# Patient Record
Sex: FEMALE | Race: WHITE | Employment: PART TIME | URBAN - METROPOLITAN AREA
[De-identification: names, ages, dates, MRNs, and addresses within clinical notes are randomized per-mention and may not be internally consistent; named-entity substitution may affect disease eponyms.]

---

## 2017-01-01 ENCOUNTER — MED REC SCAN ONLY (OUTPATIENT)
Dept: FAMILY MEDICINE CLINIC | Facility: CLINIC | Age: 27
End: 2017-01-01

## 2017-01-04 ENCOUNTER — TELEPHONE (OUTPATIENT)
Dept: FAMILY MEDICINE CLINIC | Facility: CLINIC | Age: 27
End: 2017-01-04

## 2017-01-04 ENCOUNTER — LAB ENCOUNTER (OUTPATIENT)
Dept: LAB | Age: 27
End: 2017-01-04
Attending: INTERNAL MEDICINE
Payer: MEDICAID

## 2017-01-04 DIAGNOSIS — E89.0 HYPOTHYROIDISM, POSTSURGICAL: Primary | ICD-10-CM

## 2017-01-04 LAB
FREE T4: 0.3 NG/DL (ref 0.9–1.8)
TSI SER-ACNC: >100 MIU/ML (ref 0.35–5.5)

## 2017-01-04 PROCEDURE — 36415 COLL VENOUS BLD VENIPUNCTURE: CPT

## 2017-01-04 PROCEDURE — 84443 ASSAY THYROID STIM HORMONE: CPT

## 2017-01-04 PROCEDURE — 84439 ASSAY OF FREE THYROXINE: CPT

## 2017-01-05 ENCOUNTER — TELEPHONE (OUTPATIENT)
Dept: FAMILY MEDICINE CLINIC | Facility: CLINIC | Age: 27
End: 2017-01-05

## 2017-01-05 NOTE — TELEPHONE ENCOUNTER
Patient called back and stated that she spoke with Conner Dodge RN at the time of her office visit on 12/14/2016 and was told that she would wait for the lab results to be faxed over and she would call her back to advise her if she would need to go to the ER.   Pl

## 2017-01-05 NOTE — TELEPHONE ENCOUNTER
Patient states that she has been very fatigued and has a lab order for repeat thyroid labs from a doctor at an urgent care that she was seen at recently. She will be having the lab work done at THE [facility] so that the results can be available to our doctors.

## 2017-01-05 NOTE — TELEPHONE ENCOUNTER
ABDIRIZAK GEE. I did speak with patient on the phone yesterday, not at an [address] I never told patient I would advise if she needed to go to the ED in regards to thyroid results.

## 2017-01-05 NOTE — TELEPHONE ENCOUNTER
Labs not ordered by our office. Authorizing         1/4/2017 MD Dr. Dimple Leal is a endocrinologist. Patient would need to review with office of ordering provider.  We also do not manage patients levo

## 2017-01-06 ENCOUNTER — HOSPITAL ENCOUNTER (OUTPATIENT)
Age: 27
Discharge: ANOTHER HOSPITAL | DRG: 644 | End: 2017-01-06
Attending: FAMILY MEDICINE
Payer: MEDICAID

## 2017-01-06 ENCOUNTER — HOSPITAL ENCOUNTER (INPATIENT)
Facility: HOSPITAL | Age: 27
LOS: 2 days | Discharge: HOME | DRG: 644 | End: 2017-01-11
Attending: EMERGENCY MEDICINE | Admitting: HOSPITALIST
Payer: MEDICAID

## 2017-01-06 VITALS
DIASTOLIC BLOOD PRESSURE: 92 MMHG | OXYGEN SATURATION: 100 % | HEART RATE: 105 BPM | TEMPERATURE: 98 F | RESPIRATION RATE: 20 BRPM | SYSTOLIC BLOOD PRESSURE: 144 MMHG

## 2017-01-06 DIAGNOSIS — R53.1 WEAKNESS GENERALIZED: ICD-10-CM

## 2017-01-06 DIAGNOSIS — R00.0 TACHYCARDIA: ICD-10-CM

## 2017-01-06 DIAGNOSIS — R06.02 SOB (SHORTNESS OF BREATH): ICD-10-CM

## 2017-01-06 DIAGNOSIS — E03.9 HYPOTHYROIDISM, UNSPECIFIED TYPE: Primary | ICD-10-CM

## 2017-01-06 DIAGNOSIS — R53.1 WEAKNESS GENERALIZED: Primary | ICD-10-CM

## 2017-01-06 PROBLEM — E87.2 METABOLIC ACIDOSIS: Status: ACTIVE | Noted: 2017-01-06

## 2017-01-06 PROBLEM — E87.20 METABOLIC ACIDOSIS: Status: ACTIVE | Noted: 2017-01-06

## 2017-01-06 LAB
ALBUMIN SERPL-MCNC: 3.5 G/DL (ref 3.5–4.8)
ALP LIVER SERPL-CCNC: 104 U/L (ref 37–98)
ALT SERPL-CCNC: 32 U/L (ref 14–54)
AST SERPL-CCNC: 46 U/L (ref 15–41)
ATRIAL RATE: 103 BPM
BASOPHILS # BLD AUTO: 0.06 X10(3) UL (ref 0–0.1)
BASOPHILS NFR BLD AUTO: 1 %
BILIRUB SERPL-MCNC: 0.6 MG/DL (ref 0.1–2)
BILIRUB UR QL STRIP.AUTO: NEGATIVE
BUN BLD-MCNC: 7 MG/DL (ref 8–20)
CALCIUM BLD-MCNC: 8.4 MG/DL (ref 8.3–10.3)
CHLORIDE: 110 MMOL/L (ref 101–111)
CO2: 21 MMOL/L (ref 22–32)
COLOR UR AUTO: YELLOW
CREAT BLD-MCNC: 0.82 MG/DL (ref 0.55–1.02)
EOSINOPHIL # BLD AUTO: 0.15 X10(3) UL (ref 0–0.3)
EOSINOPHIL NFR BLD AUTO: 2.6 %
ERYTHROCYTE [DISTWIDTH] IN BLOOD BY AUTOMATED COUNT: 14.2 % (ref 11.5–16)
FREE T4: 0.3 NG/DL (ref 0.9–1.8)
GLUCOSE BLD-MCNC: 108 MG/DL (ref 65–99)
GLUCOSE BLD-MCNC: 98 MG/DL (ref 70–99)
GLUCOSE UR STRIP.AUTO-MCNC: NEGATIVE MG/DL
HCT VFR BLD AUTO: 41.6 % (ref 34–50)
HGB BLD-MCNC: 14.7 G/DL (ref 12–16)
IMMATURE GRANULOCYTE COUNT: 0.01 X10(3) UL (ref 0–1)
IMMATURE GRANULOCYTE RATIO %: 0.2 %
KETONES UR STRIP.AUTO-MCNC: NEGATIVE MG/DL
LEUKOCYTE ESTERASE UR QL STRIP.AUTO: NEGATIVE
LYMPHOCYTES # BLD AUTO: 2.28 X10(3) UL (ref 0.9–4)
LYMPHOCYTES NFR BLD AUTO: 39 %
M PROTEIN MFR SERPL ELPH: 6.8 G/DL (ref 6.1–8.3)
MCH RBC QN AUTO: 30.6 PG (ref 27–33.2)
MCHC RBC AUTO-ENTMCNC: 35.3 G/DL (ref 31–37)
MCV RBC AUTO: 86.7 FL (ref 81–100)
MONOCYTES # BLD AUTO: 0.87 X10(3) UL (ref 0.1–0.6)
MONOCYTES NFR BLD AUTO: 14.9 %
NEUTROPHIL ABS PRELIM: 2.47 X10 (3) UL (ref 1.3–6.7)
NEUTROPHILS # BLD AUTO: 2.47 X10(3) UL (ref 1.3–6.7)
NEUTROPHILS NFR BLD AUTO: 42.3 %
NITRITE UR QL STRIP.AUTO: NEGATIVE
P AXIS: 30 DEGREES
P-R INTERVAL: 172 MS
PH UR STRIP.AUTO: 7.5 [PH] (ref 4.5–8)
PLATELET # BLD AUTO: 189 10(3)UL (ref 150–450)
POTASSIUM SERPL-SCNC: 3.8 MMOL/L (ref 3.6–5.1)
PROT UR STRIP.AUTO-MCNC: NEGATIVE MG/DL
Q-T INTERVAL: 374 MS
QRS DURATION: 90 MS
QTC CALCULATION (BEZET): 489 MS
R AXIS: 68 DEGREES
RBC # BLD AUTO: 4.8 X10(6)UL (ref 3.8–5.1)
RBC UR QL AUTO: NEGATIVE
RED CELL DISTRIBUTION WIDTH-SD: 43.9 FL (ref 35.1–46.3)
SODIUM SERPL-SCNC: 142 MMOL/L (ref 136–144)
SP GR UR STRIP.AUTO: 1.02 (ref 1–1.03)
T AXIS: 11 DEGREES
TSI SER-ACNC: >100 MIU/ML (ref 0.35–5.5)
UROBILINOGEN UR STRIP.AUTO-MCNC: 0.2 MG/DL
VENTRICULAR RATE: 103 BPM
WBC # BLD AUTO: 5.8 X10(3) UL (ref 4–13)

## 2017-01-06 PROCEDURE — 93005 ELECTROCARDIOGRAM TRACING: CPT

## 2017-01-06 PROCEDURE — 99215 OFFICE O/P EST HI 40 MIN: CPT

## 2017-01-06 PROCEDURE — 82962 GLUCOSE BLOOD TEST: CPT

## 2017-01-06 PROCEDURE — 93010 ELECTROCARDIOGRAM REPORT: CPT | Performed by: INTERNAL MEDICINE

## 2017-01-06 PROCEDURE — 99205 OFFICE O/P NEW HI 60 MIN: CPT

## 2017-01-06 PROCEDURE — 93010 ELECTROCARDIOGRAM REPORT: CPT

## 2017-01-06 PROCEDURE — 99220 INITIAL OBSERVATION CARE,LEVL III: CPT | Performed by: HOSPITALIST

## 2017-01-06 RX ORDER — POTASSIUM CHLORIDE 20 MEQ/1
40 TABLET, EXTENDED RELEASE ORAL ONCE
Status: COMPLETED | OUTPATIENT
Start: 2017-01-06 | End: 2017-01-06

## 2017-01-06 RX ORDER — ONDANSETRON 2 MG/ML
8 INJECTION INTRAMUSCULAR; INTRAVENOUS EVERY 6 HOURS PRN
Status: DISCONTINUED | OUTPATIENT
Start: 2017-01-06 | End: 2017-01-11

## 2017-01-06 RX ORDER — LEVOTHYROXINE SODIUM 175 UG/1
175 TABLET ORAL
COMMUNITY
End: 2017-01-27

## 2017-01-06 RX ORDER — ONDANSETRON 2 MG/ML
4 INJECTION INTRAMUSCULAR; INTRAVENOUS EVERY 4 HOURS PRN
Status: DISCONTINUED | OUTPATIENT
Start: 2017-01-06 | End: 2017-01-06

## 2017-01-06 RX ORDER — TOPIRAMATE 25 MG/1
50 TABLET ORAL 2 TIMES DAILY PRN
Status: DISCONTINUED | OUTPATIENT
Start: 2017-01-06 | End: 2017-01-07

## 2017-01-06 RX ORDER — MECLIZINE HCL 12.5 MG/1
12.5 TABLET ORAL 3 TIMES DAILY PRN
Status: DISCONTINUED | OUTPATIENT
Start: 2017-01-06 | End: 2017-01-07

## 2017-01-06 RX ORDER — TRAZODONE HYDROCHLORIDE 50 MG/1
50 TABLET ORAL NIGHTLY PRN
Status: DISCONTINUED | OUTPATIENT
Start: 2017-01-06 | End: 2017-01-11

## 2017-01-06 RX ORDER — ACETAMINOPHEN 325 MG/1
650 TABLET ORAL EVERY 6 HOURS PRN
Status: DISCONTINUED | OUTPATIENT
Start: 2017-01-06 | End: 2017-01-11

## 2017-01-06 RX ORDER — SODIUM CHLORIDE 9 MG/ML
INJECTION, SOLUTION INTRAVENOUS CONTINUOUS
Status: ACTIVE | OUTPATIENT
Start: 2017-01-06 | End: 2017-01-06

## 2017-01-06 RX ORDER — TOPIRAMATE 25 MG/1
50 TABLET ORAL 2 TIMES DAILY
Status: DISCONTINUED | OUTPATIENT
Start: 2017-01-06 | End: 2017-01-06

## 2017-01-06 RX ORDER — SODIUM CHLORIDE 9 MG/ML
INJECTION, SOLUTION INTRAVENOUS CONTINUOUS
Status: DISCONTINUED | OUTPATIENT
Start: 2017-01-06 | End: 2017-01-11

## 2017-01-06 NOTE — ED NOTES
Brought patient to bathroom via wheelchair, was able to get up to chair but holding onto cart and wheelchair to stand

## 2017-01-06 NOTE — ED PROVIDER NOTES
Patient Seen in: [facility]    History   Patient presents with:  Dizziness (neurologic)    Stated Complaint: high pulse, hypothyroidism    HPI    Patient is a 25-year-old female.   Coming in today with complaint of feeling w mcg by mouth before breakfast.   B Complex Vitamins (B-COMPLEX/B-12 OR),  Take by mouth.   ergocalciferol 38486 UNITS Oral Cap,  Take 1 capsule (50,000 Units total) by mouth once a week.    topiramate 50 MG Oral Tab,  Take 1 tablet (50 mg total) by mouth 2 (Temporal)  Resp 20  SpO2 100%  LMP 11/15/2016 (Approximate)        Physical Exam   Constitutional: She is oriented to person, place, and time. She appears well-developed and well-nourished. She appears distressed.    HENT:   Head: Normocephalic and atrauma evaluation    Follow-up:  On File, E D Attending  Patrick 61  [phone]    Today        Medications Prescribed:  Current Discharge Medication List

## 2017-01-06 NOTE — ED INITIAL ASSESSMENT (HPI)
C/o dizziness, sob, and lightheadedness with confusion x 3 days. Stated she spoke with her physician yesterday. She stated she has hypothyroidism and her pulse was 125 bpm this morning.

## 2017-01-06 NOTE — ED PROVIDER NOTES
Patient Seen in: [facility] Emergency Department    History   Patient presents with:  Dizziness (neurologic)    Stated Complaint:     HPI    39-year-old female presents reporting that her \"thyroid is low\".   Patient reports 2 years of thyroid issue Cholesterol Father    • Hypertension Father    • Heart Disease Neg    • Stroke Neg          Smoking Status: Never Smoker                      Smokeless Status: Never Used                        Alcohol Use: No                Review of Systems    Positive f other components within normal limits   FREE T4 (FREE THYROXINE) - Abnormal; Notable for the following:     Free T4 0.3 (*)     All other components within normal limits   CBC W/ DIFFERENTIAL - Abnormal; Notable for the following:     Monocyte Absolute 0.8

## 2017-01-06 NOTE — PROGRESS NOTES
[facility] [address]  [place], [address]  ?  01/06/2017  ? Re: Manuela Rodriguez  ? To Whom It May Concern:    Manuela Rodriguez was admitted to [facility] on 1/6/2017.     Please excuse Manuela Rodriguez from attending work for these

## 2017-01-06 NOTE — PLAN OF CARE
NURSING ADMISSION NOTE      Patient admitted via Cart  Oriented to room. Safety precautions initiated. Bed in low position. Call light in reach.   admissiion complete  Report given to RN

## 2017-01-06 NOTE — H&P
[facility]  History and Physical     Manuela Rodriguez Patient Status:  Emergency    10/27/1990 MRN [id number]   Location [facility] Attending Mary Villa MD   Hosp Day # 0 PCP Remy Mesa MD     Chief Complaint: Erven [place] before breakfast. Disp:  Rfl:    B Complex Vitamins (B-COMPLEX/B-12 OR) Take by mouth. Disp:  Rfl:    ergocalciferol 70311 UNITS Oral Cap Take 1 capsule (50,000 Units total) by mouth once a week.  Disp: 12 capsule Rfl: 0   topiramate 50 MG Oral Tab Take 1 t etiology; check orthostatics; possibly from hypothyroidism; neuro consult; possible MS? (says she was ruled out for MS at [facility] but I still have my own suspicions)  2. Possible pyelonephritis-empiric rocephin-send urine for culture   3.  Hypothyroidism-endo co

## 2017-01-06 NOTE — ED INITIAL ASSESSMENT (HPI)
Dizzy, lightheaded for about 2 days, feeling shes having palpitations, Endocrinolgist has been adjusting thyroid medication

## 2017-01-07 LAB
CORTISOL: 10.4 UG/DL
POTASSIUM SERPL-SCNC: 3.3 MMOL/L (ref 3.6–5.1)
POTASSIUM SERPL-SCNC: 3.7 MMOL/L (ref 3.6–5.1)

## 2017-01-07 PROCEDURE — 99223 1ST HOSP IP/OBS HIGH 75: CPT | Performed by: OTHER

## 2017-01-07 PROCEDURE — 99225 SUBSEQUENT OBSERVATION CARE: CPT | Performed by: HOSPITALIST

## 2017-01-07 RX ORDER — POTASSIUM CHLORIDE 20 MEQ/1
40 TABLET, EXTENDED RELEASE ORAL EVERY 4 HOURS
Status: COMPLETED | OUTPATIENT
Start: 2017-01-07 | End: 2017-01-07

## 2017-01-07 RX ORDER — IBUPROFEN 400 MG/1
400 TABLET ORAL EVERY 6 HOURS PRN
Status: DISCONTINUED | OUTPATIENT
Start: 2017-01-07 | End: 2017-01-11

## 2017-01-07 RX ORDER — TOPIRAMATE 25 MG/1
50 TABLET ORAL 2 TIMES DAILY
Status: DISCONTINUED | OUTPATIENT
Start: 2017-01-07 | End: 2017-01-11

## 2017-01-07 RX ORDER — POTASSIUM CHLORIDE 20 MEQ/1
40 TABLET, EXTENDED RELEASE ORAL ONCE
Status: COMPLETED | OUTPATIENT
Start: 2017-01-07 | End: 2017-01-07

## 2017-01-07 RX ORDER — MECLIZINE HYDROCHLORIDE 25 MG/1
25 TABLET ORAL 3 TIMES DAILY PRN
Status: DISCONTINUED | OUTPATIENT
Start: 2017-01-07 | End: 2017-01-08

## 2017-01-07 NOTE — PLAN OF CARE
METABOLIC/FLUID AND ELECTROLYTES - ADULT    • Electrolytes maintained within normal limits Progressing        NEUROLOGICAL - ADULT    • Achieves maximal functionality and self care Progressing        Patient/Family Goals    • Patient/Family Short Term Goal

## 2017-01-07 NOTE — PROGRESS NOTES
[facility]  Progress note     Sophy Fly Patient Status:  Emergency    10/27/1990 MRN [id number]   Location [facility] Attending Shahbaz Arellano MD   Hosp Day # 1 PCP Donaldo Campbell MD     Chief Complaint: dizziness possibly from hypothyroidism; neuro consult; possible MS? (says she was ruled out for MS at [facility] but I still have my own suspicions); cortisol normal  2.  Probable acute pyelonephritis-empiric rocephin-monitor urine for culture (U/A looks fine but grossly her

## 2017-01-07 NOTE — CONSULTS
[facility]    PATIENT'S NAME: Hugh Khan   ATTENDING PHYSICIAN: Tyrell Skinner M.D.   Pj Better: Jordon Barnes M.D.    PATIENT ACCOUNT#:   [de-identified]    LOCATION:  [address] [place] [address]  MEDICAL RECORD #:   [id number]       DATE OF BIRTH:  [birth year] HISTORY:  Hysterectomy and thyroidectomy in the past.    SOCIAL HISTORY:  No tobacco or alcohol use. FAMILY HISTORY:  Breast cancer in the mother and diabetes in the father. High cholesterol in the father.     MEDICATIONS:  Current medications included regular basis, 50 mg twice a day, not p.r.n., and Antivert as needed. She said Antivert is helping her. We will give 25 mg 3 times a day. PT and OT to see. Will follow up as needed. Please call with any questions.   The patient can be seen as outpatien

## 2017-01-07 NOTE — PLAN OF CARE
CARDIOVASCULAR - ADULT    • Absence of cardiac arrhythmias or at baseline Progressing        METABOLIC/FLUID AND ELECTROLYTES - ADULT    • Glucose maintained within prescribed range Progressing    • Electrolytes maintained within normal limits Progressing

## 2017-01-07 NOTE — PAYOR COMM NOTE
Attending Physician: Evette Cowan MD    Review Type: ADMISSION   Reviewer: Yogi Base       Date: January 7, 2017 - 2:21 PM  Payor: Moshe Mendez  Authorization Number: [id number]/A  Admit date: 1/6/2017  9:32 AM   Admitted from Emergency Dept.: yes    REVIEWER CO Dose Route User    1/7/2017 0945 Given 37.6 mcg Intravenous Georgina Bauman, PILLO      Meclizine HCl (ANTIVERT) tab 12.5 mg     Date Action Dose Route User    1/7/2017 0945 Given 12.5 mg Oral Mireille Goncalves RN    1/6/2017 2202 Given 12.5 mg Oral Salud Salas Monocyte Absolute 0.87 (*)     All other components within normal limits   CBC WITH DIFFERENTIAL WITH PLATELET    Narrative: The following orders were created for panel order CBC WITH DIFFERENTIAL WITH PLATELET.   Procedure facilitate increase in free T4 levels. 2.      Dizziness.  Evaluation per primary service.  Cortisol was 10.4 and normal, making adrenal insufficiency unlikely. Thank you for this consult.  Please call with further questions.

## 2017-01-08 ENCOUNTER — APPOINTMENT (OUTPATIENT)
Dept: ULTRASOUND IMAGING | Facility: HOSPITAL | Age: 27
DRG: 644 | End: 2017-01-08
Attending: HOSPITALIST
Payer: MEDICAID

## 2017-01-08 LAB
POTASSIUM SERPL-SCNC: 3.3 MMOL/L (ref 3.6–5.1)
POTASSIUM SERPL-SCNC: 3.7 MMOL/L (ref 3.6–5.1)

## 2017-01-08 PROCEDURE — 99225 SUBSEQUENT OBSERVATION CARE: CPT | Performed by: HOSPITALIST

## 2017-01-08 PROCEDURE — 76770 US EXAM ABDO BACK WALL COMP: CPT

## 2017-01-08 RX ORDER — MECLIZINE HYDROCHLORIDE 25 MG/1
25 TABLET ORAL EVERY 6 HOURS SCHEDULED
Status: DISCONTINUED | OUTPATIENT
Start: 2017-01-08 | End: 2017-01-11

## 2017-01-08 RX ORDER — POTASSIUM CHLORIDE 20 MEQ/1
40 TABLET, EXTENDED RELEASE ORAL EVERY 4 HOURS
Status: COMPLETED | OUTPATIENT
Start: 2017-01-08 | End: 2017-01-08

## 2017-01-08 RX ORDER — POTASSIUM CHLORIDE 20 MEQ/1
40 TABLET, EXTENDED RELEASE ORAL ONCE
Status: COMPLETED | OUTPATIENT
Start: 2017-01-08 | End: 2017-01-08

## 2017-01-08 RX ORDER — HEPARIN SODIUM 5000 [USP'U]/ML
5000 INJECTION, SOLUTION INTRAVENOUS; SUBCUTANEOUS EVERY 8 HOURS SCHEDULED
Status: DISCONTINUED | OUTPATIENT
Start: 2017-01-08 | End: 2017-01-11

## 2017-01-08 NOTE — PROGRESS NOTES
Pharmacy Note:   Antimicrobial Weight Dose Adjustment for: Ceftriaxone    Taylor Rico has been prescribed Ceftriaxone 1 gram daily. Estimated Creatinine Clearance: 99.3 mL/min (based on Cr of 0.82).           Weight = 104.3 kg          BMI

## 2017-01-08 NOTE — PLAN OF CARE
CARDIOVASCULAR - ADULT    • Absence of cardiac arrhythmias or at baseline Progressing        METABOLIC/FLUID AND ELECTROLYTES - ADULT    • Electrolytes maintained within normal limits Progressing        MUSCULOSKELETAL - ADULT    • Return mobility to safes

## 2017-01-08 NOTE — PROGRESS NOTES
[place] HOSPITALIST  Progress note     Aminah Bryce Patient Status:  Emergency    10/27/1990 MRN [id number]   Location [facility] Attending Phillip Eastman MD   Hosp Day # 2 PCP Loc Pinto MD     Chief Complaint: dizziness Epic.      ASSESSMENT / PLAN:     1. Dizziness-vertigo? possibly from hypothyroidism  2.  Probable acute pyelonephritis-empiric rocephin-monitor urine for culture (U/A looks fine but grossly her urine had white sediment in ER; also has flank pain and temp 9

## 2017-01-08 NOTE — PLAN OF CARE
MUSCULOSKELETAL - ADULT    • Return mobility to safest level of function Progressing        NEUROLOGICAL - ADULT    • Achieves stable or improved neurological status Progressing        Patient/Family Goals    • Patient/Family Long Term Goal Progressing

## 2017-01-09 ENCOUNTER — APPOINTMENT (OUTPATIENT)
Dept: CV DIAGNOSTICS | Facility: HOSPITAL | Age: 27
DRG: 644 | End: 2017-01-09
Attending: HOSPITALIST
Payer: MEDICAID

## 2017-01-09 LAB
BUN BLD-MCNC: 7 MG/DL (ref 8–20)
CALCIUM BLD-MCNC: 7.6 MG/DL (ref 8.3–10.3)
CHLORIDE: 117 MMOL/L (ref 101–111)
CO2: 21 MMOL/L (ref 22–32)
CREAT BLD-MCNC: 0.7 MG/DL (ref 0.55–1.02)
GLUCOSE BLD-MCNC: 72 MG/DL (ref 70–99)
POTASSIUM SERPL-SCNC: 3.2 MMOL/L (ref 3.6–5.1)
POTASSIUM SERPL-SCNC: 3.3 MMOL/L (ref 3.6–5.1)
SODIUM SERPL-SCNC: 146 MMOL/L (ref 136–144)

## 2017-01-09 PROCEDURE — 93306 TTE W/DOPPLER COMPLETE: CPT | Performed by: INTERNAL MEDICINE

## 2017-01-09 PROCEDURE — 99232 SBSQ HOSP IP/OBS MODERATE 35: CPT | Performed by: HOSPITALIST

## 2017-01-09 PROCEDURE — 93306 TTE W/DOPPLER COMPLETE: CPT

## 2017-01-09 RX ORDER — POTASSIUM CHLORIDE 20 MEQ/1
40 TABLET, EXTENDED RELEASE ORAL EVERY 4 HOURS
Status: COMPLETED | OUTPATIENT
Start: 2017-01-09 | End: 2017-01-09

## 2017-01-09 RX ORDER — POTASSIUM CHLORIDE 14.9 MG/ML
20 INJECTION INTRAVENOUS ONCE
Status: DISCONTINUED | OUTPATIENT
Start: 2017-01-10 | End: 2017-01-11

## 2017-01-09 NOTE — CM/SW NOTE
01/09/17 1600   CM/SW Screening   Referral [phone] staff; Chart review;Nursing rounds   Patient's Mental Status Alert;Oriented   Patient lives with (family)   Patient Status Prior to Admission   Independent with ADLs

## 2017-01-09 NOTE — PAYOR COMM NOTE
Attending Physician: Georgette Vallejo MD    Review Type: CONTINUED STAY  Reviewer: Adelita Marley     Date: January 9, 2017 - 2:46 PM  Payor: Moshe Mendez  Authorization Number: N/A  Admit date: 1/6/2017  9:32 AM        REVIEWER COMMENTS    Patient feels dizzy mEq     Date Action Dose Route User    1/8/2017 2134 Given 40 mEq Oral Cottie Nissen, RN      Potassium Chloride ER (K-DUR M20) CR tab 40 mEq     Date Action Dose Route User    1/9/2017 1232 Given 40 mEq Oral Manjit Gould RN    1/9/2017 0830 Given 36

## 2017-01-09 NOTE — PHYSICAL THERAPY NOTE
PT evaluation order received. Attempted to visit patient who is currently busy due to echo. Will re-attempt later today or tomorrow as schedule allows.

## 2017-01-09 NOTE — PROGRESS NOTES
[facility]  Progress Note    Baljinder Conley Patient Status:  Observation    10/27/1990 MRN [id number]   [facility] 5NW-A Attending Danielle Varela MD   Hosp Day # 3 PCP Radha Eckert MD       Assessment/Plan:    1.  Severe postprocedural h Neck swelling     Pain in joints     Dizziness     Headache     Fatigue     Allergic rhinitis     White matter lesion of central nervous system     New persistent daily headache     Multiple sclerosis (HCC)     PCOS (polycystic ovarian syndrome)     Hypoth

## 2017-01-09 NOTE — PROGRESS NOTES
[facility]  Progress Note     Cody Vicente Patient Status:  Inpatient    10/27/1990 MRN [id number]   [facility] 5NW-A Attending Rodrigo Mesa MD   Hosp Day # 3 PCP Zunilda Dove MD     Chief Complaint: dizziness    S: Patient feels ago, worse with standing up-check orthostasis and r/o vertigo, states she was dx in the past with BPPV, Meclizine did help in the past?  3. No nystagmus  4. D/c abx- ucx neg  5.  PT eval    Plan of care: as above    Quality:  · DVT Prophylaxis: heparin  · C

## 2017-01-09 NOTE — PLAN OF CARE
CARDIOVASCULAR - ADULT    • Absence of cardiac arrhythmias or at baseline Not Progressing        METABOLIC/FLUID AND ELECTROLYTES - ADULT    • Electrolytes maintained within normal limits Not Progressing        NEUROLOGICAL - ADULT    • Achieves stable or Nonintractable headache     FH: breast cancer in first degree relative when <48years old     Metabolic acidosis     Pyelonephritis     Hypothyroidism, unspecified type     Weakness generalized     Tachycardia     Dizziness and giddiness       Active issue

## 2017-01-09 NOTE — PLAN OF CARE
CARDIOVASCULAR - ADULT    • Absence of cardiac arrhythmias or at baseline Progressing        MUSCULOSKELETAL - ADULT    • Return mobility to safest level of function Progressing        Patient/Family Goals    • Patient/Family Long Term Goal Progressing

## 2017-01-10 LAB
HAV IGM SER QL: 2 MG/DL (ref 1.7–3)
POTASSIUM SERPL-SCNC: 3.3 MMOL/L (ref 3.6–5.1)
POTASSIUM SERPL-SCNC: 3.7 MMOL/L (ref 3.6–5.1)

## 2017-01-10 PROCEDURE — 99232 SBSQ HOSP IP/OBS MODERATE 35: CPT | Performed by: HOSPITALIST

## 2017-01-10 PROCEDURE — 99233 SBSQ HOSP IP/OBS HIGH 50: CPT | Performed by: OTHER

## 2017-01-10 RX ORDER — POTASSIUM CHLORIDE 20 MEQ/1
40 TABLET, EXTENDED RELEASE ORAL ONCE
Status: COMPLETED | OUTPATIENT
Start: 2017-01-10 | End: 2017-01-10

## 2017-01-10 RX ORDER — MECLIZINE HYDROCHLORIDE 25 MG/1
25 TABLET ORAL 3 TIMES DAILY PRN
Qty: 30 TABLET | Refills: 0 | Status: SHIPPED | OUTPATIENT
Start: 2017-01-10 | End: 2017-06-12

## 2017-01-10 RX ORDER — ERGOCALCIFEROL 1.25 MG/1
50000 CAPSULE ORAL WEEKLY
Qty: 12 CAPSULE | Refills: 0 | Status: SHIPPED | OUTPATIENT
Start: 2017-01-10 | End: 2017-01-27

## 2017-01-10 RX ORDER — POTASSIUM CHLORIDE 20 MEQ/1
40 TABLET, EXTENDED RELEASE ORAL EVERY 4 HOURS
Status: COMPLETED | OUTPATIENT
Start: 2017-01-10 | End: 2017-01-10

## 2017-01-10 NOTE — CONSULTS
[facility]  Report of Consultation    Columba Peralta Patient Status:  Inpatient    10/27/1990 MRN [id number]   [facility] 5NW-A Attending Mack Tripp MD   Hosp Day # 4 PCP Arturo Flores MD     Reason for been longstanding but seems it has worsened over past 2 weeks. Denies any syncope. States when she gets up her HR will increase to 115-120 bpm.  Had holter in past which just showed atrial runs but nothing to correspond with her symptoms.   Echo shows a n topiramate (Topamax) tab 50 mg, 50 mg, Oral, BID  •  ibuprofen (MOTRIN) tab 400 mg, 400 mg, Oral, Q6H PRN  •  TraZODone HCl (DESYREL) tab 50 mg, 50 mg, Oral, Nightly PRN  •  ondansetron HCl (ZOFRAN) injection 8 mg, 8 mg, Intravenous, Q6H PRN  •  0.9%  NaCl

## 2017-01-10 NOTE — PROGRESS NOTES
01/10/17 1718   Provider Notification   Reason for Communication Change in status  (Positive orthostatics)   Provider Name Dr. Michele Russell   Method of Communication Page   Response Waiting for response   Notification Time 26       Paged Dr. Michele Russell to notify

## 2017-01-10 NOTE — PHYSICAL THERAPY NOTE
PHYSICAL THERAPY EVALUATION - INPATIENT     Room Number: 842/968-U  Evaluation Date: 1/10/2017  Type of Evaluation: Initial  Physician Order: PT Eval and Treat    Presenting Problem: severe hypothryroidism  Reason for Therapy: Mobility Dysfunction and while patient was trying to get dressed; patient denies injury, was able to get up and go to work. Pt states that her father is Sandy Goes" and works full-time. He will be incapable of physically assisting or even driving after hospital D/C.     SUBJECTIVE  \ (including adjusting bedclothes, sheets and blankets)?: None   -   Sitting down on and standing up from a chair with arms (e.g., wheelchair, bedside commode, etc.): None   -   Moving from lying on back to sitting on the side of the bed?: None   How much he marching  Ankle pumps  LAQ  Transfer training    Patient End of Session: Up in chair;Needs met;Call light within reach;RN aware of session/findings; All patient questions and concerns addressed    ASSESSMENT     Patient is a 27year old female admitted 1/6/

## 2017-01-10 NOTE — HISTORICAL OFFICE NOTE
Blanco Jc  : 10/27/1990  ACCOUNT:  [id number]  [phone]  PCP: Dr. Alicia Simeon     TODAY'S DATE: 2016  DICTATED BY:  Aislinn Shipman M.D.]    CHIEF COMPLAINT: [Followup of Chest discomfort/tightness and Followup of Palpitations.]    HPI:    [On  used tobacco. denies smoking. CAFFEINE: no caffeine. ALCOHOL: denies drinking. EXERCISE: active.      ALLERGIES: No Known Allergies    MEDICATIONS: Selected prescriptions see below    VITAL SIGNS: [B/P - 104/60 , Pulse - 115, Weight -  204, Height -   65 , evaluation.]    PRESCRIPTIONS:   02/08/16 *Metoprolol Succinate 25MG      1/2 tablet twice daily                   02/08/16 Synthroid             50MCG     one tablet daily                         02/01/16 Calcitriol            0.5MCG    one capsule daily

## 2017-01-10 NOTE — PLAN OF CARE
MUSCULOSKELETAL - ADULT    • Return mobility to safest level of function Progressing        NEUROLOGICAL - ADULT    • Achieves maximal functionality and self care Progressing        Patient/Family Goals    • Patient/Family Long Term Goal Progressing    • P

## 2017-01-10 NOTE — PROGRESS NOTES
01/10/17 1001   Provider Notification   Reason for Communication New consult   Provider Name Dr. Fermin Castellon of Communication Call   Response Waiting for response   Notification Time [phone]     Re-consulted neuro per Dr. Lorrie Sims for pt's persistent di

## 2017-01-11 ENCOUNTER — TELEPHONE (OUTPATIENT)
Dept: FAMILY MEDICINE CLINIC | Facility: CLINIC | Age: 27
End: 2017-01-11

## 2017-01-11 VITALS
RESPIRATION RATE: 18 BRPM | TEMPERATURE: 98 F | WEIGHT: 231.5 LBS | HEART RATE: 92 BPM | SYSTOLIC BLOOD PRESSURE: 120 MMHG | OXYGEN SATURATION: 100 % | HEIGHT: 67 IN | BODY MASS INDEX: 36.34 KG/M2 | DIASTOLIC BLOOD PRESSURE: 75 MMHG

## 2017-01-11 LAB
FREE T4: 0.7 NG/DL (ref 0.9–1.8)
PHOSPHATE SERPL-MCNC: 2.2 MG/DL (ref 2.5–4.9)
POTASSIUM SERPL-SCNC: 3.4 MMOL/L (ref 3.6–5.1)
TSI SER-ACNC: >100 MIU/ML (ref 0.35–5.5)

## 2017-01-11 PROCEDURE — 99239 HOSP IP/OBS DSCHRG MGMT >30: CPT | Performed by: HOSPITALIST

## 2017-01-11 RX ORDER — POTASSIUM CHLORIDE 20 MEQ/1
40 TABLET, EXTENDED RELEASE ORAL EVERY 4 HOURS
Status: DISPENSED | OUTPATIENT
Start: 2017-01-11 | End: 2017-01-11

## 2017-01-11 NOTE — PLAN OF CARE
MUSCULOSKELETAL - ADULT    • Return mobility to safest level of function Progressing        Patient/Family Goals    • Patient/Family Long Term Goal Progressing    • Patient/Family Short Term Goal Progressing        Received patient A/O x4, VSS with with co

## 2017-01-11 NOTE — DISCHARGE SUMMARY
[facility] [address]  [place], [address]  ?  01/11/2017  ? Re: Dali Joel  ? To Whom It May Concern:    Dali Joel was admitted to [facility] from 1/6/2017 to 01/11/2017.     Please excuse Dali Joel from attendin

## 2017-01-11 NOTE — PLAN OF CARE
NEUROLOGICAL - ADULT    • Achieves stable or improved neurological status Progressing    • Achieves maximal functionality and self care Progressing          CARDIOVASCULAR - ADULT    • Absence of cardiac arrhythmias or at baseline Progressing          META Allergic rhinitis     White matter lesion of central nervous system     New persistent daily headache     Multiple sclerosis (HCC)     PCOS (polycystic ovarian syndrome)     Hypothyroidism     Cough     Post procedure discomfort     Palpitations     SOB (s

## 2017-01-11 NOTE — PROGRESS NOTES
[facility] Neurology Progress Note    [place] Patient Status:  Inpatient    10/27/1990 MRN [id number]   [facility] 5NW-A Attending Carlos Barba MD   Hosp Day # 5 PCP Harley Feliciano MD     CC: Dizziness and Lightheadedness 107/76; Sitting 121/81; Standing 131/78  · Antivert 25mg QID  · Palpitations  · Severe hypothyroidism- TSH on 1/6= >100.000  · Synthroid 100mcg IV daily  · Migraines- continue Topamax  · Periventricular and subcortical white abnormalities- MS ruled out

## 2017-01-11 NOTE — PROGRESS NOTES
[facility]  Progress Note    Kaela Bailey Patient Status:  Observation    10/27/1990 MRN [id number]   [facility] 5NW-A Attending Sanam Nair MD   Hosp Day # 5 PCP Hemanth Donohue MD       Assessment/Plan:    1.  Severe postprocedural h syndrome)     Hypothyroidism     Cough     Post procedure discomfort     Palpitations     SOB (shortness of breath)     Heterozygous MTHFR mutation C677T (HCC)     Migraine headache     Insomnia     Nonintractable headache     FH: breast cancer in first de

## 2017-01-11 NOTE — TELEPHONE ENCOUNTER
LMOM at number provided above as CB number for patient to [facility] and discuss.

## 2017-01-11 NOTE — PHYSICAL THERAPY NOTE
PHYSICAL THERAPY TREATMENT NOTE - INPATIENT    Room Number: 534/118-Z     Session: 1   Number of Visits to Meet Established Goals: 5    Presenting Problem: severe hypothryroidism    Problem List  Principal Problem:    Hypothyroidism, unspecified type  Act None   -   Sitting down on and standing up from a chair with arms (e.g., wheelchair, bedside commode, etc.): None   -   Moving from lying on back to sitting on the side of the bed?: None   How much help from another person does the patient currently need. Ahmet Parks session/findings; All patient questions and concerns addressed    ASSESSMENT   Patient is a 27year old female admitted 1/6/2017 for severe hypothyroidism. The patient is demonstrating improved activity tolerance today but appears limited with further progr Goal #5      Goal #6      Goal Comments: Goals established on 1/10/2017;ongoing 1/11/17

## 2017-01-11 NOTE — PLAN OF CARE
CARDIOVASCULAR - ADULT    • Absence of cardiac arrhythmias or at baseline Progressing          NEUROLOGICAL - ADULT    • Achieves stable or improved neurological status Progressing    • Achieves maximal functionality and self care Progressing          PROB Nonintractable headache     FH: breast cancer in first degree relative when <48years old     Metabolic acidosis     Pyelonephritis     Hypothyroidism, unspecified type     Weakness generalized     Tachycardia     Dizziness and giddiness       Active issue

## 2017-01-11 NOTE — DISCHARGE SUMMARY
[facility] HOSPITALIST  DISCHARGE SUMMARY     Debo Cole Patient Status:  Inpatient    10/27/1990 MRN [id number]   [facility] 5NW-A Attending Kiko Gomez MD   [facility] Day # 5 PCP Salima Fortune MD     Date of Admission: 2017  Date of Disch tablet by mouth daily. Refills:  0       ergocalciferol 49037 UNITS Caps   Commonly known as:  DRISDOL/VITAMIN D2        Take 1 capsule (50,000 Units total) by mouth once a week.     Stop taking on:  2/9/2017   Quantity:  12 capsule   Refills:  0       t auscultation bilaterally. No wheezes. No rhonchi. Cardiovascular: S1, S2. Regular rate and rhythm. No murmurs, rubs or gallops. Abdomen: Soft, nontender, nondistended. Positive bowel sounds. No rebound or guarding.   Neurologic: No focal neurological de

## 2017-01-11 NOTE — PROGRESS NOTES
[facility]  Progress Note     Debo Cole Patient Status:  Inpatient    10/27/1990 MRN [id number]   [facility] 5NW-A Attending Kiko Gomez MD   Hosp Day # 5 PCP Salima Fortune MD     Chief Complaint: dizziness    S: Patient state levothyroxine sodium  100 mcg Intravenous Daily   • topiramate  50 mg Oral BID       ASSESSMENT / PLAN:     1. Hypothyroidism- severe, postprocedural on iv T4, better clinically  2.  Dizziness-started 1 week ago, worse with standing up-check orthostasis and

## 2017-01-11 NOTE — PROGRESS NOTES
NURSING DISCHARGE NOTE    Discharged Home via Wheelchair. Accompanied by Family member  Belongings Taken by patient/family. Discharge instructions and prescribed medications discussed with pt, who verbalized understanding of plan.  Pertinent handout

## 2017-01-13 ENCOUNTER — OFFICE VISIT (OUTPATIENT)
Dept: FAMILY MEDICINE CLINIC | Facility: CLINIC | Age: 27
End: 2017-01-13

## 2017-01-13 ENCOUNTER — APPOINTMENT (OUTPATIENT)
Dept: LAB | Age: 27
End: 2017-01-13
Attending: INTERNAL MEDICINE
Payer: MEDICAID

## 2017-01-13 VITALS
HEIGHT: 65 IN | HEART RATE: 102 BPM | TEMPERATURE: 98 F | RESPIRATION RATE: 16 BRPM | DIASTOLIC BLOOD PRESSURE: 74 MMHG | SYSTOLIC BLOOD PRESSURE: 120 MMHG

## 2017-01-13 DIAGNOSIS — E87.6 HYPOKALEMIA: ICD-10-CM

## 2017-01-13 DIAGNOSIS — R53.1 GENERALIZED WEAKNESS: ICD-10-CM

## 2017-01-13 DIAGNOSIS — E03.9 HYPOTHYROIDISM, UNSPECIFIED TYPE: Primary | ICD-10-CM

## 2017-01-13 LAB
BUN BLD-MCNC: 12 MG/DL (ref 8–20)
CALCIUM BLD-MCNC: 8.3 MG/DL (ref 8.3–10.3)
CHLORIDE: 111 MMOL/L (ref 101–111)
CO2: 26 MMOL/L (ref 22–32)
CREAT BLD-MCNC: 0.82 MG/DL (ref 0.55–1.02)
GLUCOSE BLD-MCNC: 82 MG/DL (ref 70–99)
POTASSIUM SERPL-SCNC: 3.9 MMOL/L (ref 3.6–5.1)
SODIUM SERPL-SCNC: 143 MMOL/L (ref 136–144)

## 2017-01-13 PROCEDURE — 36415 COLL VENOUS BLD VENIPUNCTURE: CPT

## 2017-01-13 PROCEDURE — 80048 BASIC METABOLIC PNL TOTAL CA: CPT

## 2017-01-13 PROCEDURE — 99213 OFFICE O/P EST LOW 20 MIN: CPT | Performed by: INTERNAL MEDICINE

## 2017-01-13 NOTE — PROGRESS NOTES
[facility] Internal Medicine Office Note  Chief Complaint:   Patient presents with:  Hospital F/U: admit Epifanio Arch 1/6/17-1/11/17 for hypothyroid      HPI:   This is a 27year old female coming in for hospital follow up  TSH >100   She felt diz Thyroidectomy     • Other surgical history  01/13/15     Subtotal Thyroidectomy    • Other surgical history Left      foot sx x3   • D & c  6/22/2011   • D & c  2009   • Laparoscopy,pelvic,biopsy  2008     drill ovary     Family History   Problem Relation 38.52 kg/(m^2) as calculated from the following:    Height as of this encounter: 65\". Weight as of 1/6/17: 231 lb 7.7 oz. Vital signs reviewed. Appears stated age, well groomed. Physical Exam   Vitals reviewed.    Constitutional: She appears well-dev understanding. Patient is notified to call with any questions, complications, allergies, or worsening or changing symptoms. Patient is to call with any side effects or complications from the treatments as a result of today.      Cristian Hope MD

## 2017-01-13 NOTE — PATIENT INSTRUCTIONS
Thank you for choosing Valentina Pacheco MD at [place] [address]  To Do: Vimal Licea  1. Blood work today  2.  Call your insurance to find endocrinologist and make follow up appointment    • Please signup for [facility] CHART, which is electronic access to your please call Central Scheduling at [phone]  Please allow our office 5 business days to contact you regarding any testing results.     Refill policies:   Allow 3 business days for refills; controlled substances may take longer and must be picked up from

## 2017-01-18 ENCOUNTER — OFFICE VISIT (OUTPATIENT)
Dept: FAMILY MEDICINE CLINIC | Facility: CLINIC | Age: 27
End: 2017-01-18

## 2017-01-18 VITALS
WEIGHT: 219 LBS | TEMPERATURE: 98 F | BODY MASS INDEX: 34.37 KG/M2 | DIASTOLIC BLOOD PRESSURE: 72 MMHG | HEART RATE: 103 BPM | SYSTOLIC BLOOD PRESSURE: 112 MMHG | OXYGEN SATURATION: 99 % | HEIGHT: 67 IN | RESPIRATION RATE: 25 BRPM

## 2017-01-18 DIAGNOSIS — J00 ACUTE NASOPHARYNGITIS: Primary | ICD-10-CM

## 2017-01-18 DIAGNOSIS — J34.89 SINUS PRESSURE: ICD-10-CM

## 2017-01-18 PROCEDURE — 99213 OFFICE O/P EST LOW 20 MIN: CPT | Performed by: NURSE PRACTITIONER

## 2017-01-18 NOTE — PATIENT INSTRUCTIONS
Self-Care for Sore Throats  Sore throats happen for many reasons, such as colds, allergies, and infections caused by viruses or bacteria. In any case, your throat becomes red and sore.  Your goal for self-care is to reduce your discomfort while giving you Contact your healthcare provider if you have:  · A temperature over 101°F (38.3°C)  · White spots on the throat  · Great difficulty swallowing  · Trouble breathing  · A skin rash  · Recent exposure to someone else with strep bacteria  · Severe hoarseness a · Over-the-counter decongestants may be used unless a similar medicine was prescribed. Nasal sprays work the fastest. Use one that contains phenylephrine or oxymetazoline. First blow the nose gently. Then use the spray.  Do not use these medicines more ofte © 2735-6559 [address], [address]. All rights reserved. This information is not intended as a substitute for professional medical care. Always follow your healthcare professional's instructions.

## 2017-01-18 NOTE — PROGRESS NOTES
HPI:   Goldy Finn is a 27year old female who presents with ill symptoms for  4  days.  Patient reports sore throat, congestion, dry cough, cough is not keeping pt up at night, ear pain, sinus pain, OTC cold meds have not been helping, sniffling and sn • Heart Disease Neg    • Stroke Neg         Smoking Status: Never Smoker                      Smokeless Status: Never Used                        Alcohol Use: No                  REVIEW OF SYSTEMS:   GENERAL: feels well otherwise, fatigued with illness  HE · Suck on throat lozenges, cough drops, hard candy, ice chips, or frozen fruit-juice bars. Use the sugar-free versions if your diet or medical condition requires them. Gargle to ease irritation  Gargling every hour or 2 can ease irritation.  Try gargling w Sinusitis (No Antibiotics)    The sinuses are air-filled spaces within the bones of the face. They connect to the inside of the nose. Sinusitis is an inflammation of the tissue lining the sinus cavity.  Sinus inflammation can occur during a cold. It can als · Use acetaminophen or ibuprofen to control pain, unless another pain medicine was prescribed. (If you have chronic liver or kidney disease or ever had a stomach ulcer, talk with your doctor before using these medicines.  Aspirin should never be used in any

## 2017-01-21 ENCOUNTER — OFFICE VISIT (OUTPATIENT)
Dept: FAMILY MEDICINE CLINIC | Facility: CLINIC | Age: 27
End: 2017-01-21

## 2017-01-21 ENCOUNTER — LAB ENCOUNTER (OUTPATIENT)
Dept: LAB | Age: 27
End: 2017-01-21
Attending: INTERNAL MEDICINE
Payer: MEDICAID

## 2017-01-21 VITALS
SYSTOLIC BLOOD PRESSURE: 122 MMHG | DIASTOLIC BLOOD PRESSURE: 78 MMHG | WEIGHT: 227 LBS | BODY MASS INDEX: 37.82 KG/M2 | HEIGHT: 65 IN | HEART RATE: 100 BPM | RESPIRATION RATE: 16 BRPM

## 2017-01-21 DIAGNOSIS — E03.9 ACQUIRED HYPOTHYROIDISM: Primary | ICD-10-CM

## 2017-01-21 DIAGNOSIS — E03.9 ACQUIRED HYPOTHYROIDISM: ICD-10-CM

## 2017-01-21 LAB
25-HYDROXYVITAMIN D (TOTAL): 15.7 NG/ML (ref 30–100)
ALBUMIN SERPL-MCNC: 3.7 G/DL (ref 3.5–4.8)
ALP LIVER SERPL-CCNC: 73 U/L (ref 37–98)
ALT SERPL-CCNC: 49 U/L (ref 14–54)
AST SERPL-CCNC: 50 U/L (ref 15–41)
BASOPHILS # BLD AUTO: 0.03 X10(3) UL (ref 0–0.1)
BASOPHILS NFR BLD AUTO: 0.6 %
BILIRUB SERPL-MCNC: 0.8 MG/DL (ref 0.1–2)
BUN BLD-MCNC: 7 MG/DL (ref 8–20)
CALCIUM BLD-MCNC: 8.4 MG/DL (ref 8.3–10.3)
CHLORIDE: 108 MMOL/L (ref 101–111)
CO2: 26 MMOL/L (ref 22–32)
CREAT BLD-MCNC: 0.87 MG/DL (ref 0.55–1.02)
EOSINOPHIL # BLD AUTO: 0.13 X10(3) UL (ref 0–0.3)
EOSINOPHIL NFR BLD AUTO: 2.6 %
ERYTHROCYTE [DISTWIDTH] IN BLOOD BY AUTOMATED COUNT: 14.8 % (ref 11.5–16)
EST. AVERAGE GLUCOSE BLD GHB EST-MCNC: 94 MG/DL (ref 68–126)
FREE T4: 0.6 NG/DL (ref 0.9–1.8)
GLUCOSE BLD-MCNC: 89 MG/DL (ref 70–99)
HAV AB SERPL IA-ACNC: 1465 PG/ML (ref 193–986)
HAV IGM SER QL: 2 MG/DL (ref 1.7–3)
HBA1C MFR BLD HPLC: 4.9 % (ref ?–5.7)
HCT VFR BLD AUTO: 42.5 % (ref 34–50)
HGB BLD-MCNC: 14.3 G/DL (ref 12–16)
IMMATURE GRANULOCYTE COUNT: 0.01 X10(3) UL (ref 0–1)
IMMATURE GRANULOCYTE RATIO %: 0.2 %
LYMPHOCYTES # BLD AUTO: 1.87 X10(3) UL (ref 0.9–4)
LYMPHOCYTES NFR BLD AUTO: 37.7 %
M PROTEIN MFR SERPL ELPH: 6.9 G/DL (ref 6.1–8.3)
MCH RBC QN AUTO: 31 PG (ref 27–33.2)
MCHC RBC AUTO-ENTMCNC: 33.6 G/DL (ref 31–37)
MCV RBC AUTO: 92 FL (ref 81–100)
MONOCYTES # BLD AUTO: 0.5 X10(3) UL (ref 0.1–0.6)
MONOCYTES NFR BLD AUTO: 10.1 %
NEUTROPHIL ABS PRELIM: 2.42 X10 (3) UL (ref 1.3–6.7)
NEUTROPHILS # BLD AUTO: 2.42 X10(3) UL (ref 1.3–6.7)
NEUTROPHILS NFR BLD AUTO: 48.8 %
PLATELET # BLD AUTO: 190 10(3)UL (ref 150–450)
POTASSIUM SERPL-SCNC: 3.5 MMOL/L (ref 3.6–5.1)
RBC # BLD AUTO: 4.62 X10(6)UL (ref 3.8–5.1)
RED CELL DISTRIBUTION WIDTH-SD: 48.7 FL (ref 35.1–46.3)
SODIUM SERPL-SCNC: 142 MMOL/L (ref 136–144)
T3 SERPL-MCNC: 54 NG/DL (ref 60–181)
TSI SER-ACNC: 57.3 MIU/ML (ref 0.35–5.5)
WBC # BLD AUTO: 5 X10(3) UL (ref 4–13)

## 2017-01-21 PROCEDURE — 84482 T3 REVERSE: CPT

## 2017-01-21 PROCEDURE — 83735 ASSAY OF MAGNESIUM: CPT

## 2017-01-21 PROCEDURE — 83036 HEMOGLOBIN GLYCOSYLATED A1C: CPT

## 2017-01-21 PROCEDURE — 99214 OFFICE O/P EST MOD 30 MIN: CPT | Performed by: INTERNAL MEDICINE

## 2017-01-21 PROCEDURE — 85025 COMPLETE CBC W/AUTO DIFF WBC: CPT

## 2017-01-21 PROCEDURE — 82397 CHEMILUMINESCENT ASSAY: CPT

## 2017-01-21 PROCEDURE — 84439 ASSAY OF FREE THYROXINE: CPT

## 2017-01-21 PROCEDURE — 84480 ASSAY TRIIODOTHYRONINE (T3): CPT

## 2017-01-21 PROCEDURE — 36415 COLL VENOUS BLD VENIPUNCTURE: CPT

## 2017-01-21 PROCEDURE — 84443 ASSAY THYROID STIM HORMONE: CPT

## 2017-01-21 PROCEDURE — 82306 VITAMIN D 25 HYDROXY: CPT

## 2017-01-21 PROCEDURE — 82607 VITAMIN B-12: CPT

## 2017-01-21 PROCEDURE — 80053 COMPREHEN METABOLIC PANEL: CPT

## 2017-01-21 NOTE — PROGRESS NOTES
CC: Patient presents with:  Hospital F/U: thyroid issues       HPI:   Here to f/u after recent hospitalization. She says she takes synthroid daily. Current Outpatient Prescriptions:  Potassium 99 MG Oral Tab Take 2 tablets by mouth.  Disp:  Rfl Migraine headache     Insomnia     Nonintractable headache     FH: breast cancer in first degree relative when <48years old     Metabolic acidosis     Pyelonephritis     Hypothyroidism, unspecified type     Weakness generalized     Tachycardia     Dizzin tumor as pt without thyroid. Will run labs. Close f/u in few weeks. She lost endo doc as no longer takes insurance. Father in the room, all questions answered.      Greater than 50% of the encounter time was spent on counseling and/or coordination of care a

## 2017-01-23 ENCOUNTER — TELEPHONE (OUTPATIENT)
Dept: FAMILY MEDICINE CLINIC | Facility: CLINIC | Age: 27
End: 2017-01-23

## 2017-01-23 NOTE — TELEPHONE ENCOUNTER
Tried calling patient, phone is not accepting calls at this time. Patient needs to fill out our questionnaire to fill out [facility] paperwork. Will place paperwork in red triage folder and attempt to call patient later today.      [facility] paperwork given to MD at

## 2017-01-23 NOTE — TELEPHONE ENCOUNTER
Called number given and it says \"at the subscribers request.. Solange Santiago \"  Cannot leave message.

## 2017-01-24 NOTE — TELEPHONE ENCOUNTER
Attemped to call back, at the subscribers request unable to receive incoming calls. "[employer] message sent.

## 2017-01-24 NOTE — TELEPHONE ENCOUNTER
Patient reports she still has HR of 130 bpm with associated chest pain and SOB. Patient states she discussed with Dr Simona Tapia not returning to the ER \"because they dont do anything anyways. \" Patient advised that our recommendation with HR of 130 and chest

## 2017-01-24 NOTE — TELEPHONE ENCOUNTER
Pt came in on 1/24/2017 to bring in questionnaire for disability and FMLA Forms. Attached paperwork with her FMLA paperwork.  Thank you

## 2017-01-24 NOTE — TELEPHONE ENCOUNTER
Patient does not want to come to office to fill out questionnaire. Notified patient we need this information to complete the paperwork, offered to fax form to [facility] if that is closer for patient.  Patient asking if we can fax to [facility] inside [facility] where she wor

## 2017-01-25 ENCOUNTER — TELEPHONE (OUTPATIENT)
Dept: FAMILY MEDICINE CLINIC | Facility: CLINIC | Age: 27
End: 2017-01-25

## 2017-01-25 LAB — TRIIODOTHYRONINE, REVERSE: 9.4 NG/DL

## 2017-01-25 NOTE — TELEPHONE ENCOUNTER
Message        Good Morning,               Per [employer] this test does not meet medical criteria and a peer to peer has been requested. Physician can complete by calling [phone] follow prompts and use tracking#[id number].  Please advise if physician wi

## 2017-01-26 LAB — LEPTIN: 28.1 NG/ML

## 2017-01-27 RX ORDER — LEVOTHYROXINE SODIUM 0.2 MG/1
200 TABLET ORAL
Qty: 90 TABLET | Refills: 0 | Status: SHIPPED | OUTPATIENT
Start: 2017-01-27 | End: 2017-08-09

## 2017-01-27 RX ORDER — ERGOCALCIFEROL 1.25 MG/1
50000 CAPSULE ORAL
Qty: 48 CAPSULE | Refills: 0 | Status: SHIPPED | OUTPATIENT
Start: 2017-01-30 | End: 2017-07-14

## 2017-01-27 NOTE — TELEPHONE ENCOUNTER
Paperwork signed by MD. Blanco Stanley sent to scan and copy placed in envelope at  for patient to . Patient aware.

## 2017-01-27 NOTE — PROGRESS NOTES
Quick Note:    Low thyroid, increase synthroid to 200mcg, high leptin.  Low vit D, start vit D 50,000 twice weekly for 6 months.  ______

## 2017-02-02 ENCOUNTER — TELEPHONE (OUTPATIENT)
Dept: FAMILY MEDICINE CLINIC | Facility: CLINIC | Age: 27
End: 2017-02-02

## 2017-02-02 NOTE — TELEPHONE ENCOUNTER
Pt stated she would call back, phone was not working. Advised to ask what ER she was at, and when and where were her most recent labs.   Pt did not call back as of 5:15 2/2/2017

## 2017-02-03 ENCOUNTER — HOSPITAL ENCOUNTER (OUTPATIENT)
Dept: MRI IMAGING | Age: 27
Discharge: HOME | End: 2017-02-03
Attending: INTERNAL MEDICINE
Payer: MEDICAID

## 2017-02-03 DIAGNOSIS — E03.9 ACQUIRED HYPOTHYROIDISM: ICD-10-CM

## 2017-02-03 PROCEDURE — 70553 MRI BRAIN STEM W/O & W/DYE: CPT

## 2017-02-03 PROCEDURE — A9575 INJ GADOTERATE MEGLUMI 0.1ML: HCPCS | Performed by: INTERNAL MEDICINE

## 2017-02-03 NOTE — TELEPHONE ENCOUNTER
We need to know if she was at [facility]? Attempted again to reach patient at last known phone number and it won't accept incoming calls. Patient has upcoming appt with Dr. Yinka Wolfe 2/13. Will attempt to sent mychart to patient to call us.

## 2017-02-03 NOTE — TELEPHONE ENCOUNTER
Patient phone in response to MyChart and said [address] is the best way to communicate with her. She was at [facility] 1/30 thur 2/2. I am requesting medical records.

## 2017-02-06 ENCOUNTER — TELEPHONE (OUTPATIENT)
Dept: FAMILY MEDICINE CLINIC | Facility: CLINIC | Age: 27
End: 2017-02-06

## 2017-02-06 ENCOUNTER — PATIENT MESSAGE (OUTPATIENT)
Dept: FAMILY MEDICINE CLINIC | Facility: CLINIC | Age: 27
End: 2017-02-06

## 2017-02-06 NOTE — TELEPHONE ENCOUNTER
Advised patient anytime her pulse is rapid and she feels symptomatic, she should be seen in ER. There is no way to be sure her heart is okay without a doctor checking her. Patient verbalized understanding.   Her pulse currently is 112 and she is short of

## 2017-02-06 NOTE — TELEPHONE ENCOUNTER
[facility] staff said patient called back and said she was coming in to office and will see a nurse when she comes.

## 2017-02-06 NOTE — TELEPHONE ENCOUNTER
Pulse is elevated per patient and she is here to have it checked. Patient states she is sleeping all day and her pulse at home at rest is in the 130's and with activity is 150s. Patient is also short of breath.   She is currently on thyroid medication 200m

## 2017-02-09 NOTE — TELEPHONE ENCOUNTER
Called the [facility] of information department at [facility] for medical records of in patient stay from 1/3/17-2/2/17.   Phone: [phone]  Fax: [phone]    Ester Sosa in the dept states that there is no record of them receiving a release of records request.

## 2017-02-10 NOTE — PROGRESS NOTES
Quick Note:    MRI shows possible pituitary microadenoma needs to see neurosurgery dr. Trisha Ramirez and needs to see Endo doc.  Since she has medicaid send her to Dr Ayleen Mckinney.  ______

## 2017-02-10 NOTE — TELEPHONE ENCOUNTER
Message        Perfecto,              Per Yina with [employer] this test has been denied and time frame for additional info/peer to peer has passed. Physician does have the option to file an appeal, by calling [phone], [employer]#[id number].  Patient completed exam on

## 2017-02-14 ENCOUNTER — PATIENT MESSAGE (OUTPATIENT)
Dept: FAMILY MEDICINE CLINIC | Facility: CLINIC | Age: 27
End: 2017-02-14

## 2017-02-14 ENCOUNTER — OFFICE VISIT (OUTPATIENT)
Dept: NEUROLOGY | Facility: CLINIC | Age: 27
End: 2017-02-14

## 2017-02-14 ENCOUNTER — APPOINTMENT (OUTPATIENT)
Dept: LAB | Facility: HOSPITAL | Age: 27
End: 2017-02-14
Attending: Other
Payer: MEDICAID

## 2017-02-14 VITALS
WEIGHT: 210 LBS | SYSTOLIC BLOOD PRESSURE: 130 MMHG | RESPIRATION RATE: 18 BRPM | HEART RATE: 90 BPM | BODY MASS INDEX: 33.75 KG/M2 | DIASTOLIC BLOOD PRESSURE: 80 MMHG | HEIGHT: 66 IN

## 2017-02-14 DIAGNOSIS — D35.2 PITUITARY MICROADENOMA (HCC): ICD-10-CM

## 2017-02-14 DIAGNOSIS — G43.709 CHRONIC MIGRAINE W/O AURA W/O STATUS MIGRAINOSUS, NOT INTRACTABLE: ICD-10-CM

## 2017-02-14 DIAGNOSIS — R42 DIZZINESS: ICD-10-CM

## 2017-02-14 DIAGNOSIS — G93.9 WHITE MATTER LESION OF CENTRAL NERVOUS SYSTEM: ICD-10-CM

## 2017-02-14 DIAGNOSIS — D35.2 PITUITARY MICROADENOMA (HCC): Primary | ICD-10-CM

## 2017-02-14 LAB
FSH: 5.9 MIU/ML
INSULIN-LIKE GROWTH FACTOR 1: 90 NG/ML (ref 117–321)
LH: 14.7 MIU/ML
PROLACTIN: 13.6 NG/ML
T3 SERPL-MCNC: 49 NG/DL (ref 60–181)
THYROXINE (T4): 4.5 UG/DL (ref 4.5–10.9)
TSI SER-ACNC: 89.7 MIU/ML (ref 0.35–5.5)

## 2017-02-14 PROCEDURE — 82024 ASSAY OF ACTH: CPT

## 2017-02-14 PROCEDURE — 84443 ASSAY THYROID STIM HORMONE: CPT

## 2017-02-14 PROCEDURE — 83001 ASSAY OF GONADOTROPIN (FSH): CPT

## 2017-02-14 PROCEDURE — 84146 ASSAY OF PROLACTIN: CPT

## 2017-02-14 PROCEDURE — 36415 COLL VENOUS BLD VENIPUNCTURE: CPT

## 2017-02-14 PROCEDURE — 83002 ASSAY OF GONADOTROPIN (LH): CPT

## 2017-02-14 PROCEDURE — 99213 OFFICE O/P EST LOW 20 MIN: CPT | Performed by: OTHER

## 2017-02-14 PROCEDURE — 84436 ASSAY OF TOTAL THYROXINE: CPT

## 2017-02-14 PROCEDURE — 84480 ASSAY TRIIODOTHYRONINE (T3): CPT

## 2017-02-14 PROCEDURE — 84305 ASSAY OF SOMATOMEDIN: CPT

## 2017-02-14 RX ORDER — METFORMIN HYDROCHLORIDE 750 MG/1
750 TABLET, EXTENDED RELEASE ORAL
Refills: 5 | COMMUNITY
Start: 2017-02-02 | End: 2017-06-12

## 2017-02-14 NOTE — TELEPHONE ENCOUNTER
Attempted to contact appeals dept and was on hold for 1 hr and 36 minutes.  Will attempt again in AM

## 2017-02-14 NOTE — PROGRESS NOTES
HPI:    Patient ID: Baljinder Conley is a 27year old female. HPI      Baljinder Conley is a(n) 27year old female who presented for hospital follow up and with MRI findings of possible microadenoma.  She has a history of migraine headache, chronic dizzine History   Problem Relation Age of Onset   • Breast Cancer Mother 45   • Cancer Mother    • Cancer Maternal Grandmother      Cervical   • Diabetes Father    • High Cholesterol Father    • Hypertension Father    • Heart Disease Neg    • Stroke Neg         Sm normal.   Abdominal: Soft. Bowel sounds are normal.   Skin: Skin is warm and dry. Psychiatric:normal mood and affect. NEUROLOGICAL: This patient is alert and orientated x 3. Speech is fluent with intact comprehension.  Pupils equally round and reactive complete opthalmology evaluation to rule out any visual field defects and given chronic headaches. Follow up in 1-2 months.       Orders Placed This Encounter  ACTH, Plasma  Prolactin  Assay, Thyroid Stim Hormone  Triiodothyronine (T3) Total  T4 (Thyroxi

## 2017-02-14 NOTE — PATIENT INSTRUCTIONS
Refill policies:    • Allow 2 business days for refills; controlled substances may take longer.   • Contact your pharmacy at least 5 days prior to running out of medication and have them send an electronic request or submit request through the “request re your physician has recommended that you have a procedure or additional testing performed. [facility]) will contact your insurance carrier to obtain pre-certification or prior authorization.     Unfortunately, [facility] has seen an increas

## 2017-02-15 ENCOUNTER — OFFICE VISIT (OUTPATIENT)
Dept: FAMILY MEDICINE CLINIC | Facility: CLINIC | Age: 27
End: 2017-02-15

## 2017-02-15 VITALS
HEIGHT: 65 IN | BODY MASS INDEX: 38.65 KG/M2 | HEART RATE: 78 BPM | RESPIRATION RATE: 16 BRPM | DIASTOLIC BLOOD PRESSURE: 84 MMHG | WEIGHT: 232 LBS | SYSTOLIC BLOOD PRESSURE: 124 MMHG

## 2017-02-15 DIAGNOSIS — E03.9 HYPOTHYROIDISM, UNSPECIFIED TYPE: Primary | ICD-10-CM

## 2017-02-15 DIAGNOSIS — D35.2 PITUITARY MICROADENOMA (HCC): ICD-10-CM

## 2017-02-15 LAB — ADRENOCORTICOTROPIC HORMONE: 9 PG/ML

## 2017-02-15 PROCEDURE — 99213 OFFICE O/P EST LOW 20 MIN: CPT | Performed by: INTERNAL MEDICINE

## 2017-02-15 NOTE — TELEPHONE ENCOUNTER
Spoke with patient at [facility] on 2/15/17 in regards to her [facility] paperwork. Release of records has been faxed to [facility] rec dept twice on 2/13/17 and 2/14/17.    As of 2/15/17 we have still not vd inpatient records.     Spoke with patient at her OV on 2/

## 2017-02-15 NOTE — PROGRESS NOTES
CC: Patient presents with:  Test Results       HPI:   Low thyroid, on synthroid, still feels fatigue.        Current Outpatient Prescriptions:  MetFORMIN HCl  MG Oral Tablet 24 Hr  Disp:  Rfl: 5   Levothyroxine Sodium 200 MCG Oral Tab Take 1 table Heterozygous MTHFR mutation [id number] (HCC)     Migraine headache     Insomnia     Nonintractable headache     FH: breast cancer in first degree relative when <48years old     Metabolic acidosis     Pyelonephritis     Hypothyroidism, unspecified type     Weak

## 2017-02-15 NOTE — TELEPHONE ENCOUNTER
Release of records has been faxed to [facility] rec dept twice on 2/13/17 and 2/14/17. As of 2/15/17 we have still not rcvd inpatient records. Spoke with patient at her OV on 2/15/17 and informed her of above.  Also informed her that her [facility] paperwork

## 2017-02-15 NOTE — TELEPHONE ENCOUNTER
Spoke with Lorena Layne RN with [employer] appeals dept in regards to getting approval for the MRI pituitary that patient had done on 2/3/17. We need to fax over all pertinent information that supports why patient needed this MRI.  OV notes, prior tests, labs, fin

## 2017-02-16 NOTE — TELEPHONE ENCOUNTER
24 pages of pertinent information faxed to [employer]. Confirmation rcvd. All paperwork in green triage folder.

## 2017-02-21 ENCOUNTER — OFFICE VISIT (OUTPATIENT)
Dept: SURGERY | Facility: CLINIC | Age: 27
End: 2017-02-21

## 2017-02-21 ENCOUNTER — TELEPHONE (OUTPATIENT)
Dept: NEUROLOGY | Facility: CLINIC | Age: 27
End: 2017-02-21

## 2017-02-21 VITALS — HEIGHT: 67 IN | RESPIRATION RATE: 18 BRPM | BODY MASS INDEX: 32.96 KG/M2 | HEART RATE: 80 BPM | WEIGHT: 210 LBS

## 2017-02-21 DIAGNOSIS — D35.2 PITUITARY MICROADENOMA (HCC): Primary | ICD-10-CM

## 2017-02-21 PROCEDURE — 99203 OFFICE O/P NEW LOW 30 MIN: CPT | Performed by: PHYSICIAN ASSISTANT

## 2017-02-21 NOTE — PATIENT INSTRUCTIONS
Refill policies:    • Allow 2 business days for refills; controlled substances may take longer.   • Contact your pharmacy at least 5 days prior to running out of medication and have them send an electronic request or submit request through the “request re your physician has recommended that you have a procedure or additional testing performed. [facility]) will contact your insurance carrier to obtain pre-certification or prior authorization.     Unfortunately, [facility] has seen an increas

## 2017-02-21 NOTE — TELEPHONE ENCOUNTER
----- Message from Bushra Perez MD sent at 2/21/2017  3:16 PM CST -----  Mildly low IGF-1 ( Growth hormone factor) likely non significant. Rest of the pituitary hormones fine  Still has high TSH and low T3 levels. Follow up with endocrinology.

## 2017-02-21 NOTE — H&P
Neurosurgery Consultation      REASON FOR CONSULT: Pituitary Microadenoma    HISTORY OF PRESENT Brandt Mcdonald is a 27year old female here for consultation for a newly diagnosed pituitary adenoma.     She gives a history of having a goiter with di history includes Breast Cancer (age of onset: 45) in her mother; Cancer in her maternal grandmother and mother; Diabetes in her father; High Cholesterol in her father; Hypertension in her father. There is no history of Heart Disease or Stroke.     SOCIAL HI drift. Sensation to light touch is intact bilateral in both arms and legs. Gait intact. No clonus. Patient can heel and toe walk. Negative Romberg. Motor power is grossly 5/5 in the upper and lower extremities.   DTRs are 1/4 in the upper and lower extrem is in agreement with the report. ASSESSMENT:  1. Pituitary microadenoma nonsecreting  2. Thyroid dysfunction  3. Status post thyroid surgery    PLAN:  1. Follow up 3 month after MRI of the pituitary  2. Medication: None  3.  Imaging: MRI of the pituit

## 2017-02-21 NOTE — PROGRESS NOTES
Patient has been hospitalized x 5 for thyroid disorder. Presents with palpitations, SOB, fatigue, dizziness and light headed. Patient has not been responding to medication and felt it might be pituitary related.   Had scan done which demonstrated pituitary

## 2017-02-23 ENCOUNTER — TELEPHONE (OUTPATIENT)
Dept: FAMILY MEDICINE CLINIC | Facility: CLINIC | Age: 27
End: 2017-02-23

## 2017-02-23 NOTE — TELEPHONE ENCOUNTER
Patient comes to [facility] requesting a note to be excused from work today. She states she was recently discharged from the hospital for thyroid disorder and has a f/u appt tomorrow with her PCP but need a work note today.  Given the limitation of evaluating her

## 2017-02-24 ENCOUNTER — TELEPHONE (OUTPATIENT)
Dept: FAMILY MEDICINE CLINIC | Facility: CLINIC | Age: 27
End: 2017-02-24

## 2017-02-24 NOTE — TELEPHONE ENCOUNTER
An appeal was filed for coverage of MRI denied 1/25/17 by insurance. They need a copy of the denial letter with patient's signature on page 4 saying okay to proceed with appeal faxed to them at [phone] along with any office visit notes.   We do not ha

## 2017-02-27 NOTE — TELEPHONE ENCOUNTER
Spoke with [employer] and informed them that neither our office or patient has the denial letter and we will need a copy of it for us and patient to fill out the appeals page they are requesting. They will fax us a copy within 24 hours to fax [phone].   It

## 2017-02-28 NOTE — TELEPHONE ENCOUNTER
vd records from [facility] [place] for patient's inpatient stay from 1/30/17-2/1/17.

## 2017-03-01 NOTE — TELEPHONE ENCOUNTER
[facility] [address] Appeal form (2 pages). Filled out, need to be signed by patient and faxed back to [phone]. Patient states that she is out of town until 3/3/17. When she is back in town she will stop in to sign the 2 forms.   They are in red tr

## 2017-03-08 NOTE — TELEPHONE ENCOUNTER
Patient has still not come to office to sign. [employer] message sent asking patient when she is coming in.

## 2017-03-14 NOTE — TELEPHONE ENCOUNTER
Spoke with patient who is still out of town. I advised patient that I will sign appeals paperwork on her behalf and include on paperwork that [place] is out of the country. Paperwork faxed to [address] [place] [address] at [phone]. Confirmation rcvd.

## 2017-03-21 ENCOUNTER — LAB ENCOUNTER (OUTPATIENT)
Dept: LAB | Facility: HOSPITAL | Age: 27
End: 2017-03-21
Attending: FAMILY MEDICINE
Payer: MEDICAID

## 2017-03-21 ENCOUNTER — OFFICE VISIT (OUTPATIENT)
Dept: OBGYN CLINIC | Facility: CLINIC | Age: 27
End: 2017-03-21

## 2017-03-21 VITALS
WEIGHT: 236 LBS | DIASTOLIC BLOOD PRESSURE: 80 MMHG | HEIGHT: 65 IN | SYSTOLIC BLOOD PRESSURE: 126 MMHG | BODY MASS INDEX: 39.32 KG/M2

## 2017-03-21 DIAGNOSIS — E28.2 PCOS (POLYCYSTIC OVARIAN SYNDROME): Primary | ICD-10-CM

## 2017-03-21 DIAGNOSIS — E03.9 HYPOTHYROIDISM: Primary | ICD-10-CM

## 2017-03-21 DIAGNOSIS — N91.2 AMENORRHEA: ICD-10-CM

## 2017-03-21 LAB
FREE T4: 0.5 NG/DL (ref 0.9–1.8)
T3FREE SERPL-MCNC: 1.15 PG/ML (ref 2.3–4.2)
TSI SER-ACNC: >100 MIU/ML (ref 0.35–5.5)

## 2017-03-21 PROCEDURE — 84443 ASSAY THYROID STIM HORMONE: CPT

## 2017-03-21 PROCEDURE — 84481 FREE ASSAY (FT-3): CPT

## 2017-03-21 PROCEDURE — 99213 OFFICE O/P EST LOW 20 MIN: CPT | Performed by: OBSTETRICS & GYNECOLOGY

## 2017-03-21 PROCEDURE — 36415 COLL VENOUS BLD VENIPUNCTURE: CPT

## 2017-03-21 PROCEDURE — 84439 ASSAY OF FREE THYROXINE: CPT

## 2017-03-21 RX ORDER — MEDROXYPROGESTERONE ACETATE 10 MG/1
10 TABLET ORAL DAILY
Qty: 10 TABLET | Refills: 0 | Status: SHIPPED | OUTPATIENT
Start: 2017-03-21 | End: 2017-03-21 | Stop reason: CLARIF

## 2017-03-21 NOTE — PROGRESS NOTES
GYN H&P     3/21/2017  12:00 PM    CC: Patient presents with: Other: pt has has polycystic ovaries, has not had a period since 2016, would like to discuss options      HPI: patient is a 27year old  here for Patient presents with:   Other: pt has Disp: 90 tablet Rfl: 0   ergocalciferol 84147 units Oral Cap Take 1 capsule (50,000 Units total) by mouth twice a week. Disp: 48 capsule Rfl: 0   Potassium 99 MG Oral Tab Take 2 tablets by mouth.  Disp:  Rfl:    Meclizine HCl 25 MG Oral Tab Take 1 tablet (2 GENERAL: well developed, well nourished, in no apparent distress  Pelvic declined        A/P: Patient is 27year old female with (E28.2) PCOS (polycystic ovarian syndrome)  (primary encounter diagnosis)    1.  PCOS (polycystic ovarian syndrome)  -pt decli

## 2017-04-26 ENCOUNTER — TELEPHONE (OUTPATIENT)
Dept: SURGERY | Facility: CLINIC | Age: 27
End: 2017-04-26

## 2017-04-27 ENCOUNTER — OFFICE VISIT (OUTPATIENT)
Dept: FAMILY MEDICINE CLINIC | Facility: CLINIC | Age: 27
End: 2017-04-27

## 2017-04-27 ENCOUNTER — HOSPITAL ENCOUNTER (EMERGENCY)
Facility: HOSPITAL | Age: 27
Discharge: STILL ADMITTED OR ADMINISTRATIVE | End: 2017-04-27
Payer: MEDICAID

## 2017-04-27 VITALS — DIASTOLIC BLOOD PRESSURE: 70 MMHG | SYSTOLIC BLOOD PRESSURE: 112 MMHG | HEART RATE: 107 BPM

## 2017-04-27 DIAGNOSIS — Z02.9 ENCOUNTER FOR ADMINISTRATIVE EXAMINATIONS: Primary | ICD-10-CM

## 2017-04-27 PROCEDURE — 99213 OFFICE O/P EST LOW 20 MIN: CPT | Performed by: NURSE PRACTITIONER

## 2017-04-27 NOTE — PROGRESS NOTES
Patient presents to walk in clinic with 24 hour history of shortness of breath and palpitations. She has been struggling with a high TSH and has appointments already scheduled for follow up with neuro/endocrine and primary care.  She is a [employer] employee and

## 2017-05-11 NOTE — PROGRESS NOTES
Patient did not proceed to emergency room for follow up as advised. As evaluation was performed will charge patient for visit as assessment was completed.

## 2017-05-18 ENCOUNTER — OFFICE VISIT (OUTPATIENT)
Dept: FAMILY MEDICINE CLINIC | Facility: CLINIC | Age: 27
End: 2017-05-18

## 2017-05-18 ENCOUNTER — TELEPHONE (OUTPATIENT)
Dept: FAMILY MEDICINE CLINIC | Facility: CLINIC | Age: 27
End: 2017-05-18

## 2017-05-18 ENCOUNTER — HOSPITAL ENCOUNTER (EMERGENCY)
Facility: HOSPITAL | Age: 27
Discharge: HOME | End: 2017-05-18
Attending: EMERGENCY MEDICINE
Payer: MEDICAID

## 2017-05-18 ENCOUNTER — APPOINTMENT (OUTPATIENT)
Dept: GENERAL RADIOLOGY | Facility: HOSPITAL | Age: 27
End: 2017-05-18
Attending: EMERGENCY MEDICINE
Payer: MEDICAID

## 2017-05-18 VITALS
TEMPERATURE: 98 F | DIASTOLIC BLOOD PRESSURE: 90 MMHG | OXYGEN SATURATION: 98 % | HEART RATE: 115 BPM | RESPIRATION RATE: 18 BRPM | SYSTOLIC BLOOD PRESSURE: 138 MMHG

## 2017-05-18 VITALS
HEART RATE: 101 BPM | SYSTOLIC BLOOD PRESSURE: 104 MMHG | DIASTOLIC BLOOD PRESSURE: 49 MMHG | OXYGEN SATURATION: 97 % | RESPIRATION RATE: 17 BRPM

## 2017-05-18 DIAGNOSIS — Z02.9 ADMINISTRATIVE ENCOUNTER: Primary | ICD-10-CM

## 2017-05-18 DIAGNOSIS — R00.2 PALPITATIONS: Primary | ICD-10-CM

## 2017-05-18 DIAGNOSIS — E89.0 POSTOPERATIVE HYPOTHYROIDISM: ICD-10-CM

## 2017-05-18 PROCEDURE — 99285 EMERGENCY DEPT VISIT HI MDM: CPT

## 2017-05-18 PROCEDURE — 96374 THER/PROPH/DIAG INJ IV PUSH: CPT

## 2017-05-18 PROCEDURE — 93005 ELECTROCARDIOGRAM TRACING: CPT

## 2017-05-18 PROCEDURE — 71020 XR CHEST PA + LAT CHEST (CPT=71020): CPT | Performed by: EMERGENCY MEDICINE

## 2017-05-18 PROCEDURE — 85025 COMPLETE CBC W/AUTO DIFF WBC: CPT | Performed by: EMERGENCY MEDICINE

## 2017-05-18 PROCEDURE — 84439 ASSAY OF FREE THYROXINE: CPT | Performed by: EMERGENCY MEDICINE

## 2017-05-18 PROCEDURE — 84443 ASSAY THYROID STIM HORMONE: CPT | Performed by: EMERGENCY MEDICINE

## 2017-05-18 PROCEDURE — 96361 HYDRATE IV INFUSION ADD-ON: CPT

## 2017-05-18 PROCEDURE — 93010 ELECTROCARDIOGRAM REPORT: CPT

## 2017-05-18 PROCEDURE — 80053 COMPREHEN METABOLIC PANEL: CPT | Performed by: EMERGENCY MEDICINE

## 2017-05-18 RX ORDER — LORAZEPAM 2 MG/ML
1 INJECTION INTRAMUSCULAR ONCE
Status: COMPLETED | OUTPATIENT
Start: 2017-05-18 | End: 2017-05-18

## 2017-05-18 RX ORDER — SODIUM CHLORIDE 9 MG/ML
INJECTION, SOLUTION INTRAVENOUS ONCE
Status: COMPLETED | OUTPATIENT
Start: 2017-05-18 | End: 2017-05-18

## 2017-05-18 NOTE — ED INITIAL ASSESSMENT (HPI)
SOB began around 5:30 am today while driving to work right leg felt numb and patient began to have mid sternal chest pain non radiaitng but worse with deep inspiration. Not on BCP, no long drives or travel.  Denies N/V

## 2017-05-18 NOTE — TELEPHONE ENCOUNTER
This is the message sent to pt via [employer] as well as a phone message-  Can you please give our office a call [phone] to discuss your insurance coverage.  We are verifying coverage for your appointment on 5/22/17 with Dr Nohelia Cooney and the insurance we alvarado

## 2017-05-18 NOTE — ED PROVIDER NOTES
Patient Seen in: [facility] Emergency Department    History   Patient presents with:  Chest Pain Angina (cardiovascular)    Stated Complaint: chest pain    HPI    47-year-old female presents to the emergency department with complaints of palpitation foot sx x3    D & C  6/22/2011    D & C  2009    LAPAROSCOPY,PELVIC,BIOPSY  2008    Comment drill ovary       Medications :   Levothyroxine Sodium 200 MCG Oral Tab,  Take 1 tablet (200 mcg total) by mouth before breakfast.  Patient taking differently:  Take and well-nourished. HENT:   Head: Normocephalic and atraumatic. Mouth/Throat: Oropharynx is clear and moist.   Eyes: EOM are normal. Pupils are equal, round, and reactive to light. Neck: Normal range of motion. Neck supple.    Cardiovascular: Normal r for these tests on the individual orders. RAINBOW DRAW BLUE   RAINBOW DRAW GOLD   RAINBOW DRAW LAVENDER   RAINBOW DRAW LIGHT GREEN      EKG    Rate, intervals and axes as noted on EKG Report.   Rate: 102  Rhythm: Sinus Rhythm  Reading: No acute ST segment

## 2017-05-18 NOTE — PROGRESS NOTES
Pt. To [facility] c/o getting dizzy while driving to work. Had to pull over because she thought she was going to pass out. Pt. Also reports left sided \"numbness\", chest pain to center, and palpitations. Reports all symptoms still present currently.   Reports

## 2017-05-22 ENCOUNTER — TELEPHONE (OUTPATIENT)
Dept: NEUROLOGY | Facility: CLINIC | Age: 27
End: 2017-05-22

## 2017-05-22 NOTE — TELEPHONE ENCOUNTER
Patient informed Dr Cachorro Abel is not able to release patient from [facility]. Advised she will have to wait for who her care is under at [facility] to release her.  Patient given follow up with Dr Cachorro Abel 5/26/17 at 1:40 pm. Patient advised to bring CD's of any testing d

## 2017-06-15 ENCOUNTER — HOSPITAL ENCOUNTER (OUTPATIENT)
Dept: CV DIAGNOSTICS | Facility: HOSPITAL | Age: 27
Discharge: HOME | End: 2017-06-15
Attending: INTERNAL MEDICINE
Payer: MEDICAID

## 2017-06-15 ENCOUNTER — HOSPITAL ENCOUNTER (EMERGENCY)
Facility: HOSPITAL | Age: 27
Discharge: HOME | End: 2017-06-15
Attending: EMERGENCY MEDICINE
Payer: MEDICAID

## 2017-06-15 VITALS
SYSTOLIC BLOOD PRESSURE: 118 MMHG | BODY MASS INDEX: 33.75 KG/M2 | WEIGHT: 210 LBS | HEIGHT: 66 IN | DIASTOLIC BLOOD PRESSURE: 85 MMHG | OXYGEN SATURATION: 100 % | HEART RATE: 109 BPM | TEMPERATURE: 98 F | RESPIRATION RATE: 19 BRPM

## 2017-06-15 DIAGNOSIS — R42 DIZZINESS: Primary | ICD-10-CM

## 2017-06-15 DIAGNOSIS — R00.2 PALPITATIONS: ICD-10-CM

## 2017-06-15 DIAGNOSIS — R00.0 INAPPROPRIATE SINUS TACHYCARDIA: ICD-10-CM

## 2017-06-15 DIAGNOSIS — I49.8 POTS (POSTURAL ORTHOSTATIC TACHYCARDIA SYNDROME): ICD-10-CM

## 2017-06-15 PROCEDURE — 81003 URINALYSIS AUTO W/O SCOPE: CPT | Performed by: EMERGENCY MEDICINE

## 2017-06-15 PROCEDURE — 96361 HYDRATE IV INFUSION ADD-ON: CPT

## 2017-06-15 PROCEDURE — 99285 EMERGENCY DEPT VISIT HI MDM: CPT

## 2017-06-15 PROCEDURE — 99284 EMERGENCY DEPT VISIT MOD MDM: CPT

## 2017-06-15 PROCEDURE — 93018 CV STRESS TEST I&R ONLY: CPT | Performed by: INTERNAL MEDICINE

## 2017-06-15 PROCEDURE — 93010 ELECTROCARDIOGRAM REPORT: CPT

## 2017-06-15 PROCEDURE — 84484 ASSAY OF TROPONIN QUANT: CPT | Performed by: EMERGENCY MEDICINE

## 2017-06-15 PROCEDURE — 78452 HT MUSCLE IMAGE SPECT MULT: CPT | Performed by: INTERNAL MEDICINE

## 2017-06-15 PROCEDURE — 85025 COMPLETE CBC W/AUTO DIFF WBC: CPT | Performed by: EMERGENCY MEDICINE

## 2017-06-15 PROCEDURE — 93017 CV STRESS TEST TRACING ONLY: CPT | Performed by: INTERNAL MEDICINE

## 2017-06-15 PROCEDURE — 96360 HYDRATION IV INFUSION INIT: CPT

## 2017-06-15 PROCEDURE — 80048 BASIC METABOLIC PNL TOTAL CA: CPT | Performed by: EMERGENCY MEDICINE

## 2017-06-15 PROCEDURE — 93005 ELECTROCARDIOGRAM TRACING: CPT

## 2017-06-15 NOTE — ED NOTES
Pt able to ambulate to washroom without difficulty. On the way back pt stated she was very dizzy, the room was spinning. Pt assisted back by RN and PCT. Pt placed on monitor,  NSR, 98% room air, resp 21, bp 113/72.   Pt assisted back in bed, states

## 2017-06-15 NOTE — ED INITIAL ASSESSMENT (HPI)
26 y/o female to ED with c/o dizziness, patient was having stress test today, felt extremely lightheaded and dizzy, near syncope, brought to ED by staff.

## 2017-06-15 NOTE — ED NOTES
Report received from [facility] in cardiographics. States pt felt lightheaded at the beginning of second stage of stress test, bp at that time was 158/80 hr was 153, states her vitals tk normally with exercise.   Pt felt ok after a few minutes and then after

## 2017-06-15 NOTE — ED PROVIDER NOTES
Patient Seen in: [facility] Emergency Department    History   Patient presents with:  Dizziness (neurologic)    Stated Complaint: dizziness for 3 weeks, worse after stress test     HPI    Patient reports sudden onset of dizziness.   Patient reports b by mouth before breakfast.  Patient taking differently: Take 175 mcg by mouth before breakfast.     ergocalciferol 86925 units Oral Cap,  Take 1 capsule (50,000 Units total) by mouth twice a week.    B Complex Vitamins (B-COMPLEX/B-12 OR),  Take 1 tablet by Unremarkable. Calves nonswollen, symmetric, nontender. No pedal edema. Neurologic:  Mental status as above.   Patient moves all extremities with good strength    ED Course     Labs Reviewed   BASIC METABOLIC PANEL (8) - Abnormal; Notable for the followi from the other hospital.  I note patient had venous Doppler that showed no DVT on May 18. Patient had CT scan chest that showed no definite acute pulmonary artery embolism also on 5/18. Patient had EKG showing sinus tachycardia. Troponins negative.   TSH

## 2017-06-15 NOTE — ED NOTES
Pt resting on bed. Using phone. Talkative with nurse.   No apparent distress, but states her headache is severe

## 2017-06-15 NOTE — PROGRESS NOTES
After completing nuclear stress images, patient felt lightheaded and dizzy. Patient was given crackers and diet pepsi and monitored in department for 30 minutes after. RN and patient attempted to call Dr. Kike Connolly, but could not get through.   Pa

## 2017-06-19 NOTE — PROGRESS NOTES
Quick Note:    It appears we are no longer PCP, pt needs to continue to follow-up per specialist  ______

## 2017-07-13 RX ORDER — METOPROLOL SUCCINATE 25 MG/1
TABLET, EXTENDED RELEASE ORAL
Qty: 90 TABLET | Refills: 0 | OUTPATIENT
Start: 2017-07-13

## 2017-07-13 NOTE — TELEPHONE ENCOUNTER
Patient no longer under the care of Dr. Neo North as she has [facility] insurance that has appointed Dr. Anu Adorno as the patients PCP. Med denied.

## 2017-08-07 ENCOUNTER — HOSPITAL ENCOUNTER (OUTPATIENT)
Dept: GENERAL RADIOLOGY | Facility: HOSPITAL | Age: 27
Discharge: HOME | End: 2017-08-07
Attending: INTERNAL MEDICINE
Payer: MEDICAID

## 2017-08-07 DIAGNOSIS — M25.571 ACUTE RIGHT ANKLE PAIN: ICD-10-CM

## 2017-08-07 PROCEDURE — 73610 X-RAY EXAM OF ANKLE: CPT | Performed by: INTERNAL MEDICINE

## 2017-08-09 ENCOUNTER — APPOINTMENT (OUTPATIENT)
Dept: CT IMAGING | Facility: HOSPITAL | Age: 27
End: 2017-08-09
Attending: EMERGENCY MEDICINE
Payer: MEDICAID

## 2017-08-09 ENCOUNTER — APPOINTMENT (OUTPATIENT)
Dept: GENERAL RADIOLOGY | Facility: HOSPITAL | Age: 27
End: 2017-08-09
Attending: EMERGENCY MEDICINE
Payer: MEDICAID

## 2017-08-09 ENCOUNTER — APPOINTMENT (OUTPATIENT)
Dept: CT IMAGING | Facility: HOSPITAL | Age: 27
End: 2017-08-09
Attending: HOSPITALIST
Payer: MEDICAID

## 2017-08-09 PROBLEM — F07.81 POST CONCUSSIVE SYNDROME: Status: ACTIVE | Noted: 2017-08-09

## 2017-08-09 PROBLEM — R55 SYNCOPE AND COLLAPSE: Status: ACTIVE | Noted: 2017-08-09

## 2017-08-09 PROBLEM — R42 ORTHOSTATIC DIZZINESS: Status: ACTIVE | Noted: 2017-08-09

## 2017-08-09 PROCEDURE — 70450 CT HEAD/BRAIN W/O DYE: CPT | Performed by: EMERGENCY MEDICINE

## 2017-08-09 PROCEDURE — 71010 XR CHEST AP PORTABLE  (CPT=71010): CPT | Performed by: EMERGENCY MEDICINE

## 2017-08-09 PROCEDURE — 71275 CT ANGIOGRAPHY CHEST: CPT | Performed by: HOSPITALIST

## 2017-08-09 PROCEDURE — 72125 CT NECK SPINE W/O DYE: CPT | Performed by: EMERGENCY MEDICINE

## 2017-08-09 NOTE — ED NOTES
Patient states she is unable to urinate yet for culture and preg test. CT form filled out and signed.  CT aware

## 2017-08-09 NOTE — ED INITIAL ASSESSMENT (HPI)
Patient states she felt dizzy this morning, tripped and hit back of her head on carpeted stairs. Saw black spots for a few minutes afterwards. Went to [employer] and checked her BP on their machine and it was low. Her neurologist told her to come in.

## 2017-08-09 NOTE — ED PROVIDER NOTES
Patient Seen in: [facility] Emergency Department    History   Patient presents with:  Head Neck Injury (neurologic, musculoskeletal)  Hypotension (cardiovascular)    Stated Complaint: HEAD INJURY    HPI    Patient presents after head injury.   The pa date: OTHER SURGICAL HISTORY Left      Comment: foot sx x3  No date: THYROIDECTOMY    Medications :   topiramate 50 MG Oral Tab,  Take 50 mg by mouth 2 (two) times daily.    Meclizine HCl 25 MG Oral Tab,  Take 1 tablet (25 mg total) by mouth 3 (three) times Exam    General: Alert and oriented x3 in no acute distress. HEENT: Normocephalic, atraumatic, pupils equal round and reactive to light, oropharynx clear, uvula midline. Neck: Mild tenderness in the midline C-spine diffusely.   Cardiovascular: Tachycardic CBC W/ DIFFERENTIAL[[id number]]          Abnormal            Final result                 Please view results for these tests on the individual orders.    RAINBOW DRAW BLUE   RAINBOW DRAW GOLD   RAINBOW DRAW LAVENDER   RAINBOW DRAW LIGHT GREEN   URIN Noncontrast CT scanning of the cervical spine is performed from the skull base through C7. Multiplanar reconstructions are generated. Dose reduction techniques were used.  Dose information is transmitted to the ACR [employer] of Radiology) NRDR (Na 1430)   Meclizine HCl (ANTIVERT) tab 25 mg (25 mg Oral Given 8/9/17 1122)   ondansetron HCl (ZOFRAN) injection 4 mg (4 mg Intravenous Given 8/9/17 1124)   acetaminophen (TYLENOL EXTRA STRENGTH) tab 1,000 mg (1,000 mg Oral Given 8/9/17 1122)   DiphenhydrAMI 0

## 2017-08-10 ENCOUNTER — APPOINTMENT (OUTPATIENT)
Dept: MRI IMAGING | Facility: HOSPITAL | Age: 27
End: 2017-08-10
Attending: Other
Payer: MEDICAID

## 2017-08-10 PROCEDURE — 70553 MRI BRAIN STEM W/O & W/DYE: CPT | Performed by: OTHER

## 2017-08-10 NOTE — PLAN OF CARE
Received patient from ED.  AOx4, ST per tele. Denies pain. Reports dizziness that worsened with atenolol and blurry vision. Meclizine given x2 w/o improvement. Morning dose of Atenolol held per Dr. Tyrone Sorto.  (+) orthostatics, IVF.   Due medications given,

## 2017-08-10 NOTE — CONSULTS
[facility]    Report of Consultation    Bertha Flores Patient Status:  Observation    10/27/1990 MRN [id number]   [facility] 7NE-A Attending Anisha Morgan MD   Hosp Day # 0 PCP Katie Sung MD     Date of Admission:  2017 HISTORY Left      Comment: foot sx x3  No date: THYROIDECTOMY  Family History   Problem Relation Age of Onset   • Breast Cancer Mother 45   • Cancer Mother    • Cancer Maternal Grandmother      Cervical   • Diabetes Father    • High Cholesterol Father    • midline. Uvula and palate elevate symmetrically. Shrug shoulders normally bilaterally. The rest of the cranial nerves are grossly intact. Sensation to light touch is intact bilaterally. Motor:  No arm or leg weakness noted.   Finger-to-nose coordinatio refractory to conservative measures, however only one medication tried so far. Underlying etiology of the autonomic disorder is not known- idiopathic versus EDS as proposed by her other neurologist given her joint hypermobility.   Would check MRI of the pi

## 2017-08-10 NOTE — PAYOR COMM NOTE
--------------    8/9    ED        History   Patient presents with:  Head Neck Injury (neurologic, musculoskeletal)  Hypotension (cardiovascular)     Stated Complaint: HEAD INJURY     HPI     Patient presents after head injury.   The patient states that she Bacteria Urine 2+ (*)       Squamous Epi.  Cells Large (*)       Mucous Urine 1+ (*)       All other components within normal limits   PROTHROMBIN TIME (PT) - Abnormal; Notable for the following:      PT 16.4 (*)       INR 1.31 (*)       All other component

## 2017-08-10 NOTE — PROGRESS NOTES
[facility]  Progress Note     Luz Maria Westfall Patient Status:  Observation    10/27/1990 MRN [id number]   [address] Attending Bel Ramírez MD   Hosp Day # 0 PCP Vanita Bustos MD     Chief Complaint: dizziness    S: Patie 50 mg Oral BID   • enoxaparin  40 mg Subcutaneous Daily       ASSESSMENT / PLAN:     1. Dizziness  2. Orthostatic hypotension - resolved   3. Elevated d Dimer, PE r/o on cTA   4.  Hypothyroid    Plan of care:   Replace meclizine with Benadryl, vestibular PT

## 2017-08-10 NOTE — PLAN OF CARE
Assumed care around 0730. Pt alert and oriented x4. VSS. SR/ST low 100's per tele. On room air. Complaining of dizziness with position changes and blurry vision. On IV fluids.  Received benadryl and increased dose of meclizine with no improvement in symptom

## 2017-08-11 ENCOUNTER — APPOINTMENT (OUTPATIENT)
Dept: MRI IMAGING | Facility: HOSPITAL | Age: 27
End: 2017-08-11
Attending: Other
Payer: MEDICAID

## 2017-08-11 PROCEDURE — 70544 MR ANGIOGRAPHY HEAD W/O DYE: CPT | Performed by: OTHER

## 2017-08-11 PROCEDURE — 70549 MR ANGIOGRAPH NECK W/O&W/DYE: CPT | Performed by: OTHER

## 2017-08-11 NOTE — PROGRESS NOTES
[facility]  Progress Note     Christa Fothergill Patient Status:  Observation    10/27/1990 MRN [id number]   [address] Attending Migue Rico MD   Hosp Day # 0 PCP Eileen Littlejohn MD     Chief Complaint: dizziness    S: Patie ASSESSMENT / PLAN:     1. Dizziness  2. Orthostatic hypotension - resolved   3. Elevated d Dimer, PE r/o on cTA   4.  Hypothyroid    Plan of care:   Pt still dizzy  MRI brain with no new findings  Low dose BB and monitor HR      Quality:  · DVT Prophy

## 2017-08-11 NOTE — PROGRESS NOTES
[id number] Neurology Progress Note    Jarett Jorgensen Patient Status:  Observation    10/27/1990 MRN [id number]   [facility] 7NE-A Attending Henrry Martel MD   Hosp Day # 0 PCP Bonny Rocha MD         Subjective:  Alethea Garcia CN VII: Symmetric facial movement   CN VIII: Normal hearing   CN IX, XI: uvula and palate elevate symmetrically    CN XI: shoulder shrug equal    CN XII: tongue midline  Motor: No drift, no focal arm or leg weakness  Sensory: Decreased sensation in RLE today's visit, the following items were reviewed: medications.   The relevant information are:    Current MEDS:  • Pyridostigmine Bromide  30 mg Oral Daily   • Levothyroxine Sodium  150 mcg Oral Before breakfast   • topiramate  50 mg Oral BID   • enoxaparin

## 2017-08-11 NOTE — PHYSICAL THERAPY NOTE
PHYSICAL THERAPY EVALUATION - INPATIENT     Room Number: 6056/5578-Z  Evaluation Date: 8/11/2017  Type of Evaluation: Initial  Physician Order: PT Eval and Treat    Presenting Problem: head neck injury & hypotension  Reason for Therapy: Mobility Dysfun Prior Level of [place]: Pt was independent with ADL's prior to May 2017. Pt lives with her father and has been working but for the past 3 months she has not resumed work due to dizziness. SUBJECTIVE  \" feeling dizzy\".  C/o of blurry have...  -   Turning over in bed (including adjusting bedclothes, sheets and blankets)?: None   -   Sitting down on and standing up from a chair with arms (e.g., wheelchair, bedside commode, etc.): A Little   -   Moving from lying on back to sitting on the head injury. Pt is presenting with symptoms of decreased cardiovascular endurance, impaired neuromuscular control, balance deficits in static and dynamic positions, decreased strength and gait impairments.  Current impairments affects the patient ADL's such

## 2017-08-11 NOTE — PLAN OF CARE
Patient alert and oriented times four. Meds given per MAR. PRN tylenol for headaches. Went for MRI in evening. Up with 1 person assist to the bathroom. Patient continues to complain of blurred vision and dizziness. Call light in reach.   Resting comforta

## 2017-08-11 NOTE — PROGRESS NOTES
08/11/17 1231 08/11/17 1232 08/11/17 1235   Vital Signs   Pulse 102 95 94   Heart Rate Source Monitor Monitor Monitor   Resp 20 18 16   Respiratory Quality Normal Normal Normal   /80 109/78 98/65   BP Location Right arm Right arm Right arm   BP Me

## 2017-08-12 NOTE — PLAN OF CARE
Had another long conversation with pt regarding discharge. She states she understands but is worried about how she has been feeling for the last few months.  States she will call her neurologist on Monday to be seen on mon or tues per Dr Deleon Dock

## 2017-08-12 NOTE — PLAN OF CARE
Assumed care of patient upon return from MRI department this evening. Patient stated that she felt dizzy and light headed when she was transferring from MRI table to wheelchair. Patient currently in bed and asymptomatic. SR on tele.  Room air with clear eliza

## 2017-08-12 NOTE — PLAN OF CARE
Pt called to let me know while she was watching TV she lost her vision for 4 minutes. Went to see pt. Asked her if this has ever happened before and she said yes 2 days ago. Pupils equal and reactive. Dr Kacy Cohn notified. Will cont to monitor.

## 2017-08-12 NOTE — PLAN OF CARE
Pt calling and stating she was awakened by her heart feeling like it was racing and sob. Vss. No ST seen on tele. o2 sat at 100. Sat and talked with pt. Will cont to monitor.

## 2017-08-12 NOTE — PROGRESS NOTES
[facility]  Progress Note     Goldy Finn Patient Status:  Observation    10/27/1990 MRN [id number]   [address] Attending Mone Keane MD   Hosp Day # 0 PCP Lester Snow MD     Chief Complaint: dizziness    S: Patie 3. Elevated d Dimer, PE r/o on cTA   4. Hypothyroid    Plan of care:   Pan work up with MRI , MRA negative  HR controlled with BB yesterday, will d/c on toprol XL 12.5  Spoke to her neurologist yesterday- resumeCendy , okay to start BB.  He notes pt to

## 2017-08-12 NOTE — PROGRESS NOTES
[facility] Neurology Progress Note    Tere Dill Patient Status:  Observation    10/27/1990 MRN [id number]   [facility] [place] 7NE-A Attending Ulisses Sanabria MD   Hosp Day # 0 PCP Arthur Ramirez MD     Subjective:  Tere Dill Date   K 3.7 2017 TSH 0.008/Free T4 1.5   Cortisol 16   D-dimer 0.5   UA-trace LE, 2+bacteria, large squamous cell, cx pending    Imagin/10 MRI Pituitary w/wo -  Nothing convincing for pituitary mass lesion.  Certainly no macroad pressure appears to be stable. Reviewed medications and so far she has not received beta-blocker    She is only on once a day dosing of pyridostigmine. On examination funduscopic examination shows no abnormality. Optic disks were normal bilaterally.

## 2017-08-12 NOTE — PHYSICAL THERAPY NOTE
PHYSICAL THERAPY  RE-EVALUATION - INPATIENT     Room Number: 1414/5588-L  Evaluation Date: 8/12/2017  Type of Evaluation: Re-evaluation  Physician Order: PT Eval and Treat    Presenting Problem: dizziness  Reason for Therapy: Mobility Dysfunction and Disch Hypothyroidism    • Migraines    • Haq's neuroma of left foot    • Obesity, unspecified    • Palpitations    • PCOS (polycystic ovarian syndrome)    • Tachycardia        Past Surgical History  Past Surgical History:  6/22/2011: D & C  2009: D & C  No da AM-PAC '6-Clicks' INPATIENT SHORT FORM - BASIC MOBILITY  How much difficulty does the patient currently have. ..  -   Turning over in bed (including adjusting bedclothes, sheets and blankets)?: None   -   Sitting down on and standing up from a ch to progress mobility. Pt stating several times throughout session, \"but do you think I can go home? \"    Exercise/Education Provided:  Bed mobility  Functional activity tolerated  Gait training  Transfer training    Patient End of Session: Up in chair; Mark Jasmine Stand at assistance level: supervison    Goal #3    Patient is able to ambulate 100 feet with assistive device at assistance level: supervision    Goal #4    Patient will negotiate 4 stairs/one curb w/ assistive device and supervision    Goal #5        [facility]

## 2017-08-12 NOTE — PLAN OF CARE
Shortly after telling pt that she is discharged she got up to the bathroom by herself and while sitting on the toilet she said she felt dizzy and didn't want to fall so she sat on ground. Helped up and to bed. No c/o at this time. Vss.  Will cont to monitor

## 2017-08-12 NOTE — PLAN OF CARE
Pt asking to get a \"note\" that she can go by ambulance to [facility] ER from here. Spoke with SW and Dr Ziggy Parker. All caring for pt feel she can be discharged. Pt informed. She is agreeable to to go. Pt's dad here. He appears to understand.

## 2017-08-12 NOTE — PROGRESS NOTES
Orthostatic BPS-   0611- Lyin/77, HR 69  [id number]/78, HR 74  0616-123/85-Standing, HR 85    Complains of lightheadedness and blurred/doubled vision with sitting and standing.

## 2017-08-14 NOTE — DISCHARGE SUMMARY
[facility]PITAL[facility]  DISCHARGE SUMMARY     Jaja Solis Patient Status:  Observation    10/27/1990 MRN [id number]   [facility] 7NE-A Attending No att. providers found   Hosp Day # 0 PCP Ashley Kevin MD     Date of Admission: 2017 relief and remains tachycardic. Her thyroid medications have been adjusted as her TSH has either been very high and most recently very low. Her last adjustment was 2 weeks ago.     Brief Synopsis:   Pt admitted to the hospital given IVF as her tachycardia 20 tablet  Refills:  0     Metoprolol Succinate ER 25 MG Tb24  Commonly known as: Toprol XL  Notes to patient: This is a beta blocker. Take 0.5 tablets (12.5 mg total) by mouth Daily Beta Blocker.    Quantity:  30 tablet  Refills:  0     ondansetron [id number]    Phone:  [phone]   · Metoprolol Succinate ER 25 MG Tb24  · Pyridostigmine Bromide 60 MG Tabs     Please  your prescriptions at the location directed by your doctor or nurse    Bring a paper prescription for each of these medication

## 2017-10-16 ENCOUNTER — HOSPITAL ENCOUNTER (EMERGENCY)
Facility: HOSPITAL | Age: 27
Discharge: STILL ADMITTED OR ADMINISTRATIVE | End: 2017-10-18
Payer: MEDICAID

## 2017-10-16 ENCOUNTER — HOSPITAL ENCOUNTER (OUTPATIENT)
Age: 27
Discharge: ANOTHER HOSPITAL | End: 2017-10-16
Attending: FAMILY MEDICINE
Payer: MEDICAID

## 2017-10-16 VITALS
SYSTOLIC BLOOD PRESSURE: 122 MMHG | WEIGHT: 220 LBS | RESPIRATION RATE: 16 BRPM | HEART RATE: 80 BPM | OXYGEN SATURATION: 98 % | BODY MASS INDEX: 34.53 KG/M2 | DIASTOLIC BLOOD PRESSURE: 88 MMHG | HEIGHT: 67 IN | TEMPERATURE: 99 F

## 2017-10-16 DIAGNOSIS — R42 LIGHTHEADEDNESS: Primary | ICD-10-CM

## 2017-10-16 DIAGNOSIS — R00.2 PALPITATIONS: ICD-10-CM

## 2017-10-16 PROCEDURE — 93010 ELECTROCARDIOGRAM REPORT: CPT | Performed by: INTERNAL MEDICINE

## 2017-10-16 PROCEDURE — 93005 ELECTROCARDIOGRAM TRACING: CPT

## 2017-10-16 PROCEDURE — 93010 ELECTROCARDIOGRAM REPORT: CPT

## 2017-10-16 PROCEDURE — 99215 OFFICE O/P EST HI 40 MIN: CPT

## 2017-10-16 NOTE — ED INITIAL ASSESSMENT (HPI)
Hx of migraines & PMH, HA started Saturday noc, no relief 400 mg motrin q 6 hours, states feels different than migraine so did not take her meds, also feels lightheaded. Denies blurred vision, describes HA as band around head. Denies CP.

## 2017-10-16 NOTE — ED PROVIDER NOTES
Patient Seen in: [address] [facility]    History   Patient presents with:  Dizziness (neurologic)    Stated Complaint: light headed, elevated bp x2 days    HPI    Thea Venegas  Is a 27year old female with previous h/o POT syndrom No date: OTHER SURGICAL HISTORY Left      Comment: foot sx x3  No date: THYROIDECTOMY    Family History   Problem Relation Age of Onset   • Breast Cancer Mother 45   • Cancer Mother    • Cancer Maternal Grandmother      Cervical   • Diabetes Father    • Course  ------------------------------------------------------------   Patient was found to have elevated heart rate on standing up to 160 bpm.  MDM     25-year-old female with the acute onset of lightheadedness, palpitations associated with some intermitt

## 2017-11-01 LAB
ANALYZER ANC (IANC): ABNORMAL
ANION GAP SERPL CALC-SCNC: 13 MMOL/L (ref 10–20)
BASOPHILS # BLD: 0 THOUSAND/MCL (ref 0–0.3)
BASOPHILS NFR BLD: 0 %
BUN SERPL-MCNC: 6 MG/DL (ref 6–20)
BUN/CREAT SERPL: 9 (ref 7–25)
CALCIUM SERPL-MCNC: 8.8 MG/DL (ref 8.4–10.2)
CHLORIDE: 107 MMOL/L (ref 98–107)
CO2 SERPL-SCNC: 26 MMOL/L (ref 21–32)
CREAT SERPL-MCNC: 0.67 MG/DL (ref 0.51–0.95)
DIFFERENTIAL METHOD BLD: ABNORMAL
EOSINOPHIL # BLD: 0.1 THOUSAND/MCL (ref 0.1–0.5)
EOSINOPHIL NFR BLD: 2 %
ERYTHROCYTE [DISTWIDTH] IN BLOOD: 14.6 % (ref 11–15)
FREE T3: 2.9 PG/ML (ref 2.2–4)
FREE T4: 0.9 NG/DL (ref 0.8–1.5)
GLUCOSE SERPL-MCNC: 99 MG/DL (ref 65–99)
HEMATOCRIT: 44.4 % (ref 36–46.5)
HGB BLD-MCNC: 15.2 GM/DL (ref 12–15.5)
LYMPHOCYTES # BLD: 1.7 THOUSAND/MCL (ref 1–4.8)
LYMPHOCYTES NFR BLD: 31 %
MCH RBC QN AUTO: 29.2 PG (ref 26–34)
MCHC RBC AUTO-ENTMCNC: 34.2 GM/DL (ref 32–36.5)
MCV RBC AUTO: 85.2 FL (ref 78–100)
MONOCYTES # BLD: 1 THOUSAND/MCL (ref 0.3–0.9)
MONOCYTES NFR BLD: 17 %
NEUTROPHILS # BLD: 2.9 THOUSAND/MCL (ref 1.8–7.7)
NEUTROPHILS NFR BLD: 50 %
NEUTS SEG NFR BLD: ABNORMAL %
PERCENT NRBC: ABNORMAL
PLATELET # BLD: 187 THOUSAND/MCL (ref 140–450)
POTASSIUM SERPL-SCNC: 3.6 MMOL/L (ref 3.4–5.1)
RBC # BLD: 5.21 MILLION/MCL (ref 4–5.2)
SODIUM SERPL-SCNC: 142 MMOL/L (ref 135–145)
TSH SERPL-ACNC: 0.14 MCUNIT/ML (ref 0.35–5)
WBC # BLD: 5.7 THOUSAND/MCL (ref 4.2–11)

## 2017-11-02 ENCOUNTER — HOSPITAL (OUTPATIENT)
Dept: OTHER | Age: 27
End: 2017-11-02
Attending: INTERNAL MEDICINE

## 2017-11-02 ENCOUNTER — DIAGNOSTIC TRANS (OUTPATIENT)
Dept: OTHER | Age: 27
End: 2017-11-02

## 2017-11-02 LAB
ALBUMIN SERPL-MCNC: 2.9 GM/DL (ref 3.6–5.1)
ALBUMIN/GLOB SERPL: 1 {RATIO} (ref 1–2.4)
ALP SERPL-CCNC: 68 UNIT/L (ref 45–117)
ALT SERPL-CCNC: 23 UNIT/L
ANALYZER ANC (IANC): ABNORMAL
ANION GAP SERPL CALC-SCNC: 10 MMOL/L (ref 10–20)
AST SERPL-CCNC: 26 UNIT/L
BASOPHILS # BLD: 0 THOUSAND/MCL (ref 0–0.3)
BASOPHILS NFR BLD: 1 %
BILIRUB SERPL-MCNC: 0.6 MG/DL (ref 0.2–1)
BUN SERPL-MCNC: 5 MG/DL (ref 6–20)
BUN/CREAT SERPL: 8 (ref 7–25)
CALCIUM SERPL-MCNC: 8 MG/DL (ref 8.4–10.2)
CHLORIDE: 110 MMOL/L (ref 98–107)
CO2 SERPL-SCNC: 28 MMOL/L (ref 21–32)
CREAT SERPL-MCNC: 0.66 MG/DL (ref 0.51–0.95)
DIFFERENTIAL METHOD BLD: ABNORMAL
EOSINOPHIL # BLD: 0.1 THOUSAND/MCL (ref 0.1–0.5)
EOSINOPHIL NFR BLD: 3 %
ERYTHROCYTE [DISTWIDTH] IN BLOOD: 14.6 % (ref 11–15)
GLOBULIN SER-MCNC: 3 GM/DL (ref 2–4)
GLUCOSE SERPL-MCNC: 93 MG/DL (ref 65–99)
HEMATOCRIT: 40.9 % (ref 36–46.5)
HGB BLD-MCNC: 13.5 GM/DL (ref 12–15.5)
LYMPHOCYTES # BLD: 1.6 THOUSAND/MCL (ref 1–4.8)
LYMPHOCYTES NFR BLD: 40 %
MAGNESIUM SERPL-MCNC: 1.8 MG/DL (ref 1.7–2.4)
MCH RBC QN AUTO: 28.2 PG (ref 26–34)
MCHC RBC AUTO-ENTMCNC: 33 GM/DL (ref 32–36.5)
MCV RBC AUTO: 85.4 FL (ref 78–100)
MONOCYTES # BLD: 0.8 THOUSAND/MCL (ref 0.3–0.9)
MONOCYTES NFR BLD: 19 %
NEUTROPHILS # BLD: 1.5 THOUSAND/MCL (ref 1.8–7.7)
NEUTROPHILS NFR BLD: 37 %
NEUTS SEG NFR BLD: ABNORMAL %
PERCENT NRBC: ABNORMAL
PLATELET # BLD: 164 THOUSAND/MCL (ref 140–450)
POTASSIUM SERPL-SCNC: 3.5 MMOL/L (ref 3.4–5.1)
PROT SERPL-MCNC: 5.9 GM/DL (ref 6.4–8.2)
RBC # BLD: 4.79 MILLION/MCL (ref 4–5.2)
SODIUM SERPL-SCNC: 144 MMOL/L (ref 135–145)
WBC # BLD: 4.1 THOUSAND/MCL (ref 4.2–11)

## 2017-11-03 LAB
CREAT SERPL-MCNC: 0.53 MG/DL (ref 0.51–0.95)
POTASSIUM SERPL-SCNC: 3.5 MMOL/L (ref 3.4–5.1)

## 2017-11-04 LAB
CREAT SERPL-MCNC: 0.51 MG/DL (ref 0.51–0.95)
POTASSIUM SERPL-SCNC: 3.4 MMOL/L (ref 3.4–5.1)

## 2018-01-03 ENCOUNTER — TELEPHONE (OUTPATIENT)
Dept: FAMILY MEDICINE CLINIC | Facility: CLINIC | Age: 28
End: 2018-01-03

## 2018-01-03 NOTE — TELEPHONE ENCOUNTER
Spoke to pt and informed her that as a new pt we must validate her insurance prior to her visit. Pt didn't have a card and couldn't find the information that had her [address] care number.   Pt insisted we contact [facility] because she was informed that if th

## 2018-01-03 NOTE — TELEPHONE ENCOUNTER
Spoke to pt who was unable to locate her insurance card and indicated she will call back to schedule an appointment in the future ok to cancel her apptment DOS 1/5/18.

## 2018-01-09 ENCOUNTER — TELEPHONE (OUTPATIENT)
Dept: FAMILY MEDICINE CLINIC | Facility: CLINIC | Age: 28
End: 2018-01-09

## 2018-01-09 ENCOUNTER — OFFICE VISIT (OUTPATIENT)
Dept: FAMILY MEDICINE CLINIC | Facility: CLINIC | Age: 28
End: 2018-01-09

## 2018-01-09 VITALS
DIASTOLIC BLOOD PRESSURE: 100 MMHG | SYSTOLIC BLOOD PRESSURE: 150 MMHG | WEIGHT: 264 LBS | HEART RATE: 122 BPM | TEMPERATURE: 99 F | HEIGHT: 65.5 IN | BODY MASS INDEX: 43.46 KG/M2 | RESPIRATION RATE: 16 BRPM

## 2018-01-09 DIAGNOSIS — R42 ORTHOSTATIC DIZZINESS: ICD-10-CM

## 2018-01-09 DIAGNOSIS — I49.8 POTS (POSTURAL ORTHOSTATIC TACHYCARDIA SYNDROME): Primary | ICD-10-CM

## 2018-01-09 PROCEDURE — 99213 OFFICE O/P EST LOW 20 MIN: CPT | Performed by: FAMILY MEDICINE

## 2018-01-09 RX ORDER — TOPIRAMATE 50 MG/1
50 TABLET, FILM COATED ORAL
COMMUNITY
End: 2018-01-09

## 2018-01-09 RX ORDER — LEVOTHYROXINE SODIUM 0.12 MG/1
TABLET ORAL
COMMUNITY
Start: 2017-11-03 | End: 2018-01-09

## 2018-01-09 RX ORDER — LEVOTHYROXINE SODIUM 0.12 MG/1
TABLET ORAL
Refills: 0 | COMMUNITY
Start: 2017-12-30 | End: 2018-02-17

## 2018-01-09 NOTE — PROGRESS NOTES
Alis Nelson is a 32year old female and is new to our office. Patient presents today to establish care. The patient's history includes:  · Hx of POTS dx in July 2017. Treated with Corlanor that has been prescribed by Dr. Osiel Zamudio.   She last saw him pain, left     Haq's neuroma of left foot     Foot pain     Thyroid nodule     Pre-op evaluation     Difficulty swallowing     Voice hoarseness     Encounter for therapeutic drug monitoring     Neck swelling     Pain in joints     Dizziness     Headache Smokeless tobacco: Never Used    Alcohol use No    Drug use: No    Sexual activity: No     Other Topics Concern    Caffeine Concern Yes    Comment: Daily; 1 cup     Exercise Yes    Comment: 2-3 x a week    Seat Belt Yes     Social History Narrative   None

## 2018-01-09 NOTE — TELEPHONE ENCOUNTER
Pt was seen today as a new patient. She was to go directly to the ER for dizziness and tachycardia. I do not see that she has checked in. Please call.

## 2018-01-09 NOTE — TELEPHONE ENCOUNTER
Called and talked to patient she is at home waiting for a ride to go to the hospital but she is going to [facility]

## 2018-01-12 ENCOUNTER — HOSPITAL ENCOUNTER (EMERGENCY)
Facility: HOSPITAL | Age: 28
Discharge: HOME | End: 2018-01-12
Payer: MEDICAID

## 2018-01-12 VITALS
RESPIRATION RATE: 16 BRPM | WEIGHT: 230 LBS | OXYGEN SATURATION: 100 % | BODY MASS INDEX: 36.1 KG/M2 | HEART RATE: 112 BPM | HEIGHT: 67 IN | TEMPERATURE: 98 F | DIASTOLIC BLOOD PRESSURE: 94 MMHG | SYSTOLIC BLOOD PRESSURE: 136 MMHG

## 2018-01-17 PROBLEM — R42 ORTHOSTATIC DIZZINESS: Status: RESOLVED | Noted: 2017-08-09 | Resolved: 2018-01-17

## 2018-01-17 PROBLEM — F07.81 POST CONCUSSIVE SYNDROME: Status: RESOLVED | Noted: 2017-08-09 | Resolved: 2018-01-17

## 2018-01-17 PROBLEM — R53.1 WEAKNESS GENERALIZED: Status: RESOLVED | Noted: 2017-01-06 | Resolved: 2018-01-17

## 2018-01-17 PROBLEM — R55 SYNCOPE AND COLLAPSE: Status: RESOLVED | Noted: 2017-08-09 | Resolved: 2018-01-17

## 2018-01-17 PROBLEM — E87.2 METABOLIC ACIDOSIS: Status: RESOLVED | Noted: 2017-01-06 | Resolved: 2018-01-17

## 2018-01-17 PROBLEM — R00.0 TACHYCARDIA: Status: RESOLVED | Noted: 2017-01-06 | Resolved: 2018-01-17

## 2018-01-17 PROBLEM — E87.20 METABOLIC ACIDOSIS: Status: RESOLVED | Noted: 2017-01-06 | Resolved: 2018-01-17

## 2018-01-19 ENCOUNTER — OFFICE VISIT (OUTPATIENT)
Dept: FAMILY MEDICINE CLINIC | Facility: CLINIC | Age: 28
End: 2018-01-19

## 2018-01-19 VITALS
HEART RATE: 101 BPM | OXYGEN SATURATION: 98 % | BODY MASS INDEX: 41 KG/M2 | SYSTOLIC BLOOD PRESSURE: 110 MMHG | TEMPERATURE: 98 F | WEIGHT: 259 LBS | DIASTOLIC BLOOD PRESSURE: 80 MMHG

## 2018-01-19 DIAGNOSIS — R42 DIZZINESS OF UNKNOWN CAUSE: Primary | ICD-10-CM

## 2018-01-19 PROCEDURE — 99213 OFFICE O/P EST LOW 20 MIN: CPT | Performed by: NURSE PRACTITIONER

## 2018-01-19 RX ORDER — MECLIZINE HYDROCHLORIDE 25 MG/1
25 TABLET ORAL 3 TIMES DAILY PRN
Qty: 30 TABLET | Refills: 0 | Status: SHIPPED | OUTPATIENT
Start: 2018-01-19 | End: 2018-01-25

## 2018-01-19 NOTE — PATIENT INSTRUCTIONS
Managing Dizziness (Vertigo) with Medicines    Although medicines can't cure your problem, they can help control symptoms. Your doctor may prescribe medicines for a few weeks and then taper them off. Always take your medicine as prescribed.  Never share y The cause of your vertigo is not yet known. Possible causes of vertigo include:  · Inflammation of the inner ear  · Disease of the nerves to the inner ear  · Movement of calcium particles in the inner ear  · Poor blood flow to the balance centers of the br © 2943-8128 [employer] [address]. [employer] [employer], [facility] [employer] [place]. All rights reserved. This information is not intended as a substitute for professional medical care. Always follow your healthcare professional's instructions.

## 2018-01-19 NOTE — PROGRESS NOTES
Patient presents with:  Dizziness: lightheadedness  : The patient has complaints of dizziness. Pt reports motion without rotation, faintness/lightheadedness. Sx's are precipitated by: none identified. Pt has had sx's for last 7 days.   She reports histor weakness, syncope, no falling  HEENT: no dry mouth, no vision issues,no URI symptoms, no hearing issues. No ear pain, fullness, tinnitus, no dysarthria, no dysphagia, no facial weakness.   NECK: no pain  CHEST: no chest pains  ABD: no nausea or vomiting, no for this Visit:  Signed Prescriptions Disp Refills    Meclizine HCl 25 MG Oral Tab 30 tablet 0      Sig: Take 1 tablet (25 mg total) by mouth 3 (three) times daily as needed for Dizziness.            Imaging & Consults:  None

## 2018-01-23 ENCOUNTER — TELEPHONE (OUTPATIENT)
Dept: FAMILY MEDICINE CLINIC | Facility: CLINIC | Age: 28
End: 2018-01-23

## 2018-01-23 ENCOUNTER — HOSPITAL ENCOUNTER (EMERGENCY)
Facility: HOSPITAL | Age: 28
Discharge: HOME | End: 2018-01-23
Attending: EMERGENCY MEDICINE
Payer: MEDICAID

## 2018-01-23 VITALS
HEIGHT: 67 IN | BODY MASS INDEX: 38.45 KG/M2 | OXYGEN SATURATION: 100 % | TEMPERATURE: 99 F | RESPIRATION RATE: 16 BRPM | HEART RATE: 102 BPM | SYSTOLIC BLOOD PRESSURE: 139 MMHG | DIASTOLIC BLOOD PRESSURE: 98 MMHG | WEIGHT: 245 LBS

## 2018-01-23 DIAGNOSIS — E03.9 HYPOTHYROIDISM, UNSPECIFIED TYPE: ICD-10-CM

## 2018-01-23 DIAGNOSIS — I49.8 POTS (POSTURAL ORTHOSTATIC TACHYCARDIA SYNDROME): Primary | ICD-10-CM

## 2018-01-23 LAB
ATRIAL RATE: 106 BPM
BASOPHILS # BLD AUTO: 0.03 X10(3) UL (ref 0–0.1)
BASOPHILS NFR BLD AUTO: 0.5 %
BUN BLD-MCNC: 5 MG/DL (ref 8–20)
CALCIUM BLD-MCNC: 8.5 MG/DL (ref 8.3–10.3)
CHLORIDE: 109 MMOL/L (ref 101–111)
CO2: 23 MMOL/L (ref 22–32)
CREAT BLD-MCNC: 0.87 MG/DL (ref 0.55–1.02)
EOSINOPHIL # BLD AUTO: 0.09 X10(3) UL (ref 0–0.3)
EOSINOPHIL NFR BLD AUTO: 1.6 %
ERYTHROCYTE [DISTWIDTH] IN BLOOD BY AUTOMATED COUNT: 14.1 % (ref 11.5–16)
FREE T4: 0.6 NG/DL (ref 0.9–1.8)
GLUCOSE BLD-MCNC: 121 MG/DL (ref 70–99)
HCT VFR BLD AUTO: 43.1 % (ref 34–50)
HGB BLD-MCNC: 14.8 G/DL (ref 12–16)
IMMATURE GRANULOCYTE COUNT: 0.02 X10(3) UL (ref 0–1)
IMMATURE GRANULOCYTE RATIO %: 0.4 %
LYMPHOCYTES # BLD AUTO: 1.86 X10(3) UL (ref 0.9–4)
LYMPHOCYTES NFR BLD AUTO: 33.6 %
MCH RBC QN AUTO: 30 PG (ref 27–33.2)
MCHC RBC AUTO-ENTMCNC: 34.3 G/DL (ref 31–37)
MCV RBC AUTO: 87.4 FL (ref 81–100)
MONOCYTES # BLD AUTO: 0.78 X10(3) UL (ref 0.1–0.6)
MONOCYTES NFR BLD AUTO: 14.1 %
NEUTROPHIL ABS PRELIM: 2.76 X10 (3) UL (ref 1.3–6.7)
NEUTROPHILS # BLD AUTO: 2.76 X10(3) UL (ref 1.3–6.7)
NEUTROPHILS NFR BLD AUTO: 49.8 %
P AXIS: 30 DEGREES
P-R INTERVAL: 176 MS
PLATELET # BLD AUTO: 189 10(3)UL (ref 150–450)
POTASSIUM SERPL-SCNC: 3.4 MMOL/L (ref 3.6–5.1)
Q-T INTERVAL: 338 MS
QRS DURATION: 88 MS
QTC CALCULATION (BEZET): 448 MS
R AXIS: 69 DEGREES
RBC # BLD AUTO: 4.93 X10(6)UL (ref 3.8–5.1)
RED CELL DISTRIBUTION WIDTH-SD: 44.7 FL (ref 35.1–46.3)
SODIUM SERPL-SCNC: 140 MMOL/L (ref 136–144)
T AXIS: 10 DEGREES
TROPONIN: <0.046 NG/ML (ref ?–0.05)
TSI SER-ACNC: 27.3 MIU/ML (ref 0.35–5.5)
VENTRICULAR RATE: 106 BPM
WBC # BLD AUTO: 5.5 X10(3) UL (ref 4–13)

## 2018-01-23 PROCEDURE — 99284 EMERGENCY DEPT VISIT MOD MDM: CPT

## 2018-01-23 PROCEDURE — 84443 ASSAY THYROID STIM HORMONE: CPT | Performed by: EMERGENCY MEDICINE

## 2018-01-23 PROCEDURE — 85025 COMPLETE CBC W/AUTO DIFF WBC: CPT | Performed by: EMERGENCY MEDICINE

## 2018-01-23 PROCEDURE — 99285 EMERGENCY DEPT VISIT HI MDM: CPT

## 2018-01-23 PROCEDURE — 80048 BASIC METABOLIC PNL TOTAL CA: CPT | Performed by: EMERGENCY MEDICINE

## 2018-01-23 PROCEDURE — 84484 ASSAY OF TROPONIN QUANT: CPT | Performed by: EMERGENCY MEDICINE

## 2018-01-23 PROCEDURE — 93005 ELECTROCARDIOGRAM TRACING: CPT

## 2018-01-23 PROCEDURE — 93010 ELECTROCARDIOGRAM REPORT: CPT

## 2018-01-23 PROCEDURE — 96360 HYDRATION IV INFUSION INIT: CPT

## 2018-01-23 PROCEDURE — 84439 ASSAY OF FREE THYROXINE: CPT | Performed by: EMERGENCY MEDICINE

## 2018-01-23 NOTE — ED INITIAL ASSESSMENT (HPI)
Pt c/o dizziness, palpitations, blood pressure problems and visual changes feeling like she was going to pass out. Pt sent to ED to evaluate thyroid vs POTS.

## 2018-01-23 NOTE — TELEPHONE ENCOUNTER
Started last night denies fever feels lightheaded and has fatigue given an appointment with Dr Jos Tyler for 16:30 today but might go to Urgent care if feeling worse

## 2018-01-23 NOTE — ED PROVIDER NOTES
Patient Seen in: [facility] Emergency Department    History   Patient presents with:  Arrythmia/Palpitations (cardiovascular)    Stated Complaint: palpations     HPI    69-year-old female who comes emergency department with palpitations.   She was al Used                      Alcohol use: No                Review of Systems    Positive for stated complaint: palpations   Other systems are as noted in HPI. Constitutional and vital signs reviewed.       All other systems reviewed and negative except as no Abnormality         Status                     ---------                               -----------         ------                     CBC W/ DIFFERENTIAL[[id number]]          Abnormal            Final result                 Please view

## 2018-01-25 ENCOUNTER — OFFICE VISIT (OUTPATIENT)
Dept: FAMILY MEDICINE CLINIC | Facility: CLINIC | Age: 28
End: 2018-01-25

## 2018-01-25 VITALS
HEART RATE: 84 BPM | SYSTOLIC BLOOD PRESSURE: 112 MMHG | DIASTOLIC BLOOD PRESSURE: 78 MMHG | BODY MASS INDEX: 40.81 KG/M2 | TEMPERATURE: 99 F | HEIGHT: 67 IN | RESPIRATION RATE: 16 BRPM | WEIGHT: 260 LBS

## 2018-01-25 DIAGNOSIS — I49.8 POTS (POSTURAL ORTHOSTATIC TACHYCARDIA SYNDROME): Primary | ICD-10-CM

## 2018-01-25 DIAGNOSIS — R11.0 NAUSEA: ICD-10-CM

## 2018-01-25 DIAGNOSIS — R42 DIZZINESS: ICD-10-CM

## 2018-01-25 PROCEDURE — 99213 OFFICE O/P EST LOW 20 MIN: CPT | Performed by: PHYSICIAN ASSISTANT

## 2018-01-25 RX ORDER — ONDANSETRON HYDROCHLORIDE 8 MG/1
8 TABLET, FILM COATED ORAL EVERY 8 HOURS PRN
Qty: 10 TABLET | Refills: 2 | Status: SHIPPED | OUTPATIENT
Start: 2018-01-25 | End: 2018-02-05

## 2018-01-25 NOTE — PROGRESS NOTES
CC:  Patient presents with:  ER F/U: blood pressure       HPI: Edi Lopes presents for an ER F/U for dizziness, POTS syndrome, and nausea. She feels better sometimes, and worse others. No syncope or CP. No SOB or fever.  She has started with a mild ST last nigh Normocephalic, atraumatic  Ears: Normal external ears  Nose: No edema, bleeding, or rhinorrhea  Throat: No erythema, edema, or exudates  Neck: Thyromegaly without nodules; no lymphadenopathy  Eyes: Pupils equal  Cardiovascular: RRR without murmurs  Pulmona for Nausea.

## 2018-01-29 ENCOUNTER — OFFICE VISIT (OUTPATIENT)
Dept: FAMILY MEDICINE CLINIC | Facility: CLINIC | Age: 28
End: 2018-01-29

## 2018-01-29 VITALS
WEIGHT: 268 LBS | DIASTOLIC BLOOD PRESSURE: 70 MMHG | HEART RATE: 72 BPM | SYSTOLIC BLOOD PRESSURE: 110 MMHG | RESPIRATION RATE: 16 BRPM | HEIGHT: 67 IN | BODY MASS INDEX: 42.06 KG/M2 | TEMPERATURE: 98 F

## 2018-01-29 DIAGNOSIS — R05.9 COUGH: Primary | ICD-10-CM

## 2018-01-29 PROCEDURE — 99213 OFFICE O/P EST LOW 20 MIN: CPT | Performed by: PHYSICIAN ASSISTANT

## 2018-01-29 RX ORDER — CODEINE PHOSPHATE AND GUAIFENESIN 10; 100 MG/5ML; MG/5ML
5 SOLUTION ORAL 4 TIMES DAILY PRN
Qty: 180 ML | Refills: 0 | Status: SHIPPED | OUTPATIENT
Start: 2018-01-29 | End: 2018-02-05

## 2018-01-29 NOTE — PROGRESS NOTES
CC:  Patient presents with:  Sinus Problem: sinus congestion, sinus drainage, sinus headache x 4 days  Body ache and/or chills: x 4 days      HPI: Joann Hansen presents with complaints of sinus symptoms and a cough. Symptoms have been present for 4 days.   She ha or FBs   Respiratory: See HPI  Cardiovascular: No CP or palpitations; no peripheral edema  Gastrointestinal: Normal bowels; no abdominal pain or N/V/D/C  Genitourinary:  Normal urination; no hematuria, urgency, or frequency  Musculoskeletal: No pain/weakne cool/warm-mist vaporizer at night to help with the coughing. I advised her to try 1-2 tablespoons of honey every 2-4 hours to help reduce cough during the day.   I asked her to use nasal saline rinses and/or a Neti Pot with distilled water for nasal congest

## 2018-02-05 ENCOUNTER — OFFICE VISIT (OUTPATIENT)
Dept: INTERNAL MEDICINE CLINIC | Facility: CLINIC | Age: 28
End: 2018-02-05

## 2018-02-05 VITALS
TEMPERATURE: 98 F | BODY MASS INDEX: 41.12 KG/M2 | RESPIRATION RATE: 16 BRPM | HEIGHT: 67 IN | DIASTOLIC BLOOD PRESSURE: 96 MMHG | WEIGHT: 262 LBS | HEART RATE: 90 BPM | SYSTOLIC BLOOD PRESSURE: 134 MMHG

## 2018-02-05 DIAGNOSIS — Z13.220 SCREENING, LIPID: ICD-10-CM

## 2018-02-05 DIAGNOSIS — E66.9 OBESITY (BMI 30-39.9): ICD-10-CM

## 2018-02-05 DIAGNOSIS — I49.8 POTS (POSTURAL ORTHOSTATIC TACHYCARDIA SYNDROME): Primary | ICD-10-CM

## 2018-02-05 DIAGNOSIS — R73.01 IMPAIRED FASTING GLUCOSE: ICD-10-CM

## 2018-02-05 PROCEDURE — 99214 OFFICE O/P EST MOD 30 MIN: CPT | Performed by: FAMILY MEDICINE

## 2018-02-05 NOTE — PATIENT INSTRUCTIONS
4 Steps for Eating Healthier  Changing the way you eat can improve your health. It can lower your cholesterol and blood pressure, and help you stay at a healthy weight. Your diet doesn’t have to be bland and boring to be healthy.  Just watch your calories · Add beans and lentils to your meals. · Drink more water to match your fiber increase to help prevent constipation. Date Last Reviewed: 6/1/2017  © 0890-1283 [employer] [address]. [address] [facility], [address] [place] [address]. All rights reserved.  This

## 2018-02-05 NOTE — PROGRESS NOTES
HPI:    Patient ID: Mary Kay Solis is a 32year old female. HPI Here to establish care. Patient has diagnosis of POTS and was seeing a Neurologist who no longer takes her insurance. Would like referral to see a new one.  Has intermittent dizziness/light History   Marital status: Single  Spouse name: N/A    Years of education: N/A  Number of children: N/A     Occupational History  None on file     Social History Main Topics   Smoking status: Never Smoker    Smokeless tobacco: Never Used    Alcohol use No Encounter      Lipid Panel      Hemoglobin A1C    Meds This Visit:  No prescriptions requested or ordered in this encounter    Imaging & Referrals:  NEURO - INTERNAL       ET#[id number]

## 2018-02-06 ENCOUNTER — TELEPHONE (OUTPATIENT)
Dept: INTERNAL MEDICINE CLINIC | Facility: CLINIC | Age: 28
End: 2018-02-06

## 2018-02-06 NOTE — TELEPHONE ENCOUNTER
Pt was told to call with how she was doing with her BP was in to see AMS on 2-5-18. Still feeling lightheaded, blacked out today for a few seconds today.  Call lost for second time, pls return call to Pt via her mobile

## 2018-02-06 NOTE — TELEPHONE ENCOUNTER
Left detailed message at [phone] (H) vm, if s/s worsen to go to the ER, if not continue with appt on 2/23/18 with Neuro. ASked pt ot call back to confirm receipt of message.

## 2018-02-06 NOTE — TELEPHONE ENCOUNTER
Patient states she woke up this am, got out of bed, vision went black for about 1 1/2 minutes and came back, dx with POTS Syndrome before, no new s/s, still a little lightheaded but not any more than she has already been, drinking 12-14 8 ox glasses of bel

## 2018-02-12 NOTE — TELEPHONE ENCOUNTER
[phone]    Called pt to F/U regarding status since we did not hear back. Pt stating is not feeling better. Was able to get Neuro appt today, however, is having ride issues.  Neuro office is currently working with pt's insurance to cover ride today due

## 2018-02-13 ENCOUNTER — TELEPHONE (OUTPATIENT)
Dept: NEUROLOGY | Facility: CLINIC | Age: 28
End: 2018-02-13

## 2018-02-13 ENCOUNTER — OFFICE VISIT (OUTPATIENT)
Dept: NEUROLOGY | Facility: CLINIC | Age: 28
End: 2018-02-13

## 2018-02-13 ENCOUNTER — HOSPITAL ENCOUNTER (INPATIENT)
Facility: HOSPITAL | Age: 28
LOS: 3 days | Discharge: HOME | DRG: 309 | End: 2018-02-17
Attending: INTERNAL MEDICINE | Admitting: INTERNAL MEDICINE
Payer: MEDICAID

## 2018-02-13 VITALS
BODY MASS INDEX: 42.69 KG/M2 | HEART RATE: 134 BPM | RESPIRATION RATE: 18 BRPM | WEIGHT: 272 LBS | HEIGHT: 67 IN | SYSTOLIC BLOOD PRESSURE: 162 MMHG | DIASTOLIC BLOOD PRESSURE: 90 MMHG

## 2018-02-13 DIAGNOSIS — R42 DIZZINESS: ICD-10-CM

## 2018-02-13 DIAGNOSIS — I49.8 POTS (POSTURAL ORTHOSTATIC TACHYCARDIA SYNDROME): Primary | ICD-10-CM

## 2018-02-13 PROCEDURE — 99214 OFFICE O/P EST MOD 30 MIN: CPT | Performed by: OTHER

## 2018-02-13 PROCEDURE — 99223 1ST HOSP IP/OBS HIGH 75: CPT | Performed by: INTERNAL MEDICINE

## 2018-02-13 RX ORDER — SODIUM CHLORIDE 9 MG/ML
INJECTION, SOLUTION INTRAVENOUS CONTINUOUS
Status: DISCONTINUED | OUTPATIENT
Start: 2018-02-13 | End: 2018-02-15

## 2018-02-13 RX ORDER — LEVOTHYROXINE SODIUM 0.12 MG/1
125 TABLET ORAL
Status: DISCONTINUED | OUTPATIENT
Start: 2018-02-14 | End: 2018-02-14

## 2018-02-13 RX ORDER — TOPIRAMATE 25 MG/1
50 TABLET ORAL 2 TIMES DAILY
Status: DISCONTINUED | OUTPATIENT
Start: 2018-02-13 | End: 2018-02-16

## 2018-02-13 RX ORDER — ONDANSETRON 2 MG/ML
4 INJECTION INTRAMUSCULAR; INTRAVENOUS EVERY 6 HOURS PRN
Status: DISCONTINUED | OUTPATIENT
Start: 2018-02-13 | End: 2018-02-17

## 2018-02-13 RX ORDER — FLUDROCORTISONE ACETATE 0.1 MG/1
0.1 TABLET ORAL DAILY
Qty: 30 TABLET | Refills: 2 | Status: SHIPPED | OUTPATIENT
Start: 2018-02-13 | End: 2018-02-22 | Stop reason: ALTCHOICE

## 2018-02-13 RX ORDER — FLUDROCORTISONE ACETATE 0.1 MG/1
0.1 TABLET ORAL
Status: DISCONTINUED | OUTPATIENT
Start: 2018-02-14 | End: 2018-02-17

## 2018-02-13 RX ORDER — ACETAMINOPHEN 325 MG/1
650 TABLET ORAL EVERY 6 HOURS PRN
Status: DISCONTINUED | OUTPATIENT
Start: 2018-02-13 | End: 2018-02-17

## 2018-02-13 RX ORDER — ENOXAPARIN SODIUM 100 MG/ML
0.5 INJECTION SUBCUTANEOUS NIGHTLY
Status: DISCONTINUED | OUTPATIENT
Start: 2018-02-13 | End: 2018-02-15

## 2018-02-13 NOTE — PROGRESS NOTES
Patient stated that its not going well. Her insurance stop covering anitivert 5mg because it was to expensive.

## 2018-02-13 NOTE — PROGRESS NOTES
HPI:    Patient ID: Treva Doss is a 32year old female. HPI        Treva Doss is a 32year old female who presented with complaints of persistent dizziness and lightheadedness. I last saw her about a year ago.  She has been following with Dr Aida Alvarez x3  03/2012: OTHER SURGICAL HISTORY      Comment: operative laparoscopy with electrocautery of                multiple ovarian cysts bilaterally.  it was                lysis of a few pelvic adhesions  No date: OTHER SURGICAL HISTORY      Comment: Brigido Teixeira Normocephalic and atraumatic. Neck: Normal range of motion. Neck supple. Cardiovascular: Normal rate, regular rhythm and normal heart sounds. Pulmonary/Chest: Effort normal and breath sounds normal.   Abdominal: Soft.  Bowel sounds are normal.   Skin:

## 2018-02-13 NOTE — TELEPHONE ENCOUNTER
Called  Registration at [phone] to confirm patient's insurance coverage. For Non [facility] patient, [facility], Dr. Jazmine huggins ([phone]) & informed of direct admission for [facility]. Orders given.     Nursing Supervisor (ext [phone]),

## 2018-02-13 NOTE — PATIENT INSTRUCTIONS
Refill policies:    • Allow 2-3 business days for refills; controlled substances may take longer.   • Contact your pharmacy at least 5 days prior to running out of medication and have them send an electronic request or submit request through the [facility] recommended that you have a procedure or additional testing performed. [facility]) will contact your insurance carrier to obtain pre-certification or prior authorization.     Unfortunately, [facility] has seen an increase in denial of paym

## 2018-02-14 ENCOUNTER — APPOINTMENT (OUTPATIENT)
Dept: CV DIAGNOSTICS | Facility: HOSPITAL | Age: 28
DRG: 309 | End: 2018-02-14
Attending: INTERNAL MEDICINE
Payer: MEDICAID

## 2018-02-14 ENCOUNTER — APPOINTMENT (OUTPATIENT)
Dept: MRI IMAGING | Facility: HOSPITAL | Age: 28
DRG: 309 | End: 2018-02-14
Attending: Other
Payer: MEDICAID

## 2018-02-14 PROBLEM — E86.0 DEHYDRATION: Status: ACTIVE | Noted: 2018-02-14

## 2018-02-14 LAB
ALBUMIN SERPL-MCNC: 3.4 G/DL (ref 3.5–4.8)
ALP LIVER SERPL-CCNC: 83 U/L (ref 37–98)
ALT SERPL-CCNC: 32 U/L (ref 14–54)
AST SERPL-CCNC: 35 U/L (ref 15–41)
BILIRUB DIRECT SERPL-MCNC: 0.3 MG/DL (ref 0.1–0.5)
BILIRUB SERPL-MCNC: 1 MG/DL (ref 0.1–2)
BILIRUB UR QL STRIP.AUTO: NEGATIVE
BUN BLD-MCNC: 6 MG/DL (ref 8–20)
CALCIUM BLD-MCNC: 8.2 MG/DL (ref 8.3–10.3)
CHLORIDE: 113 MMOL/L (ref 101–111)
CLARITY UR REFRACT.AUTO: CLEAR
CO2: 23 MMOL/L (ref 22–32)
COLOR UR AUTO: YELLOW
CREAT BLD-MCNC: 0.71 MG/DL (ref 0.55–1.02)
ERYTHROCYTE [DISTWIDTH] IN BLOOD BY AUTOMATED COUNT: 14 % (ref 11.5–16)
FREE T4: 0.8 NG/DL (ref 0.9–1.8)
GLUCOSE BLD-MCNC: 78 MG/DL (ref 70–99)
GLUCOSE UR STRIP.AUTO-MCNC: NEGATIVE MG/DL
HCT VFR BLD AUTO: 40.9 % (ref 34–50)
HGB BLD-MCNC: 13.8 G/DL (ref 12–16)
KETONES UR STRIP.AUTO-MCNC: NEGATIVE MG/DL
LEUKOCYTE ESTERASE UR QL STRIP.AUTO: NEGATIVE
M PROTEIN MFR SERPL ELPH: 6.9 G/DL (ref 6.1–8.3)
MCH RBC QN AUTO: 29.7 PG (ref 27–33.2)
MCHC RBC AUTO-ENTMCNC: 33.7 G/DL (ref 31–37)
MCV RBC AUTO: 88 FL (ref 81–100)
NITRITE UR QL STRIP.AUTO: NEGATIVE
PH UR STRIP.AUTO: 9 [PH] (ref 4.5–8)
PLATELET # BLD AUTO: 174 10(3)UL (ref 150–450)
POTASSIUM SERPL-SCNC: 3.3 MMOL/L (ref 3.6–5.1)
POTASSIUM SERPL-SCNC: 3.5 MMOL/L (ref 3.6–5.1)
PROT UR STRIP.AUTO-MCNC: NEGATIVE MG/DL
RBC # BLD AUTO: 4.65 X10(6)UL (ref 3.8–5.1)
RBC UR QL AUTO: NEGATIVE
RED CELL DISTRIBUTION WIDTH-SD: 44.5 FL (ref 35.1–46.3)
SODIUM SERPL-SCNC: 143 MMOL/L (ref 136–144)
SP GR UR STRIP.AUTO: 1.01 (ref 1–1.03)
TSI SER-ACNC: 18.3 MIU/ML (ref 0.35–5.5)
UROBILINOGEN UR STRIP.AUTO-MCNC: <2 MG/DL
WBC # BLD AUTO: 4.6 X10(3) UL (ref 4–13)

## 2018-02-14 PROCEDURE — 99232 SBSQ HOSP IP/OBS MODERATE 35: CPT | Performed by: HOSPITALIST

## 2018-02-14 PROCEDURE — 93306 TTE W/DOPPLER COMPLETE: CPT | Performed by: INTERNAL MEDICINE

## 2018-02-14 PROCEDURE — 70553 MRI BRAIN STEM W/O & W/DYE: CPT | Performed by: OTHER

## 2018-02-14 PROCEDURE — 99254 IP/OBS CNSLTJ NEW/EST MOD 60: CPT | Performed by: OTHER

## 2018-02-14 RX ORDER — POTASSIUM CHLORIDE 20 MEQ/1
40 TABLET, EXTENDED RELEASE ORAL EVERY 4 HOURS
Status: COMPLETED | OUTPATIENT
Start: 2018-02-14 | End: 2018-02-14

## 2018-02-14 RX ORDER — PROPRANOLOL HYDROCHLORIDE 20 MG/1
20 TABLET ORAL DAILY
Status: DISCONTINUED | OUTPATIENT
Start: 2018-02-14 | End: 2018-02-14

## 2018-02-14 RX ORDER — PROPRANOLOL HYDROCHLORIDE 20 MG/1
20 TABLET ORAL 2 TIMES DAILY
Status: DISCONTINUED | OUTPATIENT
Start: 2018-02-14 | End: 2018-02-17

## 2018-02-14 RX ORDER — MECLIZINE HCL 12.5 MG/1
12.5 TABLET ORAL 3 TIMES DAILY PRN
Status: DISCONTINUED | OUTPATIENT
Start: 2018-02-14 | End: 2018-02-17

## 2018-02-14 RX ORDER — LEVOTHYROXINE SODIUM 0.05 MG/1
100 TABLET ORAL
Status: DISCONTINUED | OUTPATIENT
Start: 2018-02-15 | End: 2018-02-14

## 2018-02-14 RX ORDER — LEVOTHYROXINE SODIUM 0.15 MG/1
150 TABLET ORAL
Status: DISCONTINUED | OUTPATIENT
Start: 2018-02-15 | End: 2018-02-17

## 2018-02-14 NOTE — CONSULTS
ENDOCRINOLOGY CONSULTATION    Attending physician:  Nohemy Perdomo MD  Consulting physican:  Amrita Her MD    Admission Date:  2/13/2018  Consultation Date:  2/14/2018      Reason for consultation: Mgmt of thyroid    Chief Complaint:  Admitted no    Modifying factors: History of irradiation to the head and neck area:  no                               Recent iodine exposure: no                               Amiodarone use:  no                               Lithium use:  no       Alpha-interferon: 50 MG Oral Tab Take 50 mg by mouth 2 (two) times daily. Disp:  Rfl:    Multiple Vitamin (TAB-A-NICOLE) Oral Tab Take 1 tablet by mouth daily. Disp:  Rfl:    Fludrocortisone Acetate 0.1 MG Oral Tab Take 1 tablet (0.1 mg total) by mouth daily.  Disp: 30 tablet Relation Age of Onset   • Breast Cancer Mother 45   • Cancer Mother      breast   • Cancer Maternal Grandmother      Cervical   • Thyroid disease Maternal Grandmother      Possible thyroid cancer   • High Cholesterol Father    • Hypertension Father    • He (L)  1.4   TSH      0.350 - 5.500 mIU/mL >100.000 (H) >100.000 (H) 1.050 0.132 (L)   T3 FREE      2.30 - 4.20 pg/mL         Component      Latest Ref Rng & Units 6/20/2016 6/17/2016 5/19/2016 1/29/2016   T4,Free (Direct)      0.9 - 1.8 ng/dL  1.6 0.3 (L) 4.65   Hemoglobin      12.0 - 16.0 g/dL 13.8   Hematocrit      34.0 - 50.0 % 40.9   Platelet Count      162.1 - 450.0 10(3)uL 174.0           Assessment and Plan:    1. Postsurgical hypothyroidism: s/p total thyroidectomy for benign nodular goiter.  TSH is

## 2018-02-14 NOTE — H&P
[facility]  History and Physical     Dali Joel Patient Status:  Observation    10/27/1990 MRN [id number]   [facility] 3NE-A Attending Puja Lamas MD   Hosp Day # 0 PCP Mary Eastman DO     Chief Complaint: dizziness Comment: LAPAROSCOPY, OPERATIVE , POSSIBLE LAPAROTOMY                WITH/WO TUBAL PERFUSION performed by Radu Oliveira MD at [address] [place] [address]  No date: THYROIDECTOMY    Social History:  reports that she has never smoked.  She has neve Chest and Back: No tenderness or deformity. Abdomen: Soft, nontender, nondistended. Positive bowel sounds. No rebound, guarding or organomegaly. Neurologic: No focal neurological deficits. CNII-XII grossly intact.   Musculoskeletal: Moves all extremiti

## 2018-02-14 NOTE — PLAN OF CARE
CARDIOVASCULAR - ADULT    • Maintains optimal cardiac output and hemodynamic stability Progressing    • Absence of cardiac arrhythmias or at baseline Progressing        METABOLIC/FLUID AND ELECTROLYTES - ADULT    • Electrolytes maintained within normal garcia

## 2018-02-14 NOTE — PROGRESS NOTES
NURSING ADMISSION NOTE      Patient admitted via Wheelchair  Oriented to room. Safety precautions initiated. Bed in low position. Call light in reach.     Admission navigator completed   IV fluids started  Orthostatic BP taken and documented   Staff

## 2018-02-14 NOTE — CONSULTS
[facility]  Report of Consultation    Humberto Spear Patient Status:  Observation    10/27/1990 MRN [id number]   [facility] 3NE-A Attending Melly Varela MD   Hosp Day # 0 PCP Brynn Reynoso DO     Re nodule 12/15/2014     Past Surgical History:  6/22/2011: D & C  2009: D & C  10/12/2012: D & C  No date: DILATION/CURETTAGE,DIAGNOSTIC  6/22/2011: HYSTEROSCOPY  No date: LAPAROSCOPY,DIAGNOSTIC      Comment: x3 r/t POS  2008: Cata Goel Nightly  •  acetaminophen (TYLENOL) tab 650 mg, 650 mg, Oral, Q6H PRN  •  ondansetron HCl (ZOFRAN) injection 4 mg, 4 mg, Intravenous, Q6H PRN    Review of Systems:  All systems were reviewed and are negative except as described above in HPI.     Physical Ex

## 2018-02-14 NOTE — PAYOR COMM NOTE
--------------  ADMISSION REVIEW     Payor: Krystian Castillo #:  [id number]  Authorization Number: N/A    Admit date: N/A  Admit time: N/A       Admitting Physician: Nancy Oswald MD  Attending Physician:  Earlyne Bloch DILATION/CURETTAGE,DIAGNOSTIC  6/22/2011: HYSTEROSCOPY  No date: LAPAROSCOPY,DIAGNOSTIC      Comment: x3 r/t POS  2008: LAPAROSCOPY,PELVIC,BIOPSY      Comment: drill ovary  01/13/15: OTHER SURGICAL HISTORY      Comment: Subtotal Thyroidectomy   No date: OT 3.  HEENT: Normocephalic atraumatic. Moist mucous membranes. EOM-I. PERRLA. Anicteric. Neck: No lymphadenopathy. No JVD. No carotid bruits. Respiratory: Clear to auscultation bilaterally. No wheezes. No rhonchi.   Cardiovascular: S1, S2. Regular rate and Date Action Dose Route User    2/14/2018 0839 Given 0.1 mg Oral Victoriano Christopher RN      Levothyroxine Sodium (SYNTHROID, LEVOTHROID) tab 125 mcg     Date Action Dose Route User    2/14/2018 3925 Given 125 mcg Oral Maximo Agee RN      Meclizine H

## 2018-02-14 NOTE — PROGRESS NOTES
[facility]  Progress Note     Dali Joel Patient Status:  Observation    10/27/1990 MRN [id number]   [facility] 3NE-A Attending Jose Carlos Cope MD   Hosp Day # 0 PCP Mary Eastman DO     Chief Complaint: dizziness    S: Patie tachycardia   1. Consider echo - completed last year was normal  2. TSH recently check and med adjust   3. IVF  4. Propanolol started at 20 mg daily  5. Replace lytes, check Mg  3. POTS  4. Hypothyroidism   5.  Obesity     Plan of care: as above    Quality:

## 2018-02-14 NOTE — CONSULTS
[id number] [place] [address] with [facility]  2/14/2018    10:57 AM      Admitted because of lightheadedness and background history of POTS and for hydration. History of \"Migraine\" described as waves.   She initiall History:  6/22/2011: D & C  2009: D & C  10/12/2012: D & C  No date: DILATION/CURETTAGE,DIAGNOSTIC  6/22/2011: HYSTEROSCOPY  No date: LAPAROSCOPY,DIAGNOSTIC      Comment: x3 r/t POS  2008: LAPAROSCOPY,PELVIC,BIOPSY      Comment: drill ovary  01/13/15: Mirella Evangelista murmur  Lungs are clear, no wheezing  Extremities: no cyanosis.   ? Non-pitting edema, pulses strong    NEURO  Alert with normal MS  CN functions: PETER, EOM full, no ptosis or nystagmus, VF intact, Face symmetric, sensation intact, hearing fine, tongue mid

## 2018-02-14 NOTE — HISTORICAL OFFICE NOTE
Saeid Regalado  : 10/27/1990  ACCOUNT:  [id number]  [phone]  PCP: Dr. Johnson Gonzales     TODAY'S DATE: 2016  DICTATED BY:  Ekta Norman M.D.]    CHIEF COMPLAINT: [Followup of Chest discomfort/tightness and Followup of Palpitations.]    HPI:    [On  used tobacco. denies smoking. CAFFEINE: no caffeine. ALCOHOL: denies drinking. EXERCISE: active.      ALLERGIES: No Known Allergies    MEDICATIONS: Selected prescriptions see below    VITAL SIGNS: [B/P - 104/60 , Pulse - 115, Weight -  204, Height -   65 , evaluation.]    PRESCRIPTIONS:   02/08/16 *Metoprolol Succinate 25MG      1/2 tablet twice daily                   02/08/16 Synthroid             50MCG     one tablet daily                         02/01/16 Calcitriol            0.5MCG    one capsule daily

## 2018-02-15 ENCOUNTER — APPOINTMENT (OUTPATIENT)
Dept: ULTRASOUND IMAGING | Facility: HOSPITAL | Age: 28
DRG: 309 | End: 2018-02-15
Attending: HOSPITALIST
Payer: MEDICAID

## 2018-02-15 ENCOUNTER — APPOINTMENT (OUTPATIENT)
Dept: ULTRASOUND IMAGING | Facility: HOSPITAL | Age: 28
DRG: 309 | End: 2018-02-15
Attending: PHYSICIAN ASSISTANT
Payer: MEDICAID

## 2018-02-15 LAB
POTASSIUM SERPL-SCNC: 3.6 MMOL/L (ref 3.6–5.1)
POTASSIUM SERPL-SCNC: 3.7 MMOL/L (ref 3.6–5.1)

## 2018-02-15 PROCEDURE — 76705 ECHO EXAM OF ABDOMEN: CPT | Performed by: HOSPITALIST

## 2018-02-15 PROCEDURE — 99233 SBSQ HOSP IP/OBS HIGH 50: CPT | Performed by: OTHER

## 2018-02-15 PROCEDURE — 99232 SBSQ HOSP IP/OBS MODERATE 35: CPT | Performed by: HOSPITALIST

## 2018-02-15 PROCEDURE — 76700 US EXAM ABDOM COMPLETE: CPT | Performed by: HOSPITALIST

## 2018-02-15 RX ORDER — KETOROLAC TROMETHAMINE 30 MG/ML
30 INJECTION, SOLUTION INTRAMUSCULAR; INTRAVENOUS EVERY 6 HOURS PRN
Status: DISPENSED | OUTPATIENT
Start: 2018-02-15 | End: 2018-02-17

## 2018-02-15 RX ORDER — KETOROLAC TROMETHAMINE 30 MG/ML
15 INJECTION, SOLUTION INTRAMUSCULAR; INTRAVENOUS EVERY 6 HOURS PRN
Status: ACTIVE | OUTPATIENT
Start: 2018-02-15 | End: 2018-02-17

## 2018-02-15 RX ORDER — POTASSIUM CHLORIDE 20 MEQ/1
40 TABLET, EXTENDED RELEASE ORAL EVERY 4 HOURS
Status: COMPLETED | OUTPATIENT
Start: 2018-02-15 | End: 2018-02-15

## 2018-02-15 RX ORDER — LORAZEPAM 0.5 MG/1
0.5 TABLET ORAL NIGHTLY PRN
Status: DISCONTINUED | OUTPATIENT
Start: 2018-02-15 | End: 2018-02-17

## 2018-02-15 NOTE — PROGRESS NOTES
· [facility] Card[facility] Progress Note     Subjective:  Still with headache.   Dizzy standing though BP went up    Objective:  125/75 lying  155/119 standing    Afebrile  SR  I/O incomplete     K 3.6    Cardiac: S1 S2 regular  Lungs: clear  Abdomen: soft

## 2018-02-15 NOTE — PROGRESS NOTES
[facility]  Progress Note     Robbie Tapia Patient Status:  Observation    10/27/1990 MRN [id number]   [facility] 3NE-A Attending Tuan Salguero MD   Hosp Day # 1 PCP Josefina Bell DO     Chief Complaint: dizziness    S: Patie Oral QAM AC   • Fludrocortisone Acetate  0.1 mg Oral Daily with breakfast   • topiramate  50 mg Oral BID   • enoxaparin  0.5 mg/kg Subcutaneous Nightly       ASSESSMENT / PLAN:     1. POTS  1. Started on Propanolol yesterday, increased to BID dosing  2.  Mi

## 2018-02-15 NOTE — PROGRESS NOTES
ENDOCRINOLOGY PROGRESS NOTE    Typed by Quinn Zapata MD on 2/15/2018    I saw this patient yesterday for thyroid. Please see that consult note. I signed off the patient. Yesterday, the MRI pituitary report showed NO adenoma.  But today, the n

## 2018-02-15 NOTE — PLAN OF CARE
Radiology called with result of possible pituitary microadenoma. Primary & endocrinology notified. Endocrinologist spoke with pt on phone. - - -

## 2018-02-15 NOTE — PROGRESS NOTES
[address]  Neurology  Notes  Affiliated with [facility]  2/15/2018, 2:35 PM     Goldy Aakash Patient Status:  Inpatient    10/27/1990 MRN [id number]   [facility] 3NE-A Attending Burgess Julia MD now  Abnormal White matter changes on MRI - too much for her age   Issue of possibility of demyelinating disease NOT convincingly resolved or sorted out   Discussed options and she wants to proceed with repeat LP    HEadaches   On Inderal now, told her catarino

## 2018-02-16 ENCOUNTER — APPOINTMENT (OUTPATIENT)
Dept: GENERAL RADIOLOGY | Facility: HOSPITAL | Age: 28
DRG: 309 | End: 2018-02-16
Attending: Other
Payer: MEDICAID

## 2018-02-16 LAB
CLARITY CSF: CLEAR
COLOR CSF: COLORLESS
COUNT PERFORMED ON TUBE: 4
GLUCOSE CSF: 52 MG/DL (ref 40–70)
INR BLD: 1.16 (ref 0.89–1.11)
POTASSIUM SERPL-SCNC: 3.6 MMOL/L (ref 3.6–5.1)
PSA SERPL DL<=0.01 NG/ML-MCNC: 14.9 SECONDS (ref 12–14.3)
RBC CSF: 4 /MM3
TOTAL PROTEIN CSF: 18.8 MG/DL (ref 15–45)
TOTAL VOLUME CSF: 10 ML
WBC # FLD MANUAL: 0 /MM3 (ref 0–5)

## 2018-02-16 PROCEDURE — 62270 DX LMBR SPI PNXR: CPT | Performed by: OTHER

## 2018-02-16 PROCEDURE — 99232 SBSQ HOSP IP/OBS MODERATE 35: CPT | Performed by: OTHER

## 2018-02-16 PROCEDURE — 99232 SBSQ HOSP IP/OBS MODERATE 35: CPT | Performed by: HOSPITALIST

## 2018-02-16 PROCEDURE — 009U3ZX DRAINAGE OF SPINAL CANAL, PERCUTANEOUS APPROACH, DIAGNOSTIC: ICD-10-PCS | Performed by: RADIOLOGY

## 2018-02-16 PROCEDURE — 77003 FLUOROGUIDE FOR SPINE INJECT: CPT | Performed by: OTHER

## 2018-02-16 RX ORDER — POTASSIUM CHLORIDE 20 MEQ/1
40 TABLET, EXTENDED RELEASE ORAL EVERY 4 HOURS
Status: COMPLETED | OUTPATIENT
Start: 2018-02-16 | End: 2018-02-17

## 2018-02-16 NOTE — PROGRESS NOTES
[facility] Neurology Progress Note    Baljinder Conley Patient Status:  Inpatient    10/27/1990 MRN [id number]   [facility] 3NE-A Attending Janae Piña MD   [facility] Day # 2 PCP Mady Nguyen DO         Subjective:  Josh Bush Plan:  Endocrine, Cardiology following, appreciate recs  Continue Florinef  Continue Inderal for tachycardia and HA prophylaxis  Midrin or Indomethacin PRN for HA  Continue Synthroid at adjusted dose  PT/OT for dizziness    Pt seen with Dr. Ira Guillen

## 2018-02-16 NOTE — PROGRESS NOTES
· [facility] Card[facility] Progress Note     Subjective: Had LP earlier today.   Still with headache and dizziness, was dyspneic earlier but improved    Objective:  125/75 lying  155/119 standing    Afebrile  SR  I/O incomplete     K 3.6    Cardiac: S1 S2 r

## 2018-02-16 NOTE — PROCEDURES
[facility]  Procedure Note    Aurelia Vijaya Patient Status:  Inpatient    10/27/1990 MRN [id number]   [facility] 3NE-A Attending Obie Elias MD   [facility] Day # 2 PCP Jarek Mccarty DO     Procedure: Lumbar puncture    Pre-Procedure

## 2018-02-16 NOTE — PROGRESS NOTES
[facility]  Progress Note     Taylor Rico Patient Status:  Observation    10/27/1990 MRN [id number]   [facility] 3NE-A Attending Shiv Lopez MD   Hosp Day # 2 PCP Nito Black DO     Chief Complaint: dizziness    S: still No results for input(s): TROP, CK in the last 72 hours. Imaging: Imaging data reviewed in Epic.     Medications:   • Propranolol HCl  20 mg Oral BID   • Levothyroxine Sodium  150 mcg Oral QAM AC   • Fludrocortisone Acetate  0.1 mg Oral Daily w

## 2018-02-17 VITALS
HEART RATE: 78 BPM | SYSTOLIC BLOOD PRESSURE: 122 MMHG | DIASTOLIC BLOOD PRESSURE: 75 MMHG | WEIGHT: 265 LBS | OXYGEN SATURATION: 100 % | TEMPERATURE: 98 F | HEIGHT: 67 IN | BODY MASS INDEX: 41.59 KG/M2 | RESPIRATION RATE: 18 BRPM

## 2018-02-17 LAB — POTASSIUM SERPL-SCNC: 3.8 MMOL/L (ref 3.6–5.1)

## 2018-02-17 PROCEDURE — 99232 SBSQ HOSP IP/OBS MODERATE 35: CPT | Performed by: HOSPITALIST

## 2018-02-17 PROCEDURE — 99232 SBSQ HOSP IP/OBS MODERATE 35: CPT | Performed by: OTHER

## 2018-02-17 RX ORDER — MAGNESIUM SULFATE 1 G/100ML
1 INJECTION INTRAVENOUS ONCE
Status: COMPLETED | OUTPATIENT
Start: 2018-02-17 | End: 2018-02-17

## 2018-02-17 RX ORDER — PROPRANOLOL HYDROCHLORIDE 20 MG/1
20 TABLET ORAL 2 TIMES DAILY
Qty: 60 TABLET | Refills: 2 | Status: SHIPPED | OUTPATIENT
Start: 2018-02-17 | End: 2018-02-22 | Stop reason: ALTCHOICE

## 2018-02-17 RX ORDER — LEVOTHYROXINE SODIUM 0.15 MG/1
150 TABLET ORAL
Qty: 30 TABLET | Refills: 0 | Status: SHIPPED | OUTPATIENT
Start: 2018-02-18 | End: 2018-04-18

## 2018-02-17 RX ORDER — POTASSIUM CHLORIDE 20 MEQ/1
40 TABLET, EXTENDED RELEASE ORAL ONCE
Status: COMPLETED | OUTPATIENT
Start: 2018-02-17 | End: 2018-02-17

## 2018-02-17 RX ORDER — MECLIZINE HCL 12.5 MG/1
12.5 TABLET ORAL 3 TIMES DAILY PRN
Qty: 60 TABLET | Refills: 0 | Status: SHIPPED | OUTPATIENT
Start: 2018-02-17 | End: 2018-02-20

## 2018-02-17 RX ORDER — HYDROCODONE BITARTRATE AND ACETAMINOPHEN 10; 325 MG/1; MG/1
1 TABLET ORAL ONCE
Status: COMPLETED | OUTPATIENT
Start: 2018-02-17 | End: 2018-02-17

## 2018-02-17 NOTE — PHYSICAL THERAPY NOTE
PHYSICAL THERAPY EVALUATION - INPATIENT     Room Number: 0381/3525-K  Evaluation Date: 2/17/2018  Type of Evaluation: Initial  Physician Order: PT Eval and Treat    Presenting Problem: dizziness and headache  Reason for Therapy: Mobility Dysfunction ovarian syndrome)    • Postsurgical hypothyroidism     In 2015: total thyroidectomy for benign goiter   • POTS (postural orthostatic tachycardia syndrome)    • Tachycardia    • Thyroid nodule 12/15/2014       Past Surgical History  Past Surgical History: within functional limits    RANGE OF MOTION AND STRENGTH ASSESSMENT  Upper extremity ROM and strength are within functional limits     Lower extremity ROM is within functional limits     Lower extremity strength is within functional limits     BALANCE  Sta OT. Sat down. Second attempt: stood and began swaying, pt was instructed to side step towards head of bed before sitting down and pt with good control with hands on walker and stepped to sit 3 ft with CGA. Pt declined additional ambulation.   Pt with no L 2      CURRENT GOALS    Goal #1 Patient is able to demonstrate supine - sit EOB @ level: independent     Goal #2 Patient is able to demonstrate transfers Sit to/from Stand at assistance level: modified independent     Goal #3 Patient is able to ambulate 15

## 2018-02-17 NOTE — PROGRESS NOTES
[facility] [address]  [place], [address]  ?  02/17/18  ? Re: Bertha Flores  ? To Whom It May Concern:    Bertha Flores was admitted to [facility] from 2/13/2018 to 02/17/18.     Please excuse Bertha Flores from attending w

## 2018-02-17 NOTE — PROGRESS NOTES
[facility]  Progress Note     Ace Lime Patient Status:  Observation    10/27/1990 MRN [id number]   [facility] 3NE-A Attending Misty Mckeon MD   Hosp Day # 3 PCP Jhonatan Crum DO     Chief Complaint: dizziness    S: sleep count WNL, final Cx pending  2. Hypothyroidism   1. MRI showing  Pituitary microadenoma  2. To follow endo as OP for further workup  3. On increased dose of synthroid  3. Obesity   4.  Headache    Plan of care: as above    Quality:  · DVT Prophylaxis: SCDS

## 2018-02-17 NOTE — PLAN OF CARE
NURSING DISCHARGE NOTE    Discharged Home via Wheelchair. Accompanied by Family member and Support staff  Belongings Taken by patient/family. AVS reviewed with pt and her Father.

## 2018-02-17 NOTE — PLAN OF CARE
CARDIOVASCULAR - ADULT    • Maintains optimal cardiac output and hemodynamic stability Progressing        METABOLIC/FLUID AND ELECTROLYTES - ADULT    • Electrolytes maintained within normal limits Progressing        NEUROLOGICAL - ADULT    • Achieves stabl

## 2018-02-17 NOTE — PLAN OF CARE
Pt appeared SOB this am. 02 sats 100% on RA. Lungs clear. Still dizzy when she get up to bathroom. Frequent reminders to not look at the floor when she walks. Lumbar Puncture done. , remained flat for 2 hrs. Then  Up in bed with complaints of pain to punctur

## 2018-02-17 NOTE — PROGRESS NOTES
[facility] Neurology Progress Note    Bertha Flores Patient Status:  Inpatient    10/27/1990 MRN [id number]   [facility] 3NE-A Attending Joe Rosario MD   Hosp Day # 3 PCP Shauna Stanley, DO         I have seen the patient in

## 2018-02-17 NOTE — OCCUPATIONAL THERAPY NOTE
OCCUPATIONAL THERAPY EVALUATION - INPATIENT     Room Number: 3937/1461-R  Evaluation Date: 2/17/2018  Type of Evaluation: Initial  Presenting Problem: headache, dehydration,  POTS (postural orthostatic tachycardia syndrome)    Physician Order: IP Consult t History:  6/22/2011: D & C  2009: D & C  10/12/2012: D & C  No date: DILATION/CURETTAGE,DIAGNOSTIC  6/22/2011: HYSTEROSCOPY  No date: LAPAROSCOPY,DIAGNOSTIC      Comment: x3 r/t POS  2008: LAPAROSCOPY,PELVIC,BIOPSY      Comment: drill ovary  01/13/15: Alesia Gautam Status:   WFL - within functional limits    SENSATION  Light touch:  intact          RANGE OF MOTION AND STRENGTH ASSESSMENT  Upper extremity ROM is within functional limits     Upper extremity strength is within functional limits     COORDINATION  Sarah Mckeon reporting 9 out of 10 headaches when she was texting on the phone. Patient End of Session: In bed; With [facility] staff;Needs met;Call light within reach;RN aware of session/findings; All patient questions and concerns addressed    ASSESSMENT     Patient is a 32 y education  Rehab Potential : Fair  Frequency (Obs): 5x/week  Number of Visits to Meet Established Goals: 5    ADL Goals   Patient will perform grooming: independently and while standing at sink  Patient will perform lower body dressing:  independently  Vanesa Box

## 2018-02-18 NOTE — DISCHARGE SUMMARY
[facility]  DISCHARGE SUMMARY     Chloe Pires Patient Status:  Inpatient    10/27/1990 MRN [id number]   [facility] 3NE-A Attending No att. providers found   Hosp Day # 3 PCP Óscar Johns DO     Date of Admission: 2018  D found to have elevated TSH and her MRI showed a possible pituitary microadenoma. Endocrinology was consulted. Her Synthroid was increased and she was to follow-up as an outpatient for further workup.   She remained stable and was seen by physical therapy medications    topiramate 50 MG Tabs  Commonly known as:  TOPAMAX              Where to Get Your Medications      These medications were sent to [address] [place] [address] Dr Haney, [phone],

## 2018-02-19 ENCOUNTER — TELEPHONE (OUTPATIENT)
Dept: INTERNAL MEDICINE CLINIC | Facility: CLINIC | Age: 28
End: 2018-02-19

## 2018-02-19 ENCOUNTER — PATIENT OUTREACH (OUTPATIENT)
Dept: CASE MANAGEMENT | Age: 28
End: 2018-02-19

## 2018-02-19 LAB
ALBUMIN INDEX: 2 RATIO
ALBUMIN, CSF: 7 MG/DL
ALBUMIN, SERUM/PLASMA, NEPH: 3480 MG/DL
CSF IGG SYNTHESIS RATE: <0 MG/D
CSF IGG/ALBUMIN RATIO: 0.17 RATIO
IGG INDEX: 0.51 RATIO
IMMUNOGLOBULIN G CSF: 1.2 MG/DL
IMMUNOGLOBULIN G: 1180 MG/DL

## 2018-02-19 NOTE — TELEPHONE ENCOUNTER
Patient states she [facility] for POTS (postural orthostatic tachycardia syndrome), had lumbar puncture, abnormal MRI Brain/Pituitary gland, developed increased back pain and h/a after lumbar puncture, had lo

## 2018-02-19 NOTE — TELEPHONE ENCOUNTER
Pt needs HFU released from the hospital 2-17/18, for BP, dizziness, back pain and ab pain,  and states she is still in pain.

## 2018-02-19 NOTE — TELEPHONE ENCOUNTER
Spoke with pt to inform, per AMS, will need to go to ER for further eval due to experiencing sharp abdominal pains. Pt was very rude, stating \"I will not go to the ER\", demanding to be scheduled.  Scheduled OV on 2/21 per pt request. Pt verbalized underst

## 2018-02-20 ENCOUNTER — APPOINTMENT (OUTPATIENT)
Dept: CT IMAGING | Age: 28
End: 2018-02-20
Attending: EMERGENCY MEDICINE
Payer: MEDICAID

## 2018-02-20 ENCOUNTER — TELEPHONE (OUTPATIENT)
Dept: INTERNAL MEDICINE CLINIC | Facility: CLINIC | Age: 28
End: 2018-02-20

## 2018-02-20 ENCOUNTER — HOSPITAL ENCOUNTER (EMERGENCY)
Age: 28
Discharge: HOME | End: 2018-02-20
Attending: EMERGENCY MEDICINE
Payer: MEDICAID

## 2018-02-20 VITALS
RESPIRATION RATE: 16 BRPM | OXYGEN SATURATION: 100 % | DIASTOLIC BLOOD PRESSURE: 50 MMHG | TEMPERATURE: 99 F | HEART RATE: 102 BPM | SYSTOLIC BLOOD PRESSURE: 130 MMHG

## 2018-02-20 DIAGNOSIS — R10.9 ABDOMINAL PAIN OF UNKNOWN ETIOLOGY: Primary | ICD-10-CM

## 2018-02-20 LAB
ALBUMIN SERPL-MCNC: 3.3 G/DL (ref 3.5–4.8)
ALP LIVER SERPL-CCNC: 122 U/L (ref 37–98)
ALT SERPL-CCNC: 31 U/L (ref 14–54)
AST SERPL-CCNC: 36 U/L (ref 15–41)
BASOPHILS # BLD AUTO: 0.03 X10(3) UL (ref 0–0.1)
BASOPHILS NFR BLD AUTO: 0.4 %
BILIRUB SERPL-MCNC: 0.4 MG/DL (ref 0.1–2)
BILIRUB UR QL STRIP.AUTO: NEGATIVE
BUN BLD-MCNC: 12 MG/DL (ref 8–20)
CALCIUM BLD-MCNC: 7.8 MG/DL (ref 8.3–10.3)
CHLORIDE: 111 MMOL/L (ref 101–111)
CO2: 26 MMOL/L (ref 22–32)
COLOR UR AUTO: YELLOW
CREAT BLD-MCNC: 0.63 MG/DL (ref 0.55–1.02)
CSF BAND OLIGOCLONAL: POSITIVE
CSF OLIGOCLONAL BANDS NUMBER: 2 BANDS
EOSINOPHIL # BLD AUTO: 0.15 X10(3) UL (ref 0–0.3)
EOSINOPHIL NFR BLD AUTO: 2.1 %
ERYTHROCYTE [DISTWIDTH] IN BLOOD BY AUTOMATED COUNT: 14.9 % (ref 11.5–16)
GLUCOSE BLD-MCNC: 87 MG/DL (ref 70–99)
GLUCOSE UR STRIP.AUTO-MCNC: NEGATIVE MG/DL
HCT VFR BLD AUTO: 40.3 % (ref 34–50)
HGB BLD-MCNC: 13.5 G/DL (ref 12–16)
IMMATURE GRANULOCYTE COUNT: 0.02 X10(3) UL (ref 0–1)
IMMATURE GRANULOCYTE RATIO %: 0.3 %
KETONES UR STRIP.AUTO-MCNC: NEGATIVE MG/DL
LEUKOCYTE ESTERASE UR QL STRIP.AUTO: NEGATIVE
LIPASE: 147 U/L (ref 73–393)
LYMPHOCYTES # BLD AUTO: 1.55 X10(3) UL (ref 0.9–4)
LYMPHOCYTES NFR BLD AUTO: 22 %
M PROTEIN MFR SERPL ELPH: 6.6 G/DL (ref 6.1–8.3)
MCH RBC QN AUTO: 30.1 PG (ref 27–33.2)
MCHC RBC AUTO-ENTMCNC: 33.5 G/DL (ref 31–37)
MCV RBC AUTO: 89.8 FL (ref 81–100)
MONOCYTES # BLD AUTO: 1.13 X10(3) UL (ref 0.1–1)
MONOCYTES NFR BLD AUTO: 16 %
NEUTROPHIL ABS PRELIM: 4.17 X10 (3) UL (ref 1.3–6.7)
NEUTROPHILS # BLD AUTO: 4.17 X10(3) UL (ref 1.3–6.7)
NEUTROPHILS NFR BLD AUTO: 59.2 %
NITRITE UR QL STRIP.AUTO: NEGATIVE
PH UR STRIP.AUTO: 8 [PH] (ref 4.5–8)
PLATELET # BLD AUTO: 185 10(3)UL (ref 150–450)
POCT LOT NUMBER: NORMAL
POCT URINE PREGNANCY: NEGATIVE
POTASSIUM SERPL-SCNC: 3.9 MMOL/L (ref 3.6–5.1)
PROT UR STRIP.AUTO-MCNC: NEGATIVE MG/DL
RBC # BLD AUTO: 4.49 X10(6)UL (ref 3.8–5.1)
RBC UR QL AUTO: NEGATIVE
RED CELL DISTRIBUTION WIDTH-SD: 48.2 FL (ref 35.1–46.3)
SODIUM SERPL-SCNC: 143 MMOL/L (ref 136–144)
SP GR UR STRIP.AUTO: 1.01 (ref 1–1.03)
UROBILINOGEN UR STRIP.AUTO-MCNC: 0.2 MG/DL
WBC # BLD AUTO: 7.1 X10(3) UL (ref 4–13)

## 2018-02-20 PROCEDURE — 81003 URINALYSIS AUTO W/O SCOPE: CPT | Performed by: EMERGENCY MEDICINE

## 2018-02-20 PROCEDURE — 83690 ASSAY OF LIPASE: CPT | Performed by: EMERGENCY MEDICINE

## 2018-02-20 PROCEDURE — 96361 HYDRATE IV INFUSION ADD-ON: CPT

## 2018-02-20 PROCEDURE — 85025 COMPLETE CBC W/AUTO DIFF WBC: CPT | Performed by: EMERGENCY MEDICINE

## 2018-02-20 PROCEDURE — 99284 EMERGENCY DEPT VISIT MOD MDM: CPT

## 2018-02-20 PROCEDURE — 81025 URINE PREGNANCY TEST: CPT

## 2018-02-20 PROCEDURE — 80053 COMPREHEN METABOLIC PANEL: CPT | Performed by: EMERGENCY MEDICINE

## 2018-02-20 PROCEDURE — 96374 THER/PROPH/DIAG INJ IV PUSH: CPT

## 2018-02-20 PROCEDURE — 74177 CT ABD & PELVIS W/CONTRAST: CPT | Performed by: EMERGENCY MEDICINE

## 2018-02-20 RX ORDER — KETOROLAC TROMETHAMINE 30 MG/ML
30 INJECTION, SOLUTION INTRAMUSCULAR; INTRAVENOUS ONCE
Status: COMPLETED | OUTPATIENT
Start: 2018-02-20 | End: 2018-02-20

## 2018-02-20 NOTE — ED PROVIDER NOTES
Patient Seen in: [facility] [place]    History   Patient presents with:  Abdomen/Flank Pain (GI/)    Stated Complaint: LLQ abdominal pain.  just d/c the 17th HPI    24-year-old female who presents to the emergency department c sx x3  03/2012: OTHER SURGICAL HISTORY      Comment: operative laparoscopy with electrocautery of                multiple ovarian cysts bilaterally.  it was                lysis of a few pelvic adhesions  No date: OTHER SURGICAL HISTORY      Comment: Erlin Hui +2.  Full range of motion is noted of the extremities without deformities. No tenderness. Neurologically intact.          ED Course     Labs Reviewed   COMP METABOLIC PANEL (14) - Abnormal; Notable for the following:        Result Value    Calcium, Total normal.  Alk phos was 122 with a calcium of 7.8 but corrected was 9.1. Albumin 3.3. Urinalysis was negative. CAT scan showed a wedge-shaped focus of hypoperfusion along the midpole the left kidney measuring 2.2 x 2.4 x 2.4 cm.   This can be seen in patie

## 2018-02-20 NOTE — TELEPHONE ENCOUNTER
Patient notified AMS would like her to go to the ER for evaluation. Pt states she will go but will be going to [facility] ER because she felt she was 'badly treated' at [facility]. Pt notified to call with any questions or problems.   Pt verbalizes und

## 2018-02-20 NOTE — TELEPHONE ENCOUNTER
Please call patient. I would still recommend she go to ER for her symptoms particularly because of her headache after lumbar puncture and \"significant\" upper abdominal pain.  I reviewed her ultrasound results and there is nothing in the report that would

## 2018-02-20 NOTE — TELEPHONE ENCOUNTER
Spoke to pt for hospital follow-up today. She states she is still feeling the same- dizziness, headache, significant upper abdominal pain accompanied by nausea and constipation.   Advised pt to go to call 911 (if ride unavailable) and go to ER for further

## 2018-02-20 NOTE — PROGRESS NOTES
Initial Post Discharge Follow Up   Discharge Date: 2/17/18  Contact Date: 2/19/2018    Consent Verification:  Assessment Completed With: Patient  HIPAA Verified?   Yes    Discharge Dx:  POTS, severe dizziness, dehydration    Pt states she is still feelin

## 2018-02-20 NOTE — PAYOR COMM NOTE
--------------  DISCHARGE REVIEW    Payor: Krystian Castillo #:  [id number]  Authorization Number: [id number]    Admit date: 2/14/18  Admit time:  1958  Discharge Date: 2/17/2018  5:55 PM     Admitting Physician: Sugar Casas result she has been more dizzy, lightheaded . Her home synthroid dose has recently been adjust .   Dr Vivienne Ramirez admitting pt for IVF hydration.    She is currently dizzy, no CP/SOB/calf pain/pleuritci CP/Nausea or palpitation    Brief Synopsis:   Patient i Instructions Prescription details   Levothyroxine Sodium 150 MCG Tabs  Commonly known as:  SYNTHROID  What changed:  See the new instructions.       Take 1 tablet (150 mcg total) by mouth every morning before breakfast.   Quantity:  30 tablet  Refills:  0 Musculoskeletal: Moves all extremities. Extremities: No edema.     PATIENT DISCHARGE INSTRUCTIONS: See electronic chart    Jeffrey Arshad MD 2/18/2018    REVIEWER COMMENTS    PLEASE REVIEW AND APPROVE ALL INPT DAYS FOR THIS ADMISSION    PLEASE FAX ALL IN

## 2018-02-20 NOTE — PAYOR COMM NOTE
--------------  CONTINUED STAY REVIEW    Payor: Krystian Castillo #:  [id number]  Authorization Number: [id number]    Admit date: 2/14/18  Admit time: 1958    Admitting Physician: Tammy Bernstein MD  Attending Physician ordered  Tyrese Ames D.O. Neurology    · Cardiology Progress Note   Had LP earlier today.   Still with headache and dizziness, was dyspneic earlier but improved  Plan:   · Recheck orthostatics, continue Inderal and Florinef  · Her follow up is with Chuck Report Every 4 hours 02/14/18 02/14/18   Meclizine HCl (ANTIVERT) tab 12.5 mg Order Report 3 times daily PRN 02/14/18 02/17/18   Fludrocortisone Acetate (FLORINEF) tab 0.1 mg Order Report Daily with breakfast 02/14/18 02/17/18   Levothyroxine Sodium (SYNTH

## 2018-02-21 ENCOUNTER — TELEPHONE (OUTPATIENT)
Dept: INTERNAL MEDICINE CLINIC | Facility: CLINIC | Age: 28
End: 2018-02-21

## 2018-02-21 NOTE — TELEPHONE ENCOUNTER
Pt needs a UC follow up. She requested an appt for after 4pm or Saturday. We have nothing available until Monday. Pt stated she will schedule for Saturday if we will give her a doctors note for Monday. She is due to go back to work Monday. Please advise.  [employer]

## 2018-02-21 NOTE — TELEPHONE ENCOUNTER
Pt called back and scehduled    Future Appointments  Date Time Provider Brendan Marquez   2/26/2018 5:15 PM Vani Dozier DO [facility]

## 2018-02-22 LAB — ANGIOTENSIN CONVERTING E, CSF: 0.9 U/L

## 2018-02-23 ENCOUNTER — OFFICE VISIT (OUTPATIENT)
Dept: NEUROLOGY | Facility: CLINIC | Age: 28
End: 2018-02-23

## 2018-02-23 VITALS
BODY MASS INDEX: 41.75 KG/M2 | RESPIRATION RATE: 16 BRPM | SYSTOLIC BLOOD PRESSURE: 141 MMHG | HEART RATE: 88 BPM | HEIGHT: 67 IN | WEIGHT: 266 LBS | DIASTOLIC BLOOD PRESSURE: 87 MMHG

## 2018-02-23 DIAGNOSIS — I49.8 POTS (POSTURAL ORTHOSTATIC TACHYCARDIA SYNDROME): Primary | ICD-10-CM

## 2018-02-23 DIAGNOSIS — G93.9 CHRONIC NON-SPECIFIC WHITE MATTER LESIONS ON MRI: ICD-10-CM

## 2018-02-23 PROCEDURE — 99213 OFFICE O/P EST LOW 20 MIN: CPT | Performed by: OTHER

## 2018-02-23 RX ORDER — FLUDROCORTISONE ACETATE 0.1 MG/1
0.1 TABLET ORAL DAILY
Qty: 30 TABLET | Refills: 2 | Status: SHIPPED | OUTPATIENT
Start: 2018-02-23 | End: 2018-02-26

## 2018-02-23 NOTE — PATIENT INSTRUCTIONS
Refill policies:    • Allow 2-3 business days for refills; controlled substances may take longer.   • Contact your pharmacy at least 5 days prior to running out of medication and have them send an electronic request or submit request through the [facility] recommended that you have a procedure or additional testing performed. [facility]) will contact your insurance carrier to obtain pre-certification or prior authorization.     Unfortunately, [facility] has seen an increase in denial of paym

## 2018-02-23 NOTE — PROGRESS NOTES
HPI:    Patient ID: Abhilash Ann is a 32year old female. Neurologic Problem   Associated symptoms include dizziness, headaches, light-headedness and palpitations.          Abhilash Ann is a 32year old female who presented for hospital follow up f tachycardia syndrome)    • Tachycardia    • Thyroid nodule 12/15/2014      Past Surgical History:  6/22/2011: D & C  2009: D & C  10/12/2012: D & C  No date: DILATION/CURETTAGE,DIAGNOSTIC  6/22/2011: HYSTEROSCOPY  No date: Felix Needle Fludrocortisone Acetate 0.1 MG Oral Tab Take 1 tablet (0.1 mg total) by mouth daily.  Disp: 30 tablet Rfl: 2   Levothyroxine Sodium 150 MCG Oral Tab Take 1 tablet (150 mcg total) by mouth every morning before breakfast. Disp: 30 tablet Rfl: 0   Multiple V daily. I gave her prescription    Chronic headaches and migraines. Continue Topamax at same dose    White matter abnormality unspecified. She had extensive work up done in the past and saw Dr Marcie Naidu (MS specialist) at [facility] who rule out MS.  MRA head aiyana

## 2018-02-24 PROCEDURE — 81015 MICROSCOPIC EXAM OF URINE: CPT | Performed by: UROLOGY

## 2018-02-24 PROCEDURE — 87086 URINE CULTURE/COLONY COUNT: CPT | Performed by: UROLOGY

## 2018-02-25 PROCEDURE — 83835 ASSAY OF METANEPHRINES: CPT | Performed by: UROLOGY

## 2018-02-25 PROCEDURE — 36415 COLL VENOUS BLD VENIPUNCTURE: CPT | Performed by: UROLOGY

## 2018-02-26 ENCOUNTER — OFFICE VISIT (OUTPATIENT)
Dept: INTERNAL MEDICINE CLINIC | Facility: CLINIC | Age: 28
End: 2018-02-26

## 2018-02-26 VITALS
HEART RATE: 82 BPM | WEIGHT: 272 LBS | TEMPERATURE: 98 F | BODY MASS INDEX: 43 KG/M2 | SYSTOLIC BLOOD PRESSURE: 132 MMHG | DIASTOLIC BLOOD PRESSURE: 74 MMHG | RESPIRATION RATE: 16 BRPM

## 2018-02-26 DIAGNOSIS — R10.9 ABDOMINAL PAIN, UNSPECIFIED ABDOMINAL LOCATION: ICD-10-CM

## 2018-02-26 DIAGNOSIS — E03.9 HYPOTHYROIDISM, UNSPECIFIED TYPE: ICD-10-CM

## 2018-02-26 DIAGNOSIS — I49.8 POTS (POSTURAL ORTHOSTATIC TACHYCARDIA SYNDROME): ICD-10-CM

## 2018-02-26 DIAGNOSIS — N28.9 RENAL LESION: Primary | ICD-10-CM

## 2018-02-26 PROCEDURE — 99214 OFFICE O/P EST MOD 30 MIN: CPT | Performed by: FAMILY MEDICINE

## 2018-02-26 RX ORDER — KETOROLAC TROMETHAMINE 10 MG/1
10 TABLET, FILM COATED ORAL EVERY 6 HOURS PRN
Qty: 8 TABLET | Refills: 0 | Status: SHIPPED | OUTPATIENT
Start: 2018-02-26 | End: 2018-04-18

## 2018-02-26 NOTE — PROGRESS NOTES
HPI:    Paula Potter is a 32year old female here today for hospital follow up.    She was discharged from Inpatient hospital, [facility] to Home   Admit Date: 2/17/18  Discharge Date: 2/19/19  Hospital Discharge Diagnosis:    POTS, dehydration, d MCG Oral Tab Take 1 tablet (150 mcg total) by mouth every morning before breakfast.   Multiple Vitamin (TAB-A-NICOLE) Oral Tab Take 1 tablet by mouth daily. No current facility-administered medications on file prior to visit.        HISTORY: reconciled an encounter: 272 lb. /74 (BP Location: Left arm, Patient Position: Sitting, Cuff Size: large)   Pulse 82   Temp 98.2 °F (36.8 °C) (Oral)   Resp 16   Wt 272 lb   BMI 42.60 kg/m²   Physical Exam   Vitals reviewed.    Constitutional: She appears well-dev Luís Vogt DO, 2/26/2018

## 2018-03-05 ENCOUNTER — TELEPHONE (OUTPATIENT)
Dept: NEUROLOGY | Facility: CLINIC | Age: 28
End: 2018-03-05

## 2018-03-06 NOTE — TELEPHONE ENCOUNTER
reviewed records from [facility]. Sent to Scanning. Pt has No Appt scheduled.

## 2018-03-07 ENCOUNTER — OFFICE VISIT (OUTPATIENT)
Dept: INTERNAL MEDICINE CLINIC | Facility: CLINIC | Age: 28
End: 2018-03-07

## 2018-03-07 VITALS
WEIGHT: 270.63 LBS | HEIGHT: 67 IN | DIASTOLIC BLOOD PRESSURE: 84 MMHG | BODY MASS INDEX: 42.48 KG/M2 | TEMPERATURE: 98 F | OXYGEN SATURATION: 99 % | SYSTOLIC BLOOD PRESSURE: 132 MMHG | HEART RATE: 109 BPM

## 2018-03-07 DIAGNOSIS — Z51.81 THERAPEUTIC DRUG MONITORING: ICD-10-CM

## 2018-03-07 DIAGNOSIS — E28.2 PCOS (POLYCYSTIC OVARIAN SYNDROME): ICD-10-CM

## 2018-03-07 DIAGNOSIS — L68.0 HIRSUTISM: ICD-10-CM

## 2018-03-07 DIAGNOSIS — I49.8 POTS (POSTURAL ORTHOSTATIC TACHYCARDIA SYNDROME): ICD-10-CM

## 2018-03-07 DIAGNOSIS — E66.01 MORBID OBESITY (HCC): Primary | ICD-10-CM

## 2018-03-07 DIAGNOSIS — E89.0 POSTSURGICAL HYPOTHYROIDISM: ICD-10-CM

## 2018-03-07 PROCEDURE — 99214 OFFICE O/P EST MOD 30 MIN: CPT | Performed by: INTERNAL MEDICINE

## 2018-03-07 RX ORDER — LACTOBACIL 2/BIFIDO 1/S.THERMO 450B CELL
1 PACKET (EA) ORAL DAILY
Qty: 30 CAPSULE | Refills: 0 | Status: SHIPPED | OUTPATIENT
Start: 2018-03-07 | End: 2018-05-14 | Stop reason: ALTCHOICE

## 2018-03-07 RX ORDER — LEVOTHYROXINE SODIUM 0.15 MG/1
150 TABLET ORAL
COMMUNITY
Start: 2017-09-26 | End: 2018-06-04 | Stop reason: ALTCHOICE

## 2018-03-07 RX ORDER — HYDROCODONE BITARTRATE AND ACETAMINOPHEN 5; 325 MG/1; MG/1
TABLET ORAL
COMMUNITY
Start: 2018-02-24 | End: 2018-04-10 | Stop reason: ALTCHOICE

## 2018-03-07 RX ORDER — METFORMIN HYDROCHLORIDE 750 MG/1
750 TABLET, EXTENDED RELEASE ORAL 2 TIMES DAILY WITH MEALS
Qty: 60 TABLET | Refills: 0 | Status: SHIPPED | OUTPATIENT
Start: 2018-03-07 | End: 2018-05-14

## 2018-03-07 RX ORDER — TOPIRAMATE 50 MG/1
50 TABLET, FILM COATED ORAL 2 TIMES DAILY
Qty: 45 TABLET | Refills: 2 | Status: SHIPPED | OUTPATIENT
Start: 2018-03-07 | End: 2018-04-06

## 2018-03-07 NOTE — PATIENT INSTRUCTIONS
Apollo Salas    Welcome to [facility] weight loss clinic. We are excited that you are committed to improving your health and have invited our practice to be part of your journey.  Our approach to the medical management of weight loss is similar to that of ot day    · Read nutrition labels    · Drink a lot of water 65 oz of water per day, add fiber ( benefiber) to the water to increase fullness, overcome constipation    · Eat slowly    · Do not drink your calories ( no regular pop, juice, high calorie coffee dr

## 2018-03-07 NOTE — PROGRESS NOTES
Humberto Spear is a 32year old female. Chief complaint:  weight loss management and obesity related comorbidities    HPI:     Humberto Spear is a 32year old female new to our office today.  Referred by Dr Reena Mcdonnell   with [address] as listed below here for hermann /18      Current Outpatient Prescriptions:  Levothyroxine Sodium 150 MCG Oral Tab Take 150 mcg by mouth.  Disp:  Rfl:    HYDROcodone-acetaminophen 5-325 MG Oral Tab  Disp:  Rfl:    Ketorolac Tromethamine 10 MG Oral Tab Take 1 tablet (10 mg total) by mouth e OR LOCATION  No date: THYROIDECTOMY      Comment:  In 2015: total thyroidectomy for benign goiter  Social History:  Smoking status: Never Smoker                                                              Smokeless tobacco: Never Used cyanosis, clubbing or edema  NEURO: no gross deficits             Levothyroxine Sodium 150 MCG Oral Tab      HYDROcodone-acetaminophen 5-325 MG Oral Tab  No orders of the defined types were placed in this encounter.       ASSESSMENT/PLAN:   (E66.01) Morbid coordination of care. The diagnosis, prognosis, and general treatment was explained to the patient.   Please return to the clinic if you are having persistent or worsening symptoms   Janeth Redman MD,   Diplomate of the American Board of Internal Medicine

## 2018-04-18 ENCOUNTER — OFFICE VISIT (OUTPATIENT)
Dept: INTERNAL MEDICINE CLINIC | Facility: CLINIC | Age: 28
End: 2018-04-18

## 2018-04-18 VITALS
SYSTOLIC BLOOD PRESSURE: 130 MMHG | BODY MASS INDEX: 40.18 KG/M2 | OXYGEN SATURATION: 99 % | HEIGHT: 67 IN | HEART RATE: 113 BPM | WEIGHT: 256 LBS | TEMPERATURE: 98 F | DIASTOLIC BLOOD PRESSURE: 82 MMHG

## 2018-04-18 DIAGNOSIS — E03.9 HYPOTHYROIDISM, UNSPECIFIED TYPE: ICD-10-CM

## 2018-04-18 DIAGNOSIS — R00.0 TACHYCARDIA: ICD-10-CM

## 2018-04-18 DIAGNOSIS — I49.8 POTS (POSTURAL ORTHOSTATIC TACHYCARDIA SYNDROME): ICD-10-CM

## 2018-04-18 DIAGNOSIS — R00.2 PALPITATIONS: Primary | ICD-10-CM

## 2018-04-18 DIAGNOSIS — Z02.89 ENCOUNTER FOR COMPLETION OF FORM WITH PATIENT: ICD-10-CM

## 2018-04-18 DIAGNOSIS — E66.01 MORBID OBESITY (HCC): ICD-10-CM

## 2018-04-18 PROCEDURE — 99214 OFFICE O/P EST MOD 30 MIN: CPT | Performed by: INTERNAL MEDICINE

## 2018-04-18 RX ORDER — ERGOCALCIFEROL 1.25 MG/1
CAPSULE ORAL
COMMUNITY
End: 2018-05-14 | Stop reason: ALTCHOICE

## 2018-04-18 NOTE — PROGRESS NOTES
Bertha Flores is a 32year old female.     Chief complaint:   palpitations   HPI:   32year old female with PMh as listed below here for   Palpitations   Patient with hx of POTS   Was at [facility] feb 2018 for tachycardia   Echo was done wnl , mild syndrome)    • PCOS (polycystic ovarian syndrome)    • Postsurgical hypothyroidism     In 2015: total thyroidectomy for benign goiter   • POTS (postural orthostatic tachycardia syndrome)    • Tachycardia    • Thyroid nodule 12/15/2014     Past Surgical His when <46years old     Hypothyroidism, unspecified type     Pituitary microadenoma (HCC)     POTS (postural orthostatic tachycardia syndrome)     Autonomic disorder     Dizziness     Dehydration     Postsurgical hypothyroidism      REVIEW OF SYSTEMS:   RICHARD garcia Hugo Munson MD,   Diplomate of the American Board of Internal Medicine  Diplomate of the American Board of Obesity Medicine

## 2018-04-27 ENCOUNTER — LAB ENCOUNTER (OUTPATIENT)
Dept: LAB | Facility: HOSPITAL | Age: 28
End: 2018-04-27
Attending: INTERNAL MEDICINE
Payer: MEDICAID

## 2018-04-27 DIAGNOSIS — Z51.81 THERAPEUTIC DRUG MONITORING: ICD-10-CM

## 2018-04-27 DIAGNOSIS — L68.0 HIRSUTISM: ICD-10-CM

## 2018-04-27 DIAGNOSIS — I49.8 POTS (POSTURAL ORTHOSTATIC TACHYCARDIA SYNDROME): ICD-10-CM

## 2018-04-27 DIAGNOSIS — E89.0 POSTSURGICAL HYPOTHYROIDISM: ICD-10-CM

## 2018-04-27 DIAGNOSIS — E28.2 PCOS (POLYCYSTIC OVARIAN SYNDROME): ICD-10-CM

## 2018-04-27 DIAGNOSIS — E66.01 MORBID OBESITY (HCC): Primary | ICD-10-CM

## 2018-04-27 PROBLEM — M77.50 TENDONITIS OF FOOT: Status: ACTIVE | Noted: 2018-04-27

## 2018-04-27 PROCEDURE — 82397 CHEMILUMINESCENT ASSAY: CPT

## 2018-04-27 PROCEDURE — 82306 VITAMIN D 25 HYDROXY: CPT

## 2018-04-27 PROCEDURE — 84402 ASSAY OF FREE TESTOSTERONE: CPT

## 2018-04-27 PROCEDURE — 84403 ASSAY OF TOTAL TESTOSTERONE: CPT

## 2018-04-27 PROCEDURE — 36415 COLL VENOUS BLD VENIPUNCTURE: CPT

## 2018-04-27 PROCEDURE — 82607 VITAMIN B-12: CPT

## 2018-05-07 ENCOUNTER — TELEPHONE (OUTPATIENT)
Dept: INTERNAL MEDICINE CLINIC | Facility: CLINIC | Age: 28
End: 2018-05-07

## 2018-05-07 DIAGNOSIS — R79.89 ELEVATED TESTOSTERONE LEVEL IN FEMALE: Primary | ICD-10-CM

## 2018-05-07 DIAGNOSIS — I49.8 POTS (POSTURAL ORTHOSTATIC TACHYCARDIA SYNDROME): ICD-10-CM

## 2018-05-07 RX ORDER — ERGOCALCIFEROL 1.25 MG/1
50000 CAPSULE ORAL WEEKLY
Qty: 12 CAPSULE | Refills: 1 | Status: SHIPPED | OUTPATIENT
Start: 2018-05-07 | End: 2018-07-24

## 2018-05-11 ENCOUNTER — LAB ENCOUNTER (OUTPATIENT)
Dept: LAB | Facility: HOSPITAL | Age: 28
End: 2018-05-11
Attending: INTERNAL MEDICINE
Payer: MEDICAID

## 2018-05-11 DIAGNOSIS — R79.89 ELEVATED TESTOSTERONE LEVEL IN FEMALE: ICD-10-CM

## 2018-05-11 PROCEDURE — 84443 ASSAY THYROID STIM HORMONE: CPT

## 2018-05-11 PROCEDURE — 84439 ASSAY OF FREE THYROXINE: CPT

## 2018-05-11 PROCEDURE — 83001 ASSAY OF GONADOTROPIN (FSH): CPT

## 2018-05-11 PROCEDURE — 36415 COLL VENOUS BLD VENIPUNCTURE: CPT

## 2018-05-11 PROCEDURE — 84146 ASSAY OF PROLACTIN: CPT

## 2018-05-11 PROCEDURE — 82627 DEHYDROEPIANDROSTERONE: CPT

## 2018-05-11 PROCEDURE — 82533 TOTAL CORTISOL: CPT

## 2018-05-11 PROCEDURE — 83002 ASSAY OF GONADOTROPIN (LH): CPT

## 2018-05-11 PROCEDURE — 83498 ASY HYDROXYPROGESTERONE 17-D: CPT

## 2018-05-14 ENCOUNTER — OFFICE VISIT (OUTPATIENT)
Dept: INTERNAL MEDICINE CLINIC | Facility: CLINIC | Age: 28
End: 2018-05-14

## 2018-05-14 ENCOUNTER — TELEPHONE (OUTPATIENT)
Dept: INTERNAL MEDICINE CLINIC | Facility: CLINIC | Age: 28
End: 2018-05-14

## 2018-05-14 VITALS
DIASTOLIC BLOOD PRESSURE: 80 MMHG | BODY MASS INDEX: 39.87 KG/M2 | SYSTOLIC BLOOD PRESSURE: 104 MMHG | HEART RATE: 89 BPM | WEIGHT: 254 LBS | OXYGEN SATURATION: 100 % | HEIGHT: 67 IN | TEMPERATURE: 98 F | RESPIRATION RATE: 18 BRPM

## 2018-05-14 DIAGNOSIS — E89.0 POSTSURGICAL HYPOTHYROIDISM: ICD-10-CM

## 2018-05-14 DIAGNOSIS — L68.0 HIRSUTISM: ICD-10-CM

## 2018-05-14 DIAGNOSIS — E66.9 OBESITY (BMI 30-39.9): ICD-10-CM

## 2018-05-14 DIAGNOSIS — Z51.81 THERAPEUTIC DRUG MONITORING: ICD-10-CM

## 2018-05-14 DIAGNOSIS — I49.8 POTS (POSTURAL ORTHOSTATIC TACHYCARDIA SYNDROME): ICD-10-CM

## 2018-05-14 DIAGNOSIS — E66.01 MORBID OBESITY (HCC): ICD-10-CM

## 2018-05-14 DIAGNOSIS — Z51.81 MEDICATION MONITORING ENCOUNTER: ICD-10-CM

## 2018-05-14 DIAGNOSIS — Z71.2 ENCOUNTER TO DISCUSS TEST RESULTS: Primary | ICD-10-CM

## 2018-05-14 DIAGNOSIS — E28.2 PCOS (POLYCYSTIC OVARIAN SYNDROME): ICD-10-CM

## 2018-05-14 DIAGNOSIS — G90.A POTS (POSTURAL ORTHOSTATIC TACHYCARDIA SYNDROME): ICD-10-CM

## 2018-05-14 PROBLEM — E07.9 DISORDER OF THYROID: Status: ACTIVE | Noted: 2017-01-06

## 2018-05-14 PROBLEM — E55.9 HYPOVITAMINOSIS D: Status: ACTIVE | Noted: 2018-05-14

## 2018-05-14 PROBLEM — I10 HYPERTENSION: Status: ACTIVE | Noted: 2018-05-14

## 2018-05-14 PROBLEM — G57.62 LESION OF LEFT PLANTAR NERVE: Status: ACTIVE | Noted: 2017-06-27

## 2018-05-14 PROCEDURE — 81025 URINE PREGNANCY TEST: CPT | Performed by: INTERNAL MEDICINE

## 2018-05-14 PROCEDURE — 99214 OFFICE O/P EST MOD 30 MIN: CPT | Performed by: INTERNAL MEDICINE

## 2018-05-14 RX ORDER — LEVOTHYROXINE SODIUM 0.1 MG/1
100 TABLET ORAL
Qty: 30 TABLET | Refills: 2 | Status: SHIPPED | OUTPATIENT
Start: 2018-05-14 | End: 2018-06-12

## 2018-05-14 RX ORDER — TOPIRAMATE 50 MG/1
50 TABLET, FILM COATED ORAL
COMMUNITY
End: 2018-06-22

## 2018-05-14 RX ORDER — METFORMIN HYDROCHLORIDE 750 MG/1
TABLET, EXTENDED RELEASE ORAL
Qty: 60 TABLET | Refills: 0 | Status: CANCELLED | OUTPATIENT
Start: 2018-05-14

## 2018-05-14 RX ORDER — METFORMIN HYDROCHLORIDE 750 MG/1
750 TABLET, EXTENDED RELEASE ORAL 2 TIMES DAILY WITH MEALS
Qty: 180 TABLET | Refills: 1 | Status: SHIPPED | OUTPATIENT
Start: 2018-05-14 | End: 2018-06-13

## 2018-05-14 RX ORDER — SPIRONOLACTONE 50 MG/1
50 TABLET, FILM COATED ORAL DAILY
Qty: 30 TABLET | Refills: 1 | Status: SHIPPED | OUTPATIENT
Start: 2018-05-14 | End: 2018-06-04 | Stop reason: ALTCHOICE

## 2018-05-14 NOTE — PATIENT INSTRUCTIONS
Spironolactone tablets  Brand Name: Aldactone  What is this medicine? SPIRONOLACTONE (fernando on oh LAK tone) is a diuretic. It helps you make more urine and to lose excess water from your body.  This medicine is used to treat high blood pressure, and jennifer Do not take this medicine with any of the following medications:  · eplerenone  This medicine may also interact with the following medications:  · corticosteroids  · digoxin  · lithium  · medicines for high blood pressure like ACE inhibitors  · skeletal mu NOTE:This sheet is a summary. It may not cover all possible information. If you have questions about this medicine, talk to your doctor, pharmacist, or health care provider.  Copyright© 2017 Elsevier

## 2018-05-14 NOTE — PROGRESS NOTES
Chloe Pires is a 32year old female.     Chief complaint:weight loss follow up , medication refill, discuss test results and hypothyroidism     HPI:   Saurabh Watson here for follow up on weight loss , discuss test results   Wt Readings from Last 12 Encounters: Postsurgical hypothyroidism     In 2015: total thyroidectomy for benign goiter   • POTS (postural orthostatic tachycardia syndrome)    • Tachycardia    • Thyroid nodule 12/15/2014     Past Surgical History:  6/22/2011: D & C  2009: D & C  10/12/2012: D & C type     Pituitary microadenoma (HCC)     POTS (postural orthostatic tachycardia syndrome)     Autonomic disorder     Dizziness     Dehydration     Postsurgical hypothyroidism     Tendonitis of foot              REVIEW OF SYSTEMS:   A comprehensive 10 poin encounter  Plan: topiramate 50 MG Oral Tab, Insulin Pen Needle         (BD PEN NEEDLE ALEXA U/F) 32G X 4 MM Does not         apply Misc, Liraglutide -Weight Management         (SAXENDA) 18 MG/3ML Subcutaneous Solution         Pen-injector, spironolactone 50 sedentary time  6. Treatment plan   7.  Reviewed that use of phentermine/diethylpropion/phendimetrazine for more than 90 days is considered off-label    Please return to the clinic if you are having persistent or worsening symptoms   Chandu Stiles MD,   Leopold Martens

## 2018-05-24 NOTE — PLAN OF CARE
CARDIOVASCULAR - ADULT    • Maintains optimal cardiac output and hemodynamic stability Progressing    • Absence of cardiac arrhythmias or at baseline Progressing        METABOLIC/FLUID AND ELECTROLYTES - ADULT    • Electrolytes maintained within normal [facility] hospital course      5/24/18 no surgical intervention. Clinical completed. REC general/thin  5/23/18 admit with fall. CT showed subdural hematoma with minimal mass effect. PMH: lives at home with spouse        Past Medical History:   Diagnosis Date   • Anemia    • Arthritis    • Blood clot associated with vein wall inflammation    • CAD (coronary artery disease)     left ventricle clot   • Cardiomyopathy (CMS/HCC)     Takotsubo   • Chronic LBP    • Chronic urinary tract infection    • Gastritis     H. pylori positive   • Gastroesophageal reflux disease    • High cholesterol    • Iron deficiency anemia, unspecified 01/20/2014   • NSTEMI (non-ST elevated myocardial infarction) (CMS/HCC) 5/24/16    Dr. Painting   • Numbness and tingling of both legs    • Osteopenia 5/16/11   • Other chronic pain     Back    • Polymyalgia rheumatica (CMS/HCC)    • Right knee DJD    • Type 2 diabetes mellitus (CMS/HCC)    • Uncomplicated senile dementia    • Urinary incontinence    • Weakness of both legs

## 2018-05-31 NOTE — PROGRESS NOTES
I was asked to admit patient due to syncope, orthostasis. Chart reviewed including today's records, [facility] records and [address] [place] [address] records. Patient has had an extensive workup and diagnosis is consistent with POTS.   Additionally, today's TSH is quit Wolf Vidales, daughter

## 2018-06-04 ENCOUNTER — HOSPITAL ENCOUNTER (OUTPATIENT)
Age: 28
Discharge: HOME | End: 2018-06-04
Attending: FAMILY MEDICINE
Payer: MEDICAID

## 2018-06-04 VITALS
HEART RATE: 95 BPM | WEIGHT: 242 LBS | DIASTOLIC BLOOD PRESSURE: 83 MMHG | SYSTOLIC BLOOD PRESSURE: 118 MMHG | OXYGEN SATURATION: 100 % | TEMPERATURE: 98 F | BODY MASS INDEX: 38 KG/M2 | RESPIRATION RATE: 20 BRPM

## 2018-06-04 DIAGNOSIS — I49.8 POTS (POSTURAL ORTHOSTATIC TACHYCARDIA SYNDROME): ICD-10-CM

## 2018-06-04 DIAGNOSIS — E89.0 POSTOPERATIVE HYPOTHYROIDISM: Primary | ICD-10-CM

## 2018-06-04 DIAGNOSIS — G43.711 INTRACTABLE CHRONIC MIGRAINE WITHOUT AURA AND WITH STATUS MIGRAINOSUS: ICD-10-CM

## 2018-06-04 PROCEDURE — 93010 ELECTROCARDIOGRAM REPORT: CPT

## 2018-06-04 PROCEDURE — 93005 ELECTROCARDIOGRAM TRACING: CPT

## 2018-06-04 PROCEDURE — 99214 OFFICE O/P EST MOD 30 MIN: CPT

## 2018-06-04 PROCEDURE — 82962 GLUCOSE BLOOD TEST: CPT

## 2018-06-04 PROCEDURE — 93010 ELECTROCARDIOGRAM REPORT: CPT | Performed by: INTERNAL MEDICINE

## 2018-06-04 NOTE — ED INITIAL ASSESSMENT (HPI)
Pt c/o dizziness, heart palpitations, and a headache x 2 days. Pt reports he TSH levels have been low, medication has been adjusted but not helping. Pt denies chest pain, but when asked says she has been somewhat SOB over the past 2 days.  Pt denies recent

## 2018-06-04 NOTE — ED PROVIDER NOTES
Patient Seen in: [facility]    History   Patient presents with:  Dizziness (neurologic)    Stated Complaint: dizziness;light headness x2 days    HPI    49-year-old female with a history of hypothyroidism, migraine headache, Thyroidectomy   No date: OTHER SURGICAL HISTORY Left      Comment: foot sx x3  03/2012: OTHER SURGICAL HISTORY      Comment: operative laparoscopy with electrocautery of                multiple ovarian cysts bilaterally.  it was                lysis of a fe Notable for the following:        Result Value    POC Glucose 100 (*)     All other components within normal limits     EKG    Rate, intervals and axes as noted on EKG Report. Rate: 107  Rhythm: Sinus Rhythm  Reading: Sinus tachycardia.   No signal change

## 2018-06-05 ENCOUNTER — TELEPHONE (OUTPATIENT)
Dept: INTERNAL MEDICINE CLINIC | Facility: CLINIC | Age: 28
End: 2018-06-05

## 2018-06-22 ENCOUNTER — OFFICE VISIT (OUTPATIENT)
Dept: INTERNAL MEDICINE CLINIC | Facility: CLINIC | Age: 28
End: 2018-06-22

## 2018-06-22 VITALS
TEMPERATURE: 98 F | HEART RATE: 122 BPM | BODY MASS INDEX: 40.49 KG/M2 | DIASTOLIC BLOOD PRESSURE: 80 MMHG | HEIGHT: 67 IN | OXYGEN SATURATION: 97 % | SYSTOLIC BLOOD PRESSURE: 138 MMHG | RESPIRATION RATE: 18 BRPM | WEIGHT: 258 LBS

## 2018-06-22 DIAGNOSIS — E89.0 POSTSURGICAL HYPOTHYROIDISM: ICD-10-CM

## 2018-06-22 DIAGNOSIS — E89.0 HISTORY OF THYROIDECTOMY: ICD-10-CM

## 2018-06-22 DIAGNOSIS — E05.90 HYPERTHYROIDISM: Primary | ICD-10-CM

## 2018-06-22 PROCEDURE — 1111F DSCHRG MED/CURRENT MED MERGE: CPT | Performed by: PHYSICIAN ASSISTANT

## 2018-06-22 PROCEDURE — 99214 OFFICE O/P EST MOD 30 MIN: CPT | Performed by: PHYSICIAN ASSISTANT

## 2018-06-22 RX ORDER — LEVOTHYROXINE SODIUM 0.05 MG/1
50 TABLET ORAL
Qty: 30 TABLET | Refills: 0 | Status: SHIPPED | OUTPATIENT
Start: 2018-06-22 | End: 2018-07-26

## 2018-06-22 RX ORDER — METFORMIN HYDROCHLORIDE 750 MG/1
750 TABLET, EXTENDED RELEASE ORAL
COMMUNITY
Start: 2018-05-14 | End: 2019-01-02

## 2018-06-22 RX ORDER — TOPIRAMATE 50 MG/1
50 TABLET, FILM COATED ORAL 2 TIMES DAILY
COMMUNITY
End: 2019-01-02

## 2018-06-22 NOTE — PATIENT INSTRUCTIONS
In 1 week, have blood drawn (no fasting required)  Decrease synthroid dose to 50 mcg daily  Go to ER if onset of chest pain, shortness of breath, fainting, severe headache   Avoid driving for now when still having dizziness  Schedule with endocrinologist f

## 2018-06-24 PROBLEM — E07.9 DISORDER OF THYROID: Status: RESOLVED | Noted: 2017-01-06 | Resolved: 2018-06-24

## 2018-06-24 PROBLEM — I10 HYPERTENSION: Status: RESOLVED | Noted: 2018-05-14 | Resolved: 2018-06-24

## 2018-06-24 PROBLEM — E86.0 DEHYDRATION: Status: RESOLVED | Noted: 2018-02-14 | Resolved: 2018-06-24

## 2018-06-24 PROBLEM — R00.0 SINUS TACHYCARDIA: Status: ACTIVE | Noted: 2017-01-06

## 2018-06-24 PROBLEM — R42 DIZZINESS: Status: RESOLVED | Noted: 2018-02-13 | Resolved: 2018-06-24

## 2018-06-24 PROBLEM — E55.9 HYPOVITAMINOSIS D: Status: RESOLVED | Noted: 2018-05-14 | Resolved: 2018-06-24

## 2018-06-24 PROBLEM — E66.01 MORBID OBESITY WITH BODY MASS INDEX OF 40.0-44.9 IN ADULT (HCC): Status: RESOLVED | Noted: 2018-03-05 | Resolved: 2018-06-24

## 2018-06-25 NOTE — PROGRESS NOTES
Aurelia Lilly is a 32year old female. HPI:   Pt presents to establish care, here for hospital f/u. Was admitted overnight at [facility] 6/7/18 for tachycardia due to hyperthyroid state. Was discharged on metoprolol 25mg bid.      Had appt with endocrinologis History:  Smoking status: Never Smoker                                                              Smokeless tobacco: Never Used                      Alcohol use: No                 REVIEW OF SYSTEMS:   GENERAL HEALTH: feels well otherwise.  Denies fever, (SYNTHROID) 50 MCG Oral Tab 30 tablet 0      Sig: Take 1 tablet (50 mcg total) by mouth before breakfast.             The patient indicates understanding of these issues and agrees to the plan.   The patient is asked to return in Return in about 10 days (ar

## 2018-06-27 ENCOUNTER — APPOINTMENT (OUTPATIENT)
Dept: LAB | Age: 28
End: 2018-06-27
Attending: PHYSICIAN ASSISTANT
Payer: MEDICAID

## 2018-06-27 ENCOUNTER — OFFICE VISIT (OUTPATIENT)
Dept: INTERNAL MEDICINE CLINIC | Facility: CLINIC | Age: 28
End: 2018-06-27

## 2018-06-27 VITALS
SYSTOLIC BLOOD PRESSURE: 108 MMHG | WEIGHT: 258 LBS | TEMPERATURE: 98 F | HEART RATE: 124 BPM | HEIGHT: 67 IN | BODY MASS INDEX: 40.49 KG/M2 | DIASTOLIC BLOOD PRESSURE: 62 MMHG | RESPIRATION RATE: 18 BRPM

## 2018-06-27 DIAGNOSIS — E05.90 HYPERTHYROIDISM: ICD-10-CM

## 2018-06-27 DIAGNOSIS — I49.8 POTS (POSTURAL ORTHOSTATIC TACHYCARDIA SYNDROME): ICD-10-CM

## 2018-06-27 DIAGNOSIS — E89.0 HISTORY OF THYROIDECTOMY: ICD-10-CM

## 2018-06-27 DIAGNOSIS — R42 DIZZINESS: ICD-10-CM

## 2018-06-27 DIAGNOSIS — R42 DIZZINESS: Primary | ICD-10-CM

## 2018-06-27 DIAGNOSIS — D35.2 PITUITARY MICROADENOMA (HCC): ICD-10-CM

## 2018-06-27 DIAGNOSIS — E89.0 POSTSURGICAL HYPOTHYROIDISM: ICD-10-CM

## 2018-06-27 LAB
BUN BLD-MCNC: 7 MG/DL (ref 8–20)
CALCIUM BLD-MCNC: 9.2 MG/DL (ref 8.3–10.3)
CHLORIDE: 106 MMOL/L (ref 101–111)
CO2: 26 MMOL/L (ref 22–32)
CREAT BLD-MCNC: 0.86 MG/DL (ref 0.55–1.02)
FREE T4: 0.9 NG/DL (ref 0.9–1.8)
GLUCOSE BLD-MCNC: 100 MG/DL (ref 70–99)
POTASSIUM SERPL-SCNC: 3.6 MMOL/L (ref 3.6–5.1)
SODIUM SERPL-SCNC: 141 MMOL/L (ref 136–144)
T3 SERPL-MCNC: 48 NG/DL (ref 60–181)
TSI SER-ACNC: 0.21 MIU/ML (ref 0.35–5.5)

## 2018-06-27 PROCEDURE — 99214 OFFICE O/P EST MOD 30 MIN: CPT | Performed by: NURSE PRACTITIONER

## 2018-06-27 PROCEDURE — 84443 ASSAY THYROID STIM HORMONE: CPT

## 2018-06-27 PROCEDURE — 36415 COLL VENOUS BLD VENIPUNCTURE: CPT

## 2018-06-27 PROCEDURE — 84480 ASSAY TRIIODOTHYRONINE (T3): CPT

## 2018-06-27 PROCEDURE — 84439 ASSAY OF FREE THYROXINE: CPT

## 2018-06-27 PROCEDURE — 80048 BASIC METABOLIC PNL TOTAL CA: CPT

## 2018-06-27 RX ORDER — METOPROLOL SUCCINATE 25 MG/1
25 TABLET, EXTENDED RELEASE ORAL DAILY
Qty: 90 TABLET | Refills: 0 | Status: SHIPPED | OUTPATIENT
Start: 2018-06-27 | End: 2018-07-12 | Stop reason: ALTCHOICE

## 2018-06-27 NOTE — PROGRESS NOTES
HPI:    Patient ID: Cody Vicente is a 32year old female.     Patient presents with:  Dizziness: pt was here about 5 days ago for hospital f/u, dizziness has gotten worse since, nausea, no vomiting, loss of appetite    HPI  History of total thyroidectomy Plan:  Since apparently her insurance company does not work on the weekend, we will wait for their eval on 6/13/16.   She has been cleared for dc to snf for rehab per all consultants     Past Surgical History:  6/22/2011: D & C  2009: D & C  10/12/2012: D & week  [place]               Yes    Social History Narrative    Single, lives at home with her father, no children, works at [employer]           Current Outpatient Prescriptions:  Metoprolol Succinate ER 25 MG Oral Tablet 24 Hr Take 1 tablet (25 mg total) by 0.016 (L) 06/01/2018       Lab Results  Component Value Date   VITD 22.1 (L) 04/27/2018       Lab Results  Component Value Date   HIEU 54.9 07/27/2015       Lab Results  Component Value Date   FOLIC 34.7 43/32/4121       Lab Results  Component Value Date hyperthyroidism, recheck labs today    Patient Instructions   Lab work today- thyroid and metabolic panel  Start metoprolol 12.5 mg daily take at night  Follow up with me in 2 weeks   Lets get Ultrasound of thyroid    YEHUDA Ling

## 2018-06-27 NOTE — PATIENT INSTRUCTIONS
Lab work today- thyroid and metabolic panel  Start metoprolol 12.5 mg daily take at night  Follow up with me in 2 weeks   Lets get Ultrasound of thyroid

## 2018-06-28 ENCOUNTER — HOSPITAL ENCOUNTER (EMERGENCY)
Facility: HOSPITAL | Age: 28
Discharge: HOME | End: 2018-06-28
Attending: EMERGENCY MEDICINE
Payer: MEDICAID

## 2018-06-28 ENCOUNTER — APPOINTMENT (OUTPATIENT)
Dept: GENERAL RADIOLOGY | Facility: HOSPITAL | Age: 28
End: 2018-06-28
Attending: EMERGENCY MEDICINE
Payer: MEDICAID

## 2018-06-28 ENCOUNTER — TELEPHONE (OUTPATIENT)
Dept: INTERNAL MEDICINE CLINIC | Facility: CLINIC | Age: 28
End: 2018-06-28

## 2018-06-28 VITALS
WEIGHT: 241 LBS | TEMPERATURE: 98 F | BODY MASS INDEX: 37.83 KG/M2 | DIASTOLIC BLOOD PRESSURE: 87 MMHG | RESPIRATION RATE: 18 BRPM | HEIGHT: 67 IN | HEART RATE: 106 BPM | SYSTOLIC BLOOD PRESSURE: 122 MMHG | OXYGEN SATURATION: 100 %

## 2018-06-28 DIAGNOSIS — I49.8 POTS (POSTURAL ORTHOSTATIC TACHYCARDIA SYNDROME): Primary | ICD-10-CM

## 2018-06-28 PROCEDURE — 71045 X-RAY EXAM CHEST 1 VIEW: CPT | Performed by: EMERGENCY MEDICINE

## 2018-06-28 PROCEDURE — 93010 ELECTROCARDIOGRAM REPORT: CPT

## 2018-06-28 PROCEDURE — 84443 ASSAY THYROID STIM HORMONE: CPT | Performed by: EMERGENCY MEDICINE

## 2018-06-28 PROCEDURE — 96361 HYDRATE IV INFUSION ADD-ON: CPT

## 2018-06-28 PROCEDURE — 81003 URINALYSIS AUTO W/O SCOPE: CPT | Performed by: EMERGENCY MEDICINE

## 2018-06-28 PROCEDURE — 84439 ASSAY OF FREE THYROXINE: CPT | Performed by: EMERGENCY MEDICINE

## 2018-06-28 PROCEDURE — 80053 COMPREHEN METABOLIC PANEL: CPT | Performed by: EMERGENCY MEDICINE

## 2018-06-28 PROCEDURE — 81025 URINE PREGNANCY TEST: CPT

## 2018-06-28 PROCEDURE — 96360 HYDRATION IV INFUSION INIT: CPT

## 2018-06-28 PROCEDURE — 99285 EMERGENCY DEPT VISIT HI MDM: CPT

## 2018-06-28 PROCEDURE — 84484 ASSAY OF TROPONIN QUANT: CPT | Performed by: EMERGENCY MEDICINE

## 2018-06-28 PROCEDURE — 93005 ELECTROCARDIOGRAM TRACING: CPT

## 2018-06-28 RX ORDER — TETRACAINE HYDROCHLORIDE 5 MG/ML
SOLUTION OPHTHALMIC
Status: DISPENSED
Start: 2018-06-28 | End: 2018-06-29

## 2018-06-28 NOTE — TELEPHONE ENCOUNTER
Contacted the pt's [address] on file (the [email or web address] location on [facility]). Confirmed both the metoprolol and levothyroxine are ready for  with a 0 co-pay. Ivette Blanco was informed of this.

## 2018-06-28 NOTE — ED PROVIDER NOTES
Patient Seen in: [facility] Emergency Department    History   Patient presents with:  Dizziness (neurologic)  Arrythmia/Palpitations (cardiovascular)    Stated Complaint: near syncope, dizziness, palpitations    HPI    28-year-old female comes the h POS  2008: LAPAROSCOPY,PELVIC,BIOPSY      Comment: drill ovary  01/13/2015: OTHER SURGICAL HISTORY      Comment: Subtotal Thyroidectomy for benign goiter  No date: OTHER SURGICAL HISTORY Left      Comment: foot sx x3  03/2012: OTHER SURGICAL HISTORY      C Abnormal; Notable for the following:        Result Value    Glucose 101 (*)     BUN 7 (*)     Calcium, Total 8.1 (*)     Alkaline Phosphatase 103 (*)     Albumin 3.3 (*)     Potassium 3.5 (*)     All other components within normal limits   TSH W REFLEX TO of Parag 28 1151  ------------------------------------------------------------      [facility]               Disposition and Plan     Clinical Impression:  POTS (postural orthostatic tachycardia syndrome)  (primary encounter diagnosis)    Disposition:  Discharge  6

## 2018-06-28 NOTE — TELEPHONE ENCOUNTER
I discussed symptoms and lab results with patient. She \"blacked out\" for 2 mins, was aware of what was going on around her, no loss of bowel or bladder. Encouraged to seen medical attention in ER.  She needs to start taking levothyroxine 50 mcg  1 hour pr

## 2018-06-28 NOTE — ED INITIAL ASSESSMENT (HPI)
Pt here for syncopal episode, pt did not fall, lost vision, felt dizzy and sat down. Pt has low TSh lever per md. Pt states nausea. pt has hx of thyroid issues.

## 2018-07-05 ENCOUNTER — HOSPITAL ENCOUNTER (EMERGENCY)
Facility: HOSPITAL | Age: 28
Discharge: HOME | End: 2018-07-05
Attending: EMERGENCY MEDICINE
Payer: MEDICAID

## 2018-07-05 VITALS
BODY MASS INDEX: 38.25 KG/M2 | OXYGEN SATURATION: 95 % | SYSTOLIC BLOOD PRESSURE: 116 MMHG | DIASTOLIC BLOOD PRESSURE: 78 MMHG | HEIGHT: 66 IN | WEIGHT: 238 LBS | HEART RATE: 88 BPM | RESPIRATION RATE: 18 BRPM | TEMPERATURE: 97 F

## 2018-07-05 DIAGNOSIS — I49.8 POTS (POSTURAL ORTHOSTATIC TACHYCARDIA SYNDROME): Primary | ICD-10-CM

## 2018-07-05 LAB
ALBUMIN SERPL BCP-MCNC: 3.4 G/DL (ref 3.5–4.8)
ALP SERPL-CCNC: 73 U/L (ref 32–100)
ALT SERPL-CCNC: 23 U/L (ref 14–54)
ANION GAP SERPL CALC-SCNC: 4 MMOL/L (ref 0–18)
AST SERPL-CCNC: 39 U/L (ref 15–41)
BASOPHILS # BLD: 0 K/UL (ref 0–0.2)
BASOPHILS NFR BLD: 1 %
BILIRUB DIRECT SERPL-MCNC: 0.2 MG/DL (ref 0–0.2)
BILIRUB SERPL-MCNC: 1.1 MG/DL (ref 0.3–1.2)
BUN SERPL-MCNC: 4 MG/DL (ref 8–20)
BUN/CREAT SERPL: 5.3 (ref 10–20)
CALCIUM SERPL-MCNC: 8.4 MG/DL (ref 8.5–10.5)
CHLORIDE SERPL-SCNC: 109 MMOL/L (ref 95–110)
CO2 SERPL-SCNC: 25 MMOL/L (ref 22–32)
CREAT SERPL-MCNC: 0.75 MG/DL (ref 0.5–1.5)
D DIMER PPP FEU-MCNC: 0.37 MCG/ML (ref ?–0.5)
EOSINOPHIL # BLD: 0.1 K/UL (ref 0–0.7)
EOSINOPHIL NFR BLD: 2 %
ERYTHROCYTE [DISTWIDTH] IN BLOOD BY AUTOMATED COUNT: 13.9 % (ref 11–15)
GLUCOSE SERPL-MCNC: 102 MG/DL (ref 70–99)
HCT VFR BLD AUTO: 42.7 % (ref 35–48)
HGB BLD-MCNC: 14.4 G/DL (ref 12–16)
LYMPHOCYTES # BLD: 1.6 K/UL (ref 1–4)
LYMPHOCYTES NFR BLD: 29 %
MCH RBC QN AUTO: 29.9 PG (ref 27–32)
MCHC RBC AUTO-ENTMCNC: 33.8 G/DL (ref 32–37)
MCV RBC AUTO: 88.5 FL (ref 80–100)
MONOCYTES # BLD: 1 K/UL (ref 0–1)
MONOCYTES NFR BLD: 18 %
NEUTROPHILS # BLD AUTO: 2.9 K/UL (ref 1.8–7.7)
NEUTROPHILS NFR BLD: 50 %
OSMOLALITY UR CALC.SUM OF ELEC: 283 MOSM/KG (ref 275–295)
PLATELET # BLD AUTO: 207 K/UL (ref 140–400)
PMV BLD AUTO: 9.6 FL (ref 7.4–10.3)
POTASSIUM SERPL-SCNC: 3.4 MMOL/L (ref 3.3–5.1)
PROT SERPL-MCNC: 6.2 G/DL (ref 5.9–8.4)
RBC # BLD AUTO: 4.83 M/UL (ref 3.7–5.4)
SODIUM SERPL-SCNC: 138 MMOL/L (ref 136–144)
TSH SERPL-ACNC: 2.95 UIU/ML (ref 0.45–5.33)
WBC # BLD AUTO: 5.7 K/UL (ref 4–11)

## 2018-07-05 PROCEDURE — 93010 ELECTROCARDIOGRAM REPORT: CPT | Performed by: EMERGENCY MEDICINE

## 2018-07-05 PROCEDURE — 80048 BASIC METABOLIC PNL TOTAL CA: CPT | Performed by: EMERGENCY MEDICINE

## 2018-07-05 PROCEDURE — 85025 COMPLETE CBC W/AUTO DIFF WBC: CPT | Performed by: EMERGENCY MEDICINE

## 2018-07-05 PROCEDURE — 96361 HYDRATE IV INFUSION ADD-ON: CPT

## 2018-07-05 PROCEDURE — 85379 FIBRIN DEGRADATION QUANT: CPT | Performed by: EMERGENCY MEDICINE

## 2018-07-05 PROCEDURE — 96376 TX/PRO/DX INJ SAME DRUG ADON: CPT

## 2018-07-05 PROCEDURE — 80076 HEPATIC FUNCTION PANEL: CPT | Performed by: EMERGENCY MEDICINE

## 2018-07-05 PROCEDURE — 99285 EMERGENCY DEPT VISIT HI MDM: CPT

## 2018-07-05 PROCEDURE — 93005 ELECTROCARDIOGRAM TRACING: CPT

## 2018-07-05 PROCEDURE — 96374 THER/PROPH/DIAG INJ IV PUSH: CPT

## 2018-07-05 PROCEDURE — 84443 ASSAY THYROID STIM HORMONE: CPT | Performed by: EMERGENCY MEDICINE

## 2018-07-05 RX ORDER — MIDODRINE HYDROCHLORIDE 5 MG/1
5 TABLET ORAL 2 TIMES DAILY
Qty: 60 TABLET | Refills: 0 | Status: SHIPPED | OUTPATIENT
Start: 2018-07-05 | End: 2018-07-12 | Stop reason: ALTCHOICE

## 2018-07-05 RX ORDER — METOPROLOL TARTRATE 5 MG/5ML
5 INJECTION INTRAVENOUS ONCE
Status: COMPLETED | OUTPATIENT
Start: 2018-07-05 | End: 2018-07-05

## 2018-07-05 NOTE — ED PROVIDER NOTES
Patient Seen in: [facility] AND [facility] Emergency Department    History   Patient presents with:  Arrythmia/Palpitations (cardiovascular)    Stated Complaint: Palpitations, abnormal labs     HPI    23-year-old female with past medical history significant for C  10/12/2012: D & C  No date: DILATION/CURETTAGE,DIAGNOSTIC  6/22/2011: HYSTEROSCOPY  No date: LAPAROSCOPY,DIAGNOSTIC      Comment: x3 r/t POS  2008: LAPAROSCOPY,PELVIC,BIOPSY      Comment: drill ovary  01/13/2015: OTHER SURGICAL HISTORY      Comment: Sub PERRLA  Neck: Normal range of motion. Neck supple. No tracheal deviation present. CV: s1s2+, regular rhythm, normal heart sounds and intact distal pulses. Exam reveals no gallop and no friction rub. No murmur heard.   Pulmonary/Chest: Effort normal and tachycardia, no acute ST changes, normal axis and intervals, no ectopy           ED Course as of Jul 05 1339  ------------------------------------------------------------  Time: 07/05 1336  Value: BP: 117/79  Comment: (Reviewed)  --------------------------

## 2018-07-05 NOTE — ED INITIAL ASSESSMENT (HPI)
Patient reports palpitations since last night, constant. Felt lightheaded this morning and hit her head.     C/o headache, denies CP, + SOB    Hx of thyroid issues

## 2018-07-06 ENCOUNTER — OFFICE VISIT (OUTPATIENT)
Dept: INTERNAL MEDICINE CLINIC | Facility: CLINIC | Age: 28
End: 2018-07-06

## 2018-07-06 VITALS
HEIGHT: 67 IN | RESPIRATION RATE: 16 BRPM | DIASTOLIC BLOOD PRESSURE: 82 MMHG | OXYGEN SATURATION: 99 % | WEIGHT: 261 LBS | HEART RATE: 96 BPM | SYSTOLIC BLOOD PRESSURE: 122 MMHG | BODY MASS INDEX: 40.97 KG/M2

## 2018-07-06 DIAGNOSIS — E89.0 POSTSURGICAL HYPOTHYROIDISM: ICD-10-CM

## 2018-07-06 DIAGNOSIS — I49.8 POTS (POSTURAL ORTHOSTATIC TACHYCARDIA SYNDROME): ICD-10-CM

## 2018-07-06 DIAGNOSIS — R42 ORTHOSTATIC DIZZINESS: Primary | ICD-10-CM

## 2018-07-06 DIAGNOSIS — E89.0 HISTORY OF TOTAL THYROIDECTOMY: ICD-10-CM

## 2018-07-06 PROCEDURE — 99214 OFFICE O/P EST MOD 30 MIN: CPT | Performed by: NURSE PRACTITIONER

## 2018-07-06 NOTE — PROGRESS NOTES
HPI:    Patient ID: Baljinder Conley is a 32year old female. Patient presents with:  Er F/u: has been to the ED twice due to palpitations and dizziness. HPI  Returns to the office with continued c/o dizziness and palpitations.  She was seen yesterday Comment: drill ovary  01/13/2015: OTHER SURGICAL HISTORY      Comment: Subtotal Thyroidectomy for benign goiter  No date: OTHER SURGICAL HISTORY Left      Comment: foot sx x3  03/2012: OTHER SURGICAL HISTORY      Comment: operative laparoscopy with electro  MG Oral Tablet 24 Hr 750 mg. Disp:  Rfl:    topiramate 50 MG Oral Tab Take 50 mg by mouth.  Disp:  Rfl:    Levothyroxine Sodium (SYNTHROID) 50 MCG Oral Tab Take 1 tablet (50 mcg total) by mouth before breakfast. (Patient taking differently: Take 75 m Results  Component Value Date   FOLIC 47.3 73/66/4856       Lab Results  Component Value Date   IRON 44 07/27/2015       Lab Results  Component Value Date   B12 642 04/27/2018       Lab Results  Component Value Date   PHOS 2.2 (L) 01/11/2017       Lab Resu for continued dizziness that is worsening- talk to her about side effects of Topamax    Call our endocrine office to establish care- Dr. Danish Ramires, call insurance company to make sure they take your insurance      YEHUDA Lombardo

## 2018-07-06 NOTE — PATIENT INSTRUCTIONS
Continue midodrine to help increase blood pressure- need to monitor blood pressure so it doesn't get too high    Continue metoprolol 12.5 mg daily    Call to schedule appt with Dr. Shirley Whiting for continued dizziness that is worsening- talk to her about side e

## 2018-07-12 ENCOUNTER — OFFICE VISIT (OUTPATIENT)
Dept: NEUROLOGY | Facility: CLINIC | Age: 28
End: 2018-07-12

## 2018-07-12 ENCOUNTER — LAB ENCOUNTER (OUTPATIENT)
Dept: LAB | Facility: HOSPITAL | Age: 28
End: 2018-07-12
Attending: SURGERY
Payer: MEDICAID

## 2018-07-12 ENCOUNTER — HOSPITAL ENCOUNTER (OUTPATIENT)
Dept: GENERAL RADIOLOGY | Facility: HOSPITAL | Age: 28
Discharge: HOME | End: 2018-07-12
Attending: SURGERY
Payer: MEDICAID

## 2018-07-12 ENCOUNTER — EKG ENCOUNTER (OUTPATIENT)
Dept: LAB | Facility: HOSPITAL | Age: 28
End: 2018-07-12
Attending: SURGERY
Payer: MEDICAID

## 2018-07-12 VITALS
BODY MASS INDEX: 41.59 KG/M2 | RESPIRATION RATE: 18 BRPM | SYSTOLIC BLOOD PRESSURE: 120 MMHG | DIASTOLIC BLOOD PRESSURE: 92 MMHG | WEIGHT: 265 LBS | HEART RATE: 101 BPM | TEMPERATURE: 99 F | HEIGHT: 67 IN | OXYGEN SATURATION: 98 %

## 2018-07-12 DIAGNOSIS — Z01.818 PRE-OP TESTING: ICD-10-CM

## 2018-07-12 DIAGNOSIS — G43.009 MIGRAINE WITHOUT AURA AND WITHOUT STATUS MIGRAINOSUS, NOT INTRACTABLE: Primary | ICD-10-CM

## 2018-07-12 DIAGNOSIS — E66.01 MORBID OBESITY (HCC): Primary | ICD-10-CM

## 2018-07-12 DIAGNOSIS — R90.82 WHITE MATTER DISEASE, UNSPECIFIED: ICD-10-CM

## 2018-07-12 LAB
25-HYDROXYVITAMIN D (TOTAL): 16.2 NG/ML (ref 30–100)
ALBUMIN SERPL-MCNC: 3.1 G/DL (ref 3.5–4.8)
ALP LIVER SERPL-CCNC: 71 U/L (ref 37–98)
ALT SERPL-CCNC: 29 U/L (ref 14–54)
ANTIBODY SCREEN: NEGATIVE
APTT PPP: 36.9 SECONDS (ref 26.1–34.6)
AST SERPL-CCNC: 29 U/L (ref 15–41)
ATRIAL RATE: 90 BPM
BASOPHILS # BLD AUTO: 0.03 X10(3) UL (ref 0–0.1)
BASOPHILS NFR BLD AUTO: 0.8 %
BILIRUB SERPL-MCNC: 1.1 MG/DL (ref 0.1–2)
BUN BLD-MCNC: 8 MG/DL (ref 8–20)
CALCIUM BLD-MCNC: 8.8 MG/DL (ref 8.3–10.3)
CHLORIDE: 110 MMOL/L (ref 101–111)
CO2: 27 MMOL/L (ref 22–32)
CREAT BLD-MCNC: 0.63 MG/DL (ref 0.55–1.02)
DEPRECATED HBV CORE AB SER IA-ACNC: 34.3 NG/ML (ref 12–114)
EOSINOPHIL # BLD AUTO: 0.1 X10(3) UL (ref 0–0.3)
EOSINOPHIL NFR BLD AUTO: 2.5 %
ERYTHROCYTE [DISTWIDTH] IN BLOOD BY AUTOMATED COUNT: 13.4 % (ref 11.5–16)
EST. AVERAGE GLUCOSE BLD GHB EST-MCNC: 91 MG/DL (ref 68–126)
FOLATE (FOLIC ACID), SERUM: 16.3 NG/ML (ref 8.7–24)
GLUCOSE BLD-MCNC: 88 MG/DL (ref 70–99)
HAV AB SERPL IA-ACNC: 510 PG/ML (ref 193–986)
HBA1C MFR BLD HPLC: 4.8 % (ref ?–5.7)
HCT VFR BLD AUTO: 43.2 % (ref 34–50)
HGB BLD-MCNC: 14.1 G/DL (ref 12–16)
IMMATURE GRANULOCYTE COUNT: 0.01 X10(3) UL (ref 0–1)
IMMATURE GRANULOCYTE RATIO %: 0.3 %
INR BLD: 1.19 (ref 0.9–1.1)
IRON SATURATION: 35 % (ref 20–50)
IRON: 126 UG/DL (ref 28–170)
LYMPHOCYTES # BLD AUTO: 1.27 X10(3) UL (ref 0.9–4)
LYMPHOCYTES NFR BLD AUTO: 32.3 %
M PROTEIN MFR SERPL ELPH: 6.6 G/DL (ref 6.1–8.3)
MCH RBC QN AUTO: 29.3 PG (ref 27–33.2)
MCHC RBC AUTO-ENTMCNC: 32.6 G/DL (ref 31–37)
MCV RBC AUTO: 89.6 FL (ref 81–100)
MONOCYTES # BLD AUTO: 0.64 X10(3) UL (ref 0.1–1)
MONOCYTES NFR BLD AUTO: 16.3 %
NEUTROPHIL ABS PRELIM: 1.88 X10 (3) UL (ref 1.3–6.7)
NEUTROPHILS # BLD AUTO: 1.88 X10(3) UL (ref 1.3–6.7)
NEUTROPHILS NFR BLD AUTO: 47.8 %
P AXIS: 28 DEGREES
P-R INTERVAL: 176 MS
PLATELET # BLD AUTO: 206 10(3)UL (ref 150–450)
POTASSIUM SERPL-SCNC: 4 MMOL/L (ref 3.6–5.1)
PSA SERPL DL<=0.01 NG/ML-MCNC: 14.9 SECONDS (ref 12–14.1)
Q-T INTERVAL: 374 MS
QRS DURATION: 84 MS
QTC CALCULATION (BEZET): 457 MS
R AXIS: 79 DEGREES
RBC # BLD AUTO: 4.82 X10(6)UL (ref 3.8–5.1)
RED CELL DISTRIBUTION WIDTH-SD: 43.8 FL (ref 35.1–46.3)
RH BLOOD TYPE: POSITIVE
SODIUM SERPL-SCNC: 143 MMOL/L (ref 136–144)
T AXIS: 48 DEGREES
TOTAL IRON BINDING CAPACITY: 362 UG/DL (ref 240–450)
TRANSFERRIN: 243 MG/DL (ref 200–360)
TSI SER-ACNC: 4.91 MIU/ML (ref 0.35–5.5)
VENTRICULAR RATE: 90 BPM
WBC # BLD AUTO: 3.9 X10(3) UL (ref 4–13)

## 2018-07-12 PROCEDURE — 82728 ASSAY OF FERRITIN: CPT

## 2018-07-12 PROCEDURE — 84425 ASSAY OF VITAMIN B-1: CPT

## 2018-07-12 PROCEDURE — 86900 BLOOD TYPING SEROLOGIC ABO: CPT

## 2018-07-12 PROCEDURE — 93005 ELECTROCARDIOGRAM TRACING: CPT

## 2018-07-12 PROCEDURE — 86901 BLOOD TYPING SEROLOGIC RH(D): CPT

## 2018-07-12 PROCEDURE — 83036 HEMOGLOBIN GLYCOSYLATED A1C: CPT

## 2018-07-12 PROCEDURE — 83550 IRON BINDING TEST: CPT

## 2018-07-12 PROCEDURE — 99213 OFFICE O/P EST LOW 20 MIN: CPT | Performed by: OTHER

## 2018-07-12 PROCEDURE — 85730 THROMBOPLASTIN TIME PARTIAL: CPT

## 2018-07-12 PROCEDURE — 85610 PROTHROMBIN TIME: CPT

## 2018-07-12 PROCEDURE — 36415 COLL VENOUS BLD VENIPUNCTURE: CPT

## 2018-07-12 PROCEDURE — 82746 ASSAY OF FOLIC ACID SERUM: CPT

## 2018-07-12 PROCEDURE — 82306 VITAMIN D 25 HYDROXY: CPT

## 2018-07-12 PROCEDURE — 93010 ELECTROCARDIOGRAM REPORT: CPT | Performed by: INTERNAL MEDICINE

## 2018-07-12 PROCEDURE — 82607 VITAMIN B-12: CPT

## 2018-07-12 PROCEDURE — 84443 ASSAY THYROID STIM HORMONE: CPT

## 2018-07-12 PROCEDURE — 83540 ASSAY OF IRON: CPT

## 2018-07-12 PROCEDURE — 80053 COMPREHEN METABOLIC PANEL: CPT

## 2018-07-12 PROCEDURE — 71046 X-RAY EXAM CHEST 2 VIEWS: CPT | Performed by: SURGERY

## 2018-07-12 PROCEDURE — 85025 COMPLETE CBC W/AUTO DIFF WBC: CPT

## 2018-07-12 PROCEDURE — 86850 RBC ANTIBODY SCREEN: CPT

## 2018-07-12 RX ORDER — SPIRONOLACTONE 50 MG/1
50 TABLET, FILM COATED ORAL DAILY
Refills: 1 | Status: ON HOLD | COMMUNITY
Start: 2018-07-10 | End: 2019-01-03

## 2018-07-12 RX ORDER — MIDODRINE HYDROCHLORIDE 2.5 MG/1
2.5 TABLET ORAL 2 TIMES DAILY
Qty: 60 TABLET | Refills: 1 | Status: SHIPPED | OUTPATIENT
Start: 2018-07-12 | End: 2018-09-21

## 2018-07-12 NOTE — PROGRESS NOTES
HPI:    Patient ID: Manuela Rodriguez is a 32year old female. Neurologic Problem   Associated symptoms include dizziness, headaches, light-headedness and palpitations.          Manuela Rodriguez is a 32year old female who presented for follow up and for del angel DILATION/CURETTAGE,DIAGNOSTIC  6/22/2011: HYSTEROSCOPY  No date: LAPAROSCOPY,DIAGNOSTIC      Comment: x3 r/t POS  2008: LAPAROSCOPY,PELVIC,BIOPSY      Comment: drill ovary  01/13/2015: OTHER SURGICAL HISTORY      Comment: Subtotal Thyroidectomy for benign topiramate 50 MG Oral Tab Take 50 mg by mouth 2 (two) times daily.    Disp:  Rfl:    Levothyroxine Sodium (SYNTHROID) 50 MCG Oral Tab Take 1 tablet (50 mcg total) by mouth before breakfast. (Patient taking differently: Take 75 mcg by mouth before breakfas matter abnormality unspecified. She had extensive work up done in the past and saw Dr Shun Solitario (MS specialist) at [facility] who rule out MS. Vasculitis work up was also negative    Hypothyroidism and possible pituitary microadenoma.  F/u with Endocrinology

## 2018-07-17 LAB — VITAMIN B1 (THIAMINE), WHOLE B: 120 NMOL/L

## 2018-07-19 ENCOUNTER — HOSPITAL ENCOUNTER (OUTPATIENT)
Dept: GENERAL RADIOLOGY | Facility: HOSPITAL | Age: 28
End: 2018-07-19
Attending: SURGERY
Payer: MEDICAID

## 2018-07-19 ENCOUNTER — HOSPITAL ENCOUNTER (OUTPATIENT)
Dept: GENERAL RADIOLOGY | Facility: HOSPITAL | Age: 28
Discharge: HOME | End: 2018-07-19
Attending: SURGERY
Payer: MEDICAID

## 2018-07-19 DIAGNOSIS — Z01.818 PRE-OP TESTING: ICD-10-CM

## 2018-07-19 PROCEDURE — 74246 X-RAY XM UPR GI TRC 2CNTRST: CPT | Performed by: SURGERY

## 2018-07-19 PROCEDURE — 74240 X-RAY XM UPR GI TRC 1CNTRST: CPT | Performed by: SURGERY

## 2018-07-26 ENCOUNTER — OFFICE VISIT (OUTPATIENT)
Dept: INTERNAL MEDICINE CLINIC | Facility: CLINIC | Age: 28
End: 2018-07-26
Payer: MEDICAID

## 2018-07-26 VITALS
HEIGHT: 67 IN | TEMPERATURE: 99 F | RESPIRATION RATE: 18 BRPM | DIASTOLIC BLOOD PRESSURE: 72 MMHG | HEART RATE: 110 BPM | SYSTOLIC BLOOD PRESSURE: 122 MMHG

## 2018-07-26 DIAGNOSIS — E89.0 POSTSURGICAL HYPOTHYROIDISM: ICD-10-CM

## 2018-07-26 DIAGNOSIS — E05.90 HYPERTHYROIDISM: ICD-10-CM

## 2018-07-26 DIAGNOSIS — E89.0 HISTORY OF THYROIDECTOMY: ICD-10-CM

## 2018-07-26 DIAGNOSIS — Z01.818 PRE-OPERATIVE CLEARANCE: Primary | ICD-10-CM

## 2018-07-26 PROCEDURE — 99213 OFFICE O/P EST LOW 20 MIN: CPT | Performed by: NURSE PRACTITIONER

## 2018-07-26 RX ORDER — LEVOTHYROXINE SODIUM 0.05 MG/1
50 TABLET ORAL
Qty: 30 TABLET | Refills: 0 | COMMUNITY
Start: 2018-07-26 | End: 2018-08-03 | Stop reason: ALTCHOICE

## 2018-07-26 NOTE — H&P
Humberto Spear is a 32year old year old female who presents for a pre-operative physical exam.  Patient is to have bariatric surgery , to be done at [facility] by Dr. 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  She also has no past medical history of Anesthesia complication; D COPD  CARDIOVASCULAR: denies chest pain on exertion  GI: denies abdominal pain, denies heartburn  : denies dysuria, vaginal discharge or itching, periods irregular d/t PCOS.  denies nocturia or changes in stream  MUSCULOSKELETAL: denies back pain  NEURO: orthostatic tachycardia syndrome); Autonomic disorder; Postsurgical hypothyroidism; Tendonitis of foot; Abnormal brain MRI; Herpes labialis; History of thyroidectomy; Hyperammonemia ([address] Hyperthyroidism; Hypoparathyroidism ([address] Irregular menses;  Lesion

## 2018-07-26 NOTE — PROGRESS NOTES
Thea Venegas is a 32year old year old female who presents for a pre-operative physical exam.  Patient is to have bariatric surgery, to be done by Dr. Caren Vega at [facility] once cleared by providers.     HPI:    ***need for surgery  Functional capacit of blood and blood-forming organs(289.89); or PONV (postoperative nausea and vomiting).   Surgical Hx:  has a past surgical history that includes hysteroscopy (6/22/2011); laparoscopy,diagnostic; dilation/curettage,diagnostic; laparoscopy,pelvic,biopsy (200 anxiety  HEMATOLOGIC: denies hx of anemia, bleeding or clotting disorders  ENDOCRINE: denies thyroid history or diabetes  ALL/ASTHMA: denies hx of allergy or asthma. Denies hx of adverse reaction to anesthesia or analgesics.   EXAM:    /72   Pulse 110 sinus rhythm. No ST elevation, T wave changes, or evidence of LVH. The patient has a perioperative risk of major adverse cardiac event that is less than 1%. she has no clinical markers and an exercise capacity of greater than 4 metabolic units.    The

## 2018-08-03 ENCOUNTER — OFFICE VISIT (OUTPATIENT)
Dept: INTERNAL MEDICINE CLINIC | Facility: CLINIC | Age: 28
End: 2018-08-03
Payer: MEDICAID

## 2018-08-03 ENCOUNTER — LAB ENCOUNTER (OUTPATIENT)
Dept: LAB | Age: 28
End: 2018-08-03
Attending: NURSE PRACTITIONER
Payer: MEDICAID

## 2018-08-03 VITALS
SYSTOLIC BLOOD PRESSURE: 124 MMHG | TEMPERATURE: 97 F | HEART RATE: 91 BPM | RESPIRATION RATE: 15 BRPM | DIASTOLIC BLOOD PRESSURE: 82 MMHG

## 2018-08-03 DIAGNOSIS — Z98.890 S/P REMOVAL OF OVARIAN CYST: Primary | ICD-10-CM

## 2018-08-03 DIAGNOSIS — E89.0 POSTSURGICAL HYPOTHYROIDISM: ICD-10-CM

## 2018-08-03 DIAGNOSIS — E05.90 HYPERTHYROIDISM: ICD-10-CM

## 2018-08-03 DIAGNOSIS — E89.0 HISTORY OF TOTAL THYROIDECTOMY: ICD-10-CM

## 2018-08-03 DIAGNOSIS — Z87.42 S/P REMOVAL OF OVARIAN CYST: Primary | ICD-10-CM

## 2018-08-03 LAB
T4 FREE SERPL-MCNC: 0.8 NG/DL (ref 0.9–1.8)
TSI SER-ACNC: 9.13 MIU/ML (ref 0.35–5.5)

## 2018-08-03 PROCEDURE — 1111F DSCHRG MED/CURRENT MED MERGE: CPT | Performed by: NURSE PRACTITIONER

## 2018-08-03 PROCEDURE — 84439 ASSAY OF FREE THYROXINE: CPT

## 2018-08-03 PROCEDURE — 99213 OFFICE O/P EST LOW 20 MIN: CPT | Performed by: NURSE PRACTITIONER

## 2018-08-03 PROCEDURE — 84443 ASSAY THYROID STIM HORMONE: CPT

## 2018-08-03 PROCEDURE — 36415 COLL VENOUS BLD VENIPUNCTURE: CPT

## 2018-08-03 RX ORDER — LEVOTHYROXINE SODIUM 0.1 MG/1
50 TABLET ORAL
Refills: 0 | COMMUNITY
Start: 2018-08-02 | End: 2018-09-21

## 2018-08-03 RX ORDER — HYDROCODONE BITARTRATE AND ACETAMINOPHEN 5; 325 MG/1; MG/1
TABLET ORAL
Refills: 0 | COMMUNITY
Start: 2018-07-31 | End: 2018-09-21

## 2018-08-03 NOTE — PROGRESS NOTES
HPI:    Patient ID: Betsy Jacobo is a 32year old female. Patient presents with:  Hospital F/U    HPI  S/p uterine and ovarian cyst removal on July 30th. She is feeling ok, vaginal bleeding present, no clots.  Denies any new lightheadedness or dizzine No date: OTHER SURGICAL HISTORY Left      Comment: foot sx x3  03/2012: OTHER SURGICAL HISTORY      Comment: operative laparoscopy with electrocautery of                multiple ovarian cysts bilaterally.  it was                lysis of a few pelvic adhesio Midodrine HCl 2.5 MG Oral Tab Take 1 tablet (2.5 mg total) by mouth 2 (two) times daily.  Disp: 60 tablet Rfl: 1   MetFORMIN HCl  MG Oral Tablet 24 Hr Take 750 mg by mouth daily with breakfast.   Disp:  Rfl:    topiramate 50 MG Oral Tab Take 50 mg by Component Value Date   MG 2.0 01/21/2017        PHYSICAL EXAM:   /82   Pulse 91   Temp 97.4 °F (36.3 °C) (Oral)   Resp 15   LMP  (LMP Unknown)   Physical Exam   Vitals reviewed. Constitutional: She is oriented to person, place, and time.  She appear

## 2018-08-08 ENCOUNTER — OFFICE VISIT (OUTPATIENT)
Dept: INTERNAL MEDICINE CLINIC | Facility: CLINIC | Age: 28
End: 2018-08-08
Payer: MEDICAID

## 2018-08-08 VITALS
HEART RATE: 105 BPM | RESPIRATION RATE: 18 BRPM | SYSTOLIC BLOOD PRESSURE: 126 MMHG | OXYGEN SATURATION: 98 % | HEIGHT: 66 IN | BODY MASS INDEX: 42.59 KG/M2 | TEMPERATURE: 99 F | WEIGHT: 265 LBS | DIASTOLIC BLOOD PRESSURE: 86 MMHG

## 2018-08-08 DIAGNOSIS — L08.9 BACTERIAL SKIN INFECTION: Primary | ICD-10-CM

## 2018-08-08 DIAGNOSIS — B96.89 BACTERIAL SKIN INFECTION: Primary | ICD-10-CM

## 2018-08-08 PROCEDURE — 99213 OFFICE O/P EST LOW 20 MIN: CPT | Performed by: NURSE PRACTITIONER

## 2018-08-08 RX ORDER — CLINDAMYCIN PHOSPHATE 11.9 MG/ML
1 SOLUTION TOPICAL 2 TIMES DAILY
Qty: 1 BOTTLE | Refills: 0 | Status: SHIPPED | OUTPATIENT
Start: 2018-08-08 | End: 2018-09-21

## 2018-08-08 NOTE — PROGRESS NOTES
HPI:    Patient ID: Christa Fothergill is a 32year old female. Patient presents with:   Infection: pt went to IC yesterday for redness and pain post op, they prescribed her an antibiotic that insurance will not pay    HPI  Patient c/o abdominal discomfort Comment: x3 r/t POS  2008: LAPAROSCOPY,PELVIC,BIOPSY      Comment: drill ovary  No date: OTHER SURGICAL HISTORY Left      Comment: foot sx x3  03/2012: OTHER SURGICAL HISTORY      Comment: operative laparoscopy with electrocautery of                mul Levothyroxine Sodium 100 MCG Oral Tab  Disp:  Rfl: 0   spironolactone 50 MG Oral Tab Take 50 mg by mouth daily. Disp:  Rfl: 1   Midodrine HCl 2.5 MG Oral Tab Take 1 tablet (2.5 mg total) by mouth 2 (two) times daily.  Disp: 60 tablet Rfl: 1   MetFORMIN HCl Component Value Date   FOLIC 78.3 59/69/6284       Lab Results  Component Value Date   IRON 126 07/12/2018       Lab Results  Component Value Date   B12 510 07/12/2018       Lab Results  Component Value Date   PHOS 2.2 (L) 01/11/2017       Lab Results  Com

## 2018-08-20 ENCOUNTER — OFFICE VISIT (OUTPATIENT)
Dept: INTERNAL MEDICINE CLINIC | Facility: CLINIC | Age: 28
End: 2018-08-20
Payer: MEDICAID

## 2018-08-20 VITALS
BODY MASS INDEX: 44 KG/M2 | DIASTOLIC BLOOD PRESSURE: 84 MMHG | WEIGHT: 272 LBS | SYSTOLIC BLOOD PRESSURE: 124 MMHG | TEMPERATURE: 99 F | HEART RATE: 100 BPM | RESPIRATION RATE: 15 BRPM

## 2018-08-20 DIAGNOSIS — R42 DIZZINESS, NONSPECIFIC: Primary | ICD-10-CM

## 2018-08-20 PROCEDURE — 99213 OFFICE O/P EST LOW 20 MIN: CPT | Performed by: NURSE PRACTITIONER

## 2018-08-20 NOTE — PROGRESS NOTES
HPI:    Patient ID: Manuela Rodriguez is a 32year old female. Patient presents with:  Headache  Dizziness: onset one day    Dizziness   This is a chronic problem. The problem occurs intermittently. The problem has been waxing and waning.  Associated sympt their eval on 6/13/16.   She has been cleared for dc to snf for rehab per all consultants     Past Surgical History:  6/22/2011: D & C  2009: D & C  10/12/2012: D & C  No date: DILATION/CURETTAGE,DIAGNOSTIC  6/22/2011: HYSTEROSCOPY  No date: LAPAROSCOPY,LYLE Current Outpatient Prescriptions:  Clindamycin Phosphate 1 % External Solution Apply 1 Application topically 2 (two) times daily.  Disp: 1 Bottle Rfl: 0   HYDROcodone-acetaminophen 5-325 MG Oral Tab TK 1 TO 2 TS PO Q 6 H PRN P Disp:  Rfl: 0   Levothyroxin Date   VITD 16.2 (L) 07/12/2018       Lab Results  Component Value Date   HIEU 34.3 07/12/2018       Lab Results  Component Value Date   FOLIC 28.4 44/70/6894       Lab Results  Component Value Date   IRON 126 07/12/2018       Lab Results  Component Value D

## 2018-09-13 ENCOUNTER — TELEPHONE (OUTPATIENT)
Dept: NEUROLOGY | Facility: CLINIC | Age: 28
End: 2018-09-13

## 2018-09-21 ENCOUNTER — OFFICE VISIT (OUTPATIENT)
Dept: INTERNAL MEDICINE CLINIC | Facility: CLINIC | Age: 28
End: 2018-09-21
Payer: MEDICAID

## 2018-09-21 VITALS
WEIGHT: 272 LBS | HEIGHT: 66 IN | DIASTOLIC BLOOD PRESSURE: 74 MMHG | OXYGEN SATURATION: 98 % | BODY MASS INDEX: 43.71 KG/M2 | HEART RATE: 110 BPM | SYSTOLIC BLOOD PRESSURE: 136 MMHG | TEMPERATURE: 99 F | RESPIRATION RATE: 16 BRPM

## 2018-09-21 DIAGNOSIS — E03.9 HYPOTHYROIDISM, ADULT: Primary | ICD-10-CM

## 2018-09-21 DIAGNOSIS — E66.9 OBESITY (BMI 35.0-39.9 WITHOUT COMORBIDITY): ICD-10-CM

## 2018-09-21 PROCEDURE — 99213 OFFICE O/P EST LOW 20 MIN: CPT | Performed by: NURSE PRACTITIONER

## 2018-09-21 RX ORDER — PHENTERMINE HYDROCHLORIDE 37.5 MG/1
37.5 CAPSULE ORAL EVERY MORNING
Qty: 30 CAPSULE | Refills: 0 | Status: SHIPPED | OUTPATIENT
Start: 2018-09-21 | End: 2018-10-29

## 2018-09-21 RX ORDER — PANTOPRAZOLE SODIUM 40 MG/1
TABLET, DELAYED RELEASE ORAL
Refills: 0 | COMMUNITY
Start: 2018-09-12 | End: 2019-01-02

## 2018-09-21 RX ORDER — METOPROLOL SUCCINATE 25 MG/1
TABLET, EXTENDED RELEASE ORAL
Refills: 0 | COMMUNITY
Start: 2018-09-19 | End: 2018-09-21

## 2018-09-21 RX ORDER — LEVOTHYROXINE SODIUM 0.05 MG/1
50 TABLET ORAL
Qty: 90 TABLET | Refills: 0 | Status: SHIPPED | OUTPATIENT
Start: 2018-09-21 | End: 2018-10-13

## 2018-09-21 NOTE — PATIENT INSTRUCTIONS
[address]  [address]  [facility]  [phone]

## 2018-09-21 NOTE — PROGRESS NOTES
HPI:    Patient ID: Cierra Pearson is a 32year old female.     Patient presents with:  Thyroid Problem: medication refill    Thyroid Problem     Patient is leaving for [facility] to get  on Monday, went to refill synthroid and was told by pharmacy she h Comment:  Performed by Chandler Castro MD at [facility] OR  6/22/2011: HYSTEROSCOPY  No date: LAPAROSCOPY,DIAGNOSTIC      Comment:  x3 r/t POS  2008: LAPAROSCOPY,PELVIC,BIOPSY      Comment:  drill ovary  No date: OTHER SURGICAL HISTORY;  Left      Comment: Concern: No          Daily; 1 cup         Occupational Exposure: Not Asked        Hobby Hazards: Not Asked        Sleep Concern: Not Asked        Stress Concern: Not Asked        Weight Concern: Not Asked        Special Diet: Not Asked        Back Care: No MPV 9.6 07/05/2018     Lab Results   Component Value Date    CHOLEST 170 02/27/2018    TRIG 56 02/27/2018    HDL 83 02/27/2018    LDL 76 02/27/2018    VLDL 7 02/02/2016    TCHDLRATIO 1.68 02/02/2016    NONHDLC 53 02/02/2016     Lab Results   Component Valu Take 1 capsule (37.5 mg total) by mouth every morning.         Patient Instructions   [facility]  [address] [place] [address]      YEHUDA Macias

## 2018-10-12 ENCOUNTER — LAB ENCOUNTER (OUTPATIENT)
Dept: LAB | Age: 28
End: 2018-10-12
Attending: NURSE PRACTITIONER
Payer: MEDICAID

## 2018-10-12 ENCOUNTER — OFFICE VISIT (OUTPATIENT)
Dept: INTERNAL MEDICINE CLINIC | Facility: CLINIC | Age: 28
End: 2018-10-12
Payer: MEDICAID

## 2018-10-12 VITALS
RESPIRATION RATE: 16 BRPM | HEIGHT: 66 IN | DIASTOLIC BLOOD PRESSURE: 76 MMHG | WEIGHT: 276 LBS | TEMPERATURE: 98 F | OXYGEN SATURATION: 99 % | HEART RATE: 122 BPM | BODY MASS INDEX: 44.36 KG/M2 | SYSTOLIC BLOOD PRESSURE: 128 MMHG

## 2018-10-12 DIAGNOSIS — A09 TRAVELER'S DIARRHEA: Primary | ICD-10-CM

## 2018-10-12 DIAGNOSIS — E03.9 HYPOTHYROIDISM, ADULT: ICD-10-CM

## 2018-10-12 DIAGNOSIS — R42 DIZZINESS: ICD-10-CM

## 2018-10-12 PROCEDURE — 90686 IIV4 VACC NO PRSV 0.5 ML IM: CPT | Performed by: NURSE PRACTITIONER

## 2018-10-12 PROCEDURE — 36415 COLL VENOUS BLD VENIPUNCTURE: CPT

## 2018-10-12 PROCEDURE — 84439 ASSAY OF FREE THYROXINE: CPT

## 2018-10-12 PROCEDURE — 90471 IMMUNIZATION ADMIN: CPT | Performed by: NURSE PRACTITIONER

## 2018-10-12 PROCEDURE — 99213 OFFICE O/P EST LOW 20 MIN: CPT | Performed by: NURSE PRACTITIONER

## 2018-10-12 PROCEDURE — 84443 ASSAY THYROID STIM HORMONE: CPT

## 2018-10-12 RX ORDER — PANTOPRAZOLE SODIUM 40 MG/1
40 TABLET, DELAYED RELEASE ORAL
COMMUNITY
Start: 2018-09-12 | End: 2018-10-12

## 2018-10-12 RX ORDER — ERGOCALCIFEROL 1.25 MG/1
50000 CAPSULE ORAL WEEKLY
COMMUNITY
End: 2019-10-24

## 2018-10-12 RX ORDER — LEVOTHYROXINE SODIUM 0.15 MG/1
150 TABLET ORAL
COMMUNITY
Start: 2017-09-26 | End: 2018-10-13

## 2018-10-12 NOTE — PROGRESS NOTES
HPI:    Patient ID: Humberto Spear is a 32year old female.     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   She has been cleared for Occupational History      Not on file    Tobacco Use      Smoking status: Never Smoker      Smokeless tobacco: Never Used    Substance and Sexual Activity      Alcohol use: No        Alcohol/week: 0.0 oz      Drug use: No      Sexual activity: No    Other 07/12/2018    CA 8.3 08/07/2018    OSMOCALC 283 07/05/2018    ALKPHO 71 07/12/2018    AST 29 07/12/2018    ALT 29 07/12/2018    BILT 1.1 07/12/2018    TP 6.6 07/12/2018    ALB 3.1 (L) 07/12/2018     08/07/2018    K 3.40 (L) 08/07/2018     08/07 tenderness. Musculoskeletal: Normal range of motion. Neurological: She is alert and oriented to person, place, and time. Skin: Skin is warm and dry. Psychiatric: She has a normal mood and affect.           ASSESSMENT/PLAN:   Diagnoses and all orders

## 2018-10-13 ENCOUNTER — TELEPHONE (OUTPATIENT)
Dept: INTERNAL MEDICINE CLINIC | Facility: CLINIC | Age: 28
End: 2018-10-13

## 2018-10-13 DIAGNOSIS — E03.9 HYPOTHYROIDISM, ADULT: Primary | ICD-10-CM

## 2018-10-13 RX ORDER — LEVOTHYROXINE SODIUM 50 UG/1
50 CAPSULE ORAL
Qty: 30 CAPSULE | Refills: 0 | Status: SHIPPED | OUTPATIENT
Start: 2018-10-13 | End: 2018-11-12

## 2018-10-13 NOTE — TELEPHONE ENCOUNTER
Called patient with results of TSH level.  I sent over prescription to pharmacy for levothyroxine 50 mcg daily, repeat TSH 1 week

## 2018-10-15 ENCOUNTER — OFFICE VISIT (OUTPATIENT)
Dept: INTERNAL MEDICINE CLINIC | Facility: CLINIC | Age: 28
End: 2018-10-15
Payer: MEDICAID

## 2018-10-15 VITALS
BODY MASS INDEX: 44.36 KG/M2 | OXYGEN SATURATION: 99 % | HEART RATE: 120 BPM | HEIGHT: 66 IN | RESPIRATION RATE: 16 BRPM | SYSTOLIC BLOOD PRESSURE: 126 MMHG | TEMPERATURE: 98 F | DIASTOLIC BLOOD PRESSURE: 74 MMHG | WEIGHT: 276 LBS

## 2018-10-15 DIAGNOSIS — B96.89 ACUTE BACTERIAL RHINOSINUSITIS: Primary | ICD-10-CM

## 2018-10-15 DIAGNOSIS — J01.90 ACUTE BACTERIAL RHINOSINUSITIS: Primary | ICD-10-CM

## 2018-10-15 DIAGNOSIS — E03.9 HYPOTHYROIDISM, ADULT: ICD-10-CM

## 2018-10-15 PROCEDURE — 99213 OFFICE O/P EST LOW 20 MIN: CPT | Performed by: NURSE PRACTITIONER

## 2018-10-15 RX ORDER — AMOXICILLIN AND CLAVULANATE POTASSIUM 875; 125 MG/1; MG/1
1 TABLET, FILM COATED ORAL 2 TIMES DAILY
Qty: 20 TABLET | Refills: 0 | Status: SHIPPED | OUTPATIENT
Start: 2018-10-15 | End: 2018-10-25

## 2018-10-15 NOTE — PROGRESS NOTES
HPI:    Patient ID: Thea Venegas is a 32year old female. Patient presents with:  Sinus Problem: runny nose, vomiting, sinus congestion, headache, fever 102 on saturday      Sinus Problem   This is a new problem.  The current episode started in the pa 6/13/16.   She has been cleared for dc to snf for rehab per all consultants     Past Surgical History:   Procedure Laterality Date   • D & C  6/22/2011   • D & C  2009   • D & C  10/12/2012   • DILATION/CURETTAGE,DIAGNOSTIC     • FOOT EXCISION MORTONS NEURO Sexual activity: No    Other Topics      Concerns:         Service: Not Asked        Blood Transfusions: Not Asked        Caffeine Concern: No          Daily; 1 cup         Occupational Exposure: Not Asked        Hobby Hazards: Not Asked        S 07/12/2018     08/07/2018    K 3.40 (L) 08/07/2018     08/07/2018    CO2 26.0 08/07/2018     Lab Results   Component Value Date    WBC 5.44 08/07/2018    RBC 4.50 08/07/2018    HGB 13.6 08/07/2018    HCT 39.9 08/07/2018    MCV 88.7 08/07/2018 Eyes: Conjunctivae and EOM are normal. Pupils are equal, round, and reactive to light. Neck: Normal range of motion. Neck supple. Cardiovascular: Regular rhythm and normal heart sounds. Tachycardia present. No murmur heard. Edema not present.

## 2018-10-20 ENCOUNTER — LAB ENCOUNTER (OUTPATIENT)
Dept: LAB | Facility: HOSPITAL | Age: 28
End: 2018-10-20
Attending: SURGERY
Payer: MEDICAID

## 2018-10-20 DIAGNOSIS — E03.9 HYPOTHYROIDISM, ADULT: ICD-10-CM

## 2018-10-20 PROCEDURE — 36415 COLL VENOUS BLD VENIPUNCTURE: CPT

## 2018-10-20 PROCEDURE — 84443 ASSAY THYROID STIM HORMONE: CPT

## 2018-10-20 PROCEDURE — 84439 ASSAY OF FREE THYROXINE: CPT

## 2018-10-29 ENCOUNTER — OFFICE VISIT (OUTPATIENT)
Dept: INTERNAL MEDICINE CLINIC | Facility: CLINIC | Age: 28
End: 2018-10-29
Payer: MEDICAID

## 2018-10-29 VITALS
BODY MASS INDEX: 43.87 KG/M2 | OXYGEN SATURATION: 98 % | TEMPERATURE: 98 F | RESPIRATION RATE: 18 BRPM | SYSTOLIC BLOOD PRESSURE: 124 MMHG | WEIGHT: 273 LBS | HEIGHT: 66 IN | DIASTOLIC BLOOD PRESSURE: 80 MMHG | HEART RATE: 86 BPM

## 2018-10-29 DIAGNOSIS — Z98.84 S/P GASTRIC BYPASS: Primary | ICD-10-CM

## 2018-10-29 PROBLEM — Z01.818 PRE-OPERATIVE CLEARANCE: Status: RESOLVED | Noted: 2018-07-26 | Resolved: 2018-10-29

## 2018-10-29 PROCEDURE — 99213 OFFICE O/P EST LOW 20 MIN: CPT | Performed by: NURSE PRACTITIONER

## 2018-10-29 RX ORDER — ONDANSETRON 4 MG/1
TABLET, ORALLY DISINTEGRATING ORAL
Refills: 0 | COMMUNITY
Start: 2018-10-24 | End: 2019-01-02

## 2018-10-29 RX ORDER — OXYCODONE HYDROCHLORIDE 5 MG/1
TABLET ORAL
Refills: 0 | COMMUNITY
Start: 2018-10-24 | End: 2019-01-02

## 2018-10-29 RX ORDER — TOPIRAMATE 25 MG/1
TABLET ORAL
COMMUNITY
Start: 2017-07-26 | End: 2019-01-02

## 2018-10-29 RX ORDER — TRAMADOL HYDROCHLORIDE 50 MG/1
TABLET ORAL
Refills: 0 | COMMUNITY
Start: 2018-10-24 | End: 2019-01-02

## 2018-10-29 NOTE — PROGRESS NOTES
HPI:    Patient ID: Betsy Jacobo is a 25year old female. Patient presents with:  Post-Op    HPI  POD #6 laparoscopic gastric bypass by Dr. Mele Crook at [facility]. She stayed overnight x 1 day, she had some trouble waking up but no other events.  Sh • LAPAROSCOPY,PELVIC,BIOPSY  2008    drill ovary   • OTHER SURGICAL HISTORY Left     foot sx x3   • OTHER SURGICAL HISTORY  03/2012    operative laparoscopy with electrocautery of multiple ovarian cysts bilaterally.  it was lysis of a few pelvic adhesions Back Care: Not Asked        Exercise: Yes          2- x a week        Bike Helmet: Not Asked        Seat Belt: Yes        Self-Exams: Not Asked    Social History Narrative      Single, lives at home with her father, no children, works at [employer] RBC 4.50 08/07/2018    HGB 13.6 08/07/2018    HCT 39.9 08/07/2018    MCV 88.7 08/07/2018    MCH 30.2 08/07/2018    MCHC 34.1 08/07/2018    RDW 14.2 08/07/2018     08/07/2018    MPV 9.6 07/05/2018     Lab Results   Component Value Date    CHOLEST 17 S/P gastric bypass- recovering well- pain manageable with Tramadol. Suggest melatonin to help with sleep. She asking me about certain foods to eat, told her to discuss with surgery.      Have TSH check in 1 month, follow up after labs completed  Tony Pearson

## 2019-01-01 NOTE — Clinical Note
[address] [address]  Dept: [phone]        Please excuse above mentioned patient from work today Thursday, 2/23/17.       Shahrzad BLACKMAN
February 23, 2017    [address]  [address] [address]      Please excuse above mentioned patient from work today Thursday, 2/23/17.          Sincerely,    YEHUDA Beaver
Statement Selected

## 2019-01-02 ENCOUNTER — APPOINTMENT (OUTPATIENT)
Dept: GENERAL RADIOLOGY | Facility: HOSPITAL | Age: 29
End: 2019-01-02
Attending: EMERGENCY MEDICINE
Payer: MEDICAID

## 2019-01-02 ENCOUNTER — HOSPITAL ENCOUNTER (OUTPATIENT)
Facility: HOSPITAL | Age: 29
Setting detail: OBSERVATION
Discharge: HOME | End: 2019-01-04
Attending: EMERGENCY MEDICINE | Admitting: HOSPITALIST
Payer: MEDICAID

## 2019-01-02 DIAGNOSIS — R94.31 PROLONGED Q-T INTERVAL ON ECG: ICD-10-CM

## 2019-01-02 DIAGNOSIS — R55 SYNCOPE AND COLLAPSE: Primary | ICD-10-CM

## 2019-01-02 LAB
ALBUMIN SERPL-MCNC: 3.4 G/DL (ref 3.1–4.5)
ALBUMIN/GLOB SERPL: 0.9 {RATIO} (ref 1–2)
ALP LIVER SERPL-CCNC: 82 U/L (ref 37–98)
ALT SERPL-CCNC: 29 U/L (ref 14–54)
ANION GAP SERPL CALC-SCNC: 9 MMOL/L (ref 0–18)
AST SERPL-CCNC: 35 U/L (ref 15–41)
ATRIAL RATE: 113 BPM
ATRIAL RATE: 57 BPM
BASOPHILS # BLD AUTO: 0.02 X10(3) UL (ref 0–0.1)
BASOPHILS NFR BLD AUTO: 0.3 %
BILIRUB SERPL-MCNC: 0.6 MG/DL (ref 0.1–2)
BUN BLD-MCNC: 6 MG/DL (ref 8–20)
BUN/CREAT SERPL: 7.8 (ref 10–20)
CALCIUM BLD-MCNC: 8.5 MG/DL (ref 8.3–10.3)
CHLORIDE SERPL-SCNC: 111 MMOL/L (ref 101–111)
CO2 SERPL-SCNC: 23 MMOL/L (ref 22–32)
CREAT BLD-MCNC: 0.77 MG/DL (ref 0.55–1.02)
D-DIMER: 0.38 UG/ML FEU (ref 0–0.49)
EOSINOPHIL # BLD AUTO: 0.04 X10(3) UL (ref 0–0.3)
EOSINOPHIL NFR BLD AUTO: 0.7 %
ERYTHROCYTE [DISTWIDTH] IN BLOOD BY AUTOMATED COUNT: 13 % (ref 11.5–16)
EXPIRATION DATE: NORMAL
GLOBULIN PLAS-MCNC: 3.7 G/DL (ref 2.8–4.4)
GLUCOSE BLD-MCNC: 104 MG/DL (ref 70–99)
HAV IGM SER QL: 2 MG/DL (ref 1.8–2.5)
HCT VFR BLD AUTO: 41.5 % (ref 34–50)
HGB BLD-MCNC: 14.6 G/DL (ref 12–16)
IMMATURE GRANULOCYTE COUNT: 0.01 X10(3) UL (ref 0–1)
IMMATURE GRANULOCYTE RATIO %: 0.2 %
INR BLD: 1.18 (ref 0.9–1.1)
LYMPHOCYTES # BLD AUTO: 1.94 X10(3) UL (ref 0.9–4)
LYMPHOCYTES NFR BLD AUTO: 32.9 %
M PROTEIN MFR SERPL ELPH: 7.1 G/DL (ref 6.4–8.2)
MCH RBC QN AUTO: 30.1 PG (ref 27–33.2)
MCHC RBC AUTO-ENTMCNC: 35.2 G/DL (ref 31–37)
MCV RBC AUTO: 85.6 FL (ref 81–100)
MONOCYTES # BLD AUTO: 0.75 X10(3) UL (ref 0.1–1)
MONOCYTES NFR BLD AUTO: 12.7 %
NEUTROPHIL ABS PRELIM: 3.13 X10 (3) UL (ref 1.3–6.7)
NEUTROPHILS # BLD AUTO: 3.13 X10(3) UL (ref 1.3–6.7)
NEUTROPHILS NFR BLD AUTO: 53.2 %
OSMOLALITY SERPL CALC.SUM OF ELEC: 294 MOSM/KG (ref 275–295)
P AXIS: 37 DEGREES
P-R INTERVAL: 190 MS
PLATELET # BLD AUTO: 211 10(3)UL (ref 150–450)
POCT URINE PREGNANCY: NEGATIVE
POTASSIUM SERPL-SCNC: 3.5 MMOL/L (ref 3.6–5.1)
PSA SERPL DL<=0.01 NG/ML-MCNC: 15.5 SECONDS (ref 12.4–14.7)
Q-T INTERVAL: 328 MS
Q-T INTERVAL: 446 MS
QRS DURATION: 88 MS
QRS DURATION: 90 MS
QTC CALCULATION (BEZET): 449 MS
QTC CALCULATION (BEZET): 625 MS
R AXIS: 73 DEGREES
R AXIS: 80 DEGREES
RBC # BLD AUTO: 4.85 X10(6)UL (ref 3.8–5.1)
RED CELL DISTRIBUTION WIDTH-SD: 39.9 FL (ref 35.1–46.3)
SODIUM SERPL-SCNC: 143 MMOL/L (ref 136–144)
T AXIS: 19 DEGREES
T AXIS: 41 DEGREES
T3FREE SERPL-MCNC: 2.75 PG/ML (ref 2.3–4.2)
T4 FREE SERPL-MCNC: 1.8 NG/DL (ref 0.9–1.8)
TROPONIN I SERPL-MCNC: <0.046 NG/ML (ref ?–0.05)
TSI SER-ACNC: 0.03 MIU/ML (ref 0.35–5.5)
VENTRICULAR RATE: 113 BPM
VENTRICULAR RATE: 118 BPM
WBC # BLD AUTO: 5.9 X10(3) UL (ref 4–13)

## 2019-01-02 PROCEDURE — 99220 INITIAL OBSERVATION CARE,LEVL III: CPT | Performed by: HOSPITALIST

## 2019-01-02 PROCEDURE — 71045 X-RAY EXAM CHEST 1 VIEW: CPT | Performed by: EMERGENCY MEDICINE

## 2019-01-02 RX ORDER — SODIUM CHLORIDE 9 MG/ML
INJECTION, SOLUTION INTRAVENOUS CONTINUOUS
Status: DISCONTINUED | OUTPATIENT
Start: 2019-01-02 | End: 2019-01-03

## 2019-01-02 RX ORDER — DOXEPIN HYDROCHLORIDE 50 MG/1
1 CAPSULE ORAL DAILY
Status: DISCONTINUED | OUTPATIENT
Start: 2019-01-02 | End: 2019-01-04

## 2019-01-02 RX ORDER — ERGOCALCIFEROL 1.25 MG/1
50000 CAPSULE ORAL
Status: DISCONTINUED | OUTPATIENT
Start: 2019-01-06 | End: 2019-01-04

## 2019-01-02 RX ORDER — POTASSIUM CHLORIDE 20 MEQ/1
40 TABLET, EXTENDED RELEASE ORAL ONCE
Status: COMPLETED | OUTPATIENT
Start: 2019-01-02 | End: 2019-01-02

## 2019-01-02 RX ORDER — LEVOTHYROXINE SODIUM 0.1 MG/1
100 TABLET ORAL
COMMUNITY
End: 2019-01-15

## 2019-01-02 RX ORDER — ACETAMINOPHEN 325 MG/1
650 TABLET ORAL EVERY 6 HOURS PRN
Status: DISCONTINUED | OUTPATIENT
Start: 2019-01-02 | End: 2019-01-04

## 2019-01-02 RX ORDER — LEVOTHYROXINE SODIUM 88 UG/1
88 TABLET ORAL
Status: DISCONTINUED | OUTPATIENT
Start: 2019-01-03 | End: 2019-01-03

## 2019-01-02 NOTE — ED INITIAL ASSESSMENT (HPI)
Patient presents from immediate care for complaints of syncope this morning, \"abnormal\" ekg which reads heart rate 134 sinus tach, and persistent dizziness. Patient states she feels that she is spinning, and movement does make the dizziness worse.  No rec

## 2019-01-02 NOTE — CONSULTS
[facility] Cardiology Consultation    Abhilash Ann Patient Status:  Emergency    10/27/1990 MRN [id number]   Location [address] [facility] Attending Camille Jeong MD   Hosp Day # 0 PCP Edward Gregorio MD     Reason for Consultation:  Syncope, Left 12/3/2013    Performed by Angelica Higgins MD at [facility] MAIN OR   • HYSTEROSCOPY  6/22/2011   • LAP GASTRIC BYPASS/PAULINO-EN-Y  10/23/2018   • LAPAROSCOPY,DIAGNOSTIC      x3 r/t POS   • LAPAROSCOPY,PELVIC,BIOPSY  2008    drill ovary   • OTHER SURGICAL HIST Telemetry: ST    Laboratories and Data:  Diagnostics:    EKG, 1/2/2019:  reviewed    CXR, 1/2/2019:  neg    Labs:   HEM:  Recent Labs   Lab  01/02/19   1131   WBC  5.9   HGB  14.6   PLT  211.0       Chem:  Recent Labs   Lab  01/02/19   1131   NA  143

## 2019-01-02 NOTE — H&P
[facility]  History and Physical     Debo Cole Patient Status:  Emergency    10/27/1990 MRN [id number]   Location [facility] [facility] Attending Tonya Krause MD   Hosp Day # 0 PCP Doug Kaufman MD      Chief Complaint: Surgical History:   Past Surgical History:   Procedure Laterality Date   • D & C  6/22/2011   • D & C  2009   • D & C  10/12/2012   • DILATION/CURETTAGE,DIAGNOSTIC     • FOOT EXCISION MORTONS NEUROMA Left 12/3/2013    Performed by Deja Felipe MD at E Potassium Gluconate 595 MG Oral Cap Take by mouth. Disp:  Rfl:    topiramate (TOPAMAX) 25 MG Oral Tab Take 1 tab at bedtime for 1 week, then increase to 2 tabs at bedtime. Disp:  Rfl:    ergocalciferol 73798 units Oral Cap Take 50,000 Units by mouth.  Dis 01/02/19   1131   PTP  15.5*   INR  1.18*       Recent Labs   Lab  01/02/19   1131   TROP  <0.046       Imaging: Imaging data reviewed in Epic. ASSESSMENT / PLAN:     1.  Syncope with underlying POTS, suspect d/t dehydration resulting in orthostatic hy

## 2019-01-02 NOTE — CONSULTS
I was asked by Dr. Laura Bull to evaluate for syncope    25year old female with PMhx of POTS, IST previously on corlanor and florinef (corlanor stopped for cost reasons, BB prior that didn't affect HR's, florinef stopped when gastric bypass happened 10/18) Left arm)   Pulse (!) 125   Temp 97.4 °F (36.3 °C) (Oral)   Resp 18   Ht 5' 6\" (1.676 m)   Wt 248 lb (112.5 kg)   LMP 11/27/2018   SpO2 98%   BMI 40.03 kg/m²   Physical Exam:    General: NAD  Neck: No JVD  Lungs: CTA bilat  Heart: tachy RR, S1, S2  Abdome florinef until 2 months ago (held after gastric sleeve) so will restart at 0.1mg qd. Explained that gastric sleeve surgery common exacerbator of VV overlap syndromes. ECG reassuring. Monitor on tele x 24 hours and get 30d monitor on discharge.   F/u with

## 2019-01-02 NOTE — ED PROVIDER NOTES
Patient Seen in: [facility] Emergency Department    History   Patient presents with:  Syncope (cardiovascular, neurologic)  Abnormal Result (metabolic, cardiac)    Stated Complaint: syncope, abnormal EKG    HPI    Patient is a 38-year-old woman comi LAPAROSCOPY,PELVIC,BIOPSY  2008    drill ovary   • OTHER SURGICAL HISTORY Left     foot sx x3   • OTHER SURGICAL HISTORY  03/2012    operative laparoscopy with electrocautery of multiple ovarian cysts bilaterally.  it was lysis of a few pelvic adhesions   • Coordination normal.   Skin: Skin is warm and dry. No rash noted. Psychiatric: She has a normal mood and affect. Her behavior is normal.   Nursing note and vitals reviewed.            ED Course     Labs Reviewed   COMP METABOLIC PANEL (14) - Abnormal; Not Final result                 Please view results for these tests on the individual orders.    POCT PREGNANCY, URINE   RAINBOW DRAW BLUE   RAINBOW DRAW LAVENDER   RAINBOW DRAW LIGHT GREEN   RAINBOW DRAW GOLD   CBC W/ DIFFERENTIAL     EKG    Ra PLATELET    Narrative: The following orders were created for panel order CBC WITH DIFFERENTIAL WITH PLATELET.   Procedure                               Abnormality         Status                     ---------                               ----------- and collapse  (primary encounter diagnosis)  Prolonged Q-T interval on ECG    Disposition:  Admit  1/2/2019  2:24 pm    Follow-up:  No follow-up provider specified.       Medications Prescribed:  Current Discharge Medication List        Present on Admission

## 2019-01-02 NOTE — ED NOTES
Bedside report given to Alberto Cardoso RN. Pt resting on cart. Awaiting transport to inpatient room.

## 2019-01-02 NOTE — PROGRESS NOTES
01/02/19 1604 01/02/19 1606 01/02/19 1608   Vital Signs   Pulse 117 (!) 121 (!) 125   Heart Rate Source Monitor Monitor Monitor   Cardiac Rhythm ST ST ST   Resp 20 18 18   Respiratory Quality Normal --  --    /68 (!) 131/94 (!) 129/94   BP Locatio

## 2019-01-03 ENCOUNTER — APPOINTMENT (OUTPATIENT)
Dept: CV DIAGNOSTICS | Facility: HOSPITAL | Age: 29
End: 2019-01-03
Attending: INTERNAL MEDICINE
Payer: MEDICAID

## 2019-01-03 LAB
ANION GAP SERPL CALC-SCNC: 5 MMOL/L (ref 0–18)
BUN BLD-MCNC: 4 MG/DL (ref 8–20)
BUN/CREAT SERPL: 6.9 (ref 10–20)
CALCIUM BLD-MCNC: 7.8 MG/DL (ref 8.3–10.3)
CHLORIDE SERPL-SCNC: 112 MMOL/L (ref 101–111)
CO2 SERPL-SCNC: 25 MMOL/L (ref 22–32)
CREAT BLD-MCNC: 0.58 MG/DL (ref 0.55–1.02)
GLUCOSE BLD-MCNC: 81 MG/DL (ref 70–99)
HAV IGM SER QL: 1.6 MG/DL (ref 1.8–2.5)
OSMOLALITY SERPL CALC.SUM OF ELEC: 290 MOSM/KG (ref 275–295)
POTASSIUM SERPL-SCNC: 3.4 MMOL/L (ref 3.6–5.1)
POTASSIUM SERPL-SCNC: 3.8 MMOL/L (ref 3.6–5.1)
SODIUM SERPL-SCNC: 142 MMOL/L (ref 136–144)

## 2019-01-03 PROCEDURE — 93306 TTE W/DOPPLER COMPLETE: CPT | Performed by: INTERNAL MEDICINE

## 2019-01-03 PROCEDURE — 99243 OFF/OP CNSLTJ NEW/EST LOW 30: CPT | Performed by: OTHER

## 2019-01-03 PROCEDURE — 99225 SUBSEQUENT OBSERVATION CARE: CPT | Performed by: HOSPITALIST

## 2019-01-03 RX ORDER — SODIUM CHLORIDE 9 MG/ML
INJECTION, SOLUTION INTRAVENOUS CONTINUOUS
Status: DISCONTINUED | OUTPATIENT
Start: 2019-01-03 | End: 2019-01-04

## 2019-01-03 RX ORDER — POTASSIUM CHLORIDE 20 MEQ/1
40 TABLET, EXTENDED RELEASE ORAL ONCE
Status: COMPLETED | OUTPATIENT
Start: 2019-01-03 | End: 2019-01-03

## 2019-01-03 RX ORDER — POTASSIUM CHLORIDE 20 MEQ/1
40 TABLET, EXTENDED RELEASE ORAL EVERY 4 HOURS
Status: COMPLETED | OUTPATIENT
Start: 2019-01-03 | End: 2019-01-03

## 2019-01-03 RX ORDER — SPIRONOLACTONE 50 MG/1
25 TABLET, FILM COATED ORAL DAILY
Qty: 30 TABLET | Refills: 5 | Status: SHIPPED | OUTPATIENT
Start: 2019-01-03 | End: 2019-05-24 | Stop reason: ALTCHOICE

## 2019-01-03 RX ORDER — LEVOTHYROXINE SODIUM 0.1 MG/1
100 TABLET ORAL
Status: DISCONTINUED | OUTPATIENT
Start: 2019-01-04 | End: 2019-01-04

## 2019-01-03 RX ORDER — BUTALBITAL, ACETAMINOPHEN AND CAFFEINE 50; 325; 40 MG/1; MG/1; MG/1
1 TABLET ORAL EVERY 4 HOURS PRN
Status: DISCONTINUED | OUTPATIENT
Start: 2019-01-03 | End: 2019-01-04

## 2019-01-03 RX ORDER — FLUDROCORTISONE ACETATE 0.1 MG/1
0.1 TABLET ORAL DAILY
Status: DISCONTINUED | OUTPATIENT
Start: 2019-01-03 | End: 2019-01-04

## 2019-01-03 RX ORDER — FLUDROCORTISONE ACETATE 0.1 MG/1
0.1 TABLET ORAL DAILY
Qty: 30 TABLET | Refills: 5 | Status: SHIPPED | OUTPATIENT
Start: 2019-01-03 | End: 2019-04-23

## 2019-01-03 RX ORDER — MAGNESIUM OXIDE 400 MG (241.3 MG MAGNESIUM) TABLET
400 TABLET ONCE
Status: COMPLETED | OUTPATIENT
Start: 2019-01-03 | End: 2019-01-03

## 2019-01-03 NOTE — PAYOR COMM NOTE
--------------  ADMISSION REVIEW     Payor: [address] [place] [address]  Subscriber #:  [id number]  Authorization Number: N/A    Admit date: N/A  Admit time: N/A       Admitting Physician: Negrito Cancino MD  Attending Physician:  Salvador Broussard work on the weekend, we will wait for their eval on 6/13/16.   She has been cleared for dc to snf for rehab per all consultants     Past Surgical History:   Procedure Laterality Date   • D & C  6/22/2011   • D & C  2009   • D & C  10/12/2012   • DILATION/CU Tachycardia   Pulmonary/Chest: Effort normal. No respiratory distress. Abdominal: Soft. She exhibits no distension. There is no tenderness. Musculoskeletal: Normal range of motion. She exhibits no tenderness.    Neurological: She is alert and oriented MAGNESIUM - Normal   TROPONIN I - Normal   D-DIMER - Normal   T4, FREE (S) - Normal   CBC WITH DIFFERENTIAL WITH PLATELET   POCT PREGNANCY, URINE   CBC W/ DIFFERENTIAL     XR CHEST AP PORTABLE  (CPT=71045)   Final Result   FINDINGS:    Cardiac silhouette amount of intake has been limited d/t bypass. Patient was well until this morning when she was getting ready. She was walking down the stairs when she began to see dark spots and passed out, hitting her head. Patient got up, drove herself to the [address] [place] [address].  Quita Pertinent positives and negatives noted in the HPI.     Physical Exam:    BP (!) 134/108   Pulse (!) 121   Temp 98.3 °F (36.8 °C) (Temporal)   Resp 16   Ht 5' 6\" (1.676 m)   Wt 248 lb (112.5 kg)   LMP 11/27/2018   SpO2 100%   BMI 40.03 kg/m²    General: with patient, cardioliogist and ER. Orestes Blakely MD  1/2/2019    Cardiology Consultation  Temp:  [98.3 °F (36.8 °C)-98.5 °F (36.9 °C)] 98.3 °F (36.8 °C)  Pulse:  [103-128] 103  Resp:  [13-24] 13  BP: (106-134)/() 131/95    Impression:   1.  Syncop 5716 Given 400 mg Oral Ole Tim, RN      Potassium Chloride ER (K-DUR M20) CR tab 40 mEq     Date Action Dose Route User    1/2/2019 1429 Given 40 mEq Oral Angie Esposito, RN      Potassium Chloride ER (K-DUR M20) CR tab 40 mEq     Date Action Dose Rout

## 2019-01-03 NOTE — PLAN OF CARE
Assumed care at 1. Pt a/ox4. VSS. NSR per tele. O2 at 2l/nc in use. Prn ibuprofen given per order for right chest surgical pain w/ relief. Right CT to -20cm suction. Assisted to BR w/ SB assist.  Pt updated w/ POC. Will continue to monitor.   Quita

## 2019-01-03 NOTE — PLAN OF CARE
Assumed patient care at 0730. Vital signs stable   Patient reports some improvement with dizziness. Cha added this am   Patient unsteady and dizzy when ambulating hallway. Order for 1L bolus received. Will attempt ambulating miller again.    Plan of ca

## 2019-01-03 NOTE — PROGRESS NOTES
[address] [facility] Neurology Preliminary Note    Robbie Tapia Patient Status:  Observation    10/27/1990 MRN [id number]   [facility] 7NE-A Attending Vandana Garcia MD   Hosp Day # 0 PCP Benja Hernandez MD     REASON FOR CONSULTATION: • Panic    • PCOS (polycystic ovarian syndrome)    • PCOS (polycystic ovarian syndrome)    • Postsurgical hypothyroidism     In 2015: total thyroidectomy for benign goiter   • POTS (postural orthostatic tachycardia syndrome)    • Tachycardia    • Thyroid Medications:  Fludrocortisone Acetate 0.1 MG Oral Tab Take 1 tablet (0.1 mg total) by mouth daily. spironolactone 50 MG Oral Tab Take 0.5 tablets (25 mg total) by mouth daily.        Current Facility-Administered Medications:  [START ON 1/4/2019] Levothyr suggested. No restricted diffusion is seen to suggest acute ischemia. Stable confluent areas of FLAIR signal hyperintensity are noted within the white matter. ADDENDUM:      There is mild bowing of the superior aspect of the pituitary gland.   Mild d

## 2019-01-03 NOTE — PROGRESS NOTES
[facility] Cardiology Progress Note - Hannah Lutz Patient Status:  Observation    10/27/1990 MRN [id number]   [facility] 7NE-A Attending Mamadou Hennessy MD   Hosp Day # 0 PCP Christina Sellers MD     Subjective:  Less l sounds. Lungs: Clear without wheezes, rales, rhonchi or dullness. Normal excursions and effort. Abdomen: Soft, non-tender. No organosplenomegally, mass or rebound, BS-present. Extremities: Without clubbing, cyanosis or edema. Peripheral pulses are 2+. dysuria,   Musculoskeletal: no joint complaints upper or lower extremities  Neuro: no sensory or motor complaint  Psyche: no symptoms of depression or anxiety  Hematology: denies bruising or excessive bleeding  Endocrine: no history of diabetes  Allergy: s

## 2019-01-03 NOTE — PLAN OF CARE
Assumed care at [facility]. Pt a/ox4. VSS, NSR/ST per tele. RA. Tylenol prn given for c/o headache w/ relief. IVF infusing per order. Pt updated w/ POC. Will continue to monitor.   Patient/Family Goals    • Patient/Family Long Term Goal Progressing    • Xenia

## 2019-01-03 NOTE — PROGRESS NOTES
[facility]  Progress Note     Sharita Eckert Patient Status:  Observation    10/27/1990 MRN [id number]   [facility] 7NE-A Attending Ricardo Cook MD   Hosp Day # 0 PCP aDmaris Lares MD     Chief Complaint: Syncope    S: Patient s multivitamin  1 tablet Oral Daily   • [START ON 1/6/2019] ergocalciferol  50,000 Units Oral Q7 Days       ASSESSMENT / PLAN:     1.  Syncope with underlying POTS, suspect d/t dehydration resulting in orthostatic hypotension and tachycardia d/t intake/output

## 2019-01-03 NOTE — PROGRESS NOTES
01/03/19 1551 01/03/19 1552 01/03/19 1553   Vital Signs   Pulse 103 119 105   Resp 14 (!) 29 22   BP (!) 127/96 --  --    BP Location Right arm --  --    BP Method Automatic --  --    Patient Position Lying --  --        01/03/19 1554 01/03/19 1555   Vi

## 2019-01-04 ENCOUNTER — APPOINTMENT (OUTPATIENT)
Dept: MRI IMAGING | Facility: HOSPITAL | Age: 29
End: 2019-01-04
Attending: Other
Payer: MEDICAID

## 2019-01-04 VITALS
OXYGEN SATURATION: 98 % | HEART RATE: 95 BPM | RESPIRATION RATE: 16 BRPM | TEMPERATURE: 98 F | WEIGHT: 248 LBS | BODY MASS INDEX: 39.86 KG/M2 | HEIGHT: 66 IN | SYSTOLIC BLOOD PRESSURE: 131 MMHG | DIASTOLIC BLOOD PRESSURE: 82 MMHG

## 2019-01-04 LAB
HAV IGM SER QL: 1.9 MG/DL (ref 1.8–2.5)
POTASSIUM SERPL-SCNC: 3.6 MMOL/L (ref 3.6–5.1)

## 2019-01-04 PROCEDURE — 99226 SUBSEQUENT OBSERVATION CARE: CPT | Performed by: OTHER

## 2019-01-04 PROCEDURE — 70544 MR ANGIOGRAPHY HEAD W/O DYE: CPT | Performed by: OTHER

## 2019-01-04 PROCEDURE — 99217 OBSERVATION CARE DISCHARGE: CPT | Performed by: HOSPITALIST

## 2019-01-04 PROCEDURE — 70553 MRI BRAIN STEM W/O & W/DYE: CPT | Performed by: OTHER

## 2019-01-04 RX ORDER — METOPROLOL SUCCINATE 25 MG/1
TABLET, EXTENDED RELEASE ORAL
Qty: 30 TABLET | Refills: 3 | Status: SHIPPED | OUTPATIENT
Start: 2019-01-04 | End: 2019-05-30

## 2019-01-04 RX ORDER — POTASSIUM CHLORIDE 20 MEQ/1
40 TABLET, EXTENDED RELEASE ORAL EVERY 4 HOURS
Status: COMPLETED | OUTPATIENT
Start: 2019-01-04 | End: 2019-01-04

## 2019-01-04 NOTE — PLAN OF CARE
Assumed care at 0700. Patient A/O times 4. Patient's VSS, NSR/ST per tele, RA. Medications given per MAR. IVF infusing per order. Patient updated w/ POC. Plan to discharge this afternoon. Will continue to monitor.     Impaired Functional Mobilit

## 2019-01-04 NOTE — PROGRESS NOTES
NURSING DISCHARGE NOTE    Patient okay to discharge per all consults. Patient given and reviewed all discharge paperwork and orders with RN. All questions answered, patient verbalized understanding. Patient left with all her personal belongings. Signs a

## 2019-01-04 NOTE — PROGRESS NOTES
[facility] [address]  [place], [address]  ?  01/04/19  ? Re: Jaja Solis  ? To Whom It May Concern:    Jaja Solis was admitted to [facility] from 1/2/2019 to 01/04/19.     Please excuse Jaja Solis from attending wo

## 2019-01-04 NOTE — CONSULTS
[id number] [facility] Neurology Initial Consultation    Bailey Lyons Patient Status:  Observation    10/27/1990 MRN [id number]   [facility] 7NE-A Attending Joselito Leyva, [facility] Se Day # 0 PCP Cori Galeazzi, MD     [facility] MEDICAL HISTORY:  Past Medical History:   Diagnosis Date   • Anemia    • Bilateral chronic vestibular neuritis    • H/O complications due to general anesthesia     pt reports she had a hard time waking up from foot surgery   • Left foot pain    • Migraines grandfather; Thyroid disease in her maternal grandmother. SOCIAL HISTORY:   reports that  has never smoked. she has never used smokeless tobacco. She reports that she does not drink alcohol or use drugs.     ALLERGIES:  No Known Allergies    MEDICATIONS: No restricted diffusion is seen to suggest acute ischemia. Stable confluent areas of FLAIR signal hyperintensity are noted within the white matter. ADDENDUM:      There is mild bowing of the superior aspect of the pituitary gland.   Mild diffuse hetero

## 2019-01-04 NOTE — PROGRESS NOTES
01/04/19 0909 01/04/19 0911 01/04/19 0913   Vital Signs   BP (!) 130/91 (!) 130/92 (!) 131/91   BP Location Left arm Left arm Left arm   BP Method Automatic Automatic Automatic   Patient Position Lying Sitting Standing     Orthostatic blood pressure t

## 2019-01-04 NOTE — PLAN OF CARE
Assumed care at [address] [place] [address]. Pt a/ox4. VSS, NSR/ST per tele. RA. Fiorecet prn given for c/o headache w/ relief. IVF infusing per order. Pt updated w/ POC. MRI completed. Will continue to monitor.   Patient/Family Goals    • Patient/Family Long Term Goal Prog

## 2019-01-04 NOTE — PROGRESS NOTES
[facility] Cardiology Progress Note - Beulah Johansen Patient Status:  Observation    10/27/1990 MRN [id number]   [facility] 7NE-A Attending David Raymond MD   Hosp Day # 0 PCP Dora Yates MD     Subjective:  Gaurang Villar 24 hour   Intake 1680 ml   Output 2025 ml   Net -345 ml       Last 3 Weights  01/02/19 1126 : 248 lb (112.5 kg)  01/02/19 1048 : 248 lb (112.5 kg)  12/06/18 1115 : 220 lb (99.8 kg)      Tele: NSR    Physical Exam:    General: Alert and oriented x 3.  No gillian Oral Q4H PRN   multivitamin (ADULT) tab 1 tablet 1 tablet Oral Daily   [START ON 1/6/2019] ergocalciferol (DRISDOL/VITAMIN D2) cap 50,000 Units 50,000 Units Oral Q7 Days   acetaminophen (TYLENOL) tab 650 mg 650 mg Oral Q6H PRN       ROS:  General Health: o

## 2019-01-04 NOTE — PROGRESS NOTES
[facility] Neurology Progress Note    Get Higginbotham Patient Status:  Observation    10/27/1990 MRN [id number]   [facility] 7NE-A Attending Mamadou Hennessy MD   Hosp Day # 0 PCP Christina Sellers MD       Neurology Attending note 97.8 °F (36.6 °C), temperature source Oral, resp. rate 18, height 5' 6\" (1.676 m), weight 248 lb (112.5 kg), last menstrual period 11/27/2018, SpO2 96 %, not currently breastfeeding.     General: Well developed, well nourished   HEENT: Mucous membranes teddy of breast in first degree relative diagnosed when younger than 48years of age     Sinus tachycardia     Pituitary microadenoma (HCC)     POTS (postural orthostatic tachycardia syndrome)     Autonomic disorder     Postsurgical hypothyroidism     Tendonitis

## 2019-01-04 NOTE — PROGRESS NOTES
[facility]  Progress Note     Goldy Finn Patient Status:  Observation    10/27/1990 MRN [id number]   [facility] 7NE-A Attending Aissatou Mack MD   Hosp Day # 0 PCP Jeferson Trinidad MD     Chief Complaint: Syncope    S: Patient s Potassium Chloride ER  40 mEq Oral Q4H   • Levothyroxine Sodium  100 mcg Oral Before breakfast   • Fludrocortisone Acetate  0.1 mg Oral Daily   • multivitamin  1 tablet Oral Daily   • [START ON 1/6/2019] ergocalciferol  50,000 Units Oral Q7 Days       DOLORES

## 2019-01-04 NOTE — PHYSICAL THERAPY NOTE
PHYSICAL THERAPY QUICK EVALUATION - INPATIENT    Room Number: 6360/3159-C  Evaluation Date: 1/4/2019  Presenting Problem: syncope  Physician Order: PT Eval and Treat     Pt is 25year old female admitted on 1/2/2019 from home with syncopal event with fal we will wait for their eval on 6/13/16.   She has been cleared for dc to snf for rehab per all consultants       Past Surgical History  Past Surgical History:   Procedure Laterality Date   • D & C  6/22/2011   • D & C  2009   • D & C  10/12/2012   • Ora Salguero within functional limits     NEUROLOGICAL FINDINGS                      ACTIVITY TOLERANCE                         O2 WALK                  AM-PAC '6-Clicks' INPATIENT SHORT FORM - BASIC MOBILITY  How much difficulty does the patient currently have. ..  - impacting therapy include POTS, hospital admissions Oct 2017 and Feb 2018 for syncope, dizziness. Functional outcome measures completed include [facility].   Based on this evaluation, patient's clinical presentation is stable and overall evaluation complexity i

## 2019-01-04 NOTE — DISCHARGE SUMMARY
[facility] HOSPITAL[facility]  DISCHARGE SUMMARY     [place]av Peralta Patient Status:  Observation    10/27/1990 MRN [id number]   [facility] 7NE-A Attending Mae Smiley MD   Hosp Day # 0 PCP Miller Patel MD     Date of Admission: 2019  Date of Neuro consulted, MRI with chronic change. Low dose beta blockers added and diuretics lowered. Patient with clinical improvement, home today. Lace+ Score: 26  59-90 High Risk  29-58 Medium Risk  0-28   Low Risk.     TCM Follow-Up Recommendation:  LACE < 2 reviewed: RENEE    Follow-up appointment:   Naomy Yang MD  [address] [facility] [phone]    On 2/11/2019  at 2:30 pm for electrophysiology follow up.      MD Renee Gonzalez [facility] [address]

## 2019-01-05 LAB
ATRIAL RATE: 84 BPM
P AXIS: 34 DEGREES
P-R INTERVAL: 190 MS
Q-T INTERVAL: 396 MS
QRS DURATION: 94 MS
QTC CALCULATION (BEZET): 467 MS
R AXIS: 69 DEGREES
T AXIS: 42 DEGREES
VENTRICULAR RATE: 84 BPM

## 2019-01-07 ENCOUNTER — PATIENT OUTREACH (OUTPATIENT)
Dept: CASE MANAGEMENT | Age: 29
End: 2019-01-07

## 2019-01-08 ENCOUNTER — PATIENT MESSAGE (OUTPATIENT)
Dept: CASE MANAGEMENT | Age: 29
End: 2019-01-08

## 2019-01-15 ENCOUNTER — OFFICE VISIT (OUTPATIENT)
Dept: INTERNAL MEDICINE CLINIC | Facility: CLINIC | Age: 29
End: 2019-01-15
Payer: MEDICAID

## 2019-01-15 VITALS
RESPIRATION RATE: 18 BRPM | HEIGHT: 66 IN | TEMPERATURE: 98 F | BODY MASS INDEX: 39.86 KG/M2 | WEIGHT: 248 LBS | OXYGEN SATURATION: 99 %

## 2019-01-15 DIAGNOSIS — F41.9 ANXIETY: ICD-10-CM

## 2019-01-15 DIAGNOSIS — E03.9 HYPOTHYROIDISM, ADULT: ICD-10-CM

## 2019-01-15 DIAGNOSIS — I49.8 POTS (POSTURAL ORTHOSTATIC TACHYCARDIA SYNDROME): Primary | ICD-10-CM

## 2019-01-15 PROCEDURE — 99214 OFFICE O/P EST MOD 30 MIN: CPT | Performed by: NURSE PRACTITIONER

## 2019-01-15 RX ORDER — LEVOTHYROXINE SODIUM 0.1 MG/1
100 TABLET ORAL
Qty: 30 TABLET | Refills: 0 | Status: SHIPPED | OUTPATIENT
Start: 2019-01-15 | End: 2019-02-16

## 2019-01-15 RX ORDER — SERTRALINE HYDROCHLORIDE 25 MG/1
25 TABLET, FILM COATED ORAL DAILY
Qty: 30 TABLET | Refills: 0 | Status: SHIPPED | OUTPATIENT
Start: 2019-01-15 | End: 2019-02-25

## 2019-01-15 NOTE — PROGRESS NOTES
HPI:    Patient ID: Dali Joel is a 25year old female.     Patient presents with:  ER F/U: jan 2nd--pt fell due to dizziness, still dizzy and fatigued per pt  Other: refill levothyroxine    HPI  Patient was admitted to [facility] on Jan 2nd for POTS episo does not work on the weekend, we will wait for their eval on 6/13/16.   She has been cleared for dc to snf for rehab per all consultants     Past Surgical History:   Procedure Laterality Date   • D & C  6/22/2011   • D & C  2009   • D & C  10/12/2012   • DI History Narrative      Single, lives at home with her father, no children, works at [employer]         Current Outpatient Medications:  Levothyroxine Sodium 100 MCG Oral Tab Take 1 tablet (100 mcg total) by mouth before breakfast. Disp: 30 tablet Rfl: 0   Sertr 0.026 (L) 01/02/2019     Lab Results   Component Value Date    VITD 16.2 (L) 07/12/2018     Lab Results   Component Value Date    HIEU 34.3 07/12/2018     Lab Results   Component Value Date    FOLIC 23.8 86/32/2069     Lab Results   Component Value Date

## 2019-01-16 NOTE — PROGRESS NOTES
Multiple attempts to reach the pt and messages left with no returned phone call. Pt went to HFU with PCP on 1/15/19. Closing encounter.

## 2019-02-18 RX ORDER — LEVOTHYROXINE SODIUM 0.1 MG/1
TABLET ORAL
Qty: 30 TABLET | Refills: 0 | Status: SHIPPED | OUTPATIENT
Start: 2019-02-18 | End: 2019-02-22

## 2019-02-21 ENCOUNTER — LAB ENCOUNTER (OUTPATIENT)
Dept: LAB | Facility: HOSPITAL | Age: 29
End: 2019-02-21
Attending: NURSE PRACTITIONER
Payer: MEDICAID

## 2019-02-21 DIAGNOSIS — E03.9 HYPOTHYROIDISM, ADULT: ICD-10-CM

## 2019-02-21 LAB
T4 FREE SERPL-MCNC: 1.1 NG/DL (ref 0.8–1.7)
TSI SER-ACNC: 0.01 MIU/ML (ref 0.36–3.74)

## 2019-02-21 PROCEDURE — 84443 ASSAY THYROID STIM HORMONE: CPT

## 2019-02-21 PROCEDURE — 84439 ASSAY OF FREE THYROXINE: CPT

## 2019-02-21 PROCEDURE — 36415 COLL VENOUS BLD VENIPUNCTURE: CPT

## 2019-02-22 ENCOUNTER — TELEPHONE (OUTPATIENT)
Dept: INTERNAL MEDICINE CLINIC | Facility: CLINIC | Age: 29
End: 2019-02-22

## 2019-02-22 DIAGNOSIS — E89.0 POSTSURGICAL HYPOTHYROIDISM: Primary | ICD-10-CM

## 2019-02-22 RX ORDER — LEVOTHYROXINE SODIUM 88 UG/1
88 TABLET ORAL
Qty: 60 TABLET | Refills: 0 | Status: SHIPPED | OUTPATIENT
Start: 2019-02-22 | End: 2019-06-18

## 2019-02-22 NOTE — TELEPHONE ENCOUNTER
----- Message from CURTIS Sandoval sent at 2/22/2019  1:04 PM CST -----  Results reviewed.  Please inform patient hyperactive state, decrease levothyroxine to 88 mcg every morning 30 mins prior to any med or food, recheck 6 weeks

## 2019-02-25 ENCOUNTER — OFFICE VISIT (OUTPATIENT)
Dept: INTERNAL MEDICINE CLINIC | Facility: CLINIC | Age: 29
End: 2019-02-25
Payer: MEDICAID

## 2019-02-25 VITALS
HEART RATE: 90 BPM | WEIGHT: 240 LBS | DIASTOLIC BLOOD PRESSURE: 68 MMHG | TEMPERATURE: 99 F | SYSTOLIC BLOOD PRESSURE: 112 MMHG | RESPIRATION RATE: 18 BRPM | OXYGEN SATURATION: 97 % | HEIGHT: 66 IN | BODY MASS INDEX: 38.57 KG/M2

## 2019-02-25 DIAGNOSIS — F41.9 ANXIETY: ICD-10-CM

## 2019-02-25 DIAGNOSIS — R42 DIZZINESS: Primary | ICD-10-CM

## 2019-02-25 PROCEDURE — 99214 OFFICE O/P EST MOD 30 MIN: CPT | Performed by: NURSE PRACTITIONER

## 2019-02-25 RX ORDER — PHENTERMINE HYDROCHLORIDE 37.5 MG/1
37.5 CAPSULE ORAL EVERY MORNING
Qty: 30 CAPSULE | Refills: 0 | Status: SHIPPED | OUTPATIENT
Start: 2019-02-25 | End: 2019-04-23

## 2019-02-25 NOTE — PROGRESS NOTES
HPI:    Patient ID: Alfred Davis is a 25year old female. Patient presents with:  Dizziness  Weight Problem: had gastric sleeve surgery, not eating a lot but not losing weight per pt      Dizziness   This is a new problem.  The current episode started tachycardia syndrome)    • Tachycardia    • Thyroid nodule 12/15/2014   • 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   Sh Smoking status: Never Smoker      Smokeless tobacco: Never Used    Substance and Sexual Activity      Alcohol use: No        Alcohol/week: 0.0 oz      Drug use: No      Sexual activity: No    Other Topics      Concerns:        Caffeine Concern:  No WBC 5.9 01/02/2019    RBC 4.85 01/02/2019    HGB 14.6 01/02/2019    HCT 41.5 01/02/2019    MCV 85.6 01/02/2019    MCH 30.1 01/02/2019    MCHC 35.2 01/02/2019    RDW 13.0 01/02/2019    .0 01/02/2019    MPV 9.6 07/05/2018     Lab Results   Componen as if mood is stable, but having some anxiety issues still, increase dose of sertraline to 50 mg daily  -     Sertraline HCl 50 MG Oral Tab; Take 1 tablet (50 mg total) by mouth daily.     BMI 38.0-38.9,adult- discussed mod intensity exercising 150 mins lexi

## 2019-03-20 ENCOUNTER — OFFICE VISIT (OUTPATIENT)
Dept: INTERNAL MEDICINE CLINIC | Facility: CLINIC | Age: 29
End: 2019-03-20
Payer: MEDICAID

## 2019-03-20 ENCOUNTER — TELEPHONE (OUTPATIENT)
Dept: INTERNAL MEDICINE CLINIC | Facility: CLINIC | Age: 29
End: 2019-03-20

## 2019-03-20 VITALS
SYSTOLIC BLOOD PRESSURE: 122 MMHG | WEIGHT: 236 LBS | BODY MASS INDEX: 37.93 KG/M2 | HEIGHT: 66 IN | DIASTOLIC BLOOD PRESSURE: 84 MMHG | TEMPERATURE: 99 F | OXYGEN SATURATION: 97 % | RESPIRATION RATE: 18 BRPM | HEART RATE: 92 BPM

## 2019-03-20 DIAGNOSIS — M79.605 LEFT LEG PAIN: Primary | ICD-10-CM

## 2019-03-20 DIAGNOSIS — J00 ACUTE NASOPHARYNGITIS: ICD-10-CM

## 2019-03-20 PROCEDURE — 99213 OFFICE O/P EST LOW 20 MIN: CPT | Performed by: NURSE PRACTITIONER

## 2019-03-20 RX ORDER — CYCLOBENZAPRINE HCL 10 MG
10 TABLET ORAL 3 TIMES DAILY PRN
Qty: 21 TABLET | Refills: 0 | Status: SHIPPED | OUTPATIENT
Start: 2019-03-20 | End: 2019-05-24 | Stop reason: ALTCHOICE

## 2019-03-20 NOTE — PROGRESS NOTES
HPI:    Patient ID: Robbie Tapia is a 25year old female. URI    This is a new problem. The current episode started yesterday. The problem has been unchanged. There has been no fever.  Associated symptoms include congestion, coughing, headaches, rhino Tab Take 1 tablet (500 mg total) by mouth 2 (two) times daily as needed. Disp: 30 tablet Rfl: 0   Sertraline HCl 50 MG Oral Tab Take 1 tablet (50 mg total) by mouth daily.  Disp: 90 tablet Rfl: 1   Phentermine HCl 37.5 MG Oral Cap Take 1 capsule (37.5 mg to ASSESSMENT/PLAN:   Left leg pain  (primary encounter diagnosis)  -start flexeril 10mg PO TID for pain  -may make drowsiness, caution driving  -complete US to check for DVT  -notify us if worsens    Acute nasopharyngitis  -continue tylenol and motrin OTC

## 2019-03-20 NOTE — TELEPHONE ENCOUNTER
Patient's insurance has approved the US Doppler and she has been rescheduled for tomorrow at [address] [facility], [place], [place] location on [address] [facility] [address] [facility]    Patient aware and agreed with plan. Release to work date remains as before and no longer extended to the 28th.

## 2019-03-20 NOTE — TELEPHONE ENCOUNTER
Patient called and was seen by Hugo Villar, and was told to complete the [address] on Friday, but they had no opening, and is scheduled on Wednesday at 9 am. However, patient's work note states excused until 03/23/2019; so now, what does she do?

## 2019-03-20 NOTE — TELEPHONE ENCOUNTER
Due to insurance patient needs pre-approval.   Ok per Memo Palmer to extend work note until [address] is complete. Patient aware and will  new work note on Friday.

## 2019-03-20 NOTE — PROGRESS NOTES
Patient was seen and examined by me as well as APN student. Agree with assessment and plan.    YEHUDA Calhoun

## 2019-03-20 NOTE — PATIENT INSTRUCTIONS
Zyrtec, allegra or Claritin to help with right ear congestion  Use flexeril as needed for muscle aches if ibuprofen does not help pain  Elevate, ice, rest until US completed  Call to schedule Ultrasound ASAP  Thyroid test April 5th

## 2019-03-21 ENCOUNTER — HOSPITAL ENCOUNTER (OUTPATIENT)
Dept: ULTRASOUND IMAGING | Age: 29
Discharge: HOME | End: 2019-03-21
Attending: NURSE PRACTITIONER
Payer: MEDICAID

## 2019-03-21 DIAGNOSIS — M79.605 LEFT LEG PAIN: ICD-10-CM

## 2019-03-21 PROCEDURE — 93971 EXTREMITY STUDY: CPT | Performed by: NURSE PRACTITIONER

## 2019-03-25 ENCOUNTER — HOSPITAL ENCOUNTER (OUTPATIENT)
Dept: GENERAL RADIOLOGY | Age: 29
Discharge: HOME | End: 2019-03-25
Attending: NURSE PRACTITIONER
Payer: MEDICAID

## 2019-03-25 ENCOUNTER — OFFICE VISIT (OUTPATIENT)
Dept: INTERNAL MEDICINE CLINIC | Facility: CLINIC | Age: 29
End: 2019-03-25
Payer: MEDICAID

## 2019-03-25 VITALS
SYSTOLIC BLOOD PRESSURE: 128 MMHG | HEART RATE: 106 BPM | WEIGHT: 236 LBS | DIASTOLIC BLOOD PRESSURE: 82 MMHG | HEIGHT: 66 IN | RESPIRATION RATE: 18 BRPM | BODY MASS INDEX: 37.93 KG/M2 | OXYGEN SATURATION: 98 %

## 2019-03-25 DIAGNOSIS — M25.562 ACUTE PAIN OF LEFT KNEE: Primary | ICD-10-CM

## 2019-03-25 DIAGNOSIS — M25.562 ACUTE PAIN OF LEFT KNEE: ICD-10-CM

## 2019-03-25 PROCEDURE — 99213 OFFICE O/P EST LOW 20 MIN: CPT | Performed by: NURSE PRACTITIONER

## 2019-03-25 PROCEDURE — 73560 X-RAY EXAM OF KNEE 1 OR 2: CPT | Performed by: NURSE PRACTITIONER

## 2019-03-25 NOTE — PROGRESS NOTES
HPI:    Patient ID: Dali Joel is a 25year old female. Patient presents with:  Leg Pain: Ultra sound results, pt states pain is getting worse      Leg Pain    The pain is present in the left lower leg. This is a new problem.  The current episode st She has been cleared for dc to snf for rehab per all consultants     Past Surgical History:   Procedure Laterality Date   • D & C  6/22/2011   • D & C  2009   • D & C  10/12/2012   • DILATION/CURETTAGE,DIAGNOSTIC     • FOOT EXCISION MORTONS NEUROMA Left 12 Daily; 1 cup         Exercise: Yes          2- x a week        Seat Belt: Yes    Social History Narrative      Single, lives at home with her father, no children, works at [employer]         Current Outpatient Medications:  Cyclobenzaprine HCl 10 MG Oral Tab HCT 41.5 01/02/2019    MCV 85.6 01/02/2019    MCH 30.1 01/02/2019    MCHC 35.2 01/02/2019    RDW 13.0 01/02/2019    .0 01/02/2019    MPV 9.6 07/05/2018     Lab Results   Component Value Date    CHOLEST 170 02/27/2018    TRIG 56 02/27/2018    HDL elevation recommend until seen by ortho  -     XR KNEE (1 OR 2 VIEWS), LEFT (CPT=73560);  Future  -     OP REFERRAL TO [facility]  -     ORTHOPEDIC - INTERNAL    Monae Gian, APRN

## 2019-04-01 ENCOUNTER — TELEPHONE (OUTPATIENT)
Dept: INTERNAL MEDICINE CLINIC | Facility: CLINIC | Age: 29
End: 2019-04-01

## 2019-04-01 NOTE — TELEPHONE ENCOUNTER
Patient called and stated she went to the orthopedic specialist, and was prescribed a steroid, and now her knee is blue, and unable to extend it. Please advise.

## 2019-04-01 NOTE — TELEPHONE ENCOUNTER
Per Renato BLACKMAN pt to f/u with ortho regarding worsening symptoms. D/w pt above she expressed understanding.

## 2019-04-02 ENCOUNTER — TELEPHONE (OUTPATIENT)
Dept: INTERNAL MEDICINE CLINIC | Facility: CLINIC | Age: 29
End: 2019-04-02

## 2019-04-11 ENCOUNTER — OFFICE VISIT (OUTPATIENT)
Dept: INTERNAL MEDICINE CLINIC | Facility: CLINIC | Age: 29
End: 2019-04-11
Payer: MEDICAID

## 2019-04-11 ENCOUNTER — PATIENT MESSAGE (OUTPATIENT)
Dept: INTERNAL MEDICINE CLINIC | Facility: CLINIC | Age: 29
End: 2019-04-11

## 2019-04-11 VITALS
WEIGHT: 236 LBS | OXYGEN SATURATION: 98 % | HEIGHT: 66 IN | SYSTOLIC BLOOD PRESSURE: 130 MMHG | TEMPERATURE: 99 F | RESPIRATION RATE: 16 BRPM | HEART RATE: 74 BPM | DIASTOLIC BLOOD PRESSURE: 78 MMHG | BODY MASS INDEX: 37.93 KG/M2

## 2019-04-11 DIAGNOSIS — M25.562 ACUTE PAIN OF LEFT KNEE: Primary | ICD-10-CM

## 2019-04-11 PROCEDURE — 99213 OFFICE O/P EST LOW 20 MIN: CPT | Performed by: NURSE PRACTITIONER

## 2019-04-12 NOTE — PROGRESS NOTES
HPI:    Patient ID: Alis Nelson is a 25year old female. 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  She is HYSTEROSCOPY  6/22/2011   • LAP GASTRIC BYPASS/PAULINO-EN-Y  10/23/2018   • LAPAROSCOPY,DIAGNOSTIC      x3 r/t POS   • LAPAROSCOPY,PELVIC,BIOPSY  2008    drill ovary   • OTHER SURGICAL HISTORY Left     foot sx x3   • OTHER SURGICAL HISTORY  03/2012    operati Take 1 tablet (10 mg total) by mouth daily. Disp: 7 tablet Rfl: 0   Cyclobenzaprine HCl 10 MG Oral Tab Take 1 tablet (10 mg total) by mouth 3 (three) times daily as needed for Muscle spasms.  Caution may cause drowsiness Disp: 21 tablet Rfl: 0   naproxen 50 07/05/2018     Lab Results   Component Value Date    CHOLEST 170 02/27/2018    TRIG 56 02/27/2018    HDL 83 02/27/2018    LDL 76 02/27/2018    VLDL 7 02/02/2016    TCHDLRATIO 1.68 02/02/2016    NONHDLC 53 02/02/2016     Lab Results   Component Value Date further.          Shen Lake, APRN

## 2019-04-17 ENCOUNTER — TELEPHONE (OUTPATIENT)
Dept: INTERNAL MEDICINE CLINIC | Facility: CLINIC | Age: 29
End: 2019-04-17

## 2019-04-17 ENCOUNTER — MED REC SCAN ONLY (OUTPATIENT)
Dept: INTERNAL MEDICINE CLINIC | Facility: CLINIC | Age: 29
End: 2019-04-17

## 2019-04-17 ENCOUNTER — PATIENT MESSAGE (OUTPATIENT)
Dept: INTERNAL MEDICINE CLINIC | Facility: CLINIC | Age: 29
End: 2019-04-17

## 2019-04-17 DIAGNOSIS — M25.562 ACUTE PAIN OF LEFT KNEE: Primary | ICD-10-CM

## 2019-04-17 NOTE — TELEPHONE ENCOUNTER
Per patient,   The Mri results will be dropped off by someone either today or tomorrow. Also the doctor said i need knee cartilage replacement but my insurance probably wont cover it. So he wont even scope or graft anything.  So who do you recommend for me

## 2019-04-17 NOTE — TELEPHONE ENCOUNTER
From: Treva Doss  To: CURTIS العراقي  Sent: 4/17/2019 3:47 PM CDT  Subject: Non-Urgent Medical Question    magi,    Did you get the MRI results.

## 2019-04-17 NOTE — TELEPHONE ENCOUNTER
From: Stephania Tyler  To: CURTIS Escobar  Sent: 4/17/2019 7:32 AM CDT  Subject: Non-Urgent Medical Question    Hello,    The Mri results will be dropped off by someone either today or tomorrow.  Also the doctor said i need knee cartilage replacement

## 2019-04-18 ENCOUNTER — TELEPHONE (OUTPATIENT)
Dept: INTERNAL MEDICINE CLINIC | Facility: CLINIC | Age: 29
End: 2019-04-18

## 2019-04-18 ENCOUNTER — PATIENT MESSAGE (OUTPATIENT)
Dept: INTERNAL MEDICINE CLINIC | Facility: CLINIC | Age: 29
End: 2019-04-18

## 2019-04-18 NOTE — TELEPHONE ENCOUNTER
Relayed recommendations to patient and she verbalized understanding and agreed with plan. Patient will  forms and PT order tomorrow.

## 2019-04-18 NOTE — TELEPHONE ENCOUNTER
Dr. Henrik Roy has reviewed MRI report with [facility].  His recommendations are as follow:   -2 weeks off work is appropriate for [employer]  -this is not a surgical correction and recommends PT for strengthening, proper exercise mechanics and weight loss is best option for

## 2019-04-18 NOTE — TELEPHONE ENCOUNTER
From: Alis Nelson  To: CURTIS Pandey  Sent: 4/18/2019 2:55 PM CDT  Subject: Non-Urgent Medical Question    Just reach me by email through my chart please easier for me.

## 2019-04-23 ENCOUNTER — OFFICE VISIT (OUTPATIENT)
Dept: INTERNAL MEDICINE CLINIC | Facility: CLINIC | Age: 29
End: 2019-04-23
Payer: MEDICAID

## 2019-04-23 VITALS
WEIGHT: 243 LBS | HEART RATE: 90 BPM | DIASTOLIC BLOOD PRESSURE: 74 MMHG | HEIGHT: 66 IN | SYSTOLIC BLOOD PRESSURE: 128 MMHG | OXYGEN SATURATION: 98 % | BODY MASS INDEX: 39.05 KG/M2 | TEMPERATURE: 98 F | RESPIRATION RATE: 16 BRPM

## 2019-04-23 DIAGNOSIS — M25.562 ACUTE PAIN OF LEFT KNEE: Primary | ICD-10-CM

## 2019-04-23 PROCEDURE — 99213 OFFICE O/P EST LOW 20 MIN: CPT | Performed by: NURSE PRACTITIONER

## 2019-04-24 ENCOUNTER — APPOINTMENT (OUTPATIENT)
Dept: PHYSICAL THERAPY | Facility: HOSPITAL | Age: 29
End: 2019-04-24
Attending: INTERNAL MEDICINE
Payer: MEDICAID

## 2019-04-24 PROBLEM — M22.42 CHONDROMALACIA OF LEFT PATELLA: Status: ACTIVE | Noted: 2019-04-24

## 2019-04-24 NOTE — PROGRESS NOTES
HPI:    Patient ID: Cierra Pearson is a 25year old female.     Patient presents with:  Knee Pain: no improvement with pain, pt got a third opinion and  gave pt an extension of 4 weeks, pt requesting Phentermine script, pt requesting FMLA paperwork be ed • LAP GASTRIC BYPASS/PAULINO-EN-Y  10/23/2018   • LAPAROSCOPY,DIAGNOSTIC      x3 r/t POS   • LAPAROSCOPY,PELVIC,BIOPSY  2008    drill ovary   • OTHER SURGICAL HISTORY Left     foot sx x3   • OTHER SURGICAL HISTORY  03/2012    operative laparoscopy with elec Cyclobenzaprine HCl 10 MG Oral Tab Take 1 tablet (10 mg total) by mouth 3 (three) times daily as needed for Muscle spasms.  Caution may cause drowsiness Disp: 21 tablet Rfl: 0   naproxen 500 MG Oral Tab Take 1 tablet (500 mg total) by mouth 2 (two) times 07/12/2018     Lab Results   Component Value Date    T4F 1.1 02/21/2019    TSH 0.014 (L) 02/21/2019     Lab Results   Component Value Date    VITD 16.2 (L) 07/12/2018     Lab Results   Component Value Date    HIEU 34.3 07/12/2018     Lab Results   Component illnesses.      Follow up once seen by ortho again    Patient Instructions   My Fitness pal gillian to help with weight loss  Drink calorie free, no soda, no coconut water        CURTIS Brown

## 2019-04-26 ENCOUNTER — APPOINTMENT (OUTPATIENT)
Dept: PHYSICAL THERAPY | Facility: HOSPITAL | Age: 29
End: 2019-04-26
Attending: INTERNAL MEDICINE
Payer: MEDICAID

## 2019-05-01 ENCOUNTER — APPOINTMENT (OUTPATIENT)
Dept: PHYSICAL THERAPY | Facility: HOSPITAL | Age: 29
End: 2019-05-01
Attending: INTERNAL MEDICINE
Payer: MEDICAID

## 2019-05-03 ENCOUNTER — APPOINTMENT (OUTPATIENT)
Dept: PHYSICAL THERAPY | Facility: HOSPITAL | Age: 29
End: 2019-05-03
Attending: INTERNAL MEDICINE
Payer: MEDICAID

## 2019-05-03 ENCOUNTER — LAB ENCOUNTER (OUTPATIENT)
Dept: LAB | Age: 29
End: 2019-05-03
Attending: NURSE PRACTITIONER
Payer: MEDICAID

## 2019-05-03 ENCOUNTER — OFFICE VISIT (OUTPATIENT)
Dept: INTERNAL MEDICINE CLINIC | Facility: CLINIC | Age: 29
End: 2019-05-03
Payer: MEDICAID

## 2019-05-03 VITALS
WEIGHT: 238.63 LBS | RESPIRATION RATE: 16 BRPM | TEMPERATURE: 99 F | HEIGHT: 66 IN | BODY MASS INDEX: 38.35 KG/M2 | OXYGEN SATURATION: 98 %

## 2019-05-03 DIAGNOSIS — E03.9 HYPOTHYROIDISM, ADULT: ICD-10-CM

## 2019-05-03 DIAGNOSIS — E87.6 HYPOKALEMIA: ICD-10-CM

## 2019-05-03 DIAGNOSIS — E28.2 PCOS (POLYCYSTIC OVARIAN SYNDROME): ICD-10-CM

## 2019-05-03 DIAGNOSIS — M22.42 CHONDROMALACIA OF LEFT PATELLA: Primary | ICD-10-CM

## 2019-05-03 PROCEDURE — 36415 COLL VENOUS BLD VENIPUNCTURE: CPT

## 2019-05-03 PROCEDURE — 80048 BASIC METABOLIC PNL TOTAL CA: CPT

## 2019-05-03 PROCEDURE — 99214 OFFICE O/P EST MOD 30 MIN: CPT | Performed by: NURSE PRACTITIONER

## 2019-05-03 PROCEDURE — 84443 ASSAY THYROID STIM HORMONE: CPT

## 2019-05-03 PROCEDURE — 84439 ASSAY OF FREE THYROXINE: CPT

## 2019-05-03 RX ORDER — PHENTERMINE HYDROCHLORIDE 30 MG/1
30 CAPSULE ORAL EVERY MORNING
Qty: 30 CAPSULE | Refills: 0 | Status: SHIPPED | OUTPATIENT
Start: 2019-05-03 | End: 2019-05-06 | Stop reason: CLARIF

## 2019-05-03 NOTE — PROGRESS NOTES
HPI:    Patient ID: Mary Kay Solis is a 25year old female. Patient presents with:  Musculoskeletal Problem  Dizziness      Patient saw ortho who states patient may return to work with limitations of no squatting or using stairs.  She is completing phys 10/12/2012   • DILATION/CURETTAGE,DIAGNOSTIC     • FOOT EXCISION MORTONS NEUROMA Left 12/3/2013    Performed by Deja Felipe MD at [facility] MAIN OR   • HYSTEROSCOPY  6/22/2011   • LAP GASTRIC BYPASS/PAULINO-EN-Y  10/23/2018   • LAPAROSCOPY,DIAGNOSTIC      x3 r works at [employer]           Current Outpatient Medications:  Phentermine HCl 30 MG Oral Cap Take 1 capsule (30 mg total) by mouth every morning.  Disp: 30 capsule Rfl: 0   metFORMIN HCl 500 MG Oral Tab Take 1 tablet (500 mg total) by mouth 2 (two) times daily 85.6 01/02/2019    MCH 30.1 01/02/2019    MCHC 35.2 01/02/2019    RDW 13.0 01/02/2019    .0 01/02/2019    MPV 9.6 07/05/2018     Lab Results   Component Value Date    CHOLEST 170 02/27/2018    TRIG 56 02/27/2018    HDL 83 02/27/2018    LDL 76 02/27/ has a normal mood and affect.       05/03/19  1026 05/03/19  1032 05/03/19  1033   Ortho BP: 130/78 124/72 134/76   Patient Position: Sitting Standing Supine   BP Location: Right arm Right arm Right arm   Orthostatic Pulse: 114              ASSESSMENT/PLAN:

## 2019-05-06 ENCOUNTER — PATIENT MESSAGE (OUTPATIENT)
Dept: INTERNAL MEDICINE CLINIC | Facility: CLINIC | Age: 29
End: 2019-05-06

## 2019-05-06 DIAGNOSIS — Z98.84 S/P GASTRIC BYPASS: ICD-10-CM

## 2019-05-06 DIAGNOSIS — E66.9 OBESITY (BMI 35.0-39.9 WITHOUT COMORBIDITY): ICD-10-CM

## 2019-05-06 RX ORDER — PHENTERMINE HYDROCHLORIDE 37.5 MG/1
37.5 TABLET ORAL
Qty: 30 TABLET | Refills: 0 | Status: SHIPPED
Start: 2019-05-06 | End: 2019-05-26

## 2019-05-06 NOTE — TELEPHONE ENCOUNTER
Patient says the pharmacy told her the dosage for phentermine needs to be written for 37.5mg, rather than 30mg. Please call patient when new script is ready for pickup.

## 2019-05-07 NOTE — TELEPHONE ENCOUNTER
From: Janet Phillips RN  To: Sophy Temple  Sent: 5/6/2019 11:16 AM CDT  Subject: Lab results     Trenton Cheadle reviewed your labs results. Your thyroid function is normal please continue your current dose levothyroxine.  Your kidney function an

## 2019-05-08 ENCOUNTER — APPOINTMENT (OUTPATIENT)
Dept: PHYSICAL THERAPY | Facility: HOSPITAL | Age: 29
End: 2019-05-08
Attending: INTERNAL MEDICINE
Payer: MEDICAID

## 2019-05-10 ENCOUNTER — APPOINTMENT (OUTPATIENT)
Dept: PHYSICAL THERAPY | Facility: HOSPITAL | Age: 29
End: 2019-05-10
Attending: INTERNAL MEDICINE
Payer: MEDICAID

## 2019-05-15 ENCOUNTER — APPOINTMENT (OUTPATIENT)
Dept: PHYSICAL THERAPY | Facility: HOSPITAL | Age: 29
End: 2019-05-15
Attending: INTERNAL MEDICINE
Payer: MEDICAID

## 2019-05-17 ENCOUNTER — APPOINTMENT (OUTPATIENT)
Dept: PHYSICAL THERAPY | Facility: HOSPITAL | Age: 29
End: 2019-05-17
Attending: INTERNAL MEDICINE
Payer: MEDICAID

## 2019-05-24 ENCOUNTER — OFFICE VISIT (OUTPATIENT)
Dept: INTERNAL MEDICINE CLINIC | Facility: CLINIC | Age: 29
End: 2019-05-24
Payer: MEDICAID

## 2019-05-24 DIAGNOSIS — R42 DIZZINESS: ICD-10-CM

## 2019-05-24 DIAGNOSIS — F41.9 ANXIETY: ICD-10-CM

## 2019-05-24 DIAGNOSIS — R00.0 TACHYCARDIA: Primary | ICD-10-CM

## 2019-05-24 PROCEDURE — 93000 ELECTROCARDIOGRAM COMPLETE: CPT | Performed by: NURSE PRACTITIONER

## 2019-05-24 PROCEDURE — 99214 OFFICE O/P EST MOD 30 MIN: CPT | Performed by: NURSE PRACTITIONER

## 2019-05-24 RX ORDER — ESCITALOPRAM OXALATE 10 MG/1
10 TABLET ORAL DAILY
Qty: 30 TABLET | Refills: 0 | Status: SHIPPED | OUTPATIENT
Start: 2019-05-24 | End: 2019-06-27

## 2019-05-24 RX ORDER — METOPROLOL SUCCINATE 25 MG/1
12.5 TABLET, EXTENDED RELEASE ORAL DAILY
Qty: 30 TABLET | Refills: 0 | Status: SHIPPED | OUTPATIENT
Start: 2019-05-24 | End: 2019-05-28

## 2019-05-26 VITALS
WEIGHT: 237 LBS | DIASTOLIC BLOOD PRESSURE: 60 MMHG | TEMPERATURE: 98 F | RESPIRATION RATE: 16 BRPM | SYSTOLIC BLOOD PRESSURE: 104 MMHG | HEIGHT: 66 IN | HEART RATE: 137 BPM | BODY MASS INDEX: 38.09 KG/M2

## 2019-05-26 NOTE — PROGRESS NOTES
HPI:    Patient ID: Goldy Finn is a 25year old female. Patient presents with:  Dizziness: patient c/o dizziness started this morning, fast heartbeart      Presents for dizziness this started this morning.  She has a history of POTS syndrome was pre History:   Procedure Laterality Date   • D & C  6/22/2011   • D & C  2009   • D & C  10/12/2012   • DILATION/CURETTAGE,DIAGNOSTIC     • FOOT EXCISION MORTONS NEUROMA Left 12/3/2013    Performed by [facility]urka [facility]a [facility] at [facility] MAIN OR   • HYSTEROSCOPY  6/22/2 Yes    Social History Narrative      Single, lives at home with her father, no children, works at [employer]           Current Outpatient Medications:  Metoprolol Succinate ER 25 MG Oral Tablet 24 Hr Take 0.5 tablets (12.5 mg total) by mouth daily.  Disp: 30 tab Results   Component Value Date    T4F 1.0 05/03/2019    TSH 3.840 (H) 05/03/2019     Lab Results   Component Value Date    VITD 16.2 (L) 07/12/2018     Lab Results   Component Value Date    HIEU 34.3 07/12/2018     Lab Results   Component Value Date    FOLI

## 2019-05-28 ENCOUNTER — LAB ENCOUNTER (OUTPATIENT)
Dept: LAB | Age: 29
End: 2019-05-28
Attending: NURSE PRACTITIONER
Payer: MEDICAID

## 2019-05-28 ENCOUNTER — OFFICE VISIT (OUTPATIENT)
Dept: INTERNAL MEDICINE CLINIC | Facility: CLINIC | Age: 29
End: 2019-05-28
Payer: MEDICAID

## 2019-05-28 VITALS
WEIGHT: 237 LBS | BODY MASS INDEX: 38.09 KG/M2 | RESPIRATION RATE: 18 BRPM | HEART RATE: 122 BPM | DIASTOLIC BLOOD PRESSURE: 70 MMHG | TEMPERATURE: 98 F | OXYGEN SATURATION: 96 % | SYSTOLIC BLOOD PRESSURE: 116 MMHG | HEIGHT: 66 IN

## 2019-05-28 DIAGNOSIS — E03.9 HYPOTHYROIDISM, ADULT: ICD-10-CM

## 2019-05-28 DIAGNOSIS — E89.0 POSTSURGICAL HYPOTHYROIDISM: ICD-10-CM

## 2019-05-28 DIAGNOSIS — R42 DIZZINESS: Primary | ICD-10-CM

## 2019-05-28 DIAGNOSIS — R42 DIZZINESS: ICD-10-CM

## 2019-05-28 PROCEDURE — 80053 COMPREHEN METABOLIC PANEL: CPT

## 2019-05-28 PROCEDURE — 85025 COMPLETE CBC W/AUTO DIFF WBC: CPT

## 2019-05-28 PROCEDURE — 84443 ASSAY THYROID STIM HORMONE: CPT

## 2019-05-28 PROCEDURE — 99213 OFFICE O/P EST LOW 20 MIN: CPT | Performed by: NURSE PRACTITIONER

## 2019-05-28 PROCEDURE — 36415 COLL VENOUS BLD VENIPUNCTURE: CPT

## 2019-05-28 NOTE — PROGRESS NOTES
HPI:    Patient ID: Alis Nelson is a 25year old female. Patient presents with:  Dizziness: follow up-- dizziness, pt states no improvement      Patient reports HR is improved with taking metoprolol as directed but she is still feeling dizzy.  She is LAPAROSCOPY,DIAGNOSTIC      x3 r/t POS   • LAPAROSCOPY,PELVIC,BIOPSY  2008    drill ovary   • OTHER SURGICAL HISTORY Left     foot sx x3   • OTHER SURGICAL HISTORY  03/2012    operative laparoscopy with electrocautery of multiple ovarian cysts bilaterally. (two) times daily with meals.  Disp: 60 tablet Rfl: 0   Levothyroxine Sodium 88 MCG Oral Tab Take 1 tablet (88 mcg total) by mouth before breakfast. Repeat thyroid labs in 6 weeks Disp: 60 tablet Rfl: 0   Metoprolol Succinate ER 25 MG Oral Tablet 24 Hr 1/2 PHOS 2.2 (L) 01/11/2017     Lab Results   Component Value Date    MG 1.9 01/04/2019        PHYSICAL EXAM:   /70   Pulse (!) 122   Temp 97.9 °F (36.6 °C) (Oral)   Resp 18   Ht 66\"   Wt 237 lb   SpO2 96%   BMI 38.25 kg/m²   Physical Exam   Nursing

## 2019-05-30 ENCOUNTER — OFFICE VISIT (OUTPATIENT)
Dept: INTERNAL MEDICINE CLINIC | Facility: CLINIC | Age: 29
End: 2019-05-30
Payer: MEDICAID

## 2019-05-30 VITALS
DIASTOLIC BLOOD PRESSURE: 68 MMHG | HEIGHT: 66 IN | SYSTOLIC BLOOD PRESSURE: 124 MMHG | HEART RATE: 101 BPM | TEMPERATURE: 98 F | WEIGHT: 238 LBS | BODY MASS INDEX: 38.25 KG/M2 | RESPIRATION RATE: 16 BRPM | OXYGEN SATURATION: 98 %

## 2019-05-30 DIAGNOSIS — R00.0 TACHYCARDIA: Primary | ICD-10-CM

## 2019-05-30 DIAGNOSIS — R42 DIZZINESS: ICD-10-CM

## 2019-05-30 DIAGNOSIS — M25.562 CHRONIC PAIN OF LEFT KNEE: ICD-10-CM

## 2019-05-30 DIAGNOSIS — G89.29 CHRONIC PAIN OF LEFT KNEE: ICD-10-CM

## 2019-05-30 PROCEDURE — 99213 OFFICE O/P EST LOW 20 MIN: CPT | Performed by: NURSE PRACTITIONER

## 2019-05-30 RX ORDER — METOPROLOL SUCCINATE 25 MG/1
TABLET, EXTENDED RELEASE ORAL
Qty: 30 TABLET | Refills: 3 | COMMUNITY
Start: 2019-05-30 | End: 2019-08-01

## 2019-05-30 NOTE — PROGRESS NOTES
HPI:    Patient ID: Thea Venegas is a 25year old female. Patient presents with: Follow - Up: Pt states that she is not feeling better and that light is making her more dizzy       Patient has been taking metoprolol 25 mg yesterday and today.  Heart foot sx x3   • OTHER SURGICAL HISTORY  03/2012    operative laparoscopy with electrocautery of multiple ovarian cysts bilaterally.  it was lysis of a few pelvic adhesions   • OTHER SURGICAL HISTORY      LAPAROSCOPY, OPERATIVE , POSSIBLE LAPAROTOMY WITH/W 60 tablet Rfl: 0   Levothyroxine Sodium 88 MCG Oral Tab Take 1 tablet (88 mcg total) by mouth before breakfast. Repeat thyroid labs in 6 weeks Disp: 60 tablet Rfl: 0   ergocalciferol 57601 units Oral Cap Take 50,000 Units by mouth once a week.    Disp:  Rfl (BP Location: Right arm, Patient Position: Sitting, Cuff Size: adult)   Pulse 101   Temp 98 °F (36.7 °C) (Oral)   Resp 16   Ht 66\"   Wt 238 lb   LMP  (Approximate)   SpO2 98%   BMI 38.41 kg/m²   Physical Exam   Nursing note and vitals reviewed.    Constitu

## 2019-06-18 DIAGNOSIS — E89.0 POSTSURGICAL HYPOTHYROIDISM: ICD-10-CM

## 2019-06-19 RX ORDER — LEVOTHYROXINE SODIUM 88 UG/1
88 TABLET ORAL
Qty: 60 TABLET | Refills: 0 | Status: SHIPPED | OUTPATIENT
Start: 2019-06-19 | End: 2019-08-26

## 2019-06-26 ENCOUNTER — TELEPHONE (OUTPATIENT)
Dept: INTERNAL MEDICINE CLINIC | Facility: CLINIC | Age: 29
End: 2019-06-26

## 2019-06-26 DIAGNOSIS — M25.562 CHRONIC PAIN OF LEFT KNEE: Primary | ICD-10-CM

## 2019-06-26 DIAGNOSIS — G89.29 CHRONIC PAIN OF LEFT KNEE: Primary | ICD-10-CM

## 2019-06-26 NOTE — TELEPHONE ENCOUNTER
[facility] Clinical Staff Cc: [facility] Referral Pool   Phone Number: [phone]      Albertina Mccullough [[id number]]     . Reason for the order/referral:  REFERRAL REQUEST   PCP: [email or web address] A[facility] Lender Serve MD   Refer to Provider (first

## 2019-06-27 ENCOUNTER — OFFICE VISIT (OUTPATIENT)
Dept: INTERNAL MEDICINE CLINIC | Facility: CLINIC | Age: 29
End: 2019-06-27
Payer: MEDICAID

## 2019-06-27 VITALS
WEIGHT: 239 LBS | HEIGHT: 65 IN | BODY MASS INDEX: 39.82 KG/M2 | DIASTOLIC BLOOD PRESSURE: 70 MMHG | HEART RATE: 102 BPM | RESPIRATION RATE: 14 BRPM | SYSTOLIC BLOOD PRESSURE: 118 MMHG

## 2019-06-27 DIAGNOSIS — Z98.84 S/P GASTRIC BYPASS: ICD-10-CM

## 2019-06-27 DIAGNOSIS — E55.9 VITAMIN D DEFICIENCY: ICD-10-CM

## 2019-06-27 DIAGNOSIS — Z51.81 THERAPEUTIC DRUG MONITORING: Primary | ICD-10-CM

## 2019-06-27 DIAGNOSIS — E89.0 POSTSURGICAL HYPOTHYROIDISM: ICD-10-CM

## 2019-06-27 DIAGNOSIS — I49.8 POTS (POSTURAL ORTHOSTATIC TACHYCARDIA SYNDROME): ICD-10-CM

## 2019-06-27 DIAGNOSIS — E20.8 OTHER HYPOPARATHYROIDISM (HCC): ICD-10-CM

## 2019-06-27 DIAGNOSIS — Z98.890 H/O ABDOMINAL SURGERY: ICD-10-CM

## 2019-06-27 PROBLEM — G89.18 PAIN ASSOCIATED WITH SURGICAL PROCEDURE: Status: ACTIVE | Noted: 2018-07-30

## 2019-06-27 PROCEDURE — 99214 OFFICE O/P EST MOD 30 MIN: CPT | Performed by: NURSE PRACTITIONER

## 2019-06-27 NOTE — PROGRESS NOTES
HISTORY OF PRESENT ILLNESS  Patient presents with:  Weight Problem: Patient referred by Eric COBB, gastric sleve on 10/23/18 Dr Marzena Garrison from [facility]  started at 24 down to [address] [facility] is a 25year old female here for follow up with medical orthostatic tachycardia syndrome)   Diabetes: negative  Thyroid disease: thyroid removed 2013--> hypothyroid   MS ?   Renal disease: negative   Kidney stones: negative   Liver disease: negative  Joint-related conditions:negative  Migraines/seizures: +migrai 05/28/2019    AST 32 05/28/2019    ALT 25 05/28/2019    BILT 0.5 05/28/2019    TP 6.8 05/28/2019    ALB 3.3 (L) 05/28/2019    GLOBULIN 3.5 05/28/2019     05/28/2019    K 4.4 05/28/2019     05/28/2019    CO2 27.0 05/28/2019     Lab Results   Com Medication: 10/10, Surgery interest: 0/10. Counseled on comprehensive weight loss plan including attention to nutrition, exercise and behavior/stress management for success.  Discussed first goal of weight loss 5% in 3 months and 10% in 6 months    Abnor management  · Weight Loss Contract reviewed and signed today 6/27/2019    Labs ordered today: none    Patient Instructions   PLAN:  Follow up with me in 1 month  Schedule follow up appointments:  Dietitian/nutritionist      Please try to work on the follow mindfulness, meditation, clarity, sleep, and vandana to your daily life. Return in about 1 month (around 7/27/2019) for weight mangement. Patient verbalizes understanding.     CURTIS Golden

## 2019-06-27 NOTE — PATIENT INSTRUCTIONS
PLAN:  Follow up with me in 1-2 month  Schedule follow up appointments:  Dietitian/nutritionist      Please try to work on the following dietary changes:  1. Drink lots of water and cut down on soda/juice consumption if soda/juice drinker  2.  Eat breakfast

## 2019-07-03 ENCOUNTER — TELEPHONE (OUTPATIENT)
Dept: NEUROLOGY | Facility: CLINIC | Age: 29
End: 2019-07-03

## 2019-08-01 ENCOUNTER — OFFICE VISIT (OUTPATIENT)
Dept: INTERNAL MEDICINE CLINIC | Facility: CLINIC | Age: 29
End: 2019-08-01
Payer: MEDICAID

## 2019-08-01 VITALS
RESPIRATION RATE: 16 BRPM | BODY MASS INDEX: 38.89 KG/M2 | HEIGHT: 66 IN | SYSTOLIC BLOOD PRESSURE: 110 MMHG | HEART RATE: 98 BPM | WEIGHT: 242 LBS | TEMPERATURE: 98 F | DIASTOLIC BLOOD PRESSURE: 72 MMHG

## 2019-08-01 DIAGNOSIS — L68.0 HIRSUTISM: ICD-10-CM

## 2019-08-01 DIAGNOSIS — K59.00 CONSTIPATION, UNSPECIFIED CONSTIPATION TYPE: Primary | ICD-10-CM

## 2019-08-01 DIAGNOSIS — Z79.899 ENCOUNTER FOR LONG-TERM (CURRENT) USE OF HIGH-RISK MEDICATION: ICD-10-CM

## 2019-08-01 PROCEDURE — 99214 OFFICE O/P EST MOD 30 MIN: CPT | Performed by: NURSE PRACTITIONER

## 2019-08-01 RX ORDER — METOPROLOL SUCCINATE 25 MG/1
TABLET, EXTENDED RELEASE ORAL
Qty: 90 TABLET | Refills: 3 | Status: SHIPPED | OUTPATIENT
Start: 2019-08-01 | End: 2019-08-06

## 2019-08-01 RX ORDER — SPIRONOLACTONE 50 MG/1
50 TABLET, FILM COATED ORAL DAILY
Qty: 14 TABLET | Refills: 0 | Status: ON HOLD | OUTPATIENT
Start: 2019-08-01 | End: 2019-08-07

## 2019-08-01 NOTE — PROGRESS NOTES
HPI:    Patient ID: Stephania Tyler is a 25year old female. Patient presents with:  Constipation: started about 5 days ago, last bowel movement 5 days ago      Constipation   This is a new problem. The current episode started in the past 7 days.  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 Encounter for long-term (current) use of high-risk medication  -     Metoprolol Succinate ER 25 MG Oral Tablet 24 Hr; 1 tablet daily  -     BASIC METABOLIC PANEL (8);  Future    W

## 2019-08-05 ENCOUNTER — TELEPHONE (OUTPATIENT)
Dept: NEUROLOGY | Facility: CLINIC | Age: 29
End: 2019-08-05

## 2019-08-05 NOTE — TELEPHONE ENCOUNTER
S: Patient reports she was at grocery store last night and got very dizzy. Patient reports she fell and hit head on floor today due to dizziness. B: LOV 7/12/208 - Patient Canceled appointment for 7/3/2019.     Migraine without aura and without status mi

## 2019-08-05 NOTE — TELEPHONE ENCOUNTER
patient woke up this morning feeling dizzy. bump her head on the kitchen floor.   my pulse is 105  but my blood pressure is low 102/80 my pulse is usually around 130's   Appointment made for patient on Friday 8/9 at 9;40

## 2019-08-05 NOTE — TELEPHONE ENCOUNTER
Per discussion with Dr. Ilya Knox, patient needs to be seen in office (appointment scheduled for 8/9/2019), to go to ER due hitting her head and fluctuation in vital signs.     Called patient and left detailed message on verified phone number (Alma Darby per [employer]) a

## 2019-08-06 ENCOUNTER — HOSPITAL ENCOUNTER (OUTPATIENT)
Facility: HOSPITAL | Age: 29
Setting detail: OBSERVATION
Discharge: HOME | End: 2019-08-07
Attending: EMERGENCY MEDICINE | Admitting: HOSPITALIST
Payer: MEDICAID

## 2019-08-06 ENCOUNTER — APPOINTMENT (OUTPATIENT)
Dept: CT IMAGING | Facility: HOSPITAL | Age: 29
End: 2019-08-06
Attending: EMERGENCY MEDICINE
Payer: MEDICAID

## 2019-08-06 ENCOUNTER — APPOINTMENT (OUTPATIENT)
Dept: GENERAL RADIOLOGY | Facility: HOSPITAL | Age: 29
End: 2019-08-06
Attending: EMERGENCY MEDICINE
Payer: MEDICAID

## 2019-08-06 DIAGNOSIS — R55 SYNCOPE AND COLLAPSE: Primary | ICD-10-CM

## 2019-08-06 DIAGNOSIS — R42 DIZZINESS: ICD-10-CM

## 2019-08-06 LAB
ALBUMIN SERPL-MCNC: 3.3 G/DL (ref 3.4–5)
ALBUMIN/GLOB SERPL: 0.9 {RATIO} (ref 1–2)
ALP LIVER SERPL-CCNC: 116 U/L (ref 37–98)
ALT SERPL-CCNC: 27 U/L (ref 13–56)
ANION GAP SERPL CALC-SCNC: 6 MMOL/L (ref 0–18)
AST SERPL-CCNC: 41 U/L (ref 15–37)
ATRIAL RATE: 101 BPM
BASOPHILS # BLD AUTO: 0.03 X10(3) UL (ref 0–0.2)
BASOPHILS NFR BLD AUTO: 0.4 %
BILIRUB SERPL-MCNC: 0.8 MG/DL (ref 0.1–2)
BILIRUB UR QL STRIP.AUTO: NEGATIVE
BUN BLD-MCNC: 4 MG/DL (ref 7–18)
BUN/CREAT SERPL: 4.5 (ref 10–20)
CALCIUM BLD-MCNC: 8.2 MG/DL (ref 8.5–10.1)
CHLORIDE SERPL-SCNC: 111 MMOL/L (ref 98–112)
CO2 SERPL-SCNC: 22 MMOL/L (ref 21–32)
COLOR UR AUTO: YELLOW
CREAT BLD-MCNC: 0.89 MG/DL (ref 0.55–1.02)
D-DIMER: 0.52 UG/ML FEU (ref ?–0.5)
DEPRECATED RDW RBC AUTO: 41.7 FL (ref 35.1–46.3)
EOSINOPHIL # BLD AUTO: 0.06 X10(3) UL (ref 0–0.7)
EOSINOPHIL NFR BLD AUTO: 0.9 %
ERYTHROCYTE [DISTWIDTH] IN BLOOD BY AUTOMATED COUNT: 13.4 % (ref 11–15)
EXPIRATION DATE: NORMAL
GLOBULIN PLAS-MCNC: 3.8 G/DL (ref 2.8–4.4)
GLUCOSE BLD-MCNC: 90 MG/DL (ref 70–99)
GLUCOSE UR STRIP.AUTO-MCNC: NEGATIVE MG/DL
HCT VFR BLD AUTO: 43.7 % (ref 35–48)
HGB BLD-MCNC: 14.6 G/DL (ref 12–16)
IMM GRANULOCYTES # BLD AUTO: 0.01 X10(3) UL (ref 0–1)
IMM GRANULOCYTES NFR BLD: 0.1 %
KETONES UR STRIP.AUTO-MCNC: NEGATIVE MG/DL
LEUKOCYTE ESTERASE UR QL STRIP.AUTO: NEGATIVE
LYMPHOCYTES # BLD AUTO: 2.07 X10(3) UL (ref 1–4)
LYMPHOCYTES NFR BLD AUTO: 29.7 %
M PROTEIN MFR SERPL ELPH: 7.1 G/DL (ref 6.4–8.2)
MCH RBC QN AUTO: 29.1 PG (ref 26–34)
MCHC RBC AUTO-ENTMCNC: 33.4 G/DL (ref 31–37)
MCV RBC AUTO: 87.2 FL (ref 80–100)
MONOCYTES # BLD AUTO: 0.76 X10(3) UL (ref 0.1–1)
MONOCYTES NFR BLD AUTO: 10.9 %
NEUTROPHILS # BLD AUTO: 4.04 X10 (3) UL (ref 1.5–7.7)
NEUTROPHILS # BLD AUTO: 4.04 X10(3) UL (ref 1.5–7.7)
NEUTROPHILS NFR BLD AUTO: 58 %
NITRITE UR QL STRIP.AUTO: NEGATIVE
OSMOLALITY SERPL CALC.SUM OF ELEC: 284 MOSM/KG (ref 275–295)
P AXIS: 30 DEGREES
P-R INTERVAL: 162 MS
PH UR STRIP.AUTO: 8 [PH] (ref 4.5–8)
PLATELET # BLD AUTO: 220 10(3)UL (ref 150–450)
POCT URINE PREGNANCY: NEGATIVE
POTASSIUM SERPL-SCNC: 3.8 MMOL/L (ref 3.5–5.1)
PROT UR STRIP.AUTO-MCNC: NEGATIVE MG/DL
Q-T INTERVAL: 366 MS
QRS DURATION: 88 MS
QTC CALCULATION (BEZET): 474 MS
R AXIS: 76 DEGREES
RBC # BLD AUTO: 5.01 X10(6)UL (ref 3.8–5.3)
RBC UR QL AUTO: NEGATIVE
SODIUM SERPL-SCNC: 139 MMOL/L (ref 136–145)
SP GR UR STRIP.AUTO: 1.01 (ref 1–1.03)
T AXIS: 25 DEGREES
TROPONIN I SERPL-MCNC: <0.045 NG/ML (ref ?–0.04)
UROBILINOGEN UR STRIP.AUTO-MCNC: <2 MG/DL
VENTRICULAR RATE: 101 BPM
WBC # BLD AUTO: 7 X10(3) UL (ref 4–11)

## 2019-08-06 PROCEDURE — 99219 INITIAL OBSERVATION CARE,LEVL II: CPT | Performed by: HOSPITALIST

## 2019-08-06 PROCEDURE — 70450 CT HEAD/BRAIN W/O DYE: CPT | Performed by: EMERGENCY MEDICINE

## 2019-08-06 PROCEDURE — 71046 X-RAY EXAM CHEST 2 VIEWS: CPT | Performed by: EMERGENCY MEDICINE

## 2019-08-06 PROCEDURE — 71275 CT ANGIOGRAPHY CHEST: CPT | Performed by: EMERGENCY MEDICINE

## 2019-08-06 RX ORDER — BISACODYL 10 MG
10 SUPPOSITORY, RECTAL RECTAL
Status: DISCONTINUED | OUTPATIENT
Start: 2019-08-06 | End: 2019-08-07

## 2019-08-06 RX ORDER — METOPROLOL SUCCINATE 25 MG/1
25 TABLET, EXTENDED RELEASE ORAL DAILY
Status: DISCONTINUED | OUTPATIENT
Start: 2019-08-06 | End: 2019-08-07

## 2019-08-06 RX ORDER — ONDANSETRON 2 MG/ML
4 INJECTION INTRAMUSCULAR; INTRAVENOUS EVERY 6 HOURS PRN
Status: DISCONTINUED | OUTPATIENT
Start: 2019-08-06 | End: 2019-08-07

## 2019-08-06 RX ORDER — SPIRONOLACTONE 25 MG/1
50 TABLET ORAL DAILY
Status: DISCONTINUED | OUTPATIENT
Start: 2019-08-07 | End: 2019-08-07

## 2019-08-06 RX ORDER — ZOLPIDEM TARTRATE 5 MG/1
5 TABLET ORAL NIGHTLY PRN
Status: DISCONTINUED | OUTPATIENT
Start: 2019-08-06 | End: 2019-08-07

## 2019-08-06 RX ORDER — SODIUM CHLORIDE 9 MG/ML
INJECTION, SOLUTION INTRAVENOUS CONTINUOUS
Status: DISCONTINUED | OUTPATIENT
Start: 2019-08-06 | End: 2019-08-07

## 2019-08-06 RX ORDER — LORAZEPAM 2 MG/ML
0.5 INJECTION INTRAMUSCULAR ONCE
Status: COMPLETED | OUTPATIENT
Start: 2019-08-06 | End: 2019-08-06

## 2019-08-06 RX ORDER — ERGOCALCIFEROL 1.25 MG/1
50000 CAPSULE ORAL WEEKLY
Status: DISCONTINUED | OUTPATIENT
Start: 2019-08-11 | End: 2019-08-07

## 2019-08-06 RX ORDER — ACETAMINOPHEN 500 MG
1000 TABLET ORAL ONCE
Status: COMPLETED | OUTPATIENT
Start: 2019-08-06 | End: 2019-08-06

## 2019-08-06 RX ORDER — POLYETHYLENE GLYCOL 3350 17 G/17G
17 POWDER, FOR SOLUTION ORAL DAILY PRN
Status: DISCONTINUED | OUTPATIENT
Start: 2019-08-06 | End: 2019-08-07

## 2019-08-06 RX ORDER — METOCLOPRAMIDE HYDROCHLORIDE 5 MG/ML
10 INJECTION INTRAMUSCULAR; INTRAVENOUS EVERY 8 HOURS PRN
Status: DISCONTINUED | OUTPATIENT
Start: 2019-08-06 | End: 2019-08-07

## 2019-08-06 RX ORDER — DOCUSATE SODIUM 100 MG/1
100 CAPSULE, LIQUID FILLED ORAL 2 TIMES DAILY
Status: DISCONTINUED | OUTPATIENT
Start: 2019-08-06 | End: 2019-08-07

## 2019-08-06 RX ORDER — LEVOTHYROXINE SODIUM 88 UG/1
88 TABLET ORAL
Status: DISCONTINUED | OUTPATIENT
Start: 2019-08-07 | End: 2019-08-07

## 2019-08-06 RX ORDER — SODIUM PHOSPHATE, DIBASIC AND SODIUM PHOSPHATE, MONOBASIC 7; 19 G/133ML; G/133ML
1 ENEMA RECTAL ONCE AS NEEDED
Status: DISCONTINUED | OUTPATIENT
Start: 2019-08-06 | End: 2019-08-07

## 2019-08-06 RX ORDER — SODIUM CHLORIDE 9 MG/ML
INJECTION, SOLUTION INTRAVENOUS CONTINUOUS
Status: ACTIVE | OUTPATIENT
Start: 2019-08-06 | End: 2019-08-06

## 2019-08-06 RX ORDER — ACETAMINOPHEN 325 MG/1
650 TABLET ORAL EVERY 6 HOURS PRN
Status: DISCONTINUED | OUTPATIENT
Start: 2019-08-06 | End: 2019-08-07

## 2019-08-06 RX ORDER — MECLIZINE HYDROCHLORIDE 25 MG/1
25 TABLET ORAL ONCE
Status: COMPLETED | OUTPATIENT
Start: 2019-08-06 | End: 2019-08-06

## 2019-08-06 RX ORDER — FLUDROCORTISONE ACETATE 0.1 MG/1
0.1 TABLET ORAL 2 TIMES DAILY
Status: DISCONTINUED | OUTPATIENT
Start: 2019-08-06 | End: 2019-08-07

## 2019-08-06 RX ORDER — METOPROLOL SUCCINATE 25 MG/1
25 TABLET, EXTENDED RELEASE ORAL DAILY
COMMUNITY
End: 2019-10-24

## 2019-08-06 NOTE — ED NOTES
Round on pt. Updated on poc. No distress noted. Lights off in room. Updated on eta to CT.    Pt still c/o dizziness and headache

## 2019-08-06 NOTE — ED NOTES
Round on pt. C/o dizziness and headache. 600mls infused at this time. No distress noted.  Lights off in room

## 2019-08-06 NOTE — TELEPHONE ENCOUNTER
Pt called back, transferred from [facility] staff, however pt was not on phone when answered. Called pt back, she states that she went to [facility] where she was told the dizziness and fluctuating vital signs could be due to multiple reasons.  Pt states U

## 2019-08-06 NOTE — ED PROVIDER NOTES
Patient Seen in: [facility] Emergency Department    History   Patient presents with:  Dizziness (neurologic)  Fall (musculoskeletal, neurologic)    Stated Complaint: episode of dizziness, fell, no LOC, drove self here    HPI    39-year-old female wh 12/3/2013    Performed by Madonna Rodriguez MD at [address]   • HYSTEROSCOPY  6/22/2011   • LAP GASTRIC BYPASS/PAULINO-EN-Y  10/23/2018   • LAPAROSCOPY,DIAGNOSTIC      x3 r/t POS   • LAPAROSCOPY,PELVIC,BIOPSY  2008    drill ovary   • OTHER SURGICAL HISTORY L anicteric. Neck is supple. Lungs are clear to auscultation bilaterally.   Heart is regular rate and rhythm without murmur gallop or rub    Abdomen is soft nondistended nontender to deep palpation there is no rebound or guarding noted no hepatosplenomega following orders were created for panel order CBC WITH DIFFERENTIAL WITH PLATELET.   Procedure                               Abnormality         Status                     ---------                               -----------         ------ disease or esophagitis.     3. 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            Chest x-ray reveals:       FINDINGS:    LUNGS:  No focal consolidation.  Normal vascularity.  Stable mild elevation the right hemidiaphragm.   CARDIAC

## 2019-08-06 NOTE — ED INITIAL ASSESSMENT (HPI)
Reports she had an episode of feeling lightheaded, everything went black, fell and hit front of head on carpet but did not pass out. No n/v. Incident occurred around 0730. C/o mild h/a, denies neck or back pain.

## 2019-08-06 NOTE — ED NOTES
Report called to floor RN, pt updated on plan of care, provided crackers and water until she will be able to order.

## 2019-08-07 VITALS
RESPIRATION RATE: 19 BRPM | BODY MASS INDEX: 40.64 KG/M2 | OXYGEN SATURATION: 100 % | TEMPERATURE: 98 F | WEIGHT: 252.88 LBS | SYSTOLIC BLOOD PRESSURE: 107 MMHG | HEIGHT: 66 IN | DIASTOLIC BLOOD PRESSURE: 73 MMHG | HEART RATE: 70 BPM

## 2019-08-07 LAB
T4 FREE SERPL-MCNC: 0.5 NG/DL (ref 0.8–1.7)
TSI SER-ACNC: 31 MIU/ML (ref 0.36–3.74)

## 2019-08-07 PROCEDURE — 99217 OBSERVATION CARE DISCHARGE: CPT | Performed by: STUDENT IN AN ORGANIZED HEALTH CARE EDUCATION/TRAINING PROGRAM

## 2019-08-07 RX ORDER — FLUDROCORTISONE ACETATE 0.1 MG/1
0.1 TABLET ORAL 2 TIMES DAILY
Qty: 60 TABLET | Refills: 0 | Status: SHIPPED | OUTPATIENT
Start: 2019-08-07 | End: 2019-10-24

## 2019-08-07 NOTE — PROGRESS NOTES
08/06/19 1837 08/06/19 1839 08/06/19 1842   Vital Signs   /75 126/89 113/80   BP Location Left arm Left arm Left arm   BP Method Automatic Automatic Automatic   Patient Position Lying Sitting Standing

## 2019-08-07 NOTE — DISCHARGE SUMMARY
[facility] HOSPITAL[facility]  DISCHARGE SUMMARY     Treva Doss Patient Status:  Observation    10/27/1990 MRN [id number]   [facility] 7NE-A Attending Stacy Wilson MD   Hosp Day # 0 PCP Falco Srinivasan MD     Date of Admission: 2019  Date of Incidental or significant findings and recommendations (brief descriptions):  • no    Lab/Test results pending at Discharge:   · no    Consultants:  • Cardiology     Discharge Medication List:     Discharge Medications      START taking these medicatio Vital signs:  Temp:  [97.5 °F (36.4 °C)-98.6 °F (37 °C)] 97.7 °F (36.5 °C)  Pulse:  [70-95] 70  Resp:  [16-22] 19  BP: (107-146)/(73-93) 107/73    Physical Exam:    General: No acute distress.    Respiratory: Clear to auscultation bilater

## 2019-08-07 NOTE — PLAN OF CARE
Pt is alert and oriented x4, on room air, NSR on the monitor, pt denies any cardiovascular symptoms on the monitor. Pt is continent of B/B, pt up with SBA, call light is within reach. All needs met and will continue to monitor. Plan: Continue fluids . 9 strengthening/mobility  - Encourage toileting schedule  Outcome: Progressing     Problem: DISCHARGE PLANNING  Goal: Discharge to home or other facility with appropriate resources  Description  INTERVENTIONS:  - Identify barriers to discharge w/pt and careg

## 2019-08-07 NOTE — PLAN OF CARE
NURSING DISCHARGE NOTE    Discharged Home via Wheelchair. Accompanied by Family member  Belongings Taken by patient/family.     Received pt a/karri, KERON, NATHAN on tele at 0700  Pt educated on discharge instructions, medications, follow up appointments, and Problem: DISCHARGE PLANNING  Goal: Discharge to home or other facility with appropriate resources  Description  INTERVENTIONS:  - Identify barriers to discharge w/pt and caregiver  - Include patient/family/discharge partner in discharge Ihsan Tyson

## 2019-08-07 NOTE — CONSULTS
[facility] Cardiology  Consultation Note      Sophy Fly Patient Status:  Observation    10/27/1990 MRN [id number]   [facility] 7NE-A Attending Alison Marin MD   Hosp Day # 0 PCP Warren Riddle MD     Outpatient cardiologist regaining consciousness. Feels fine now. Orthostatics negative, HR remains <100bpm.  TSH 31 ECG SR nl intervals/repolarization.      Medications:    Current Facility-Administered Medications:  [START ON 8/11/2019] ergocalciferol (DRISDOL/VITAMIN D2) c their eval on 6/13/16.   She has been cleared for dc to snf for rehab per all consultants       Past Surgical History:   Procedure Laterality Date   • D & C  6/22/2011   • D & C  2009   • D & C  10/12/2012   • DILATION/CURETTAGE,DIAGNOSTIC     • FOOT EXCISI hematuria  Hematologic/lymphatic: negative for bleeding  Musculoskeletal:negative for myalgias  Neurological: negative for dizziness and headaches  Endocrine: negative for temperature intolerance      Physical Exam:  Blood pressure 107/73, pulse 70, temper 08/06/2019    T4F 0.5 08/07/2019    TSH 31.000 08/07/2019    DDIMER 0.52 08/06/2019    TROP <0.045 08/06/2019         Thank you for allowing our practice to participate in the care of your patient.  Please do not hesitate to contact me if you have any quest

## 2019-08-07 NOTE — H&P
[facility]  History and Physical     Galehenry Conley Patient Status:  Observation    10/27/1990 MRN [id number]   [facility] 7NE-A Attending Nena Obrien MD   Hosp Day # 0 PCP Minerva Haley MD     Chief Complaint: syncope    His LAPAROSCOPY,PELVIC,BIOPSY  2008    drill ovary   • OTHER SURGICAL HISTORY Left     foot sx x3   • OTHER SURGICAL HISTORY  03/2012    operative laparoscopy with electrocautery of multiple ovarian cysts bilaterally.  it was lysis of a few pelvic adhesions   • in the HPI. Physical Exam:    /80 (BP Location: Left arm)   Pulse 90   Temp 98.6 °F (37 °C) (Oral)   Resp 20   Ht 167.6 cm (5' 6\")   Wt 242 lb 1.6 oz (109.8 kg)   LMP 11/06/2018 (Approximate)   SpO2 100%   Breastfeeding?  No   BMI 39.08 kg/m²   Ge not bypass 10/2018    Quality:  · DVT Prophylaxis: scd  · CODE status: full  · Mcrae: no    Plan of care discussed with patient and ED physician    Ellyn Dunham MD  8/6/2019

## 2019-08-07 NOTE — PLAN OF CARE
Pt admitted to [facility]. Pt is A/O x4. KERON. NSR on tele. VSS. Oriented to the room. Admission navigator completed. Call light within reach.

## 2019-08-17 ENCOUNTER — TELEPHONE (OUTPATIENT)
Dept: INTERNAL MEDICINE CLINIC | Facility: CLINIC | Age: 29
End: 2019-08-17

## 2019-08-17 NOTE — TELEPHONE ENCOUNTER
Called and left detailed message for patient letting her know that Dietitian services are not covered under Medicaid insurance plans. Left estimated cost of initial appt. Encouraged patient to contact department to confirm and/or cancel appointment.

## 2019-08-21 ENCOUNTER — OFFICE VISIT (OUTPATIENT)
Dept: NEUROLOGY | Facility: CLINIC | Age: 29
End: 2019-08-21
Payer: MEDICAID

## 2019-08-21 VITALS
WEIGHT: 244 LBS | DIASTOLIC BLOOD PRESSURE: 94 MMHG | SYSTOLIC BLOOD PRESSURE: 132 MMHG | HEART RATE: 92 BPM | RESPIRATION RATE: 16 BRPM | BODY MASS INDEX: 39 KG/M2

## 2019-08-21 DIAGNOSIS — R55 NEAR SYNCOPE: ICD-10-CM

## 2019-08-21 DIAGNOSIS — I49.8 POTS (POSTURAL ORTHOSTATIC TACHYCARDIA SYNDROME): Primary | ICD-10-CM

## 2019-08-21 PROCEDURE — 99213 OFFICE O/P EST LOW 20 MIN: CPT | Performed by: OTHER

## 2019-08-21 RX ORDER — ESCITALOPRAM OXALATE 10 MG/1
10 TABLET ORAL DAILY
Qty: 30 TABLET | Refills: 2 | Status: SHIPPED | OUTPATIENT
Start: 2019-08-21 | End: 2019-10-29

## 2019-08-21 NOTE — PROGRESS NOTES
Pt here for migraine follow up, and to discuss two recent episodes of vision going black followed by a fall. See in ED on 8/6/19.

## 2019-08-22 NOTE — PROGRESS NOTES
HPI:    Patient ID: Treva Doss is a 25year old female. Migraine    Associated symptoms include dizziness. Neurologic Problem   The patient's pertinent negatives include no syncope. Associated symptoms include dizziness and light-headedness.  Pert x3 r/t POS   • LAPAROSCOPY,PELVIC,BIOPSY  2008    drill ovary   • OTHER SURGICAL HISTORY Left     foot sx x3   • OTHER SURGICAL HISTORY  03/2012    operative laparoscopy with electrocautery of multiple ovarian cysts bilaterally.  it was lysis of a few Oral Tab Take 1 tablet (88 mcg total) by mouth before breakfast. Repeat thyroid labs in 6 weeks Disp: 60 tablet Rfl: 0   ergocalciferol 37709 units Oral Cap Take 50,000 Units by mouth once a week.    Disp:  Rfl:      Allergies:No Known Allergies   PHYSICAL Gregorio Kebede      Follow up with me in 3-6 months. See orders and medications filed with this encounter. The patient indicates understanding of these issues and agrees with the plan. No orders of the defined types were placed in this encounter.

## 2019-08-26 DIAGNOSIS — E89.0 POSTSURGICAL HYPOTHYROIDISM: ICD-10-CM

## 2019-08-26 RX ORDER — LEVOTHYROXINE SODIUM 88 UG/1
TABLET ORAL
Qty: 30 TABLET | Refills: 0 | Status: SHIPPED | OUTPATIENT
Start: 2019-08-26 | End: 2019-10-16

## 2019-08-30 ENCOUNTER — OFFICE VISIT (OUTPATIENT)
Dept: INTERNAL MEDICINE CLINIC | Facility: CLINIC | Age: 29
End: 2019-08-30
Payer: MEDICAID

## 2019-08-30 VITALS
HEIGHT: 66 IN | RESPIRATION RATE: 18 BRPM | DIASTOLIC BLOOD PRESSURE: 74 MMHG | OXYGEN SATURATION: 99 % | HEART RATE: 86 BPM | WEIGHT: 244 LBS | SYSTOLIC BLOOD PRESSURE: 118 MMHG | TEMPERATURE: 98 F | BODY MASS INDEX: 39.21 KG/M2

## 2019-08-30 DIAGNOSIS — G47.00 INSOMNIA, UNSPECIFIED TYPE: ICD-10-CM

## 2019-08-30 DIAGNOSIS — E89.0 POSTSURGICAL HYPOTHYROIDISM: ICD-10-CM

## 2019-08-30 DIAGNOSIS — R42 DIZZINESS: ICD-10-CM

## 2019-08-30 DIAGNOSIS — R55 SYNCOPE, UNSPECIFIED SYNCOPE TYPE: Primary | ICD-10-CM

## 2019-08-30 DIAGNOSIS — R51.9 HEADACHE IN BACK OF HEAD: ICD-10-CM

## 2019-08-30 PROCEDURE — 99214 OFFICE O/P EST MOD 30 MIN: CPT | Performed by: NURSE PRACTITIONER

## 2019-08-30 RX ORDER — PREDNISONE 20 MG/1
20 TABLET ORAL DAILY
Qty: 5 TABLET | Refills: 0 | Status: SHIPPED | OUTPATIENT
Start: 2019-08-30 | End: 2019-09-04

## 2019-08-30 RX ORDER — TRAZODONE HYDROCHLORIDE 50 MG/1
50 TABLET ORAL NIGHTLY PRN
Qty: 30 TABLET | Refills: 0 | Status: SHIPPED | OUTPATIENT
Start: 2019-08-30 | End: 2019-09-30

## 2019-08-30 RX ORDER — NAPROXEN 500 MG/1
500 TABLET ORAL 2 TIMES DAILY WITH MEALS
Qty: 28 TABLET | Refills: 0 | Status: SHIPPED | OUTPATIENT
Start: 2019-08-30 | End: 2019-09-30

## 2019-09-01 NOTE — PROGRESS NOTES
HPI:    Patient ID: Taylor Rico is a 25year old female.     Patient presents with:  Headache: headache, weakness  Head Injury: pt was making tea and vision went black, fell on head, seen at [facility]  Sleep Problem: difficulty sleeping due to headache, to • Migraines    • Obesity, unspecified    • Panic    • PCOS (polycystic ovarian syndrome)    • PCOS (polycystic ovarian syndrome)    • Postsurgical hypothyroidism     In 2015: total thyroidectomy for benign goiter   • POTS (postural orthostatic tachycardi Stroke Neg      Social History    Socioeconomic History      Marital status:       Spouse name: Not on file      Number of children: Not on file      Years of education: Not on file      Highest education level: Not on file    Tobacco Use      Smoki OSMOCALC 284 08/06/2019    ALKPHO 116 (H) 08/06/2019    AST 41 (H) 08/06/2019    ALT 27 08/06/2019    BILT 0.8 08/06/2019    TP 7.1 08/06/2019    ALB 3.3 (L) 08/06/2019    GLOBULIN 3.8 08/06/2019     08/06/2019    K 3.8 08/06/2019     08/06/ normal heart sounds and intact distal pulses. No murmur heard. Edema not present. Carotid bruit not present. Pulmonary/Chest: Effort normal and breath sounds normal. No respiratory distress. She has no wheezes. She has no rales. Abdominal: Soft.  Gilmer

## 2019-09-30 ENCOUNTER — OFFICE VISIT (OUTPATIENT)
Dept: INTERNAL MEDICINE CLINIC | Facility: CLINIC | Age: 29
End: 2019-09-30
Payer: MEDICAID

## 2019-09-30 VITALS
SYSTOLIC BLOOD PRESSURE: 108 MMHG | DIASTOLIC BLOOD PRESSURE: 62 MMHG | HEART RATE: 98 BPM | RESPIRATION RATE: 16 BRPM | BODY MASS INDEX: 38.73 KG/M2 | HEIGHT: 66 IN | WEIGHT: 241 LBS | TEMPERATURE: 98 F

## 2019-09-30 DIAGNOSIS — E55.9 VITAMIN D DEFICIENCY: ICD-10-CM

## 2019-09-30 DIAGNOSIS — J01.10 ACUTE NON-RECURRENT FRONTAL SINUSITIS: Primary | ICD-10-CM

## 2019-09-30 PROBLEM — R94.31 PROLONGED Q-T INTERVAL ON ECG: Status: RESOLVED | Noted: 2019-01-02 | Resolved: 2019-09-30

## 2019-09-30 PROBLEM — M22.42 CHONDROMALACIA OF LEFT PATELLA: Status: RESOLVED | Noted: 2019-04-24 | Resolved: 2019-09-30

## 2019-09-30 PROBLEM — M77.50 TENDONITIS OF FOOT: Status: RESOLVED | Noted: 2018-04-27 | Resolved: 2019-09-30

## 2019-09-30 PROBLEM — G89.18 PAIN ASSOCIATED WITH SURGICAL PROCEDURE: Status: RESOLVED | Noted: 2018-07-30 | Resolved: 2019-09-30

## 2019-09-30 PROCEDURE — 99213 OFFICE O/P EST LOW 20 MIN: CPT | Performed by: NURSE PRACTITIONER

## 2019-09-30 RX ORDER — AZITHROMYCIN 250 MG/1
TABLET, FILM COATED ORAL
Qty: 6 TABLET | Refills: 0 | Status: SHIPPED | OUTPATIENT
Start: 2019-09-30 | End: 2019-10-16

## 2019-09-30 NOTE — PATIENT INSTRUCTIONS
Prenatal DHA  Start claritin once daily  Use huyen to help with nausea  BRAT diet for diarrhea  Take antibiotic with food  Increase water intake

## 2019-09-30 NOTE — PROGRESS NOTES
HPI:    Patient ID: Christa Fothergill is a 25year old female. Patient presents with:  Sinusitis: started thursday, sinus pressure, nausea, chills, fever      Sinusitis   This is a new problem. The current episode started in the past 7 days.  The problem i 12/3/2013    Performed by Malcom Mcintyre MD at [address]   • HYSTEROSCOPY  6/22/2011   • LAP GASTRIC BYPASS/PAULINO-EN-Y  10/23/2018   • LAPAROSCOPY,DIAGNOSTIC      x3 r/t POS   • LAPAROSCOPY,PELVIC,BIOPSY  2008    drill ovary   • OTHER SURGICAL HISTORY L Medications:  azithromycin (ZITHROMAX Z-MAN) 250 MG Oral Tab Take two tablets by mouth today, then one tablet daily. Disp: 6 tablet Rfl: 0   LEVOTHYROXINE SODIUM 88 MCG Oral Tab TAKE 1 TABLET(88 MCG) BY MOUTH BEFORE BREAKFAST.  REPEAT THYROID LABS IN 6 WEEK Date    VITD 16.2 (L) 07/12/2018     Lab Results   Component Value Date    HIEU 34.3 07/12/2018     Lab Results   Component Value Date    FOLIC 98.0 72/93/7762     Lab Results   Component Value Date    IRON 126 07/12/2018     Lab Results   Component Value D deficiency  -     VITAMIN D, 25-HYDROXY; Future    -Wants influenza vaccine next visit  -Discussed switching to zoloft once becoming pregnant  -No high dose Vit D once pregnant  -She has not been taking trazodone  -Gyne and cards to decide about metoprolol

## 2019-10-16 ENCOUNTER — OFFICE VISIT (OUTPATIENT)
Dept: INTERNAL MEDICINE CLINIC | Facility: CLINIC | Age: 29
End: 2019-10-16
Payer: MEDICAID

## 2019-10-16 VITALS
BODY MASS INDEX: 38.73 KG/M2 | HEART RATE: 106 BPM | TEMPERATURE: 98 F | OXYGEN SATURATION: 97 % | SYSTOLIC BLOOD PRESSURE: 132 MMHG | RESPIRATION RATE: 18 BRPM | WEIGHT: 241 LBS | DIASTOLIC BLOOD PRESSURE: 76 MMHG | HEIGHT: 66 IN

## 2019-10-16 DIAGNOSIS — J32.9 RECURRENT SINUSITIS: Primary | ICD-10-CM

## 2019-10-16 PROCEDURE — 99213 OFFICE O/P EST LOW 20 MIN: CPT | Performed by: NURSE PRACTITIONER

## 2019-10-16 RX ORDER — METHYLPREDNISOLONE 4 MG/1
TABLET ORAL
Qty: 1 KIT | Refills: 0 | Status: SHIPPED | OUTPATIENT
Start: 2019-10-16 | End: 2019-10-23

## 2019-10-16 RX ORDER — LEVOTHYROXINE SODIUM 0.1 MG/1
TABLET ORAL
Refills: 0 | COMMUNITY
Start: 2019-10-06 | End: 2019-10-24 | Stop reason: DRUGHIGH

## 2019-10-16 RX ORDER — AMOXICILLIN AND CLAVULANATE POTASSIUM 875; 125 MG/1; MG/1
1 TABLET, FILM COATED ORAL 2 TIMES DAILY
Qty: 20 TABLET | Refills: 0 | Status: SHIPPED | OUTPATIENT
Start: 2019-10-16 | End: 2019-10-23

## 2019-10-16 NOTE — PATIENT INSTRUCTIONS
Drink at least 60 oz of water a day  Use Blistex for dry lips  Flonase 1 spray each nostril twice a day

## 2019-10-16 NOTE — PROGRESS NOTES
HPI:    Patient ID: Alis Nelson is a 25year old female.     Patient presents with:  Cough: cough, sinus congestion, runny nose, ear pain, headache x4-5 days      Patient was seen on 9/30 for same symptoms, minor improvement with zpak but all symptoms h 6/13/16.   She has been cleared for dc to snf for rehab per all consultants     Past Surgical History:   Procedure Laterality Date   • D & C  6/22/2011   • D & C  2009   • D & C  10/12/2012   • DILATION/CURETTAGE,DIAGNOSTIC     • FOOT EXCISION MORTONS NEURO Caffeine Concern: Yes          Daily; 1 cup coffee        Exercise: Yes          2- x a week- walks 30min         Seat Belt: Yes    Social History Narrative      Single, lives at home with her father, no children, works at [employer]       Levothyroxine Sodium TCHDLRATIO 1.68 02/02/2016    NONHDLC 53 02/02/2016     Lab Results   Component Value Date    EAG 91 07/12/2018    A1C 4.8 07/12/2018     Lab Results   Component Value Date    T4F 0.5 (L) 08/07/2019    TSH 31.000 (H) 08/07/2019     Lab Results   Component Neurological: She is alert and oriented to person, place, and time. Skin: Skin is warm and dry. Psychiatric: She has a normal mood and affect.             ASSESSMENT/PLAN:   Diagnoses and all orders for this visit:    Recurrent sinusitis  -     Amoxic

## 2019-10-20 ENCOUNTER — DIAGNOSTIC TRANS (OUTPATIENT)
Dept: OTHER | Age: 29
End: 2019-10-20

## 2019-10-20 ENCOUNTER — HOSPITAL (OUTPATIENT)
Dept: OTHER | Age: 29
End: 2019-10-20

## 2019-10-20 LAB
ANALYZER ANC (IANC): NORMAL
ANION GAP SERPL CALC-SCNC: 10 MMOL/L (ref 10–20)
BASOPHILS # BLD: 0 K/MCL (ref 0–0.3)
BASOPHILS NFR BLD: 1 %
BUN SERPL-MCNC: 7 MG/DL (ref 6–20)
BUN/CREAT SERPL: 8 (ref 7–25)
CALCIUM SERPL-MCNC: 7.9 MG/DL (ref 8.4–10.2)
CHLORIDE SERPL-SCNC: 115 MMOL/L (ref 98–107)
CO2 SERPL-SCNC: 23 MMOL/L (ref 21–32)
CREAT SERPL-MCNC: 0.87 MG/DL (ref 0.51–0.95)
DIFFERENTIAL METHOD BLD: NORMAL
EOSINOPHIL # BLD: 0.1 K/MCL (ref 0.1–0.5)
EOSINOPHIL NFR BLD: 1 %
ERYTHROCYTE [DISTWIDTH] IN BLOOD: 13.6 % (ref 11–15)
GLUCOSE SERPL-MCNC: 89 MG/DL (ref 65–99)
HCG POINT OF CARE (5HGRST): NEGATIVE
HCT VFR BLD CALC: 39.8 % (ref 36–46.5)
HGB BLD-MCNC: 13.3 G/DL (ref 12–15.5)
IMM GRANULOCYTES # BLD AUTO: 0 K/MCL (ref 0–0.2)
IMM GRANULOCYTES NFR BLD: 0 %
LYMPHOCYTES # BLD: 2.1 K/MCL (ref 1–4.8)
LYMPHOCYTES NFR BLD: 32 %
MCH RBC QN AUTO: 29 PG (ref 26–34)
MCHC RBC AUTO-ENTMCNC: 33.4 G/DL (ref 32–36.5)
MCV RBC AUTO: 86.9 FL (ref 78–100)
MONOCYTES # BLD: 0.8 K/MCL (ref 0.3–0.9)
MONOCYTES NFR BLD: 12 %
NEUTROPHILS # BLD: 3.6 K/MCL (ref 1.8–7.7)
NEUTROPHILS NFR BLD: 54 %
NEUTS SEG NFR BLD: NORMAL %
NRBC (NRBCRE): 0 /100 WBC
NT-PROBNP SERPL-MCNC: 23 PG/ML
PLATELET # BLD: 226 K/MCL (ref 140–450)
POTASSIUM SERPL-SCNC: 3.7 MMOL/L (ref 3.4–5.1)
POTASSIUM SERPL-SCNC: ABNORMAL MMOL/L
RBC # BLD: 4.58 MIL/MCL (ref 4–5.2)
SODIUM SERPL-SCNC: 144 MMOL/L (ref 135–145)
TROPONIN I SERPL HS-MCNC: <0.02 NG/ML
TSH SERPL-ACNC: 1.99 MCUNITS/ML (ref 0.35–5)
WBC # BLD: 6.6 K/MCL (ref 4.2–11)

## 2019-10-20 PROCEDURE — 99219 INITIAL OBSERVATION CARE,LEVL II: CPT | Performed by: INTERNAL MEDICINE

## 2019-10-21 ENCOUNTER — DIAGNOSTIC TRANS (OUTPATIENT)
Dept: OTHER | Age: 29
End: 2019-10-21

## 2019-10-21 LAB — MAGNESIUM SERPL-MCNC: 2 MG/DL (ref 1.7–2.4)

## 2019-10-21 PROCEDURE — 93306 TTE W/DOPPLER COMPLETE: CPT | Performed by: INTERNAL MEDICINE

## 2019-10-21 PROCEDURE — 99214 OFFICE O/P EST MOD 30 MIN: CPT | Performed by: INTERNAL MEDICINE

## 2019-10-21 PROCEDURE — X1094 NO CHARGE VISIT: HCPCS | Performed by: INTERNAL MEDICINE

## 2019-10-23 ENCOUNTER — OFFICE VISIT (OUTPATIENT)
Dept: INTERNAL MEDICINE CLINIC | Facility: CLINIC | Age: 29
End: 2019-10-23
Payer: MEDICAID

## 2019-10-23 ENCOUNTER — LAB ENCOUNTER (OUTPATIENT)
Dept: LAB | Age: 29
End: 2019-10-23
Attending: NURSE PRACTITIONER
Payer: MEDICAID

## 2019-10-23 VITALS
WEIGHT: 241 LBS | SYSTOLIC BLOOD PRESSURE: 128 MMHG | HEIGHT: 66 IN | DIASTOLIC BLOOD PRESSURE: 72 MMHG | BODY MASS INDEX: 38.73 KG/M2 | HEART RATE: 78 BPM | TEMPERATURE: 99 F | RESPIRATION RATE: 16 BRPM | OXYGEN SATURATION: 97 %

## 2019-10-23 DIAGNOSIS — R03.0 ELEVATED BLOOD PRESSURE, SITUATIONAL: Primary | ICD-10-CM

## 2019-10-23 DIAGNOSIS — E89.0 POSTSURGICAL HYPOTHYROIDISM: ICD-10-CM

## 2019-10-23 DIAGNOSIS — R10.84 GENERALIZED ABDOMINAL PAIN: ICD-10-CM

## 2019-10-23 DIAGNOSIS — F41.9 ANXIETY: ICD-10-CM

## 2019-10-23 DIAGNOSIS — E55.9 VITAMIN D DEFICIENCY: ICD-10-CM

## 2019-10-23 DIAGNOSIS — Z79.899 ENCOUNTER FOR LONG-TERM (CURRENT) USE OF HIGH-RISK MEDICATION: ICD-10-CM

## 2019-10-23 DIAGNOSIS — R42 DIZZINESS: ICD-10-CM

## 2019-10-23 DIAGNOSIS — R14.0 ABDOMINAL DISTENTION: ICD-10-CM

## 2019-10-23 PROCEDURE — 80053 COMPREHEN METABOLIC PANEL: CPT

## 2019-10-23 PROCEDURE — 1111F DSCHRG MED/CURRENT MED MERGE: CPT | Performed by: NURSE PRACTITIONER

## 2019-10-23 PROCEDURE — 36415 COLL VENOUS BLD VENIPUNCTURE: CPT

## 2019-10-23 PROCEDURE — 84443 ASSAY THYROID STIM HORMONE: CPT

## 2019-10-23 PROCEDURE — 82306 VITAMIN D 25 HYDROXY: CPT

## 2019-10-23 PROCEDURE — 99214 OFFICE O/P EST MOD 30 MIN: CPT | Performed by: NURSE PRACTITIONER

## 2019-10-23 PROCEDURE — 84439 ASSAY OF FREE THYROXINE: CPT

## 2019-10-23 RX ORDER — ALPRAZOLAM 0.25 MG/1
0.25 TABLET ORAL NIGHTLY PRN
Qty: 30 TABLET | Refills: 0 | Status: SHIPPED | OUTPATIENT
Start: 2019-10-23 | End: 2020-02-12

## 2019-10-23 NOTE — PROGRESS NOTES
HPI:    Patient ID: Jarett Jorgensen is a 25year old female.     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   She has been cleared for dc t Smoker      Smokeless tobacco: Never Used    Substance and Sexual Activity      Alcohol use: No        Alcohol/week: 0.0 standard drinks      Drug use: No      Sexual activity: Never    Other Topics      Concerns:        Caffeine Concern: Yes          Avi CHOLEST 170 02/27/2018    TRIG 56 02/27/2018    HDL 83 02/27/2018    LDL 76 02/27/2018    VLDL 7 02/02/2016    TCHDLRATIO 1.68 02/02/2016    NONHDLC 53 02/02/2016     Lab Results   Component Value Date    EAG 91 07/12/2018    A1C 4.8 07/12/2018     Lab Res distention- discussed gas forming food  -     US ABDOMEN CO MPLETE (CPT=76370); Future    Generalized abdominal pain  -     US ABDOMEN COMPLETE (CPT=76020);  Future    Anxiety- use prn for symptoms of anxiety, no driving while taking medication  -     RM

## 2019-10-24 ENCOUNTER — TELEPHONE (OUTPATIENT)
Dept: INTERNAL MEDICINE CLINIC | Facility: CLINIC | Age: 29
End: 2019-10-24

## 2019-10-24 DIAGNOSIS — E05.90 HYPERTHYROIDISM: Primary | ICD-10-CM

## 2019-10-24 DIAGNOSIS — E55.9 VITAMIN D DEFICIENCY: ICD-10-CM

## 2019-10-24 NOTE — TELEPHONE ENCOUNTER
----- Message from CURTIS Ellington sent at 10/23/2019  9:11 PM CDT -----  Results reviewed. Please inform patient kidney, liver, electrolytes are stable.  TSH is almost WNL, increase 112 mcg daily, recheck level in 6 weeks  Vit D level low, start 50,00

## 2019-10-25 RX ORDER — LEVOTHYROXINE SODIUM 112 UG/1
112 TABLET ORAL
Qty: 30 TABLET | Refills: 1 | Status: SHIPPED | OUTPATIENT
Start: 2019-10-25 | End: 2019-11-05

## 2019-10-25 RX ORDER — ERGOCALCIFEROL 1.25 MG/1
50000 CAPSULE ORAL WEEKLY
Qty: 12 CAPSULE | Refills: 0 | Status: SHIPPED | OUTPATIENT
Start: 2019-10-25 | End: 2020-07-28

## 2019-10-29 ENCOUNTER — OFFICE VISIT (OUTPATIENT)
Dept: INTERNAL MEDICINE CLINIC | Facility: CLINIC | Age: 29
End: 2019-10-29
Payer: MEDICAID

## 2019-10-29 VITALS
HEART RATE: 100 BPM | TEMPERATURE: 98 F | SYSTOLIC BLOOD PRESSURE: 128 MMHG | HEIGHT: 66 IN | DIASTOLIC BLOOD PRESSURE: 78 MMHG | WEIGHT: 247 LBS | BODY MASS INDEX: 39.7 KG/M2 | RESPIRATION RATE: 16 BRPM

## 2019-10-29 DIAGNOSIS — R42 DIZZINESS: Primary | ICD-10-CM

## 2019-10-29 PROCEDURE — 99213 OFFICE O/P EST LOW 20 MIN: CPT | Performed by: NURSE PRACTITIONER

## 2019-10-29 RX ORDER — TOPIRAMATE 50 MG/1
50 TABLET, FILM COATED ORAL DAILY PRN
Qty: 30 TABLET | Refills: 0 | COMMUNITY
Start: 2019-10-29 | End: 2019-12-04

## 2019-10-29 NOTE — PATIENT INSTRUCTIONS
Make sure to be taking medications as directed  Drink and eat at regular intervals  Manage stress levels  Talk to Dr. Christopher Watson regarding restarting lexapro

## 2019-10-30 NOTE — PROGRESS NOTES
HPI:    Patient ID: Valery Thibodeaux is a 30year old female. Patient presents with: Tachycardia: dizziness      Reports heart racing, dizziness, vision darkness over the last 3 days. She reports she has been taking medications as directed.  She reports 6/22/2011   • LAP GASTRIC BYPASS/PAULINO-EN-Y  10/23/2018   • LAPAROSCOPY,DIAGNOSTIC      x3 r/t POS   • LAPAROSCOPY,PELVIC,BIOPSY  2008    drill ovary   • OTHER SURGICAL HISTORY Left     foot sx x3   • OTHER SURGICAL HISTORY  03/2012    operative laparoscopy times daily. , Disp: 30 tablet, Rfl: 0  topiramate (TOPAMAX) 50 MG Oral Tab, Take 1 tablet (50 mg total) by mouth daily. , Disp: 30 tablet, Rfl: 0  Levothyroxine Sodium 112 MCG Oral Tab, Take 1 tablet (112 mcg total) by mouth before breakfast. Recheck lab on Value Date    FOLIC 85.6 47/95/3647     Lab Results   Component Value Date    IRON 126 07/12/2018     Lab Results   Component Value Date    B12 510 07/12/2018     Lab Results   Component Value Date    PHOS 2.2 (L) 01/11/2017     Lab Results   Component Mariama

## 2019-11-01 ENCOUNTER — HOSPITAL ENCOUNTER (OUTPATIENT)
Age: 29
Discharge: HOME | End: 2019-11-01
Attending: EMERGENCY MEDICINE
Payer: MEDICAID

## 2019-11-01 VITALS
SYSTOLIC BLOOD PRESSURE: 116 MMHG | BODY MASS INDEX: 38.57 KG/M2 | OXYGEN SATURATION: 100 % | HEART RATE: 88 BPM | DIASTOLIC BLOOD PRESSURE: 64 MMHG | HEIGHT: 66 IN | WEIGHT: 240 LBS | RESPIRATION RATE: 18 BRPM | TEMPERATURE: 98 F

## 2019-11-01 DIAGNOSIS — G43.811 OTHER MIGRAINE WITH STATUS MIGRAINOSUS, INTRACTABLE: ICD-10-CM

## 2019-11-01 DIAGNOSIS — R42 DIZZINESS: Primary | ICD-10-CM

## 2019-11-01 DIAGNOSIS — R55 SYNCOPE AND COLLAPSE: ICD-10-CM

## 2019-11-01 PROCEDURE — 99214 OFFICE O/P EST MOD 30 MIN: CPT

## 2019-11-01 PROCEDURE — 85025 COMPLETE CBC W/AUTO DIFF WBC: CPT | Performed by: EMERGENCY MEDICINE

## 2019-11-01 PROCEDURE — 99213 OFFICE O/P EST LOW 20 MIN: CPT

## 2019-11-01 PROCEDURE — 36415 COLL VENOUS BLD VENIPUNCTURE: CPT

## 2019-11-01 PROCEDURE — 81025 URINE PREGNANCY TEST: CPT | Performed by: EMERGENCY MEDICINE

## 2019-11-01 PROCEDURE — 80047 BASIC METABLC PNL IONIZED CA: CPT

## 2019-11-01 PROCEDURE — 81002 URINALYSIS NONAUTO W/O SCOPE: CPT | Performed by: EMERGENCY MEDICINE

## 2019-11-01 RX ORDER — MECLIZINE HYDROCHLORIDE 25 MG/1
25 TABLET ORAL 3 TIMES DAILY PRN
Qty: 20 TABLET | Refills: 0 | Status: SHIPPED | OUTPATIENT
Start: 2019-11-01 | End: 2020-03-02

## 2019-11-01 RX ORDER — BUTALBITAL, ACETAMINOPHEN AND CAFFEINE 50; 325; 40 MG/1; MG/1; MG/1
1-2 TABLET ORAL EVERY 6 HOURS PRN
Qty: 10 TABLET | Refills: 0 | Status: SHIPPED | OUTPATIENT
Start: 2019-11-01 | End: 2019-11-08

## 2019-11-01 RX ORDER — METOCLOPRAMIDE HYDROCHLORIDE 5 MG/ML
10 INJECTION INTRAMUSCULAR; INTRAVENOUS ONCE
Status: DISCONTINUED | OUTPATIENT
Start: 2019-11-01 | End: 2019-11-01

## 2019-11-01 RX ORDER — DIPHENHYDRAMINE HYDROCHLORIDE 50 MG/ML
50 INJECTION INTRAMUSCULAR; INTRAVENOUS ONCE
Status: DISCONTINUED | OUTPATIENT
Start: 2019-11-01 | End: 2019-11-01

## 2019-11-01 NOTE — ED PROVIDER NOTES
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              ED Course     Labs Reviewed   POCT CBC - Abnormal; Notable for the following components:       Result Value    # Mixed Cells 1.1 (*)     All other components within normal limits   POCT URIN medications    Butalbital-APAP-Caffeine -40 MG Oral Tab  Take 1-2 tablets by mouth every 6 (six) hours as needed for Pain.   Qty: 10 tablet Refills: 0    Meclizine HCl 25 MG Oral Tab  Take 1 tablet (25 mg total) by mouth 3 (three) times daily as neede

## 2019-11-01 NOTE — ED INITIAL ASSESSMENT (HPI)
The patient is here for evaluation of dizziness, elevated BP, and a headache x 5-7 days. She has been seen on 10/23 and on 10/29 but symptoms have not resolved. She denies any nausea, vomiting, or other symptoms.   She states she has fallen down 3 times i

## 2019-11-05 ENCOUNTER — TELEPHONE (OUTPATIENT)
Dept: NEUROLOGY | Facility: CLINIC | Age: 29
End: 2019-11-05

## 2019-11-05 ENCOUNTER — HOSPITAL ENCOUNTER (EMERGENCY)
Facility: HOSPITAL | Age: 29
Discharge: HOME | End: 2019-11-05
Attending: EMERGENCY MEDICINE
Payer: MEDICAID

## 2019-11-05 ENCOUNTER — OFFICE VISIT (OUTPATIENT)
Dept: NEUROLOGY | Facility: CLINIC | Age: 29
End: 2019-11-05
Payer: MEDICAID

## 2019-11-05 VITALS
RESPIRATION RATE: 18 BRPM | WEIGHT: 242 LBS | BODY MASS INDEX: 38.89 KG/M2 | DIASTOLIC BLOOD PRESSURE: 78 MMHG | OXYGEN SATURATION: 99 % | SYSTOLIC BLOOD PRESSURE: 106 MMHG | TEMPERATURE: 98 F | HEART RATE: 75 BPM | HEIGHT: 66 IN

## 2019-11-05 VITALS — WEIGHT: 245 LBS | BODY MASS INDEX: 40 KG/M2

## 2019-11-05 DIAGNOSIS — R55 SYNCOPE AND COLLAPSE: ICD-10-CM

## 2019-11-05 DIAGNOSIS — R42 DIZZINESS: Primary | ICD-10-CM

## 2019-11-05 DIAGNOSIS — E03.9 HYPOTHYROIDISM, UNSPECIFIED TYPE: ICD-10-CM

## 2019-11-05 DIAGNOSIS — I49.8 POTS (POSTURAL ORTHOSTATIC TACHYCARDIA SYNDROME): ICD-10-CM

## 2019-11-05 DIAGNOSIS — G43.009 MIGRAINE WITHOUT AURA AND WITHOUT STATUS MIGRAINOSUS, NOT INTRACTABLE: ICD-10-CM

## 2019-11-05 DIAGNOSIS — R55 NEAR SYNCOPE: ICD-10-CM

## 2019-11-05 PROCEDURE — 84443 ASSAY THYROID STIM HORMONE: CPT | Performed by: EMERGENCY MEDICINE

## 2019-11-05 PROCEDURE — 84439 ASSAY OF FREE THYROXINE: CPT | Performed by: EMERGENCY MEDICINE

## 2019-11-05 PROCEDURE — 82962 GLUCOSE BLOOD TEST: CPT

## 2019-11-05 PROCEDURE — 81025 URINE PREGNANCY TEST: CPT

## 2019-11-05 PROCEDURE — 99285 EMERGENCY DEPT VISIT HI MDM: CPT

## 2019-11-05 PROCEDURE — 93010 ELECTROCARDIOGRAM REPORT: CPT

## 2019-11-05 PROCEDURE — 96360 HYDRATION IV INFUSION INIT: CPT

## 2019-11-05 PROCEDURE — 85025 COMPLETE CBC W/AUTO DIFF WBC: CPT | Performed by: EMERGENCY MEDICINE

## 2019-11-05 PROCEDURE — 80053 COMPREHEN METABOLIC PANEL: CPT | Performed by: EMERGENCY MEDICINE

## 2019-11-05 PROCEDURE — 84484 ASSAY OF TROPONIN QUANT: CPT | Performed by: EMERGENCY MEDICINE

## 2019-11-05 PROCEDURE — 93005 ELECTROCARDIOGRAM TRACING: CPT

## 2019-11-05 PROCEDURE — 99213 OFFICE O/P EST LOW 20 MIN: CPT | Performed by: OTHER

## 2019-11-05 PROCEDURE — 99284 EMERGENCY DEPT VISIT MOD MDM: CPT

## 2019-11-05 RX ORDER — LEVOTHYROXINE SODIUM 0.12 MG/1
125 TABLET ORAL
Qty: 90 TABLET | Refills: 0 | Status: SHIPPED | OUTPATIENT
Start: 2019-11-05 | End: 2019-11-19

## 2019-11-05 RX ORDER — PREDNISONE 20 MG/1
20 TABLET ORAL DAILY
COMMUNITY
End: 2019-11-16

## 2019-11-05 NOTE — PROGRESS NOTES
Pt following up for dizziness and falls. Pt states she has fallen 3 times in the last 4 days. Pt states she blacks out when she falls. Pt states she has bumped her head one time.

## 2019-11-05 NOTE — PROGRESS NOTES
Patient states when in supine position for orthostatics she felt as if she was spinning. Patient states slight dizziness while sitting. When moved to the standing position the patient was not able to stand by herself.  Had patient sit for 1-2 minutes with f

## 2019-11-05 NOTE — PROGRESS NOTES
HPI:    Patient ID: Goldy Finn is a 30year old female. Goldy Finn is a 30year old female who presented with c/o dizziness and near syncope. States was in the urgent care over the weekend and got IV fluids and felt better.  States she is tryin foot sx x3   • OTHER SURGICAL HISTORY  03/2012    operative laparoscopy with electrocautery of multiple ovarian cysts bilaterally.  it was lysis of a few pelvic adhesions   • OTHER SURGICAL HISTORY      LAPAROSCOPY, OPERATIVE , POSSIBLE LAPAROTOMY WITH/WO T • topiramate (TOPAMAX) 50 MG Oral Tab Take 1 tablet (50 mg total) by mouth daily.  30 tablet 0   • Levothyroxine Sodium 112 MCG Oral Tab Take 1 tablet (112 mcg total) by mouth before breakfast. Recheck lab on/after 12/5/19. 30 tablet 1   • ergocalciferol Doubt POTS flare up  ? migrainous dizziness  Call Endocrinology as levothyroxine dosing was just increased and in the past she has had dizziness with dose adjustment. Stay off work until dizziness improves.  Advised going to the ED if symptoms worsens

## 2019-11-05 NOTE — TELEPHONE ENCOUNTER
Spoke with pt, she states she was in office this morning with elevated BP. Pt states she was advised rest and ensure adequate hydration at home.     Pt states after getting home she experienced an instance of decreased blood pressure, she states BP was \"80

## 2019-11-05 NOTE — TELEPHONE ENCOUNTER
Patient states she is very unsteady/dizzy and has a bad headache- states pain is #9 on 1-10 scale. Patient states her B/P is 90/105Advised patient to go to Er. Patient states does not feel up to driving and has no one to take her.  Advised to call 911 for t

## 2019-11-06 NOTE — ED PROVIDER NOTES
Patient Seen in: [facility] Emergency Department      History   Patient presents with:  Syncope (cardiovascular, neurologic)    Stated Complaint: syncope    HPI    Patient is a 75-year-old woman with multiple chronic medical problems including pots Assessment & Plan:  Since apparently her insurance company does not work on the weekend, we will wait for their eval on 6/13/16.   She has been cleared for dc to snf for rehab per all consultants              Past Surgical History:   Procedure Laterality Da Exam  Vitals signs and nursing note reviewed. Constitutional:       General: She is not in acute distress. Appearance: She is well-developed. She is not toxic-appearing. HENT:      Head: Normocephalic and atraumatic.       Mouth/Throat:      Mouth: were created for panel order CBC WITH DIFFERENTIAL WITH PLATELET.   Procedure                               Abnormality         Status                     ---------                               -----------         ------                     CBC W/ DIFFERJOVAN [id number]  [phone]    In 1 day  Return to the ER if you feel worse in any way, Return to the ER if you have any concerns, Return to the ER if you cannot obtain follow-up        Medications Prescribed:  Current Discharge Medication List

## 2019-11-06 NOTE — ED INITIAL ASSESSMENT (HPI)
Patient relates she has a syncopal episode yesterday falling from standing and remains dizzy. Patient further relates she had another syncopal episode today and was unconscious for approximately two minutes. Patient also complaining of a headache as well.

## 2019-11-06 NOTE — ED NOTES
Dr Lanier Poster at bedside updating pt of results/POC. Pt given 2 rishabh crackers and 2 OJ for glucose 68 on CMP. Pt tolerated well.  Will do accucheck

## 2019-11-06 NOTE — ED NOTES
Pt ambulatory to the bathroom with her , gait slow, steady. States she feels better, \"dizziness better. \"

## 2019-11-07 ENCOUNTER — OFFICE VISIT (OUTPATIENT)
Dept: INTERNAL MEDICINE CLINIC | Facility: CLINIC | Age: 29
End: 2019-11-07
Payer: MEDICAID

## 2019-11-07 VITALS
BODY MASS INDEX: 38.89 KG/M2 | OXYGEN SATURATION: 97 % | HEIGHT: 66 IN | SYSTOLIC BLOOD PRESSURE: 118 MMHG | RESPIRATION RATE: 18 BRPM | WEIGHT: 242 LBS | HEART RATE: 108 BPM | TEMPERATURE: 98 F | DIASTOLIC BLOOD PRESSURE: 68 MMHG

## 2019-11-07 DIAGNOSIS — R55 SYNCOPE, UNSPECIFIED SYNCOPE TYPE: Primary | ICD-10-CM

## 2019-11-07 PROCEDURE — 1111F DSCHRG MED/CURRENT MED MERGE: CPT | Performed by: NURSE PRACTITIONER

## 2019-11-07 PROCEDURE — 99213 OFFICE O/P EST LOW 20 MIN: CPT | Performed by: NURSE PRACTITIONER

## 2019-11-07 NOTE — PROGRESS NOTES
HPI:    Patient ID: Get Higginbotham is a 30year old female. Patient presents with:  Hospital F/U: passed out       Patient was in [facility] ER two days ago for syncope, critical TSH level and low sugar at that time.  She was advised to be admitted at that BYPASS/PAULINO-EN-Y  10/23/2018   • LAPAROSCOPY,DIAGNOSTIC      x3 r/t POS   • LAPAROSCOPY,PELVIC,BIOPSY  2008    drill ovary   • OTHER SURGICAL HISTORY Left     foot sx x3   • OTHER SURGICAL HISTORY  03/2012    operative laparoscopy with electrocautery of mu Levothyroxine Sodium 125 MCG Oral Tab Take 1 tablet (125 mcg total) by mouth before breakfast. 90 tablet 0   • clomiPHENE Citrate (CLOMID OR) Take by mouth daily.      • Butalbital-APAP-Caffeine -40 MG Oral Tab Take 1-2 tablets by mouth every 6 (six) Component Value Date    EAG 91 07/12/2018    A1C 4.8 07/12/2018     Lab Results   Component Value Date    T4F 0.3 (L) 11/05/2019    TSH >100.000 (H) 11/05/2019     Lab Results   Component Value Date    VITD 21.0 (L) 10/23/2019     Lab Results   Component

## 2019-11-12 DIAGNOSIS — E05.90 HYPERTHYROIDISM: Primary | ICD-10-CM

## 2019-11-16 ENCOUNTER — APPOINTMENT (OUTPATIENT)
Dept: CT IMAGING | Facility: HOSPITAL | Age: 29
DRG: 310 | End: 2019-11-16
Attending: EMERGENCY MEDICINE
Payer: MEDICAID

## 2019-11-16 ENCOUNTER — HOSPITAL ENCOUNTER (INPATIENT)
Facility: HOSPITAL | Age: 29
LOS: 2 days | Discharge: HOME | DRG: 310 | End: 2019-11-18
Attending: EMERGENCY MEDICINE | Admitting: INTERNAL MEDICINE
Payer: MEDICAID

## 2019-11-16 DIAGNOSIS — E03.9 HYPOTHYROIDISM, UNSPECIFIED TYPE: ICD-10-CM

## 2019-11-16 DIAGNOSIS — R55 SYNCOPE AND COLLAPSE: Primary | ICD-10-CM

## 2019-11-16 DIAGNOSIS — R42 DIZZINESS: ICD-10-CM

## 2019-11-16 PROCEDURE — 71275 CT ANGIOGRAPHY CHEST: CPT | Performed by: EMERGENCY MEDICINE

## 2019-11-16 PROCEDURE — 99223 1ST HOSP IP/OBS HIGH 75: CPT | Performed by: INTERNAL MEDICINE

## 2019-11-16 PROCEDURE — 70450 CT HEAD/BRAIN W/O DYE: CPT | Performed by: EMERGENCY MEDICINE

## 2019-11-16 RX ORDER — ACETAMINOPHEN 325 MG/1
650 TABLET ORAL EVERY 6 HOURS PRN
Status: DISCONTINUED | OUTPATIENT
Start: 2019-11-16 | End: 2019-11-18

## 2019-11-16 RX ORDER — SODIUM CHLORIDE 9 MG/ML
INJECTION, SOLUTION INTRAVENOUS CONTINUOUS
Status: DISCONTINUED | OUTPATIENT
Start: 2019-11-16 | End: 2019-11-18

## 2019-11-16 RX ORDER — LEVOTHYROXINE SODIUM 0.12 MG/1
125 TABLET ORAL
Status: DISCONTINUED | OUTPATIENT
Start: 2019-11-16 | End: 2019-11-18

## 2019-11-16 RX ORDER — ONDANSETRON 2 MG/ML
4 INJECTION INTRAMUSCULAR; INTRAVENOUS EVERY 4 HOURS PRN
Status: DISCONTINUED | OUTPATIENT
Start: 2019-11-16 | End: 2019-11-18

## 2019-11-16 RX ORDER — ONDANSETRON 2 MG/ML
4 INJECTION INTRAMUSCULAR; INTRAVENOUS EVERY 6 HOURS PRN
Status: DISCONTINUED | OUTPATIENT
Start: 2019-11-16 | End: 2019-11-16

## 2019-11-16 RX ORDER — POTASSIUM CHLORIDE 20 MEQ/1
40 TABLET, EXTENDED RELEASE ORAL EVERY 4 HOURS
Status: COMPLETED | OUTPATIENT
Start: 2019-11-16 | End: 2019-11-17

## 2019-11-16 RX ORDER — SODIUM CHLORIDE 9 MG/ML
1000 INJECTION, SOLUTION INTRAVENOUS ONCE
Status: COMPLETED | OUTPATIENT
Start: 2019-11-16 | End: 2019-11-16

## 2019-11-16 RX ORDER — MAGNESIUM OXIDE 400 MG (241.3 MG MAGNESIUM) TABLET
1 TABLET NIGHTLY PRN
Status: DISCONTINUED | OUTPATIENT
Start: 2019-11-16 | End: 2019-11-18

## 2019-11-16 RX ORDER — ALPRAZOLAM 0.25 MG/1
0.25 TABLET ORAL NIGHTLY PRN
Status: DISCONTINUED | OUTPATIENT
Start: 2019-11-16 | End: 2019-11-18

## 2019-11-16 RX ORDER — SODIUM CHLORIDE 9 MG/ML
INJECTION, SOLUTION INTRAVENOUS CONTINUOUS
Status: ACTIVE | OUTPATIENT
Start: 2019-11-16 | End: 2019-11-16

## 2019-11-16 RX ORDER — ONDANSETRON 2 MG/ML
4 INJECTION INTRAMUSCULAR; INTRAVENOUS ONCE
Status: COMPLETED | OUTPATIENT
Start: 2019-11-16 | End: 2019-11-16

## 2019-11-16 RX ORDER — MECLIZINE HYDROCHLORIDE 25 MG/1
25 TABLET ORAL 3 TIMES DAILY PRN
Status: DISCONTINUED | OUTPATIENT
Start: 2019-11-16 | End: 2019-11-18

## 2019-11-17 ENCOUNTER — APPOINTMENT (OUTPATIENT)
Dept: CV DIAGNOSTICS | Facility: HOSPITAL | Age: 29
DRG: 310 | End: 2019-11-17
Attending: INTERNAL MEDICINE
Payer: MEDICAID

## 2019-11-17 PROCEDURE — 93306 TTE W/DOPPLER COMPLETE: CPT | Performed by: INTERNAL MEDICINE

## 2019-11-17 PROCEDURE — 99255 IP/OBS CONSLTJ NEW/EST HI 80: CPT | Performed by: OTHER

## 2019-11-17 PROCEDURE — 99233 SBSQ HOSP IP/OBS HIGH 50: CPT | Performed by: STUDENT IN AN ORGANIZED HEALTH CARE EDUCATION/TRAINING PROGRAM

## 2019-11-17 RX ORDER — POTASSIUM CHLORIDE 20 MEQ/1
40 TABLET, EXTENDED RELEASE ORAL ONCE
Status: COMPLETED | OUTPATIENT
Start: 2019-11-17 | End: 2019-11-17

## 2019-11-17 NOTE — H&P
[facility]                                                               History & Physical         Robbie Tapia Patient Status:  Emergency    10/27/1990 MRN [id number]   Location [facility] [facility] Attending Jimena Mart 6/22/2011   • D & C  2009   • D & C  10/12/2012   • DILATION/CURETTAGE,DIAGNOSTIC     • FOOT EXCISION MORTONS NEUROMA Left 12/3/2013    Performed by Miracle E[facility]ley MD at [facility] MAIN OR   • HYSTEROSCOPY  6/22/2011   • LAP GASTRIC BYPASS/PAULINO-EN-Y  10/23/2018 menstrual period 09/16/2019, SpO2 100 %, not currently breastfeeding. General: No acute distress. HEENT: Moist mucous membranes. EOM-I. PERRL  Neck: No lymphadenopathy. No JVD. No carotid bruits. Respiratory: Clear to auscultation bilaterally.   No whe PATIENT STATED HISTORY:(As transcribed by Technologist)  Patient presents with dizziness, lightheaded, and midsternal chest pain. CONTRAST USED:  77cc of Omnipaque 350     FINDINGS:    VASCULATURE:  Aorta is normal in caliber.   No acute pulmonary em / PLAN:     1. Multiple syncopal episodes at home, dizziness, POTS syndrome  1. Check orthostatics  2. IV fluids  3. 2D echo  4. Fall precautions  5. Neurology consult  2. Hypothyroidism  1.  Continue home levothyroxine, levothyroxine dose was changed last

## 2019-11-17 NOTE — PROGRESS NOTES
[facility]  Progress Note     Vimal Licea Patient Status:  Inpatient    10/27/1990 MRN [id number]   [facility] 8NE-A Attending Ana Maria Murillo MD   Hosp Day # 1 PCP David Silveira MD     Chief Complaint: dizzinesss    S: Patient f Imaging: Imaging data reviewed in Epic.     Medications:   • levothyroxine sodium  75 mcg Intravenous Daily   • Potassium Chloride ER  40 mEq Oral Once   • metoprolol Tartrate  12.5 mg Oral Daily Beta Blocker   • Levothyroxine Sodium  125 mcg Oral Bef

## 2019-11-17 NOTE — PROGRESS NOTES
Orthostatic VS positive and symptomatic. Patient had no symptoms lying down. When patient moved from lying to sitting and sitting to standing patient c/o a combination of dizziness and lightheadedness.

## 2019-11-17 NOTE — ED INITIAL ASSESSMENT (HPI)
Per patient doctor things she has thyroid storm. Had medication adjusted last week and reports continued symptoms. Hot/cold flashes, dizziness, lightheadedness. Has an appointment next Tuesday to see a specialist regarding her thyroid.

## 2019-11-17 NOTE — PLAN OF CARE
Severe hypothyroidism. IV synthroid given. Potassium replaced per protocol.       Problem: METABOLIC/FLUID AND ELECTROLYTES - ADULT  Goal: Electrolytes maintained within normal limits  Description  INTERVENTIONS:  - Monitor labs and rhythm and assess pat

## 2019-11-17 NOTE — PROGRESS NOTES
11/16/19 2206 11/16/19 2209 11/16/19 2212   Vital Signs   Pulse 84 84 88   Heart Rate Source Monitor Monitor Monitor   Cardiac Rhythm NSR NSR NSR   Resp 18 18 18   Respiratory Quality Normal Normal Normal   /78 124/86 148/71   BP Location Left arm

## 2019-11-17 NOTE — CONSULTS
[id number] [facility] Consultation    Date of consult: 11/17/2019    Reason for consult: POTS    HPI: Betsy Jacobo is a 30year old female with past medical history as listed below presents with passing out multiple times at home.   Patient als PRN      Allergies:  No Known Allergies  Past Medical History:   Diagnosis Date   • Anemia    • Bilateral chronic vestibular neuritis    • H/O complications due to general anesthesia     pt reports she had a hard time waking up from foot surgery   • Left f Used    Alcohol use:  Yes      Alcohol/week: 0.0 standard drinks    Family History   Problem Relation Age of Onset   • Breast Cancer Mother 45   • Cancer Mother         breast   • Cancer Maternal Grandmother         Cervical   • Thyroid disease Maternal Gra 7.1  --           Assessment:  Principal Problem:    Syncope and collapse  Active Problems:    POTS (postural orthostatic tachycardia syndrome)    Dizziness    Hypothyroidism, unspecified type    Autonomic nerve disorder   Chronic migraine     Recommendati

## 2019-11-17 NOTE — ED PROVIDER NOTES
Patient Seen in: [facility] Emergency Department      History   Patient presents with:  Dizziness (neurologic)    Stated Complaint: lightheaded, dizziness, recent meds adjustments to synthroid    HPI    Patient is a 25-year-old female who presents e Overview:  Last Assessment & Plan:  Since apparently her insurance company does not work on the weekend, we will wait for their eval on 6/13/16.   She has been cleared for dc to snf for rehab per all consultants              Past Surgical History:   Procedu 109.8 kg   LMP 09/16/2019   SpO2 100%   BMI 39.06 kg/m²         Physical Exam  GENERAL: Well-developed, well-nourished female sitting up breathing easily in no apparent distress. Patient is nontoxic in appearance.   HEENT: Head is normocephalic, atraumatic WITH PLATELET    Narrative: The following orders were created for panel order CBC WITH DIFFERENTIAL WITH PLATELET.   Procedure                               Abnormality         Status                     ---------                               --------- fluid placed on a continuous pulse ox and cardiac monitor.   In light of this being the third visit to the emergency room for the symptoms over the last 2 weeks with now worsening symptoms at home and continued episodes of passing out patient will be admitt

## 2019-11-17 NOTE — ED NOTES
Report given to [employer]. Pt to go upstairs when room is clean.  Pt updated on plan of care

## 2019-11-18 VITALS
DIASTOLIC BLOOD PRESSURE: 57 MMHG | OXYGEN SATURATION: 97 % | HEIGHT: 66 IN | RESPIRATION RATE: 18 BRPM | WEIGHT: 254.63 LBS | BODY MASS INDEX: 40.92 KG/M2 | SYSTOLIC BLOOD PRESSURE: 118 MMHG | TEMPERATURE: 98 F | HEART RATE: 84 BPM

## 2019-11-18 PROCEDURE — 99239 HOSP IP/OBS DSCHRG MGMT >30: CPT | Performed by: STUDENT IN AN ORGANIZED HEALTH CARE EDUCATION/TRAINING PROGRAM

## 2019-11-18 PROCEDURE — 99233 SBSQ HOSP IP/OBS HIGH 50: CPT | Performed by: STUDENT IN AN ORGANIZED HEALTH CARE EDUCATION/TRAINING PROGRAM

## 2019-11-18 PROCEDURE — 99232 SBSQ HOSP IP/OBS MODERATE 35: CPT | Performed by: OTHER

## 2019-11-18 RX ORDER — POTASSIUM CHLORIDE 20 MEQ/1
40 TABLET, EXTENDED RELEASE ORAL ONCE
Status: COMPLETED | OUTPATIENT
Start: 2019-11-18 | End: 2019-11-18

## 2019-11-18 NOTE — PROGRESS NOTES
Neurology Progress Note    Cierra Lili Patient Status:  Inpatient    10/27/1990 MRN [id number]   [facility] 8NE-A Attending Leilani Zavala MD   Hosp Day # 2 PCP Nikki Vicente MD     Subjective:  Pt was seen and examined in bed this am Her orthostatic VS were negative. Extensive cardiac w/u in the past has not been consistent with POTS. Her TSH was greatly elevated (>100) and endocrinology is on consult. This may have contributed to feelings of dizziness.  She has seen Dr. Franko Rey [facility]

## 2019-11-18 NOTE — PAYOR COMM NOTE
--------------  ADMISSION REVIEW     Payor: Krystian Castillo #:  [id number]  Authorization Number: [id number]    Admit date: 11/16/19  Admit time: 2149       Admitting Physician: Tsering Hanna MD  Attending Physician: improvement and worsening symptoms at home as per patient.     Past Medical History:   Diagnosis Date   • Anemia    • Bilateral chronic vestibular neuritis    • H/O complications due to general anesthesia     pt reports she had a hard time waking up from fo Smokeless tobacco: Never Used    Alcohol use: Yes      Alcohol/week: 0.0 standard drinks    Drug use:  No             Review of Systems    Positive for stated complaint: lightheaded, dizziness, recent meds adjustments to synthroid  Other systems are as no appropriately.              ED Course     Labs Reviewed   COMP METABOLIC PANEL (14) - Abnormal; Notable for the following components:       Result Value    Glucose 105 (*)     BUN/CREA Ratio 8.2 (*)     Calcium, Total 8.2 (*)     AST 41 (*)     Albumin 3.2 including a CBC, chemistries, BUN/creatinine, and blood sugar all of which are unremarkable. The patient's liver function test and troponin are negative. Patient's calcium is 8.2.   Patient's T4 is low at 0.5 and TSH is overall 100 which is a significant Status:  Addendum    :  Celena Guidry MD (Physician)    Related Notes:  Original Note by Celena Guidry MD (Physician) filed at 11/17/2019  8:45 AM         [place] [facility] [facility]                                                               History & Ph wait for their eval on 6/13/16.   She has been cleared for dc to snf for rehab per all consultants       Past Surgical History:   Procedure Laterality Date   • D & C  6/22/2011   • D & C  2009   • D & C  10/12/2012   • DILATION/CURETTAGE,DIAGNOSTIC     • FO signs: Blood pressure 120/74, pulse 94, temperature 98.5 °F (36.9 °C), temperature source Oral, resp. rate 18, height 5' 6\" (1.676 m), weight 242 lb (109.8 kg), last menstrual period 09/16/2019, SpO2 100 %, not currently breastfeeding.   General: No acute Dose information is transmitted to the [facility] [employer] of   Radiology) NRDR ([address]) which includes the Dose Index Registry.      PATIENT STATED HISTORY:(As transcribed by Technologist)  Patient presents with dizziness, light depressed skull fracture.     =====  CONCLUSION:  No acute intracranial findings. Dictated by: Alize Morales MD on 11/16/2019 at 19:38      ASSESSMENT / PLAN:     1. Multiple syncopal episodes at home, dizziness, POTS syndrome  1.  Check orthostatics 11/18/2019 0835 Given 40 mEq Oral Stephon Woods RN      0.9% NaCl infusion     Date Action Dose Route User    11/18/2019 0700 Rate/Dose Verify (none) Intravenous Henna Regalado RN    11/18/2019 0538 New Bag (none) Intravenous Henna Regalado RN 25 mg, 25 mg, Oral, TID PRN  0.9% NaCl infusion, , Intravenous, Continuous  acetaminophen (TYLENOL) tab 650 mg, 650 mg, Oral, Q6H PRN  influenza vaccine split quad (FLULAVAL) ages 6 months to 64 years inj 0.5ml, 0.5 mL, Intramuscular, Prior to discharge  m at [address] [place] [address]   • OTHER SURGICAL HISTORY   07/30/2018     Operative laparoscopy, endometrial sampling, Right peritubal cystectomy, Left ovarian cystectomy, fulgration of endometriosis   • THYROIDECTOMY Bilateral 01/15/2016      Social History:  S MCV 88.6   MCH 29.3   MCHC 33.0   RDW 14.1   NEPRELIM 2.55   WBC 5.3   .0           Recent Labs   Lab 11/16/19  1837 11/17/19  0742   * 82   BUN 8 5*   CREATSERUM 0.97 0.75   GFRAA 90 123   GFRNAA 78 107   CA 8.2* 7.3*   ALB 3.2*  --    NA Temp src  —  Oral  —  —  —   Pulse  —  77  81  80  77   Heart Rate Source  —  Monitor  Monitor  Monitor  Monitor   Cardiac Rhythm  —  —  —  —  —   Resp  —  20  18  18  18   Respiratory Quality  —  Normal  Normal  Normal  Normal   BP  —  99/78  87/64Abnor

## 2019-11-18 NOTE — PROGRESS NOTES
[facility]  Progress Note    Dali Joel Patient Status:  Inpatient    10/27/1990 MRN [id number]   [facility] 8NE-A Attending Sheri Duke MD   Hosp Day # 2 PCP Leo Hooks MD     Subjective:  No specific complaints.       Amisha Sumner nerve     Vitamin D deficiency     S/P removal of ovarian cyst     S/P gastric bypass     Syncope and collapse     QT prolongation     Dizziness     Hypothyroidism, unspecified type      31 yo female with hypothyroidism and questionable compliance with thy

## 2019-11-18 NOTE — PROGRESS NOTES
[facility] Neurology Progress Note    Jaja Solis Patient Status:  Inpatient    10/27/1990 MRN [id number]   [facility] 8NE-A Attending Adalid Robbins MD   [facility] Day # 2 PCP Tanesha Gross MD         Subjective:  Jaja Solis 100      Assessment:   Recurrent syncopal episodes, none since admission  Reports hx POTS, but per chart review, workup has been negative, not orthostatic during this admisison  Hypothyroid  Migraines   Hx dizziness/vestibular neuritis, on meclizine    Loi

## 2019-11-18 NOTE — CONSULTS
[facility]  Consultation Note      Aaliyahzenia Ann Patient Status:  Inpatient    10/27/1990 MRN [id number]   [facility] 8NE-A Attending Maria Luisa Stark MD   Hosp Day # 2 PCP Edward Gregorio MD     Outpatient cardiologist: orthostatics mildly positive by blood pressure (HR remained stable), resolved by 2nd set of OVS.  Work-up most notable for hypothyroidism with TSH>100 s/p IV TSH with another dose today. Cardiology consultation was requested.     Medications:  ondans HYSTEROSCOPY  6/22/2011   • LAP GASTRIC BYPASS/PAULINO-EN-Y  10/23/2018   • LAPAROSCOPY,DIAGNOSTIC      x3 r/t POS   • LAPAROSCOPY,PELVIC,BIOPSY  2008    drill ovary   • OTHER SURGICAL HISTORY Left     foot sx x3   • OTHER SURGICAL HISTORY  03/2012    operati 10.1 oz (115.5 kg), last menstrual period 2019, SpO2 97 %, not currently breastfeeding.   Temp (24hrs), Av.2 °F (36.8 °C), Min:98 °F (36.7 °C), Max:98.6 °F (37 °C)    Wt Readings from Last 3 Encounters:  19 : 254 lb 10.1 oz (115.5 kg)

## 2019-11-18 NOTE — PROGRESS NOTES
[facility]  Progress Note     Christa Fothergill Patient Status:  Inpatient    10/27/1990 MRN [id number]   [facility] 8NE-A Attending Migue Rico MD   Hosp Day # 2 PCP Kristian Vasquez MD     Chief Complaint: dizzinesss    S: Patient s Component Value Date    TSH >100.000 11/18/2019       Imaging: Imaging data reviewed in Epic. Medications:   • metoprolol Tartrate  12.5 mg Oral Daily Beta Blocker   • Levothyroxine Sodium  125 mcg Oral Before breakfast       ASSESSMENT / PLAN:     1.

## 2019-11-18 NOTE — CONSULTS
[facility]    PATIENT'S NAME: Aliza Rodriguez   ATTENDING PHYSICIAN: Jamal Ortiz MD   CONSULTING PHYSICIAN: Renaldo Cloud M.D.    PATIENT ACCOUNT#:   [de-identified]    LOCATION:  [address] [place] [address]  MEDICAL RECORD #:   [id number]       DATE OF BI grandfather with hypertension. REVIEW OF SYSTEMS:  Fourteen-part review of systems unremarkable other than as mentioned in HPI. PHYSICAL EXAMINATION:    GENERAL:  Patient is alert and oriented. Mentation appropriate. VITAL SIGNS:  Temperature 98. Cassandra Rolle M.D.  d: 11/17/2019 11:27:24  t: 11/17/2019 13:00:49  [facility] [id number]  /

## 2019-11-18 NOTE — PLAN OF CARE
C/O feeling dizzy/ lightheaded while getting orthostatic BP done-see flow sheet. pt assisted back to bed- IVF infusing.  Family at bedside  Pt stable-will con't to monitor    Problem: METABOLIC/FLUID AND ELECTROLYTES - ADULT  Goal: Electrolytes maintained w

## 2019-11-18 NOTE — PROGRESS NOTES
11/17/19 2031 11/17/19 2032 11/17/19 2035   Vital Signs   Temp 98.4 °F (36.9 °C)  --   --    Temp src Oral  --   --    Pulse 82 85 81   Heart Rate Source Monitor Monitor Monitor   Resp 20  --   --    Respiratory Quality Normal Normal Normal   /70

## 2019-11-18 NOTE — PHYSICAL THERAPY NOTE
PHYSICAL THERAPY QUICK EVALUATION - INPATIENT    Room Number: 0022/7720-D  Evaluation Date: 11/18/2019  Presenting Problem: syncope  Physician Order: PT Eval and Treat     Brain CT (-)      Therapy significant co-morbidities:  Hypothyroidism, Migraines, foot sx x3   • OTHER SURGICAL HISTORY  03/2012    operative laparoscopy with electrocautery of multiple ovarian cysts bilaterally.  it was lysis of a few pelvic adhesions   • OTHER SURGICAL HISTORY      LAPAROSCOPY, OPERATIVE , POSSIBLE LAPAROTOMY WITH/WO T chair (including a wheelchair)?: None   -   Need to walk in hospital room?: None   -   Climbing 3-5 steps with a railing?: A Little       AM-PAC Score:  Raw Score: 23   Approx Degree of Impairment: 11.2%   Standardized Score (AM-PAC Scale): 56.93   CMS Mod Discussed with pt she should consider purchasing home  BP cuff to monitor vitals at home. PT Discharge Recommendations: Home;Outpatient PT    PLAN  Patient has been evaluated and presents with no skilled Physical Therapy needs at this time.   Patient disc

## 2019-11-19 ENCOUNTER — PATIENT OUTREACH (OUTPATIENT)
Dept: CASE MANAGEMENT | Age: 29
End: 2019-11-19

## 2019-11-19 NOTE — PAYOR COMM NOTE
--------------  DISCHARGE REVIEW    Payor: Krystian Reedanne #:  [id number]  Authorization Number: [id number]    Admit date: 11/16/19  Admit time:  2149  Discharge Date: 11/18/2019  2:56 PM     Admitting Physician: Lien Sullivan one episode of dizziness but never any LOC./ Per chart this seems to be more frequent which is prompting the ED visits (3 visits in 2 weeks). She states that she feels well today. Her orthostatic VS were negative.  Extensive cardiac w/u in the past has not

## 2019-11-20 NOTE — PROGRESS NOTES
LM for pt to call NCM back for condition update. TCC and NCM # left for pt to call back. TCC was ordered at d/c.

## 2019-11-25 ENCOUNTER — OFFICE VISIT (OUTPATIENT)
Dept: INTERNAL MEDICINE CLINIC | Facility: CLINIC | Age: 29
End: 2019-11-25
Payer: MEDICAID

## 2019-11-25 VITALS
TEMPERATURE: 99 F | HEIGHT: 66 IN | DIASTOLIC BLOOD PRESSURE: 70 MMHG | HEART RATE: 80 BPM | RESPIRATION RATE: 16 BRPM | SYSTOLIC BLOOD PRESSURE: 120 MMHG | WEIGHT: 254 LBS | BODY MASS INDEX: 40.82 KG/M2

## 2019-11-25 DIAGNOSIS — E89.0 POSTSURGICAL HYPOTHYROIDISM: Primary | ICD-10-CM

## 2019-11-25 DIAGNOSIS — N94.6 MENSTRUAL CRAMPS: ICD-10-CM

## 2019-11-25 DIAGNOSIS — R42 DIZZINESS: ICD-10-CM

## 2019-11-25 PROCEDURE — 99214 OFFICE O/P EST MOD 30 MIN: CPT | Performed by: PHYSICIAN ASSISTANT

## 2019-11-25 RX ORDER — NAPROXEN 500 MG/1
500 TABLET ORAL 2 TIMES DAILY WITH MEALS
Qty: 28 TABLET | Refills: 0 | Status: SHIPPED | OUTPATIENT
Start: 2019-11-25 | End: 2020-03-11

## 2019-11-25 NOTE — PATIENT INSTRUCTIONS
Continue synthroid and follow up with endocrinologist and cardiologist as scheduled  Take naproxen up to twice daily with food as needed for pain

## 2019-11-25 NOTE — PROGRESS NOTES
Aminah Wu is a 30year old female. HPI:   Pt following up from hospital stay for dizziness, uncontrolled hypothyroidism. Saw endo outpatient last week and synthroid dose was raised. She is still having dizzy spells and occasional syncope.    Has f/u company does not work on the weekend, we will wait for their eval on 6/13/16.   She has been cleared for dc to snf for rehab per all consultants      Social History:  Social History    Tobacco Use      Smoking status: Never Smoker      Smokeless tobacco: Ne times daily with meals. The patient indicates understanding of these issues and agrees to the plan. The patient is asked to return in 1 month.

## 2019-11-29 NOTE — DISCHARGE SUMMARY
[facility]  DISCHARGE SUMMARY     Taylor Rico Patient Status:  Inpatient    10/27/1990 MRN [id number]   [facility] 8NE-A Attending No att. providers found   Hosp Day # 2 PCP Molly Cheadle, MD     Date of Admission: 2019  Da home.    Lace+ Score: 29  59-90 High Risk  29-58 Medium Risk  0-28   Low Risk  Patient was referred to the [facility]. TCM Follow-Up Recommendation:  LACE > 58:  High Risk of readmission after discharge from the hospital.    [address]  [address] [id number] [place], [address]               2019  3:20 PM  [facility] MYCHART FOLLOW UP [3408] 20 min.  [address] [facility] [place] , APN    Patient Instructions:          Location Instructions:      [address]

## 2019-11-29 NOTE — PROGRESS NOTES
Multiple attempts to reach pt and messages left with no return call. Pt went in for HFU appt with PCP on 11/25/19. Encounter closing.

## 2019-12-27 ENCOUNTER — OFFICE VISIT (OUTPATIENT)
Dept: INTERNAL MEDICINE CLINIC | Facility: CLINIC | Age: 29
End: 2019-12-27
Payer: MEDICAID

## 2019-12-27 VITALS
RESPIRATION RATE: 18 BRPM | HEIGHT: 66 IN | BODY MASS INDEX: 40.34 KG/M2 | HEART RATE: 94 BPM | WEIGHT: 251 LBS | SYSTOLIC BLOOD PRESSURE: 126 MMHG | OXYGEN SATURATION: 97 % | TEMPERATURE: 98 F | DIASTOLIC BLOOD PRESSURE: 78 MMHG

## 2019-12-27 DIAGNOSIS — R55 SYNCOPE AND COLLAPSE: ICD-10-CM

## 2019-12-27 DIAGNOSIS — E89.0 POSTSURGICAL HYPOTHYROIDISM: Primary | ICD-10-CM

## 2019-12-27 PROCEDURE — 99214 OFFICE O/P EST MOD 30 MIN: CPT | Performed by: PHYSICIAN ASSISTANT

## 2019-12-27 NOTE — PATIENT INSTRUCTIONS
Schedule with weight loss clinic  Continue current medications   Recommend waiting to try to get pregnant until AFTER you have more stable thyroid function  NO DRIVING until at least 6 months without a fainting episode, and then start driving with someone

## 2019-12-27 NOTE — PROGRESS NOTES
Cody Vicente is a 30year old female. HPI:   Pt following up from office visit last month    Hypothyroidism- seeing endo now, last TSH was in normal range. Has order for recheck next week.    Dizziness and syncope: has been feeling well recently, no fur Tachycardia    • Thyroid nodule 12/15/2014   • 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   She has been cleared for dc t until thyroid function is stable for at least 3 months before trying to achieve pregnancy to avoid risk of birth defects,  labor, HTN, miscarriage, etc. Start prenatal vitamin now.    Ok to return to work next month provided that: she does not drive,

## 2019-12-30 ENCOUNTER — PATIENT MESSAGE (OUTPATIENT)
Dept: INTERNAL MEDICINE CLINIC | Facility: CLINIC | Age: 29
End: 2019-12-30

## 2020-01-04 NOTE — PROGRESS NOTES
HISTORY OF PRESENT ILLNESS  Patient presents with:  Weight Problem: patient referred by Oj Kinsey,  last seen by [facility] 6/27/19 up 17,  10/23/18 gastric sleeve Dr Brayden Nguyen from [facility] started 029 [id number].  Has tried phentermine in past and lost weight negative  Joint-related conditions:negative  Migraines/seizures: +migraines with less 1x/month- hx of Topamax  Glaucoma: negative   Depression/anxiety: negative  Constipation: negative  DVT: negative  GERD: negative  Sleep Apnea: negative- hx of testing  O HGB 11.6 (L) 11/17/2019    HCT 35.1 11/17/2019    MCV 88.6 11/17/2019    MCH 29.3 11/17/2019    MCHC 33.0 11/17/2019    RDW 14.1 11/17/2019    .0 11/17/2019    MPV 9.6 07/05/2018     Lab Results   Component Value Date    GLU 82 11/17/2019    BUN mg total) by mouth daily. (Patient not taking: Reported on 1/6/2020 ), Disp: 14 tablet, Rfl: 0  clomiPHENE Citrate 50 MG Oral Tab, Take 1 tablet (50 mg total) by mouth daily. Take days 5-9 of cycle.  (Patient not taking: Reported on 1/6/2020 ), Disp: 5 tabl MG Oral Tablet 24 Hr; Take 2 tablets (1,500 mg total) by mouth daily with food. Refer to discharge instructions for initial dosing.  -     L-Methylfolate 15 MG Oral Tab; Take 1 tablet (15 mg total) by mouth daily.     Vegetarian diet  -     VITAMIN B12; Fut examples include: greek yogurt, cottage cheese, hard boiled egg, whole grain toast with peanut butter.   3.  Reduce carbohydrates which includes sugar items such as sweets as well as rice, pasta, potatoes and bread and make sure to choose whole grain option

## 2020-01-06 ENCOUNTER — OFFICE VISIT (OUTPATIENT)
Dept: INTERNAL MEDICINE CLINIC | Facility: CLINIC | Age: 30
End: 2020-01-06
Payer: MEDICAID

## 2020-01-06 VITALS
WEIGHT: 256 LBS | DIASTOLIC BLOOD PRESSURE: 80 MMHG | RESPIRATION RATE: 14 BRPM | HEIGHT: 65 IN | HEART RATE: 80 BPM | SYSTOLIC BLOOD PRESSURE: 110 MMHG | BODY MASS INDEX: 42.65 KG/M2

## 2020-01-06 DIAGNOSIS — Z15.89 MTHFR GENE MUTATION: ICD-10-CM

## 2020-01-06 DIAGNOSIS — Z98.84 H/O BARIATRIC SURGERY: ICD-10-CM

## 2020-01-06 DIAGNOSIS — Z51.81 ENCOUNTER FOR THERAPEUTIC DRUG MONITORING: Primary | ICD-10-CM

## 2020-01-06 DIAGNOSIS — E66.01 MORBID OBESITY WITH BMI OF 40.0-44.9, ADULT (HCC): ICD-10-CM

## 2020-01-06 DIAGNOSIS — E28.2 PCOS (POLYCYSTIC OVARIAN SYNDROME): ICD-10-CM

## 2020-01-06 DIAGNOSIS — Z78.9 VEGETARIAN DIET: ICD-10-CM

## 2020-01-06 PROCEDURE — 99214 OFFICE O/P EST MOD 30 MIN: CPT | Performed by: NURSE PRACTITIONER

## 2020-01-06 RX ORDER — METFORMIN HYDROCHLORIDE 750 MG/1
1500 TABLET, EXTENDED RELEASE ORAL
Qty: 60 TABLET | Refills: 1 | Status: SHIPPED | OUTPATIENT
Start: 2020-01-06 | End: 2020-03-02

## 2020-01-06 RX ORDER — LEVOMEFOLATE CALCIUM 15 MG
15 TABLET ORAL DAILY
Qty: 90 TABLET | Refills: 3 | Status: SHIPPED | OUTPATIENT
Start: 2020-01-06 | End: 2020-02-12

## 2020-01-06 NOTE — PATIENT INSTRUCTIONS
Welcome to the [facility]...your Lifestyle Renovation begins now! Thank you for placing your trust in our health care team, I look forward to working with you along this journey to better health!     Next steps:     1.  Sched able with long-term goal of 30 minutes 5 days a week at a minimum. Meditation daily can help manage and control stress. Chronic stress can make weight loss difficult.   Exercising is one way to help with stress, but meditation using the CALM Bharati or ano

## 2020-01-06 NOTE — TELEPHONE ENCOUNTER
From: Treva Doss  Sent: 1/3/2020 11:26 PM CST  To: [facility] Clinical Staff  Subject: RE: Heriberto Nayak no fax   I would just have to have my  pick it up and then take it to the personal department at the store they fax it over that's what usuall

## 2020-01-08 ENCOUNTER — TELEPHONE (OUTPATIENT)
Dept: INTERNAL MEDICINE CLINIC | Facility: CLINIC | Age: 30
End: 2020-01-08

## 2020-01-08 NOTE — TELEPHONE ENCOUNTER
Received notice L-Methylfolate needs pa from [facility] plan to see if PA can be done

## 2020-01-14 ENCOUNTER — OFFICE VISIT (OUTPATIENT)
Dept: FAMILY MEDICINE CLINIC | Facility: CLINIC | Age: 30
End: 2020-01-14
Payer: MEDICAID

## 2020-01-14 VITALS
DIASTOLIC BLOOD PRESSURE: 80 MMHG | RESPIRATION RATE: 18 BRPM | TEMPERATURE: 98 F | SYSTOLIC BLOOD PRESSURE: 130 MMHG | WEIGHT: 257 LBS | HEIGHT: 66 IN | BODY MASS INDEX: 41.3 KG/M2 | OXYGEN SATURATION: 99 % | HEART RATE: 100 BPM

## 2020-01-14 DIAGNOSIS — J11.1 INFLUENZA-LIKE ILLNESS: Primary | ICD-10-CM

## 2020-01-14 PROCEDURE — 99213 OFFICE O/P EST LOW 20 MIN: CPT | Performed by: NURSE PRACTITIONER

## 2020-01-14 RX ORDER — BENZONATATE 200 MG/1
200 CAPSULE ORAL 3 TIMES DAILY PRN
Qty: 30 CAPSULE | Refills: 0 | Status: SHIPPED | OUTPATIENT
Start: 2020-01-14 | End: 2020-01-28 | Stop reason: ALTCHOICE

## 2020-01-14 RX ORDER — AZITHROMYCIN 250 MG/1
TABLET, FILM COATED ORAL
Qty: 6 TABLET | Refills: 0 | Status: SHIPPED | OUTPATIENT
Start: 2020-01-14 | End: 2020-01-28 | Stop reason: ALTCHOICE

## 2020-01-14 RX ORDER — FLUTICASONE PROPIONATE 50 MCG
2 SPRAY, SUSPENSION (ML) NASAL DAILY
Qty: 1 BOTTLE | Refills: 0 | Status: SHIPPED | OUTPATIENT
Start: 2020-01-14 | End: 2020-01-28 | Stop reason: ALTCHOICE

## 2020-01-14 NOTE — PROGRESS NOTES
Patient presents with:  Sore Throat: sore throat, congestion, earache   :    HPI:   Abhilash Ann is a 30year old female who presents for upper respiratory symptoms for  6  days. Started suddenly. Getting worse.  Feeling feverish,chills headaches, conge Diagnosis Date   • Anemia    • Bilateral chronic vestibular neuritis    • H/O complications due to general anesthesia     pt reports she had a hard time waking up from foot surgery   • Left foot pain    • Migraines    • Migraines    • Obesity, unspecifie Grandmother         Cervical   • Thyroid disease Maternal Grandmother         Possible thyroid cancer   • High Cholesterol Father    • Hypertension Father    • Heart Disease Father    • Other (Other) Father         heart bypass surgery   • Hypertension [place] (200 mg total) by mouth 3 (three) times daily as needed. • Fluticasone Propionate 50 MCG/ACT Nasal Suspension 1 Bottle 0     Si sprays by Each Nare route daily. Imaging & Consults:  None      Rest, increase fluids,Tylenol prn.   The patient yair

## 2020-01-22 ENCOUNTER — HOSPITAL ENCOUNTER (OUTPATIENT)
Age: 30
Discharge: HOME | End: 2020-01-22
Attending: EMERGENCY MEDICINE
Payer: MEDICAID

## 2020-01-22 VITALS
HEART RATE: 81 BPM | DIASTOLIC BLOOD PRESSURE: 78 MMHG | OXYGEN SATURATION: 100 % | SYSTOLIC BLOOD PRESSURE: 121 MMHG | HEIGHT: 66 IN | BODY MASS INDEX: 39.37 KG/M2 | TEMPERATURE: 98 F | WEIGHT: 245 LBS | RESPIRATION RATE: 16 BRPM

## 2020-01-22 DIAGNOSIS — K11.5 PAROTID SIALOLITHIASIS: Primary | ICD-10-CM

## 2020-01-22 PROCEDURE — 99212 OFFICE O/P EST SF 10 MIN: CPT

## 2020-01-22 PROCEDURE — 99213 OFFICE O/P EST LOW 20 MIN: CPT

## 2020-01-22 NOTE — ED PROVIDER NOTES
Patient Seen in: [facility]      History   Patient presents with:  Swelling Edema    Stated Complaint: facial swelling x2 days    HPI     Patient awoke this morning with swelling to left side of her face.   About 3 weeks ago HISTORY  03/2012    operative laparoscopy with electrocautery of multiple ovarian cysts bilaterally.  it was lysis of a few pelvic adhesions   • OTHER SURGICAL HISTORY      LAPAROSCOPY, OPERATIVE , POSSIBLE LAPAROTOMY WITH/WO TUBAL PERFUSION performed by Joslyn Rodriguez Oromucosa is moist.  Lips are moist.  No oral lesions. No parotid gland stone visible. No purulent drainage from the parotid duct visible  Neck: Supple  Skin: Unremarkable. No skin change or skin rash on the face.     Neurologic:  Mental status as above

## 2020-01-27 ENCOUNTER — LAB ENCOUNTER (OUTPATIENT)
Dept: LAB | Facility: HOSPITAL | Age: 30
End: 2020-01-27
Attending: FAMILY MEDICINE
Payer: MEDICAID

## 2020-01-27 DIAGNOSIS — E05.90 HYPERTHYROIDISM: ICD-10-CM

## 2020-01-27 DIAGNOSIS — E03.9 HYPOTHYROIDISM (ACQUIRED): ICD-10-CM

## 2020-01-27 DIAGNOSIS — E28.2 PCOS (POLYCYSTIC OVARIAN SYNDROME): ICD-10-CM

## 2020-01-27 DIAGNOSIS — Z51.81 ENCOUNTER FOR THERAPEUTIC DRUG MONITORING: ICD-10-CM

## 2020-01-27 DIAGNOSIS — Z78.9 VEGETARIAN DIET: ICD-10-CM

## 2020-01-27 DIAGNOSIS — Z98.84 H/O BARIATRIC SURGERY: ICD-10-CM

## 2020-01-27 DIAGNOSIS — E66.01 MORBID OBESITY WITH BMI OF 40.0-44.9, ADULT (HCC): ICD-10-CM

## 2020-01-27 DIAGNOSIS — N91.2 ABSENCE OF MENSTRUATION: ICD-10-CM

## 2020-01-27 LAB
EST. AVERAGE GLUCOSE BLD GHB EST-MCNC: 100 MG/DL (ref 68–126)
HBA1C MFR BLD HPLC: 5.1 % (ref ?–5.7)
PROGEST SERPL-MCNC: 0.6 NG/ML
T4 FREE SERPL-MCNC: 1.4 NG/DL (ref 0.8–1.7)
TSI SER-ACNC: 1.43 MIU/ML (ref 0.36–3.74)
VIT B12 SERPL-MCNC: 375 PG/ML (ref 193–986)

## 2020-01-27 PROCEDURE — 82607 VITAMIN B-12: CPT

## 2020-01-27 PROCEDURE — 84439 ASSAY OF FREE THYROXINE: CPT

## 2020-01-27 PROCEDURE — 84443 ASSAY THYROID STIM HORMONE: CPT

## 2020-01-27 PROCEDURE — 82306 VITAMIN D 25 HYDROXY: CPT | Performed by: NURSE PRACTITIONER

## 2020-01-27 PROCEDURE — 84144 ASSAY OF PROGESTERONE: CPT

## 2020-01-27 PROCEDURE — 83036 HEMOGLOBIN GLYCOSYLATED A1C: CPT

## 2020-01-27 PROCEDURE — 36415 COLL VENOUS BLD VENIPUNCTURE: CPT | Performed by: NURSE PRACTITIONER

## 2020-01-28 ENCOUNTER — OFFICE VISIT (OUTPATIENT)
Dept: INTERNAL MEDICINE CLINIC | Facility: CLINIC | Age: 30
End: 2020-01-28
Payer: MEDICAID

## 2020-01-28 VITALS
HEIGHT: 66 IN | HEART RATE: 104 BPM | TEMPERATURE: 99 F | BODY MASS INDEX: 41.78 KG/M2 | RESPIRATION RATE: 18 BRPM | WEIGHT: 260 LBS | OXYGEN SATURATION: 99 % | DIASTOLIC BLOOD PRESSURE: 80 MMHG | SYSTOLIC BLOOD PRESSURE: 134 MMHG

## 2020-01-28 DIAGNOSIS — R42 DIZZINESS: ICD-10-CM

## 2020-01-28 DIAGNOSIS — E23.7 PITUITARY ABNORMALITY (HCC): Primary | ICD-10-CM

## 2020-01-28 PROCEDURE — 99214 OFFICE O/P EST MOD 30 MIN: CPT | Performed by: PHYSICIAN ASSISTANT

## 2020-01-28 NOTE — PROGRESS NOTES
Taylor Rico is a 30year old female. HPI:   Patient here for follow-up  C/o recent dizziness episode. Occurred 2 days ago at 3pm.  Felt short of breath, then dizziness began, while lying completely still at home.   Lasted 25 minutes, felt lightheaded t 1/28/2020 ) 90 tablet 3   • clomiPHENE Citrate 50 MG Oral Tab Take 1 tablet (50 mg total) by mouth daily. Take days 5-9 of cycle.  (Patient not taking: Reported on 1/28/2020 ) 5 tablet 0      Past Medical History:   Diagnosis Date   • Anemia    • Bilateral nourished,in no apparent distress  SKIN: no rashes,no suspicious lesions, warm and dry  HEENT: atraumatic, normocephalic,ears and throat are clear  NECK: supple,no adenopathy, no thyromegaly  LUNGS: clear to auscultation b/l no W/R/R  CARDIO: RRR without m

## 2020-02-03 ENCOUNTER — TELEPHONE (OUTPATIENT)
Dept: INTERNAL MEDICINE CLINIC | Facility: CLINIC | Age: 30
End: 2020-02-03

## 2020-02-03 NOTE — TELEPHONE ENCOUNTER
Per aNncy Ko, keep the appointment for Neurology. If another MRI is indicated from Neurology it can be ordered from their office. Pt. Updated regarding the denial for the MRI and follow up with Neurology at scheduled time.     Discussed with patient

## 2020-02-12 ENCOUNTER — LAB ENCOUNTER (OUTPATIENT)
Dept: LAB | Age: 30
End: 2020-02-12
Attending: NURSE PRACTITIONER
Payer: MEDICAID

## 2020-02-12 ENCOUNTER — OFFICE VISIT (OUTPATIENT)
Dept: INTERNAL MEDICINE CLINIC | Facility: CLINIC | Age: 30
End: 2020-02-12
Payer: MEDICAID

## 2020-02-12 VITALS
WEIGHT: 259 LBS | DIASTOLIC BLOOD PRESSURE: 72 MMHG | RESPIRATION RATE: 18 BRPM | HEART RATE: 114 BPM | BODY MASS INDEX: 41.62 KG/M2 | OXYGEN SATURATION: 97 % | TEMPERATURE: 98 F | HEIGHT: 66 IN | SYSTOLIC BLOOD PRESSURE: 122 MMHG

## 2020-02-12 DIAGNOSIS — R42 DIZZINESS: ICD-10-CM

## 2020-02-12 DIAGNOSIS — F41.9 ANXIETY: ICD-10-CM

## 2020-02-12 DIAGNOSIS — N92.1 MENORRHAGIA WITH IRREGULAR CYCLE: Primary | ICD-10-CM

## 2020-02-12 LAB
BASOPHILS # BLD AUTO: 0.04 X10(3) UL (ref 0–0.2)
BASOPHILS NFR BLD AUTO: 0.6 %
DEPRECATED RDW RBC AUTO: 46.9 FL (ref 35.1–46.3)
EOSINOPHIL # BLD AUTO: 0.17 X10(3) UL (ref 0–0.7)
EOSINOPHIL NFR BLD AUTO: 2.6 %
ERYTHROCYTE [DISTWIDTH] IN BLOOD BY AUTOMATED COUNT: 15 % (ref 11–15)
HCT VFR BLD AUTO: 42.9 % (ref 35–48)
HGB BLD-MCNC: 13.9 G/DL (ref 12–16)
IMM GRANULOCYTES # BLD AUTO: 0.01 X10(3) UL (ref 0–1)
IMM GRANULOCYTES NFR BLD: 0.2 %
LYMPHOCYTES # BLD AUTO: 1.96 X10(3) UL (ref 1–4)
LYMPHOCYTES NFR BLD AUTO: 29.9 %
MCH RBC QN AUTO: 28.5 PG (ref 26–34)
MCHC RBC AUTO-ENTMCNC: 32.4 G/DL (ref 31–37)
MCV RBC AUTO: 88.1 FL (ref 80–100)
MONOCYTES # BLD AUTO: 0.82 X10(3) UL (ref 0.1–1)
MONOCYTES NFR BLD AUTO: 12.5 %
NEUTROPHILS # BLD AUTO: 3.56 X10 (3) UL (ref 1.5–7.7)
NEUTROPHILS # BLD AUTO: 3.56 X10(3) UL (ref 1.5–7.7)
NEUTROPHILS NFR BLD AUTO: 54.2 %
PLATELET # BLD AUTO: 221 10(3)UL (ref 150–450)
RBC # BLD AUTO: 4.87 X10(6)UL (ref 3.8–5.3)
WBC # BLD AUTO: 6.6 X10(3) UL (ref 4–11)

## 2020-02-12 PROCEDURE — 36415 COLL VENOUS BLD VENIPUNCTURE: CPT

## 2020-02-12 PROCEDURE — 99213 OFFICE O/P EST LOW 20 MIN: CPT | Performed by: NURSE PRACTITIONER

## 2020-02-12 PROCEDURE — 85025 COMPLETE CBC W/AUTO DIFF WBC: CPT

## 2020-02-12 RX ORDER — ALPRAZOLAM 0.25 MG/1
0.25 TABLET ORAL NIGHTLY PRN
Qty: 30 TABLET | Refills: 0 | Status: SHIPPED | OUTPATIENT
Start: 2020-02-12 | End: 2020-07-14

## 2020-02-12 RX ORDER — NAPROXEN 500 MG/1
500 TABLET ORAL 2 TIMES DAILY PRN
Qty: 28 TABLET | Refills: 0 | Status: SHIPPED | OUTPATIENT
Start: 2020-02-12 | End: 2020-07-14

## 2020-02-12 NOTE — PROGRESS NOTES
HPI:    Patient ID: Humberto Spear is a 30year old female. Patient presents with:  Dizziness: pt currently menstruating, states she lost \"a lot of blood\"  Headache: headache       C/o dizziness since starting menstrual flow on Sunday.  Reports flow i NEUROMA Left 12/3/2013    Performed by Valeria Gaming MD at [address] [place] [address]   • HYSTEROSCOPY  6/22/2011   • LAPAROSCOPY,DIAGNOSTIC      x3 r/t POS   • LAPAROSCOPY,PELVIC,BIOPSY  2008    drill ovary   • OTHER SURGICAL HISTORY Left     foot sx x3   • OTHER BHUPINDER Belt: Yes    Social History Narrative      Single, lives at home with her father, no children, works at [employer]         Current Outpatient Medications   Medication Sig Dispense Refill   • naproxen 500 MG Oral Tab Take 1 tablet (500 mg total) by mouth 2 (two) HCT 35.1 11/17/2019    MCV 88.6 11/17/2019    MCH 29.3 11/17/2019    MCHC 33.0 11/17/2019    RDW 14.1 11/17/2019    .0 11/17/2019    MPV 9.6 07/05/2018     Lab Results   Component Value Date    CHOLEST 170 02/27/2018    TRIG 56 02/27/2018    HDL 83 Take 1 tablet (500 mg total) by mouth 2 (two) times daily as needed. Dizziness- check counts  -     CBC WITH DIFFERENTIAL WITH PLATELET; Future    Anxiety- use prn  -     ALPRAZolam (XANAX) 0.25 MG Oral Tab;  Take 1 tablet (0.25 mg total) by mouth nightl

## 2020-03-11 ENCOUNTER — LAB ENCOUNTER (OUTPATIENT)
Dept: LAB | Facility: HOSPITAL | Age: 30
End: 2020-03-11
Attending: INTERNAL MEDICINE
Payer: MEDICAID

## 2020-03-11 ENCOUNTER — OFFICE VISIT (OUTPATIENT)
Dept: INTERNAL MEDICINE CLINIC | Facility: CLINIC | Age: 30
End: 2020-03-11
Payer: MEDICAID

## 2020-03-11 VITALS
TEMPERATURE: 98 F | DIASTOLIC BLOOD PRESSURE: 82 MMHG | SYSTOLIC BLOOD PRESSURE: 118 MMHG | OXYGEN SATURATION: 98 % | HEART RATE: 114 BPM | BODY MASS INDEX: 42.11 KG/M2 | HEIGHT: 66 IN | WEIGHT: 262 LBS | RESPIRATION RATE: 18 BRPM

## 2020-03-11 DIAGNOSIS — J01.10 ACUTE NON-RECURRENT FRONTAL SINUSITIS: Primary | ICD-10-CM

## 2020-03-11 DIAGNOSIS — E03.9 HYPOTHYROIDISM (ACQUIRED): ICD-10-CM

## 2020-03-11 DIAGNOSIS — N91.2 AMENORRHEA: ICD-10-CM

## 2020-03-11 DIAGNOSIS — F41.9 ANXIETY: ICD-10-CM

## 2020-03-11 LAB
ESTRADIOL SERPL-MCNC: 51.3 PG/ML
PROLACTIN SERPL-MCNC: 21.4 NG/ML
T4 FREE SERPL-MCNC: 0.9 NG/DL (ref 0.8–1.7)
TSI SER-ACNC: 2.94 MIU/ML (ref 0.36–3.74)

## 2020-03-11 PROCEDURE — 84439 ASSAY OF FREE THYROXINE: CPT

## 2020-03-11 PROCEDURE — 84443 ASSAY THYROID STIM HORMONE: CPT

## 2020-03-11 PROCEDURE — 84146 ASSAY OF PROLACTIN: CPT

## 2020-03-11 PROCEDURE — 82670 ASSAY OF TOTAL ESTRADIOL: CPT

## 2020-03-11 PROCEDURE — 99214 OFFICE O/P EST MOD 30 MIN: CPT | Performed by: NURSE PRACTITIONER

## 2020-03-11 PROCEDURE — 36415 COLL VENOUS BLD VENIPUNCTURE: CPT

## 2020-03-11 RX ORDER — AMOXICILLIN AND CLAVULANATE POTASSIUM 875; 125 MG/1; MG/1
1 TABLET, FILM COATED ORAL 2 TIMES DAILY
Qty: 20 TABLET | Refills: 0 | Status: SHIPPED | OUTPATIENT
Start: 2020-03-11 | End: 2020-03-21

## 2020-03-11 RX ORDER — SERTRALINE HYDROCHLORIDE 25 MG/1
25 TABLET, FILM COATED ORAL DAILY
Qty: 90 TABLET | Refills: 0 | Status: SHIPPED | OUTPATIENT
Start: 2020-03-11 | End: 2020-07-14

## 2020-03-11 NOTE — PROGRESS NOTES
HPI:   Patient presents with symptoms of cough, congestion, PND for 3 days. Current treatment includes mucinex without improvement. Has not been taking lexapro, levels of anxiety are higher d/t inability to conceive.  She would like to restart medication In 2015: total thyroidectomy for benign goiter   • POTS (postural orthostatic tachycardia syndrome)    • Tachycardia    • Thyroid nodule 12/15/2014   • Vestibular neuronitis 6/3/2016    Overview:  Last Assessment & Plan:  Since apparently her insurance increasing fluid intake  The patient indicates understanding of these issues and agrees to the plan. The patient is asked to return in 3 days if not better. Call if fever or worsening symptoms.

## 2020-03-19 ENCOUNTER — TELEPHONE (OUTPATIENT)
Dept: INTERNAL MEDICINE CLINIC | Facility: CLINIC | Age: 30
End: 2020-03-19

## 2020-03-19 NOTE — TELEPHONE ENCOUNTER
LMOVM to COB    Patient stated, \"I never picked up the amoxicillin that prescribed on 3/11/2020. \"  \"I went to Urgent care on Sunday and was prescribed an inhaler and Tessalon Perles. Was told to go to hospital if still not feeling better. \"    Dry Cough  Feels tired  Reports temperature:  4 times as 101.0  Temperature goes up and down   Reports shortness of breath and states, \"A little bit not to much\"    Denies recent travel  Denies contact with anyone that has tested positive for COVID-19. Per Payal BLACKMAN,    Begin the course of antibiotics that was prescribed on 3/11/2020. I called the pharmacy and spoke to [address] [place] [address] who informed me that the patient picked up the medication Augmentin 875 mg on 3/12/2020 at 4:40 pm.      Discussed with patient results and recommendations. Understanding expressed.

## 2020-03-19 NOTE — TELEPHONE ENCOUNTER
Was treated for a cold a week ago  Went to urgent care and still isnt feeling well  She has a cough with really bad sore throat and very tired, Fever: 101  Sleeping a lot    Please call to discuss.

## 2020-03-30 ENCOUNTER — TELEPHONE (OUTPATIENT)
Dept: INTERNAL MEDICINE CLINIC | Facility: CLINIC | Age: 30
End: 2020-03-30

## 2020-03-30 RX ORDER — AZITHROMYCIN 250 MG/1
TABLET, FILM COATED ORAL
Qty: 6 TABLET | Refills: 0 | Status: SHIPPED | OUTPATIENT
Start: 2020-03-30 | End: 2020-07-14

## 2020-03-30 NOTE — TELEPHONE ENCOUNTER
I called patient and she was following tomorrow with Vipin Lyons for a cough, but I screened her, and she has a fever of 100 and feels weakness. Please advise. I have cx her appt tomorrow.

## 2020-03-30 NOTE — TELEPHONE ENCOUNTER
Spoke with pt she has had the following symptoms x 14 days:  Low grade fever of 99 has not been higher than that, cough at night that is dry, sore throat, denies sinus symptoms. No use of OTC.  Pt denies recent travel or contact with confirmed/suspected stefani

## 2020-05-04 ENCOUNTER — TELEPHONE (OUTPATIENT)
Dept: NEUROLOGY | Facility: CLINIC | Age: 30
End: 2020-05-04

## 2020-05-04 RX ORDER — METHYLPREDNISOLONE 4 MG/1
TABLET ORAL
Qty: 1 PACKAGE | Refills: 0 | Status: SHIPPED | OUTPATIENT
Start: 2020-05-04 | End: 2020-07-14

## 2020-05-04 NOTE — TELEPHONE ENCOUNTER
Pt calling for advice on headaches x4 days, different from normal migraines. Stabbing pain in back of head toward front, bilateral, continuous. Tylenol/ibuprofen didn't help. Caffeine helped for a brief period. No other associated symptoms.   Denies any

## 2020-05-06 NOTE — TELEPHONE ENCOUNTER
Radha Michaud MD  You 2 days ago      Will do a round of medrol dose pack and if no change or worsening please go to the ED for CT  head and CT angiogram. Thanks    Routing comment      Left message on patient's non-confidential voice mail to call tracey

## 2020-07-28 ENCOUNTER — OFFICE VISIT (OUTPATIENT)
Dept: INTERNAL MEDICINE CLINIC | Facility: CLINIC | Age: 30
End: 2020-07-28
Payer: MEDICAID

## 2020-07-28 VITALS
DIASTOLIC BLOOD PRESSURE: 82 MMHG | OXYGEN SATURATION: 99 % | HEART RATE: 104 BPM | SYSTOLIC BLOOD PRESSURE: 126 MMHG | TEMPERATURE: 99 F | RESPIRATION RATE: 20 BRPM

## 2020-07-28 DIAGNOSIS — Z34.91 FIRST TRIMESTER PREGNANCY: Primary | ICD-10-CM

## 2020-07-28 DIAGNOSIS — I49.8 POTS (POSTURAL ORTHOSTATIC TACHYCARDIA SYNDROME): ICD-10-CM

## 2020-07-28 PROCEDURE — 3079F DIAST BP 80-89 MM HG: CPT | Performed by: NURSE PRACTITIONER

## 2020-07-28 PROCEDURE — 3074F SYST BP LT 130 MM HG: CPT | Performed by: NURSE PRACTITIONER

## 2020-07-28 PROCEDURE — 99213 OFFICE O/P EST LOW 20 MIN: CPT | Performed by: NURSE PRACTITIONER

## 2020-07-28 RX ORDER — LABETALOL 100 MG/1
100 TABLET, FILM COATED ORAL 2 TIMES DAILY
Qty: 60 TABLET | Refills: 0 | Status: SHIPPED | OUTPATIENT
Start: 2020-07-28 | End: 2020-08-27

## 2020-07-28 NOTE — PATIENT INSTRUCTIONS
Healthy Eating Habits During Pregnancy    It’s important to develop healthy eating habits while you are pregnant, for you as well as for your baby. Here are some ways to stay healthy.   Aim for a healthy weight  A slow, steady rate of weight gain is often tilefish, and albacore tuna  Things to limit  Ask your healthcare provider whether it’s safe to eat or drink:  · Caffeine  · Artificial sweeteners  · Organ meats  · Certain types of fish  · Fish and shellfish that contain mercury in lower amounts, like shr

## 2020-07-28 NOTE — PROGRESS NOTES
HPI:    Patient ID: Thea Venegas is a 30year old female. Patient presents with:  Pregnancy      HCG levels about 4 weeks pregnant. Has been following with Dr. Nguyen Negrete and has appt later today.  Endo has switched her levothyroxine and we reviewed thi operative laparoscopy with electrocautery of multiple ovarian cysts bilaterally.  it was lysis of a few pelvic adhesions   • OTHER SURGICAL HISTORY      LAPAROSCOPY, OPERATIVE , POSSIBLE LAPAROTOMY WITH/WO TUBAL PERFUSION performed by Dajuan Sosa MD at • Labetalol HCl 100 MG Oral Tab Take 1 tablet (100 mg total) by mouth 2 (two) times daily.  60 tablet 0   • Levothyroxine Sodium 175 MCG Oral Tab One tablet Monday through Friday, two tablets Saturdays and Sundays 102 tablet 3     Allergies:No Known Aller Temp 98.6 °F (37 °C)   Resp 20   LMP 05/28/2020   SpO2 99%   Physical Exam   Nursing note and vitals reviewed. Constitutional: She is oriented to person, place, and time and obese. She appears well-developed.    Cardiovascular: Regular rhythm and normal h build and keep strong bones. · Folic acid helps prevent certain birth defects. · Iodine helps the thyroid work right. · Some vitamins may not be safe to take. Your healthcare provider will tell you which ones to avoid.   Fluids  Drink at least 8 to 10 cu

## 2020-07-31 ENCOUNTER — OFFICE VISIT (OUTPATIENT)
Dept: INTERNAL MEDICINE CLINIC | Facility: CLINIC | Age: 30
End: 2020-07-31
Payer: MEDICAID

## 2020-07-31 ENCOUNTER — TELEPHONE (OUTPATIENT)
Dept: OBGYN CLINIC | Facility: CLINIC | Age: 30
End: 2020-07-31

## 2020-07-31 VITALS
SYSTOLIC BLOOD PRESSURE: 124 MMHG | RESPIRATION RATE: 20 BRPM | BODY MASS INDEX: 42 KG/M2 | HEIGHT: 66 IN | TEMPERATURE: 99 F | OXYGEN SATURATION: 96 % | HEART RATE: 98 BPM | DIASTOLIC BLOOD PRESSURE: 78 MMHG

## 2020-07-31 DIAGNOSIS — R42 DIZZINESS: Primary | ICD-10-CM

## 2020-07-31 DIAGNOSIS — Z3A.01 LESS THAN 8 WEEKS GESTATION OF PREGNANCY: ICD-10-CM

## 2020-07-31 PROBLEM — G57.62 LESION OF LEFT PLANTAR NERVE: Status: RESOLVED | Noted: 2017-06-27 | Resolved: 2020-07-31

## 2020-07-31 PROBLEM — Z98.84 S/P GASTRIC BYPASS: Status: RESOLVED | Noted: 2018-10-29 | Resolved: 2020-07-31

## 2020-07-31 PROBLEM — R00.0 SINUS TACHYCARDIA: Status: RESOLVED | Noted: 2017-01-06 | Resolved: 2020-07-31

## 2020-07-31 PROBLEM — R55 SYNCOPE AND COLLAPSE: Status: RESOLVED | Noted: 2019-01-02 | Resolved: 2020-07-31

## 2020-07-31 PROBLEM — Z98.84 H/O BARIATRIC SURGERY: Status: RESOLVED | Noted: 2018-08-03 | Resolved: 2020-07-31

## 2020-07-31 PROBLEM — E03.9 HYPOTHYROIDISM, UNSPECIFIED TYPE: Status: RESOLVED | Noted: 2019-11-16 | Resolved: 2020-07-31

## 2020-07-31 PROCEDURE — 3074F SYST BP LT 130 MM HG: CPT | Performed by: NURSE PRACTITIONER

## 2020-07-31 PROCEDURE — 99213 OFFICE O/P EST LOW 20 MIN: CPT | Performed by: NURSE PRACTITIONER

## 2020-07-31 PROCEDURE — 3078F DIAST BP <80 MM HG: CPT | Performed by: NURSE PRACTITIONER

## 2020-07-31 PROCEDURE — 3008F BODY MASS INDEX DOCD: CPT | Performed by: NURSE PRACTITIONER

## 2020-07-31 NOTE — TELEPHONE ENCOUNTER
Patient calling to initiate prenatal care  LMP 5/28/20  Patient is 7-8 weeks Confirmation Ultrasound and Appointment scheduled on  8/14/20  Insurance [facility]  Good time to return phone call [phone]    Her pcp referred her to come to us.  This will

## 2020-07-31 NOTE — PROGRESS NOTES
HPI:    Patient ID: Sharita Eckert is a 30year old female. Patient presents with:  Dizziness  Lab Results      C/o dizziness that started prior to starting labetolol. Hcg levels continue to rise, following with Dr. Shakira Starkey.  She is taking levothyroxin SURGICAL HISTORY Left     foot sx x3   • OTHER SURGICAL HISTORY  03/2012    operative laparoscopy with electrocautery of multiple ovarian cysts bilaterally.  it was lysis of a few pelvic adhesions   • OTHER SURGICAL HISTORY      LAPAROSCOPY, OPERATIVE , POS Acid (PRENATAL DHA) 200 MG Oral Cap Take 1 tablet by mouth daily. 30 capsule 0   • Labetalol HCl 100 MG Oral Tab Take 1 tablet (100 mg total) by mouth 2 (two) times daily.  60 tablet 0   • Levothyroxine Sodium 175 MCG Oral Tab One tablet Monday through [facility] Date    MG 1.9 01/04/2019        PHYSICAL EXAM:   /78   Pulse 98   Temp 98.6 °F (37 °C)   Resp 20   Ht 66\"   LMP 05/28/2020   SpO2 96%   BMI 42.29 kg/m²   Physical Exam   Nursing note and vitals reviewed.    Constitutional: She is oriented to person,

## 2020-08-03 ENCOUNTER — APPOINTMENT (OUTPATIENT)
Dept: LAB | Facility: HOSPITAL | Age: 30
End: 2020-08-03
Attending: NURSE PRACTITIONER
Payer: MEDICAID

## 2020-08-03 DIAGNOSIS — Z3A.01 LESS THAN 8 WEEKS GESTATION OF PREGNANCY: ICD-10-CM

## 2020-08-03 LAB — B-HCG SERPL-ACNC: 146 MIU/ML

## 2020-08-03 PROCEDURE — 84702 CHORIONIC GONADOTROPIN TEST: CPT

## 2020-08-03 PROCEDURE — 36415 COLL VENOUS BLD VENIPUNCTURE: CPT

## 2020-08-13 ENCOUNTER — OFFICE VISIT (OUTPATIENT)
Dept: INTERNAL MEDICINE CLINIC | Facility: CLINIC | Age: 30
End: 2020-08-13
Payer: MEDICAID

## 2020-08-13 VITALS
RESPIRATION RATE: 18 BRPM | HEART RATE: 102 BPM | SYSTOLIC BLOOD PRESSURE: 138 MMHG | TEMPERATURE: 99 F | OXYGEN SATURATION: 97 % | DIASTOLIC BLOOD PRESSURE: 78 MMHG

## 2020-08-13 DIAGNOSIS — F41.9 ANXIETY: ICD-10-CM

## 2020-08-13 DIAGNOSIS — O00.109 TUBAL ECTOPIC PREGNANCY, UNSPECIFIED LATERALITY, UNSPECIFIED WHETHER INTRAUTERINE PREGNANCY PRESENT: Primary | ICD-10-CM

## 2020-08-13 PROCEDURE — 99213 OFFICE O/P EST LOW 20 MIN: CPT | Performed by: NURSE PRACTITIONER

## 2020-08-13 PROCEDURE — 3075F SYST BP GE 130 - 139MM HG: CPT | Performed by: NURSE PRACTITIONER

## 2020-08-13 PROCEDURE — 3078F DIAST BP <80 MM HG: CPT | Performed by: NURSE PRACTITIONER

## 2020-08-13 RX ORDER — ALPRAZOLAM 0.5 MG/1
0.5 TABLET ORAL 2 TIMES DAILY PRN
Qty: 6 TABLET | Refills: 0 | Status: SHIPPED | OUTPATIENT
Start: 2020-08-13 | End: 2020-08-17

## 2020-08-13 NOTE — PROGRESS NOTES
HPI:    Patient ID: Jaja Solis is a 30year old female. Patient presents with:  Pregnancy      Has been following with Dr. Grady Ward, HCG levels not rising possible tubal pregnancy.  She is scheduled for Hysteroscopy,dilation and curettage,possible s multiple ovarian cysts bilaterally.  it was lysis of a few pelvic adhesions   • OTHER SURGICAL HISTORY      LAPAROSCOPY, OPERATIVE , POSSIBLE LAPAROTOMY WITH/WO TUBAL PERFUSION performed by Cynthia Roth MD at [address] [place] [address]   • OTHER SURGICAL HIST HCl 100 MG Oral Tab Take 1 tablet (100 mg total) by mouth 2 (two) times daily.  60 tablet 0   • Levothyroxine Sodium 175 MCG Oral Tab One tablet Monday through Friday, two tablets Saturdays and Sundays 102 tablet 3   • Docosahexaenoic Acid (PRENATAL DHA) 20 Component Value Date    MG 1.9 01/04/2019        PHYSICAL EXAM:   /78   Pulse 102   Temp 98.6 °F (37 °C)   Resp 18   LMP 05/28/2020   SpO2 97%   Physical Exam   Nursing note and vitals reviewed. Constitutional: She is obese.  She appears well-deve

## 2020-08-18 ENCOUNTER — TELEPHONE (OUTPATIENT)
Dept: OBGYN CLINIC | Facility: CLINIC | Age: 30
End: 2020-08-18

## 2020-08-18 NOTE — TELEPHONE ENCOUNTER
ANGÉLICA stated that pt was on his schedule for today and tomorrow for an appt in regards to a second opinion for possible ectopic. ANGÉLICA stated that pt is no longer on his schedule for today and wants to confirm with pt that she is keeping her appt for tomorrow. ABDIRIZAKTCB.

## 2020-08-25 ENCOUNTER — OFFICE VISIT (OUTPATIENT)
Dept: INTERNAL MEDICINE CLINIC | Facility: CLINIC | Age: 30
End: 2020-08-25
Payer: MEDICAID

## 2020-08-25 VITALS
DIASTOLIC BLOOD PRESSURE: 70 MMHG | HEIGHT: 65 IN | WEIGHT: 288 LBS | RESPIRATION RATE: 18 BRPM | HEART RATE: 100 BPM | SYSTOLIC BLOOD PRESSURE: 118 MMHG | TEMPERATURE: 98 F | BODY MASS INDEX: 47.98 KG/M2

## 2020-08-25 DIAGNOSIS — I49.8 POTS (POSTURAL ORTHOSTATIC TACHYCARDIA SYNDROME): ICD-10-CM

## 2020-08-25 DIAGNOSIS — Z98.890 S/P DILATION AND CURETTAGE: Primary | ICD-10-CM

## 2020-08-25 PROCEDURE — 3078F DIAST BP <80 MM HG: CPT | Performed by: NURSE PRACTITIONER

## 2020-08-25 PROCEDURE — 3074F SYST BP LT 130 MM HG: CPT | Performed by: NURSE PRACTITIONER

## 2020-08-25 PROCEDURE — 3008F BODY MASS INDEX DOCD: CPT | Performed by: NURSE PRACTITIONER

## 2020-08-25 PROCEDURE — 99214 OFFICE O/P EST MOD 30 MIN: CPT | Performed by: NURSE PRACTITIONER

## 2020-08-25 RX ORDER — HYDROCODONE BITARTRATE AND ACETAMINOPHEN 5; 325 MG/1; MG/1
1-2 TABLET ORAL
COMMUNITY
Start: 2020-08-24 | End: 2020-08-27

## 2020-08-25 RX ORDER — NAPROXEN 500 MG/1
500 TABLET ORAL 2 TIMES DAILY WITH MEALS
Qty: 28 TABLET | Refills: 0 | Status: SHIPPED | OUTPATIENT
Start: 2020-08-25 | End: 2020-09-18 | Stop reason: SINTOL

## 2020-08-25 NOTE — PROGRESS NOTES
HPI:    Patient ID: Bailey Lyons is a 30year old female. Patient presents with:  Post-Op: Pain medication are not helping      POD #1 Hysteroscopy,dilation and curettage,tubal patency laparoscopy with Dr. Delbert Germain at [facility].  Patient was sent home yeste • DILATION/CURETTAGE,DIAGNOSTIC     • FOOT EXCISION MORTONS NEUROMA Left 12/3/2013    Performed by Jaspal Gasca MD at [address]   • HYSTEROSCOPY  6/22/2011   • LAPAROSCOPY,DIAGNOSTIC      x3 r/t POS   • LAPAROSCOPY,PELVIC,BIOPSY  2008    drill ovary Daily; 1 cup coffee        Exercise: No          3 x a week 30  min         Seat Belt: Yes    Social History Narrative      Single, lives at home with her father, no children, works at [employer]         Current Outpatient Medications   Medication Sig D Component Value Date    T4F 1.70 08/17/2020    TSH 0.048 (L) 08/17/2020     Lab Results   Component Value Date    VITD 24.0 (L) 01/27/2020     Lab Results   Component Value Date    HIEU 34.3 07/12/2018     Lab Results   Component Value Date    FOLIC 23.3 07

## 2020-08-27 ENCOUNTER — LAB ENCOUNTER (OUTPATIENT)
Dept: LAB | Facility: HOSPITAL | Age: 30
End: 2020-08-27
Attending: OBSTETRICS & GYNECOLOGY
Payer: MEDICAID

## 2020-08-27 ENCOUNTER — TELEPHONE (OUTPATIENT)
Dept: OBGYN CLINIC | Facility: CLINIC | Age: 30
End: 2020-08-27

## 2020-08-27 ENCOUNTER — OFFICE VISIT (OUTPATIENT)
Dept: OBGYN CLINIC | Facility: CLINIC | Age: 30
End: 2020-08-27
Payer: MEDICAID

## 2020-08-27 ENCOUNTER — PATIENT MESSAGE (OUTPATIENT)
Dept: OBGYN CLINIC | Facility: CLINIC | Age: 30
End: 2020-08-27

## 2020-08-27 VITALS
DIASTOLIC BLOOD PRESSURE: 82 MMHG | SYSTOLIC BLOOD PRESSURE: 122 MMHG | HEIGHT: 65 IN | HEART RATE: 97 BPM | BODY MASS INDEX: 48.82 KG/M2 | WEIGHT: 293 LBS

## 2020-08-27 DIAGNOSIS — O00.211: ICD-10-CM

## 2020-08-27 DIAGNOSIS — N92.6 MISSED MENSES: ICD-10-CM

## 2020-08-27 DIAGNOSIS — O00.101 RIGHT TUBAL PREGNANCY WITHOUT INTRAUTERINE PREGNANCY: Primary | ICD-10-CM

## 2020-08-27 LAB
B-HCG SERPL-ACNC: 36 MIU/ML
CONTROL LINE PRESENT WITH A CLEAR BACKGROUND (YES/NO): YES YES/NO

## 2020-08-27 PROCEDURE — 84702 CHORIONIC GONADOTROPIN TEST: CPT

## 2020-08-27 PROCEDURE — 99203 OFFICE O/P NEW LOW 30 MIN: CPT | Performed by: OBSTETRICS & GYNECOLOGY

## 2020-08-27 PROCEDURE — 81025 URINE PREGNANCY TEST: CPT | Performed by: OBSTETRICS & GYNECOLOGY

## 2020-08-27 PROCEDURE — 3074F SYST BP LT 130 MM HG: CPT | Performed by: OBSTETRICS & GYNECOLOGY

## 2020-08-27 PROCEDURE — 36415 COLL VENOUS BLD VENIPUNCTURE: CPT

## 2020-08-27 PROCEDURE — 3079F DIAST BP 80-89 MM HG: CPT | Performed by: OBSTETRICS & GYNECOLOGY

## 2020-08-27 PROCEDURE — 3008F BODY MASS INDEX DOCD: CPT | Performed by: OBSTETRICS & GYNECOLOGY

## 2020-08-27 NOTE — PROGRESS NOTES
Manuela Rodriguez is a 30year old female  Patient's last menstrual period was 2020 (exact date).  Patient presents with:  Gyn Problem: had laproscopic surgery and does not know what they did and they said they found something but could not remov Dilation/curettage,diagnostic     • Hysteroscopy  6/22/2011   • Laparoscopy,diagnostic      x3 r/t POS   • Laparoscopy,pelvic,biopsy  2008    drill ovary   • Other surgical history Left     foot sx x3   • Other surgical history  03/2012    operative laparo Attends meetings of clubs or organizations: Not on file        Relationship status: Not on file      Intimate partner violence:        Fear of current or ex partner: Not on file        Emotionally abused: Not on file        Physically abused: Not on valerie distribution, and no lesions  Urethral Meatus:  normal in size, location, without lesions and prolapse  Bladder:  No fullness, masses or tenderness  Vagina:  Normal appearance without lesions, no abnormal discharge  Cervix:  Normal without tenderness on mo

## 2020-08-27 NOTE — TELEPHONE ENCOUNTER
Per Dr. Toya Sutton recommendations, patient instructed to go to Emergency Room at [facility] for evaluation. Patient verbalizes understanding.

## 2020-08-27 NOTE — TELEPHONE ENCOUNTER
Per Dr. Alen Shaikh orders patient informed of HCG results and recommendations for Methotrexate injection. Patient provided with a phone number to schedule appointment with Dusty tomorrow morning.   Patient states she has been having pain, taking 80

## 2020-08-27 NOTE — TELEPHONE ENCOUNTER
Pt just called to let us know that she just did her stat test that dr HILL ordered today. Please advise.  Thanks

## 2020-08-28 ENCOUNTER — HOSPITAL ENCOUNTER (EMERGENCY)
Facility: HOSPITAL | Age: 30
Discharge: HOME | End: 2020-08-28
Attending: EMERGENCY MEDICINE
Payer: MEDICAID

## 2020-08-28 ENCOUNTER — APPOINTMENT (OUTPATIENT)
Dept: ULTRASOUND IMAGING | Facility: HOSPITAL | Age: 30
End: 2020-08-28
Attending: EMERGENCY MEDICINE
Payer: MEDICAID

## 2020-08-28 ENCOUNTER — TELEPHONE (OUTPATIENT)
Dept: OBGYN CLINIC | Facility: CLINIC | Age: 30
End: 2020-08-28

## 2020-08-28 VITALS
HEIGHT: 66 IN | HEART RATE: 82 BPM | BODY MASS INDEX: 45 KG/M2 | SYSTOLIC BLOOD PRESSURE: 120 MMHG | RESPIRATION RATE: 20 BRPM | DIASTOLIC BLOOD PRESSURE: 81 MMHG | OXYGEN SATURATION: 98 % | TEMPERATURE: 99 F | WEIGHT: 280 LBS

## 2020-08-28 DIAGNOSIS — O00.90 ECTOPIC PREGNANCY WITHOUT INTRAUTERINE PREGNANCY, UNSPECIFIED LOCATION: Primary | ICD-10-CM

## 2020-08-28 LAB
B-HCG SERPL-ACNC: 28 MIU/ML
BILIRUB UR QL STRIP.AUTO: NEGATIVE
GLUCOSE UR STRIP.AUTO-MCNC: NEGATIVE MG/DL
KETONES UR STRIP.AUTO-MCNC: NEGATIVE MG/DL
NITRITE UR QL STRIP.AUTO: NEGATIVE
PH UR STRIP.AUTO: 8 [PH] (ref 4.5–8)
PROT UR STRIP.AUTO-MCNC: NEGATIVE MG/DL
RBC #/AREA URNS AUTO: >10 /HPF
SP GR UR STRIP.AUTO: 1.01 (ref 1–1.03)
UROBILINOGEN UR STRIP.AUTO-MCNC: <2 MG/DL

## 2020-08-28 PROCEDURE — 84702 CHORIONIC GONADOTROPIN TEST: CPT | Performed by: EMERGENCY MEDICINE

## 2020-08-28 PROCEDURE — 96372 THER/PROPH/DIAG INJ SC/IM: CPT

## 2020-08-28 PROCEDURE — 36415 COLL VENOUS BLD VENIPUNCTURE: CPT

## 2020-08-28 PROCEDURE — 76817 TRANSVAGINAL US OBSTETRIC: CPT | Performed by: EMERGENCY MEDICINE

## 2020-08-28 PROCEDURE — 76801 OB US < 14 WKS SINGLE FETUS: CPT | Performed by: EMERGENCY MEDICINE

## 2020-08-28 PROCEDURE — 99284 EMERGENCY DEPT VISIT MOD MDM: CPT

## 2020-08-28 PROCEDURE — 81001 URINALYSIS AUTO W/SCOPE: CPT | Performed by: EMERGENCY MEDICINE

## 2020-08-28 NOTE — TELEPHONE ENCOUNTER
Patient called to get a methotrexate order. Patient was instructed to go to [facility] yesterday but patient did not go stating her insurance would not cover it.  Spoke with Dr Gretchen Del Valle and she still is recommending to go to [facility]

## 2020-08-28 NOTE — ED INITIAL ASSESSMENT (HPI)
Patient (+) ectopic pregnancy, had D&C on Monday. Patients hcg levels are not going down, still having LLQ pain.

## 2020-08-28 NOTE — ED PROVIDER NOTES
Patient Seen in: [facility] Emergency Department      History   Patient presents with:  Abdomen/Flank Pain    Stated Complaint: + ectopic, had d & c at [facility] on Monday, hcg levels not decreasing    HPI    68-year-old female presents reporting a recent DILATION/CURETTAGE,DIAGNOSTIC     • FOOT EXCISION MORTONS NEUROMA Left 12/3/2013    Performed by Jaspal Gasca MD at [address]   • HYSTEROSCOPY  6/22/2011   • LAPAROSCOPY,DIAGNOSTIC      x3 r/t POS   • LAPAROSCOPY,PELVIC,BIOPSY  2008    drill ovary rigidity   Lungs: good air exchange and clear   Heart: regular rate rhythm and no murmur   Abdomen: Moderate tenderness on palpation of the right lower quadrant and suprapubic region. No tenderness on palpation of the left lower quadrant.   No rebound or g echogenicity, without discrete polyps, microcystic changes, or dystrophic calcifications, while maintaining its well-demarcated endometrial-myometrial sonographic interface, without discrete subendometrial myometrial cysts.  THE ADNEXAL STRUCTURES: The Righ significant masses are identified. Follicles are seen within the ovary. CUL-DE-SAC:  No free fluid  OTHER:  No adnexal mass. .               CONCLUSION:  No right adnexal mass, or other adnexal mass identified.   No sign of hemoperitoneum or other free flu

## 2020-08-31 NOTE — TELEPHONE ENCOUNTER
From: Alfred Davis  To: Mendy Carmona DO  Sent: 8/27/2020 6:51 PM CDT  Subject: Other    Hello, should I go to the er today or tomorrow.  What do you think after ten today or tomorrow

## 2020-09-05 DIAGNOSIS — G90.A POTS (POSTURAL ORTHOSTATIC TACHYCARDIA SYNDROME): ICD-10-CM

## 2020-09-08 ENCOUNTER — HOSPITAL ENCOUNTER (EMERGENCY)
Facility: HOSPITAL | Age: 30
Discharge: HOME | End: 2020-09-08
Attending: EMERGENCY MEDICINE
Payer: MEDICAID

## 2020-09-08 ENCOUNTER — OFFICE VISIT (OUTPATIENT)
Dept: INTERNAL MEDICINE CLINIC | Facility: CLINIC | Age: 30
End: 2020-09-08
Payer: MEDICAID

## 2020-09-08 ENCOUNTER — APPOINTMENT (OUTPATIENT)
Dept: ULTRASOUND IMAGING | Facility: HOSPITAL | Age: 30
End: 2020-09-08
Attending: EMERGENCY MEDICINE
Payer: MEDICAID

## 2020-09-08 ENCOUNTER — APPOINTMENT (OUTPATIENT)
Dept: CT IMAGING | Facility: HOSPITAL | Age: 30
End: 2020-09-08
Attending: EMERGENCY MEDICINE
Payer: MEDICAID

## 2020-09-08 VITALS
OXYGEN SATURATION: 97 % | BODY MASS INDEX: 45 KG/M2 | RESPIRATION RATE: 18 BRPM | HEIGHT: 66 IN | SYSTOLIC BLOOD PRESSURE: 156 MMHG | DIASTOLIC BLOOD PRESSURE: 99 MMHG | HEART RATE: 98 BPM | TEMPERATURE: 98 F | WEIGHT: 280 LBS

## 2020-09-08 VITALS
TEMPERATURE: 99 F | SYSTOLIC BLOOD PRESSURE: 136 MMHG | WEIGHT: 280 LBS | BODY MASS INDEX: 45 KG/M2 | RESPIRATION RATE: 20 BRPM | HEART RATE: 96 BPM | OXYGEN SATURATION: 97 % | DIASTOLIC BLOOD PRESSURE: 88 MMHG | HEIGHT: 66 IN

## 2020-09-08 DIAGNOSIS — E66.01 MORBID OBESITY WITH BMI OF 40.0-44.9, ADULT (HCC): ICD-10-CM

## 2020-09-08 DIAGNOSIS — K20.90 ESOPHAGITIS: Primary | ICD-10-CM

## 2020-09-08 DIAGNOSIS — R10.9 ABDOMINAL PAIN OF UNKNOWN ETIOLOGY: Primary | ICD-10-CM

## 2020-09-08 DIAGNOSIS — K20.90 ESOPHAGITIS: ICD-10-CM

## 2020-09-08 LAB
ALBUMIN SERPL-MCNC: 3.2 G/DL (ref 3.4–5)
ALBUMIN/GLOB SERPL: 0.9 {RATIO} (ref 1–2)
ALP LIVER SERPL-CCNC: 86 U/L (ref 37–98)
ALT SERPL-CCNC: 29 U/L (ref 13–56)
ANION GAP SERPL CALC-SCNC: 6 MMOL/L (ref 0–18)
AST SERPL-CCNC: 26 U/L (ref 15–37)
BASOPHILS # BLD AUTO: 0.04 X10(3) UL (ref 0–0.2)
BASOPHILS NFR BLD AUTO: 0.5 %
BILIRUB SERPL-MCNC: 0.6 MG/DL (ref 0.1–2)
BILIRUB UR QL STRIP.AUTO: NEGATIVE
BUN BLD-MCNC: 11 MG/DL (ref 7–18)
BUN/CREAT SERPL: 12.8 (ref 10–20)
CALCIUM BLD-MCNC: 8.2 MG/DL (ref 8.5–10.1)
CHLORIDE SERPL-SCNC: 112 MMOL/L (ref 98–112)
CLARITY UR REFRACT.AUTO: CLEAR
CO2 SERPL-SCNC: 23 MMOL/L (ref 21–32)
COLOR UR AUTO: YELLOW
CREAT BLD-MCNC: 0.86 MG/DL (ref 0.55–1.02)
DEPRECATED RDW RBC AUTO: 43.4 FL (ref 35.1–46.3)
EOSINOPHIL # BLD AUTO: 0.32 X10(3) UL (ref 0–0.7)
EOSINOPHIL NFR BLD AUTO: 4.2 %
ERYTHROCYTE [DISTWIDTH] IN BLOOD BY AUTOMATED COUNT: 14.9 % (ref 11–15)
GLOBULIN PLAS-MCNC: 3.5 G/DL (ref 2.8–4.4)
GLUCOSE BLD-MCNC: 88 MG/DL (ref 70–99)
GLUCOSE UR STRIP.AUTO-MCNC: NEGATIVE MG/DL
HCT VFR BLD AUTO: 40.8 % (ref 35–48)
HGB BLD-MCNC: 12.9 G/DL (ref 12–16)
IMM GRANULOCYTES # BLD AUTO: 0.02 X10(3) UL (ref 0–1)
IMM GRANULOCYTES NFR BLD: 0.3 %
KETONES UR STRIP.AUTO-MCNC: NEGATIVE MG/DL
LIPASE SERPL-CCNC: 155 U/L (ref 73–393)
LYMPHOCYTES # BLD AUTO: 2.39 X10(3) UL (ref 1–4)
LYMPHOCYTES NFR BLD AUTO: 31.5 %
M PROTEIN MFR SERPL ELPH: 6.7 G/DL (ref 6.4–8.2)
MCH RBC QN AUTO: 26.3 PG (ref 26–34)
MCHC RBC AUTO-ENTMCNC: 31.6 G/DL (ref 31–37)
MCV RBC AUTO: 83.3 FL (ref 80–100)
MONOCYTES # BLD AUTO: 1.03 X10(3) UL (ref 0.1–1)
MONOCYTES NFR BLD AUTO: 13.6 %
NEUTROPHILS # BLD AUTO: 3.78 X10 (3) UL (ref 1.5–7.7)
NEUTROPHILS # BLD AUTO: 3.78 X10(3) UL (ref 1.5–7.7)
NEUTROPHILS NFR BLD AUTO: 49.9 %
NITRITE UR QL STRIP.AUTO: NEGATIVE
OSMOLALITY SERPL CALC.SUM OF ELEC: 291 MOSM/KG (ref 275–295)
PH UR STRIP.AUTO: 7 [PH] (ref 4.5–8)
PLATELET # BLD AUTO: 269 10(3)UL (ref 150–450)
POCT URINE PREGNANCY: NEGATIVE
POTASSIUM SERPL-SCNC: 3.7 MMOL/L (ref 3.5–5.1)
PROT UR STRIP.AUTO-MCNC: NEGATIVE MG/DL
RBC # BLD AUTO: 4.9 X10(6)UL (ref 3.8–5.3)
RBC UR QL AUTO: NEGATIVE
SODIUM SERPL-SCNC: 141 MMOL/L (ref 136–145)
SP GR UR STRIP.AUTO: 1.01 (ref 1–1.03)
UROBILINOGEN UR STRIP.AUTO-MCNC: <2 MG/DL
WBC # BLD AUTO: 7.6 X10(3) UL (ref 4–11)

## 2020-09-08 PROCEDURE — 3008F BODY MASS INDEX DOCD: CPT | Performed by: NURSE PRACTITIONER

## 2020-09-08 PROCEDURE — 96375 TX/PRO/DX INJ NEW DRUG ADDON: CPT

## 2020-09-08 PROCEDURE — 99285 EMERGENCY DEPT VISIT HI MDM: CPT

## 2020-09-08 PROCEDURE — 87086 URINE CULTURE/COLONY COUNT: CPT | Performed by: EMERGENCY MEDICINE

## 2020-09-08 PROCEDURE — 76705 ECHO EXAM OF ABDOMEN: CPT | Performed by: EMERGENCY MEDICINE

## 2020-09-08 PROCEDURE — C9113 INJ PANTOPRAZOLE SODIUM, VIA: HCPCS | Performed by: EMERGENCY MEDICINE

## 2020-09-08 PROCEDURE — 3075F SYST BP GE 130 - 139MM HG: CPT | Performed by: NURSE PRACTITIONER

## 2020-09-08 PROCEDURE — 80053 COMPREHEN METABOLIC PANEL: CPT | Performed by: EMERGENCY MEDICINE

## 2020-09-08 PROCEDURE — 93005 ELECTROCARDIOGRAM TRACING: CPT

## 2020-09-08 PROCEDURE — 81025 URINE PREGNANCY TEST: CPT

## 2020-09-08 PROCEDURE — 3079F DIAST BP 80-89 MM HG: CPT | Performed by: NURSE PRACTITIONER

## 2020-09-08 PROCEDURE — 74177 CT ABD & PELVIS W/CONTRAST: CPT | Performed by: EMERGENCY MEDICINE

## 2020-09-08 PROCEDURE — 96374 THER/PROPH/DIAG INJ IV PUSH: CPT

## 2020-09-08 PROCEDURE — 81001 URINALYSIS AUTO W/SCOPE: CPT | Performed by: EMERGENCY MEDICINE

## 2020-09-08 PROCEDURE — 99214 OFFICE O/P EST MOD 30 MIN: CPT | Performed by: NURSE PRACTITIONER

## 2020-09-08 PROCEDURE — 85025 COMPLETE CBC W/AUTO DIFF WBC: CPT | Performed by: EMERGENCY MEDICINE

## 2020-09-08 PROCEDURE — 96376 TX/PRO/DX INJ SAME DRUG ADON: CPT

## 2020-09-08 PROCEDURE — 83690 ASSAY OF LIPASE: CPT | Performed by: EMERGENCY MEDICINE

## 2020-09-08 PROCEDURE — 93010 ELECTROCARDIOGRAM REPORT: CPT

## 2020-09-08 RX ORDER — ONDANSETRON 2 MG/ML
4 INJECTION INTRAMUSCULAR; INTRAVENOUS ONCE
Status: COMPLETED | OUTPATIENT
Start: 2020-09-08 | End: 2020-09-08

## 2020-09-08 RX ORDER — MORPHINE SULFATE 2 MG/ML
4 INJECTION, SOLUTION INTRAMUSCULAR; INTRAVENOUS EVERY 30 MIN PRN
Status: DISCONTINUED | OUTPATIENT
Start: 2020-09-08 | End: 2020-09-08

## 2020-09-08 RX ORDER — DICYCLOMINE HCL 20 MG
20 TABLET ORAL 3 TIMES DAILY
Qty: 20 TABLET | Refills: 0 | Status: SHIPPED | OUTPATIENT
Start: 2020-09-08 | End: 2020-10-20

## 2020-09-08 RX ORDER — MAGNESIUM HYDROXIDE/ALUMINUM HYDROXICE/SIMETHICONE 120; 1200; 1200 MG/30ML; MG/30ML; MG/30ML
30 SUSPENSION ORAL ONCE
Status: COMPLETED | OUTPATIENT
Start: 2020-09-08 | End: 2020-09-08

## 2020-09-08 RX ORDER — HYDROCODONE BITARTRATE AND ACETAMINOPHEN 5; 325 MG/1; MG/1
1-2 TABLET ORAL EVERY 6 HOURS PRN
Qty: 10 TABLET | Refills: 0 | Status: SHIPPED | OUTPATIENT
Start: 2020-09-08 | End: 2020-09-11

## 2020-09-08 RX ORDER — OMEPRAZOLE 40 MG/1
40 CAPSULE, DELAYED RELEASE ORAL DAILY
Qty: 30 CAPSULE | Refills: 0 | Status: SHIPPED | OUTPATIENT
Start: 2020-09-08 | End: 2020-09-09

## 2020-09-08 RX ORDER — LABETALOL 100 MG/1
TABLET, FILM COATED ORAL
Qty: 60 TABLET | Refills: 0 | Status: SHIPPED | OUTPATIENT
Start: 2020-09-08 | End: 2020-10-20

## 2020-09-08 RX ORDER — KETOROLAC TROMETHAMINE 30 MG/ML
15 INJECTION, SOLUTION INTRAMUSCULAR; INTRAVENOUS ONCE
Status: COMPLETED | OUTPATIENT
Start: 2020-09-08 | End: 2020-09-08

## 2020-09-08 RX ORDER — LIDOCAINE HYDROCHLORIDE 20 MG/ML
10 SOLUTION OROPHARYNGEAL ONCE
Status: COMPLETED | OUTPATIENT
Start: 2020-09-08 | End: 2020-09-08

## 2020-09-08 NOTE — PROGRESS NOTES
HPI:    Patient ID: Shay Larsen is a 30year old female.     Patient presents with:  ER F/U: pt still having upper epigastric pain, nausea (pt states she needs referral to GI for \"esophagus problem\")      She was in the ER this morning for epigastric Last Assessment & Plan:  Since apparently her insurance company does not work on the weekend, we will wait for their eval on 6/13/16.   She has been cleared for dc to snf for rehab per all consultants     Past Surgical History:   Procedure Laterality Date Smoker      Smokeless tobacco: Never Used    Substance and Sexual Activity      Alcohol use: Not Currently        Alcohol/week: 0.0 standard drinks        Comment: social      Drug use: No      Sexual activity: Yes        Partners: Male    Other Topics 7.6 09/08/2020    RBC 4.90 09/08/2020    HGB 12.9 09/08/2020    HCT 40.8 09/08/2020    MCV 83.3 09/08/2020    MCH 26.3 09/08/2020    MCHC 31.6 09/08/2020    RDW 14.9 09/08/2020    .0 09/08/2020    MPV 9.6 07/05/2018     Lab Results   Component Value calories daily and walking at least 3 miles a day      Patient Instructions   Pepcid one tablet q12 hours prn for pain      Monae Sprinegr, CURTIS

## 2020-09-08 NOTE — ED NOTES
Pt to [address] via wheelchair. On return from [address], pt became lightheaded, Got back to bed, placed on monitor. Vital signs taken. MD Tosin Dover aware.

## 2020-09-08 NOTE — ED NOTES
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 Dr Alycia Jordan notified of re read, no orders received.

## 2020-09-08 NOTE — ED NOTES
Pt ldischarged, leaves ED with . Work note provided. Ambulatory with steady gait, VSS, verbalized understanding of discharge and follow-up.

## 2020-09-08 NOTE — ED PROVIDER NOTES
Patient Seen in: [facility] Emergency Department      History   Patient presents with:  Abdomen/Flank Pain    Stated Complaint: ABD PAIN    HPI    Patient is a 73-year-old female comes emergency room for evaluation of abdominal pain.   Patient compla 03/2012    operative laparoscopy with electrocautery of multiple ovarian cysts bilaterally.  it was lysis of a few pelvic adhesions   • OTHER SURGICAL HISTORY      LAPAROSCOPY, OPERATIVE , POSSIBLE LAPAROTOMY WITH/WO TUBAL PERFUSION performed by Brielle Dye sounds are present. Soft. nondistended, no pulsatile masses. Mild tenderness in the epigastrium and bilateral upper quadrants. Lower abdomen nontender. Laparoscopy sites clean, dry, intact  MUSCULOSKELETAL: No calf tenderness.   Dorsalis and Posterior Ti morphINE sulfate (PF) 2 MG/ML injection 4 mg (4 mg Intravenous Given 9/8/20 0348)   ketorolac tromethamine (TORADOL) 30 MG/ML injection 15 mg (15 mg Intravenous Given 9/8/20 0231)   ondansetron HCl (ZOFRAN) injection 4 mg (4 mg Intravenous Given 9/8/20 0 case with the patient and they had no questions, complaints, or concerns. Patient felt comfortable going home.             Disposition and Plan     Clinical Impression:  Abdominal pain of unknown etiology  (primary encounter diagnosis)  Esophagitis    Disp

## 2020-09-09 LAB
ATRIAL RATE: 95 BPM
P AXIS: 25 DEGREES
P-R INTERVAL: 178 MS
Q-T INTERVAL: 396 MS
QRS DURATION: 84 MS
QTC CALCULATION (BEZET): 497 MS
R AXIS: 79 DEGREES
T AXIS: 44 DEGREES
VENTRICULAR RATE: 95 BPM

## 2020-09-18 PROBLEM — Z87.59 HX OF ECTOPIC PREGNANCY: Status: ACTIVE | Noted: 2020-09-18

## 2020-09-23 PROBLEM — E66.01 MORBID OBESITY WITH BMI OF 40.0-44.9, ADULT (HCC): Status: RESOLVED | Noted: 2018-03-05 | Resolved: 2020-09-23

## 2020-10-16 ENCOUNTER — OFFICE VISIT (OUTPATIENT)
Dept: NEUROLOGY | Facility: CLINIC | Age: 30
End: 2020-10-16
Payer: MEDICAID

## 2020-10-16 VITALS
RESPIRATION RATE: 16 BRPM | WEIGHT: 280 LBS | BODY MASS INDEX: 45 KG/M2 | SYSTOLIC BLOOD PRESSURE: 128 MMHG | HEART RATE: 90 BPM | DIASTOLIC BLOOD PRESSURE: 78 MMHG

## 2020-10-16 DIAGNOSIS — G43.009 MIGRAINE WITHOUT AURA AND WITHOUT STATUS MIGRAINOSUS, NOT INTRACTABLE: ICD-10-CM

## 2020-10-16 DIAGNOSIS — S09.90XA CLOSED HEAD INJURY, INITIAL ENCOUNTER: Primary | ICD-10-CM

## 2020-10-16 DIAGNOSIS — I49.8 POTS (POSTURAL ORTHOSTATIC TACHYCARDIA SYNDROME): ICD-10-CM

## 2020-10-16 DIAGNOSIS — G93.9 CHRONIC NON-SPECIFIC WHITE MATTER LESIONS ON MRI: ICD-10-CM

## 2020-10-16 PROCEDURE — 3074F SYST BP LT 130 MM HG: CPT | Performed by: OTHER

## 2020-10-16 PROCEDURE — 3078F DIAST BP <80 MM HG: CPT | Performed by: OTHER

## 2020-10-16 PROCEDURE — 99214 OFFICE O/P EST MOD 30 MIN: CPT | Performed by: OTHER

## 2020-10-16 NOTE — PROGRESS NOTES
HPI:    Patient ID: Luz Maria Westfall is a 30year old female. Luz Maria Westfall is a 30year old female who presented with complaints of head injury. She has a history of migraines, chronic dizziness, POTS, chronic non-specific white matter lesions.  She wa • DILATION/CURETTAGE,DIAGNOSTIC     • FOOT EXCISION MORTONS NEUROMA Left 12/3/2013    Performed by Malcom Mcintyre MD at [facility] MAIN OR   • HYSTEROSCOPY  6/22/2011   • LAPAROSCOPY,DIAGNOSTIC      x3 r/t POS   • LAPAROSCOPY,PELVIC,BIOPSY  2008    drill ovar Neurological: Positive for headaches. Negative for dizziness, syncope and light-headedness. All other systems reviewed and are negative.            Current Outpatient Medications   Medication Sig Dispense Refill   • Butalbital-APAP-Caffeine -40 MG normal.   Skin: Skin is warm and dry. Psychiatric:normal mood and affect. NEUROLOGICAL: This patient is alert and orientated x 3. Speech is fluent with intact comprehension. Pupils equally round and reactive to light. 3+ brisk bilaterally. EOMs intact. encounter.       Meds This Visit:  Requested Prescriptions      No prescriptions requested or ordered in this encounter       Imaging & Referrals:  None       ID#[id number]    Headache   Pertinent negatives include no dizziness, nausea, neck pain, photophobia or

## 2020-10-16 NOTE — PROGRESS NOTES
Pt got concussion on Monday after slip and fall in shower. Pt having pain in back of head since then.

## 2020-11-12 ENCOUNTER — HOSPITAL ENCOUNTER (OUTPATIENT)
Dept: GENERAL RADIOLOGY | Facility: HOSPITAL | Age: 30
Discharge: HOME | End: 2020-11-12
Attending: FAMILY MEDICINE
Payer: MEDICAID

## 2020-11-12 DIAGNOSIS — J20.9 ACUTE BRONCHITIS: ICD-10-CM

## 2020-11-12 PROCEDURE — 71046 X-RAY EXAM CHEST 2 VIEWS: CPT | Performed by: FAMILY MEDICINE

## 2020-12-14 ENCOUNTER — HOSPITAL ENCOUNTER (OUTPATIENT)
Age: 30
Discharge: HOME | End: 2020-12-14
Payer: MEDICAID

## 2020-12-14 VITALS
TEMPERATURE: 98 F | BODY MASS INDEX: 45 KG/M2 | RESPIRATION RATE: 18 BRPM | OXYGEN SATURATION: 100 % | WEIGHT: 280 LBS | SYSTOLIC BLOOD PRESSURE: 139 MMHG | DIASTOLIC BLOOD PRESSURE: 91 MMHG | HEART RATE: 87 BPM

## 2020-12-14 DIAGNOSIS — S39.012A LUMBAR STRAIN, INITIAL ENCOUNTER: Primary | ICD-10-CM

## 2020-12-14 PROCEDURE — 99213 OFFICE O/P EST LOW 20 MIN: CPT | Performed by: PHYSICIAN ASSISTANT

## 2020-12-14 PROCEDURE — 81002 URINALYSIS NONAUTO W/O SCOPE: CPT | Performed by: PHYSICIAN ASSISTANT

## 2020-12-14 PROCEDURE — 81025 URINE PREGNANCY TEST: CPT | Performed by: PHYSICIAN ASSISTANT

## 2020-12-14 RX ORDER — CYCLOBENZAPRINE HCL 5 MG
5 TABLET ORAL 3 TIMES DAILY PRN
Qty: 15 TABLET | Refills: 0 | Status: SHIPPED | OUTPATIENT
Start: 2020-12-14 | End: 2020-12-19

## 2020-12-14 NOTE — ED INITIAL ASSESSMENT (HPI)
R flank pain x 72 hours, Hx of kidney stones & cyst on right kidney. Denies any N/V/D. Denies abd pain or injury. Denies burning or frequency. Minimal relief w 1 gram Tylenol PTA. Intermittent dizziness but not now.

## 2020-12-14 NOTE — ED PROVIDER NOTES
Patient [facility]en [facility]: [facility] [address] [address] [address]      History   Patient presents with:  Back Pain: Righr Flank    Stated Complaint: kidney pain    HPI    CHIEF COMPLAINT: Right flank pain    HISTORY OF PRESENT ILLNESS: Patient is a pleasant 24-year-old f agree. The patient's family history is reviewed and is noncontributory to the presenting problem, except as indicated as above.     Past Medical History:   Diagnosis Date   • Anemia    • Anesthesia complication    • Bilateral chronic vestibular neuritis THYROIDECTOMY Bilateral 01/15/2016                Family history reviewed with patient/caregiver and is not pertinent to presenting problem.     Social History    Tobacco Use      Smoking status: Never Smoker      Smokeless tobacco: Never Used    Alcohol us capillary refill  Musculoskeletal: neck is supple, no lymphadenopathy, non tender. no meningeal signs.        Extremities are symmetrical, full range of motion  Psychiatric: there is no agitation    ED Course     Labs Reviewed   POCT URINALYSIS DIPSTICK - A emergency basis. Comprehensive verbal and written discharge and follow-up instructions were provided to help prevent relapse or worsening. I discussed the case with the patient and they had no questions, complaints, or concerns.   Patient states they und

## 2021-02-01 ENCOUNTER — OFFICE VISIT (OUTPATIENT)
Dept: OCCUPATIONAL MEDICINE | Age: 31
End: 2021-02-01
Attending: PHYSICIAN ASSISTANT

## 2021-02-03 ENCOUNTER — HOSPITAL ENCOUNTER (OUTPATIENT)
Dept: GENERAL RADIOLOGY | Age: 31
Discharge: HOME | End: 2021-02-03
Attending: FAMILY MEDICINE

## 2021-02-03 ENCOUNTER — OFFICE VISIT (OUTPATIENT)
Dept: OCCUPATIONAL MEDICINE | Age: 31
End: 2021-02-03
Attending: FAMILY MEDICINE

## 2021-02-03 DIAGNOSIS — R76.11 POSITIVE TB TEST: ICD-10-CM

## 2021-02-03 DIAGNOSIS — R76.11 POSITIVE TB TEST: Primary | ICD-10-CM

## 2021-03-29 ENCOUNTER — LAB ENCOUNTER (OUTPATIENT)
Dept: LAB | Facility: HOSPITAL | Age: 31
End: 2021-03-29
Attending: OBSTETRICS & GYNECOLOGY
Payer: MEDICAID

## 2021-03-29 DIAGNOSIS — E03.9 HYPOTHYROIDISM (ACQUIRED): ICD-10-CM

## 2021-03-29 DIAGNOSIS — N91.2 AMENORRHEA: ICD-10-CM

## 2021-03-29 PROCEDURE — 36415 COLL VENOUS BLD VENIPUNCTURE: CPT

## 2021-03-29 PROCEDURE — 84702 CHORIONIC GONADOTROPIN TEST: CPT

## 2021-03-29 PROCEDURE — 84144 ASSAY OF PROGESTERONE: CPT

## 2021-03-29 PROCEDURE — 84443 ASSAY THYROID STIM HORMONE: CPT

## 2021-03-29 PROCEDURE — 84439 ASSAY OF FREE THYROXINE: CPT

## 2021-04-13 ENCOUNTER — IMMUNIZATION (OUTPATIENT)
Dept: LAB | Age: 31
End: 2021-04-13
Attending: HOSPITALIST
Payer: MEDICAID

## 2021-04-13 DIAGNOSIS — Z23 NEED FOR VACCINATION: Primary | ICD-10-CM

## 2021-04-13 PROCEDURE — 0002A SARSCOV2 VAC 30MCG/0.3ML IM: CPT

## 2021-06-09 ENCOUNTER — OFFICE VISIT (OUTPATIENT)
Dept: NEUROLOGY | Facility: CLINIC | Age: 31
End: 2021-06-09
Payer: MEDICAID

## 2021-06-09 VITALS
SYSTOLIC BLOOD PRESSURE: 118 MMHG | HEART RATE: 86 BPM | DIASTOLIC BLOOD PRESSURE: 74 MMHG | WEIGHT: 243 LBS | BODY MASS INDEX: 39 KG/M2 | RESPIRATION RATE: 16 BRPM

## 2021-06-09 DIAGNOSIS — G43.009 MIGRAINE WITHOUT AURA AND WITHOUT STATUS MIGRAINOSUS, NOT INTRACTABLE: ICD-10-CM

## 2021-06-09 DIAGNOSIS — E23.7 PITUITARY ABNORMALITY (HCC): Primary | ICD-10-CM

## 2021-06-09 PROCEDURE — 99213 OFFICE O/P EST LOW 20 MIN: CPT | Performed by: OTHER

## 2021-06-09 PROCEDURE — 3074F SYST BP LT 130 MM HG: CPT | Performed by: OTHER

## 2021-06-09 PROCEDURE — 3078F DIAST BP <80 MM HG: CPT | Performed by: OTHER

## 2021-08-17 ENCOUNTER — OFFICE VISIT (OUTPATIENT)
Dept: ENDOCRINOLOGY CLINIC | Facility: CLINIC | Age: 31
End: 2021-08-17
Payer: MEDICAID

## 2021-08-17 VITALS
RESPIRATION RATE: 22 BRPM | WEIGHT: 280 LBS | HEART RATE: 103 BPM | DIASTOLIC BLOOD PRESSURE: 80 MMHG | HEIGHT: 66 IN | SYSTOLIC BLOOD PRESSURE: 131 MMHG | BODY MASS INDEX: 45 KG/M2

## 2021-08-17 DIAGNOSIS — E89.0 POST-SURGICAL HYPOTHYROIDISM: ICD-10-CM

## 2021-08-17 DIAGNOSIS — E55.9 VITAMIN D DEFICIENCY: ICD-10-CM

## 2021-08-17 DIAGNOSIS — E28.2 PCOS (POLYCYSTIC OVARIAN SYNDROME): ICD-10-CM

## 2021-08-17 DIAGNOSIS — E66.01 CLASS 3 SEVERE OBESITY DUE TO EXCESS CALORIES WITHOUT SERIOUS COMORBIDITY WITH BODY MASS INDEX (BMI) OF 45.0 TO 49.9 IN ADULT (HCC): Primary | ICD-10-CM

## 2021-08-17 PROCEDURE — 99214 OFFICE O/P EST MOD 30 MIN: CPT | Performed by: INTERNAL MEDICINE

## 2021-08-17 PROCEDURE — 3075F SYST BP GE 130 - 139MM HG: CPT | Performed by: INTERNAL MEDICINE

## 2021-08-17 PROCEDURE — 3008F BODY MASS INDEX DOCD: CPT | Performed by: INTERNAL MEDICINE

## 2021-08-17 PROCEDURE — 3079F DIAST BP 80-89 MM HG: CPT | Performed by: INTERNAL MEDICINE

## 2021-08-17 NOTE — H&P
Name: Get Higginbotham  Date: 8/17/2021    Referring Physician: No ref. provider found    Patient presents with:  Thyroid Problem: PCP refered for thyroid labs. Thyroidectomy in 2016. Patient reports fatigue and weight gain.        HISTORY OF PRESENT ILLNESS Past Surgical History:   Procedure Laterality Date   • D & C  6/22/2011   • D & C  2009   • D & C  10/12/2012   • DILATION/CURETTAGE,DIAGNOSTIC     • HYSTEROSCOPY  6/22/2011   • LAPAROSCOPY,DIAGNOSTIC      x3 r/t POS   • LAPAROSCOPY,PELVIC,BIOPSY  2008 Rfl: 3  •  Meclizine HCl 12.5 MG Oral Tab, Take 1 tablet (12.5 mg total) by mouth 3 (three) times daily as needed for Dizziness. , Disp: 20 tablet, Rfl: 0     Allergies:   No Known Allergies    REVIEW OF SYSTEMS  Eyes: no change in vision  Neurologic: no he Adjust the levothyroxine dose as necessary (she is interested in trying other forms of thyroid replacement)  3.) Refer to the bariatric center  4.) I will follow up with her about her test results    Prior to this encounter, I spent over 20 minutes with harper

## 2021-10-14 ENCOUNTER — IMMUNIZATION (OUTPATIENT)
Dept: LAB | Facility: HOSPITAL | Age: 31
End: 2021-10-14
Attending: EMERGENCY MEDICINE
Payer: COMMERCIAL

## 2021-10-14 DIAGNOSIS — Z23 NEED FOR VACCINATION: Primary | ICD-10-CM

## 2021-10-14 PROCEDURE — 0003A SARSCOV2 VAC 30MCG/0.3ML IM: CPT

## 2021-12-21 ENCOUNTER — LAB ENCOUNTER (OUTPATIENT)
Dept: LAB | Facility: HOSPITAL | Age: 31
End: 2021-12-21
Payer: COMMERCIAL

## 2021-12-21 DIAGNOSIS — Z31.49 INVESTIGATION AND TESTING FOR PROCREATION MANAGEMENT: Primary | ICD-10-CM

## 2021-12-21 PROCEDURE — 87491 CHLMYD TRACH DNA AMP PROBE: CPT

## 2021-12-21 PROCEDURE — 87389 HIV-1 AG W/HIV-1&-2 AB AG IA: CPT

## 2021-12-21 PROCEDURE — 83001 ASSAY OF GONADOTROPIN (FSH): CPT

## 2021-12-21 PROCEDURE — 86644 CMV ANTIBODY: CPT

## 2021-12-21 PROCEDURE — 86376 MICROSOMAL ANTIBODY EACH: CPT

## 2021-12-21 PROCEDURE — 86765 RUBEOLA ANTIBODY: CPT

## 2021-12-21 PROCEDURE — 82306 VITAMIN D 25 HYDROXY: CPT

## 2021-12-21 PROCEDURE — 83036 HEMOGLOBIN GLYCOSYLATED A1C: CPT

## 2021-12-21 PROCEDURE — 84146 ASSAY OF PROLACTIN: CPT

## 2021-12-21 PROCEDURE — 84702 CHORIONIC GONADOTROPIN TEST: CPT

## 2021-12-21 PROCEDURE — 87591 N.GONORRHOEAE DNA AMP PROB: CPT

## 2021-12-21 PROCEDURE — 86803 HEPATITIS C AB TEST: CPT

## 2021-12-21 PROCEDURE — 86787 VARICELLA-ZOSTER ANTIBODY: CPT

## 2021-12-21 PROCEDURE — 84144 ASSAY OF PROGESTERONE: CPT

## 2021-12-21 PROCEDURE — 85025 COMPLETE CBC W/AUTO DIFF WBC: CPT

## 2021-12-21 PROCEDURE — 83002 ASSAY OF GONADOTROPIN (LH): CPT

## 2021-12-21 PROCEDURE — 86900 BLOOD TYPING SEROLOGIC ABO: CPT

## 2021-12-21 PROCEDURE — 87340 HEPATITIS B SURFACE AG IA: CPT

## 2021-12-21 PROCEDURE — 36415 COLL VENOUS BLD VENIPUNCTURE: CPT

## 2021-12-21 PROCEDURE — 82670 ASSAY OF TOTAL ESTRADIOL: CPT

## 2021-12-21 PROCEDURE — 86901 BLOOD TYPING SEROLOGIC RH(D): CPT

## 2021-12-21 PROCEDURE — 86645 CMV ANTIBODY IGM: CPT

## 2021-12-21 PROCEDURE — 80053 COMPREHEN METABOLIC PANEL: CPT

## 2021-12-21 PROCEDURE — 86762 RUBELLA ANTIBODY: CPT

## 2021-12-21 PROCEDURE — 86850 RBC ANTIBODY SCREEN: CPT

## 2021-12-21 PROCEDURE — 86780 TREPONEMA PALLIDUM: CPT

## 2021-12-21 PROCEDURE — 84443 ASSAY THYROID STIM HORMONE: CPT

## 2021-12-22 ENCOUNTER — HOSPITAL ENCOUNTER (OUTPATIENT)
Dept: ULTRASOUND IMAGING | Facility: HOSPITAL | Age: 31
Discharge: HOME | End: 2021-12-22
Payer: COMMERCIAL

## 2021-12-22 DIAGNOSIS — Z31.49 ENCOUNTER FOR OTHER PROCREATIVE INVESTIGATION AND TESTING: ICD-10-CM

## 2021-12-22 PROCEDURE — 76830 TRANSVAGINAL US NON-OB: CPT

## 2021-12-22 PROCEDURE — 76830 TRANSVAGINAL US NON-OB: CPT | Performed by: OBSTETRICS & GYNECOLOGY

## 2022-05-04 NOTE — TELEPHONE ENCOUNTER
Patient was released from hospital yesterday. She had palpitations, dizziness, HTN. Thyroid is abnormal - grossly abnormal.    Still having palpitations. She was sent home without medications.   I asked patient if she was referred to endocrinology since Detail Level: Detailed

## 2022-05-09 ENCOUNTER — OFFICE VISIT (OUTPATIENT)
Dept: NEUROLOGY | Facility: CLINIC | Age: 32
End: 2022-05-09
Payer: COMMERCIAL

## 2022-05-09 VITALS
DIASTOLIC BLOOD PRESSURE: 78 MMHG | SYSTOLIC BLOOD PRESSURE: 132 MMHG | HEART RATE: 122 BPM | RESPIRATION RATE: 18 BRPM | BODY MASS INDEX: 47 KG/M2 | WEIGHT: 290 LBS

## 2022-05-09 DIAGNOSIS — G43.009 MIGRAINE WITHOUT AURA AND WITHOUT STATUS MIGRAINOSUS, NOT INTRACTABLE: Primary | ICD-10-CM

## 2022-05-09 DIAGNOSIS — E23.7 PITUITARY ABNORMALITY (HCC): ICD-10-CM

## 2022-05-09 RX ORDER — MULTIVITAMIN
1 TABLET ORAL DAILY
COMMUNITY

## 2022-05-09 NOTE — PROGRESS NOTES
Patient states bilateral temporal region pain. Patient states this moves back towards the occipital regions. Patient states pain behind the right eye. Patient denies vision changes. Patient states increase in headaches started about a week and half ago. Denies changes in memory or speech. Patient states dizziness on and off. Denies facial numbness or tingling. Denies head trauma.

## 2022-05-11 ENCOUNTER — TELEPHONE (OUTPATIENT)
Dept: NEUROLOGY | Facility: CLINIC | Age: 32
End: 2022-05-11

## 2022-05-13 ENCOUNTER — OFFICE VISIT (OUTPATIENT)
Dept: FAMILY MEDICINE CLINIC | Facility: CLINIC | Age: 32
End: 2022-05-13
Payer: COMMERCIAL

## 2022-05-13 VITALS
SYSTOLIC BLOOD PRESSURE: 123 MMHG | OXYGEN SATURATION: 98 % | TEMPERATURE: 99 F | HEART RATE: 110 BPM | DIASTOLIC BLOOD PRESSURE: 80 MMHG

## 2022-05-13 DIAGNOSIS — R51.9 ACUTE NONINTRACTABLE HEADACHE, UNSPECIFIED HEADACHE TYPE: Primary | ICD-10-CM

## 2022-05-13 PROCEDURE — 99213 OFFICE O/P EST LOW 20 MIN: CPT | Performed by: FAMILY MEDICINE

## 2022-05-13 PROCEDURE — 3079F DIAST BP 80-89 MM HG: CPT | Performed by: FAMILY MEDICINE

## 2022-05-13 PROCEDURE — 3074F SYST BP LT 130 MM HG: CPT | Performed by: FAMILY MEDICINE

## 2022-05-13 RX ORDER — PREDNISONE 20 MG/1
TABLET ORAL
Qty: 10 TABLET | Refills: 0 | Status: SHIPPED | OUTPATIENT
Start: 2022-05-13

## 2022-05-13 NOTE — PATIENT INSTRUCTIONS
Hold prednisone until you speak with your neurologist to make sure it won't interrupt their plan regarding imaging. If cleared to take it, take 2 tabs once daily for 5 days. Take with food. While you are on steroids it is preferred that you take Tylenol for pain if needed rather than ibuprofen (Motrin/Ibuprofen) or Aleve. Continue with plan for imaging when insurance clears MRI order. If pain becomes severe, go to ED for urgent pain relief.

## 2022-06-13 ENCOUNTER — TELEPHONE (OUTPATIENT)
Dept: INTERNAL MEDICINE CLINIC | Facility: CLINIC | Age: 32
End: 2022-06-13

## 2022-06-13 NOTE — TELEPHONE ENCOUNTER
Pt stated she has the below. She's a new pt, appt not long enough.   LMTCB     Mild chest pain - new pt appt     Future Appointments   Date Time Provider Brendan Marquez   6/14/2022 10:45 AM Elvin Spear,  [facility] [address] [facility] [email or web address]     ]

## 2022-06-13 NOTE — TELEPHONE ENCOUNTER
LMTCB 6/13. Will need evaluation via IC or ED. Can not give any other advice if not current pt. Please change appt length prior to sending to triage.

## 2022-06-14 ENCOUNTER — LAB ENCOUNTER (OUTPATIENT)
Dept: LAB | Age: 32
End: 2022-06-14
Attending: FAMILY MEDICINE
Payer: COMMERCIAL

## 2022-06-14 ENCOUNTER — OFFICE VISIT (OUTPATIENT)
Dept: INTERNAL MEDICINE CLINIC | Facility: CLINIC | Age: 32
End: 2022-06-14
Payer: COMMERCIAL

## 2022-06-14 VITALS
OXYGEN SATURATION: 98 % | HEART RATE: 114 BPM | BODY MASS INDEX: 47.09 KG/M2 | DIASTOLIC BLOOD PRESSURE: 66 MMHG | SYSTOLIC BLOOD PRESSURE: 128 MMHG | HEIGHT: 66 IN | WEIGHT: 293 LBS

## 2022-06-14 DIAGNOSIS — R00.2 PALPITATIONS: ICD-10-CM

## 2022-06-14 DIAGNOSIS — E66.01 CLASS 3 SEVERE OBESITY DUE TO EXCESS CALORIES WITHOUT SERIOUS COMORBIDITY WITH BODY MASS INDEX (BMI) OF 45.0 TO 49.9 IN ADULT (HCC): ICD-10-CM

## 2022-06-14 DIAGNOSIS — R07.9 CHEST PAIN, UNSPECIFIED TYPE: Primary | ICD-10-CM

## 2022-06-14 DIAGNOSIS — R07.9 CHEST PAIN, UNSPECIFIED TYPE: ICD-10-CM

## 2022-06-14 LAB
T4 FREE SERPL-MCNC: 0.4 NG/DL (ref 0.8–1.7)
TSI SER-ACNC: >100 MIU/ML (ref 0.36–3.74)

## 2022-06-14 PROCEDURE — 36415 COLL VENOUS BLD VENIPUNCTURE: CPT

## 2022-06-14 PROCEDURE — 3074F SYST BP LT 130 MM HG: CPT | Performed by: FAMILY MEDICINE

## 2022-06-14 PROCEDURE — 3008F BODY MASS INDEX DOCD: CPT | Performed by: FAMILY MEDICINE

## 2022-06-14 PROCEDURE — 3078F DIAST BP <80 MM HG: CPT | Performed by: FAMILY MEDICINE

## 2022-06-14 PROCEDURE — 84443 ASSAY THYROID STIM HORMONE: CPT

## 2022-06-14 PROCEDURE — 99204 OFFICE O/P NEW MOD 45 MIN: CPT | Performed by: FAMILY MEDICINE

## 2022-06-14 PROCEDURE — 84439 ASSAY OF FREE THYROXINE: CPT

## 2022-06-14 RX ORDER — PHENTERMINE HYDROCHLORIDE 37.5 MG/1
37.5 CAPSULE ORAL EVERY MORNING
COMMUNITY

## 2022-06-14 NOTE — PATIENT INSTRUCTIONS
Schedule the heart monitor and ultrasound. Schedule the ultrasound for after you complete the heart monitor.

## 2022-07-18 ENCOUNTER — HOSPITAL ENCOUNTER (OUTPATIENT)
Age: 32
Discharge: STILL ADMITTED OR ADMINISTRATIVE | End: 2022-07-18
Payer: COMMERCIAL

## 2022-08-09 ENCOUNTER — LAB ENCOUNTER (OUTPATIENT)
Dept: LAB | Facility: HOSPITAL | Age: 32
End: 2022-08-09
Attending: OBSTETRICS & GYNECOLOGY
Payer: COMMERCIAL

## 2022-08-09 DIAGNOSIS — Z31.49 INVESTIGATION AND TESTING FOR PROCREATION MANAGEMENT: Primary | ICD-10-CM

## 2022-08-09 LAB
ANTIBODY SCREEN: NEGATIVE
B-HCG SERPL-ACNC: <1 MIU/ML
BASOPHILS # BLD AUTO: 0.05 X10(3) UL (ref 0–0.2)
BASOPHILS NFR BLD AUTO: 0.8 %
DHEA-S SERPL-MCNC: 30.6 UG/DL
EOSINOPHIL # BLD AUTO: 0.13 X10(3) UL (ref 0–0.7)
EOSINOPHIL NFR BLD AUTO: 2.1 %
ERYTHROCYTE [DISTWIDTH] IN BLOOD BY AUTOMATED COUNT: 13.7 %
ESTRADIOL SERPL-MCNC: 196.3 PG/ML
HBV SURFACE AG SER-ACNC: <0.1 [IU]/L
HBV SURFACE AG SERPL QL IA: NONREACTIVE
HCT VFR BLD AUTO: 43.8 %
HCV AB SERPL QL IA: NONREACTIVE
HGB BLD-MCNC: 14.8 G/DL
IMM GRANULOCYTES # BLD AUTO: 0 X10(3) UL (ref 0–1)
IMM GRANULOCYTES NFR BLD: 0 %
LH SERPL-ACNC: 3.4 MIU/ML
LYMPHOCYTES # BLD AUTO: 1.82 X10(3) UL (ref 1–4)
LYMPHOCYTES NFR BLD AUTO: 30.1 %
MCH RBC QN AUTO: 30.8 PG (ref 26–34)
MCHC RBC AUTO-ENTMCNC: 33.8 G/DL (ref 31–37)
MCV RBC AUTO: 91.3 FL
MONOCYTES # BLD AUTO: 0.88 X10(3) UL (ref 0.1–1)
MONOCYTES NFR BLD AUTO: 14.5 %
NEUTROPHILS # BLD AUTO: 3.17 X10 (3) UL (ref 1.5–7.7)
NEUTROPHILS # BLD AUTO: 3.17 X10(3) UL (ref 1.5–7.7)
NEUTROPHILS NFR BLD AUTO: 52.5 %
PLATELET # BLD AUTO: 188 10(3)UL (ref 150–450)
PROGEST SERPL-MCNC: 8.04 NG/ML
PROLACTIN SERPL-MCNC: 28.1 NG/ML
RBC # BLD AUTO: 4.8 X10(6)UL
RH BLOOD TYPE: POSITIVE
RUBV IGG SER QL: POSITIVE
RUBV IGG SER-ACNC: 416.6 IU/ML (ref 10–?)
T PALLIDUM AB SER QL IA: NONREACTIVE
T4 FREE SERPL-MCNC: 0.3 NG/DL (ref 0.8–1.7)
TSI SER-ACNC: >100 MIU/ML (ref 0.36–3.74)
VIT D+METAB SERPL-MCNC: 14.4 NG/ML (ref 30–100)
WBC # BLD AUTO: 6.1 X10(3) UL (ref 4–11)

## 2022-08-09 PROCEDURE — 87340 HEPATITIS B SURFACE AG IA: CPT

## 2022-08-09 PROCEDURE — 83498 ASY HYDROXYPROGESTERONE 17-D: CPT

## 2022-08-09 PROCEDURE — 84443 ASSAY THYROID STIM HORMONE: CPT

## 2022-08-09 PROCEDURE — 86901 BLOOD TYPING SEROLOGIC RH(D): CPT

## 2022-08-09 PROCEDURE — 87491 CHLMYD TRACH DNA AMP PROBE: CPT

## 2022-08-09 PROCEDURE — 84144 ASSAY OF PROGESTERONE: CPT

## 2022-08-09 PROCEDURE — 36415 COLL VENOUS BLD VENIPUNCTURE: CPT

## 2022-08-09 PROCEDURE — 85025 COMPLETE CBC W/AUTO DIFF WBC: CPT

## 2022-08-09 PROCEDURE — 86900 BLOOD TYPING SEROLOGIC ABO: CPT

## 2022-08-09 PROCEDURE — 83520 IMMUNOASSAY QUANT NOS NONAB: CPT

## 2022-08-09 PROCEDURE — 84146 ASSAY OF PROLACTIN: CPT

## 2022-08-09 PROCEDURE — 82627 DEHYDROEPIANDROSTERONE: CPT

## 2022-08-09 PROCEDURE — 82670 ASSAY OF TOTAL ESTRADIOL: CPT

## 2022-08-09 PROCEDURE — 86850 RBC ANTIBODY SCREEN: CPT

## 2022-08-09 PROCEDURE — 87591 N.GONORRHOEAE DNA AMP PROB: CPT

## 2022-08-09 PROCEDURE — 86762 RUBELLA ANTIBODY: CPT

## 2022-08-09 PROCEDURE — 86780 TREPONEMA PALLIDUM: CPT

## 2022-08-09 PROCEDURE — 84402 ASSAY OF FREE TESTOSTERONE: CPT

## 2022-08-09 PROCEDURE — 83002 ASSAY OF GONADOTROPIN (LH): CPT

## 2022-08-09 PROCEDURE — 86803 HEPATITIS C AB TEST: CPT

## 2022-08-09 PROCEDURE — 87389 HIV-1 AG W/HIV-1&-2 AB AG IA: CPT

## 2022-08-09 PROCEDURE — 84439 ASSAY OF FREE THYROXINE: CPT

## 2022-08-09 PROCEDURE — 84403 ASSAY OF TOTAL TESTOSTERONE: CPT

## 2022-08-09 PROCEDURE — 82306 VITAMIN D 25 HYDROXY: CPT

## 2022-08-09 PROCEDURE — 84702 CHORIONIC GONADOTROPIN TEST: CPT

## 2022-08-10 LAB
C TRACH DNA SPEC QL NAA+PROBE: NEGATIVE
N GONORRHOEA DNA SPEC QL NAA+PROBE: NEGATIVE

## 2022-08-12 LAB — ANTI-MULLERIAN HORMONE: 4.04 NG/ML

## 2022-08-14 LAB — 17-HYDROPROGESTERONE QNT: 230.79 NG/DL

## 2022-08-19 ENCOUNTER — LAB ENCOUNTER (OUTPATIENT)
Dept: LAB | Facility: HOSPITAL | Age: 32
End: 2022-08-19
Attending: OBSTETRICS & GYNECOLOGY
Payer: COMMERCIAL

## 2022-08-19 DIAGNOSIS — Z31.49 INVESTIGATION AND TESTING FOR PROCREATION MANAGEMENT: Primary | ICD-10-CM

## 2022-08-19 LAB
ANTIBODY SCREEN: NEGATIVE
B-HCG SERPL-ACNC: <1 MIU/ML
BASOPHILS # BLD AUTO: 0.05 X10(3) UL (ref 0–0.2)
BASOPHILS NFR BLD AUTO: 1 %
DHEA-S SERPL-MCNC: 26.8 UG/DL
EOSINOPHIL # BLD AUTO: 0.09 X10(3) UL (ref 0–0.7)
EOSINOPHIL NFR BLD AUTO: 1.7 %
ERYTHROCYTE [DISTWIDTH] IN BLOOD BY AUTOMATED COUNT: 13.9 %
ESTRADIOL SERPL-MCNC: 48.3 PG/ML
HBV SURFACE AG SER-ACNC: <0.1 [IU]/L
HBV SURFACE AG SERPL QL IA: NONREACTIVE
HCT VFR BLD AUTO: 41.7 %
HCV AB SERPL QL IA: NONREACTIVE
HGB BLD-MCNC: 13.8 G/DL
IMM GRANULOCYTES # BLD AUTO: 0.01 X10(3) UL (ref 0–1)
IMM GRANULOCYTES NFR BLD: 0.2 %
LH SERPL-ACNC: 2.4 MIU/ML
LYMPHOCYTES # BLD AUTO: 1.72 X10(3) UL (ref 1–4)
LYMPHOCYTES NFR BLD AUTO: 33.2 %
MCH RBC QN AUTO: 30.5 PG (ref 26–34)
MCHC RBC AUTO-ENTMCNC: 33.1 G/DL (ref 31–37)
MCV RBC AUTO: 92.1 FL
MONOCYTES # BLD AUTO: 0.89 X10(3) UL (ref 0.1–1)
MONOCYTES NFR BLD AUTO: 17.2 %
NEUTROPHILS # BLD AUTO: 2.42 X10 (3) UL (ref 1.5–7.7)
NEUTROPHILS # BLD AUTO: 2.42 X10(3) UL (ref 1.5–7.7)
NEUTROPHILS NFR BLD AUTO: 46.7 %
PLATELET # BLD AUTO: 162 10(3)UL (ref 150–450)
PROGEST SERPL-MCNC: 0.65 NG/ML
PROLACTIN SERPL-MCNC: 21.1 NG/ML
RBC # BLD AUTO: 4.53 X10(6)UL
RH BLOOD TYPE: POSITIVE
RUBV IGG SER QL: POSITIVE
RUBV IGG SER-ACNC: 391.1 IU/ML (ref 10–?)
T PALLIDUM AB SER QL IA: NONREACTIVE
T4 FREE SERPL-MCNC: 0.8 NG/DL (ref 0.8–1.7)
TSI SER-ACNC: >100 MIU/ML (ref 0.36–3.74)
VIT D+METAB SERPL-MCNC: 16.9 NG/ML (ref 30–100)
WBC # BLD AUTO: 5.2 X10(3) UL (ref 4–11)

## 2022-08-19 PROCEDURE — 86850 RBC ANTIBODY SCREEN: CPT

## 2022-08-19 PROCEDURE — 87389 HIV-1 AG W/HIV-1&-2 AB AG IA: CPT

## 2022-08-19 PROCEDURE — 83498 ASY HYDROXYPROGESTERONE 17-D: CPT

## 2022-08-19 PROCEDURE — 84702 CHORIONIC GONADOTROPIN TEST: CPT

## 2022-08-19 PROCEDURE — 86901 BLOOD TYPING SEROLOGIC RH(D): CPT

## 2022-08-19 PROCEDURE — 83002 ASSAY OF GONADOTROPIN (LH): CPT

## 2022-08-19 PROCEDURE — 86900 BLOOD TYPING SEROLOGIC ABO: CPT

## 2022-08-19 PROCEDURE — 82306 VITAMIN D 25 HYDROXY: CPT

## 2022-08-19 PROCEDURE — 85025 COMPLETE CBC W/AUTO DIFF WBC: CPT

## 2022-08-19 PROCEDURE — 36415 COLL VENOUS BLD VENIPUNCTURE: CPT

## 2022-08-19 PROCEDURE — 86803 HEPATITIS C AB TEST: CPT

## 2022-08-19 PROCEDURE — 82670 ASSAY OF TOTAL ESTRADIOL: CPT

## 2022-08-19 PROCEDURE — 86762 RUBELLA ANTIBODY: CPT

## 2022-08-19 PROCEDURE — 86780 TREPONEMA PALLIDUM: CPT

## 2022-08-19 PROCEDURE — 84443 ASSAY THYROID STIM HORMONE: CPT

## 2022-08-19 PROCEDURE — 82627 DEHYDROEPIANDROSTERONE: CPT

## 2022-08-19 PROCEDURE — 84144 ASSAY OF PROGESTERONE: CPT

## 2022-08-19 PROCEDURE — 84146 ASSAY OF PROLACTIN: CPT

## 2022-08-19 PROCEDURE — 87340 HEPATITIS B SURFACE AG IA: CPT

## 2022-08-19 PROCEDURE — 83520 IMMUNOASSAY QUANT NOS NONAB: CPT

## 2022-08-19 PROCEDURE — 84439 ASSAY OF FREE THYROXINE: CPT

## 2022-08-21 LAB
RUBELLA ANTIBODY, IGM: <10 AU/ML
SEX HORMONE BINDING GLOBULIN: 50 NMOL/L
TESTOSTERONE -MS, BIOAVAILAB: 28 NG/DL
TESTOSTERONE, -MS/MS: 79 NG/DL
TESTOSTERONE, FREE -MS/MS: 10.6 PG/ML

## 2022-08-22 LAB — ANTI-MULLERIAN HORMONE: 4.28 NG/ML

## 2022-08-26 LAB — 17-HYDROPROGESTERONE QNT: 81.49 NG/DL

## 2022-09-08 NOTE — Clinical Note
Date: 1/13/2017    Patient Name: Robbie Tapia          To Whom it may concern: This letter has been written at the patient's request. The above patient was seen at the [facility] for treatment of a medical condition.     The patient was 07-Sep-2022 20:30

## 2022-09-19 ENCOUNTER — LAB ENCOUNTER (OUTPATIENT)
Dept: LAB | Facility: HOSPITAL | Age: 32
End: 2022-09-19
Attending: OBSTETRICS & GYNECOLOGY

## 2022-09-19 DIAGNOSIS — Z32.00 PREGNANCY EXAMINATION OR TEST, PREGNANCY UNCONFIRMED: Primary | ICD-10-CM

## 2022-09-19 LAB
B-HCG SERPL-ACNC: <1 MIU/ML
PROGEST SERPL-MCNC: 6.29 NG/ML

## 2022-09-19 PROCEDURE — 36415 COLL VENOUS BLD VENIPUNCTURE: CPT

## 2022-09-19 PROCEDURE — 84144 ASSAY OF PROGESTERONE: CPT

## 2022-09-19 PROCEDURE — 84702 CHORIONIC GONADOTROPIN TEST: CPT

## 2022-09-21 ENCOUNTER — OFFICE VISIT (OUTPATIENT)
Dept: FAMILY MEDICINE CLINIC | Facility: CLINIC | Age: 32
End: 2022-09-21

## 2022-09-21 ENCOUNTER — E-VISIT (OUTPATIENT)
Dept: TELEHEALTH | Age: 32
End: 2022-09-21

## 2022-09-21 VITALS
SYSTOLIC BLOOD PRESSURE: 102 MMHG | HEIGHT: 66.04 IN | RESPIRATION RATE: 20 BRPM | BODY MASS INDEX: 46.77 KG/M2 | DIASTOLIC BLOOD PRESSURE: 60 MMHG | TEMPERATURE: 97 F | HEART RATE: 91 BPM | WEIGHT: 291 LBS | OXYGEN SATURATION: 98 %

## 2022-09-21 DIAGNOSIS — R51.9 ACUTE NONINTRACTABLE HEADACHE, UNSPECIFIED HEADACHE TYPE: Primary | ICD-10-CM

## 2022-09-21 DIAGNOSIS — Z02.9 ENCOUNTER FOR ADMINISTRATIVE EXAMINATIONS, UNSPECIFIED: Primary | ICD-10-CM

## 2022-09-21 PROCEDURE — 3074F SYST BP LT 130 MM HG: CPT | Performed by: FAMILY MEDICINE

## 2022-09-21 PROCEDURE — 99213 OFFICE O/P EST LOW 20 MIN: CPT | Performed by: FAMILY MEDICINE

## 2022-09-21 PROCEDURE — 3008F BODY MASS INDEX DOCD: CPT | Performed by: FAMILY MEDICINE

## 2022-09-21 PROCEDURE — 3078F DIAST BP <80 MM HG: CPT | Performed by: FAMILY MEDICINE

## 2022-09-21 RX ORDER — PREDNISONE 20 MG/1
TABLET ORAL
Qty: 10 TABLET | Refills: 0 | Status: SHIPPED | OUTPATIENT
Start: 2022-09-21

## 2022-09-21 NOTE — PATIENT INSTRUCTIONS
Your covid test will be back in 24 hours. In the meantime, use tylenol and/or ibuprofen. You can alternate each one every 3 hours if needed for pain control. Consider cool compresses, peppermint oil/menthol topically applied to forehead and neck musculatures, sleep, massage, etc for comfort. If covid test is negative and headache is not improved, start prednisone rx. Take prednisone-- 2 tabs once daily for 5 days. Take with food. While you are on steroids it is preferred that you take Tylenol for pain if needed rather than ibuprofen (Motrin/Ibuprofen) or Aleve. If still no better, follow-up with PCP for further evaluation.  If headache becomes severe, go to the ED for urgent eval.

## 2022-09-21 NOTE — PROGRESS NOTES
PT referred for in person evaluation. No charge for this e-visit. Pt did visit [facility] today.

## 2022-09-22 LAB — SARS-COV-2 RNA RESP QL NAA+PROBE: NOT DETECTED

## 2022-10-05 ENCOUNTER — LAB ENCOUNTER (OUTPATIENT)
Dept: LAB | Facility: HOSPITAL | Age: 32
End: 2022-10-05
Attending: OBSTETRICS & GYNECOLOGY
Payer: COMMERCIAL

## 2022-10-05 ENCOUNTER — HOSPITAL ENCOUNTER (OUTPATIENT)
Dept: ULTRASOUND IMAGING | Facility: HOSPITAL | Age: 32
Discharge: HOME | End: 2022-10-05
Attending: OBSTETRICS & GYNECOLOGY
Payer: COMMERCIAL

## 2022-10-05 DIAGNOSIS — Z31.89 ENCOUNTER FOR FERTILITY PLANNING: ICD-10-CM

## 2022-10-05 DIAGNOSIS — Z31.89 ENCOUNTER FOR ARTIFICIAL INSEMINATION: Primary | ICD-10-CM

## 2022-10-05 LAB
ESTRADIOL SERPL-MCNC: 48.5 PG/ML
LH SERPL-ACNC: 12.6 MIU/ML
PROGEST SERPL-MCNC: 0.53 NG/ML

## 2022-10-05 PROCEDURE — 36415 COLL VENOUS BLD VENIPUNCTURE: CPT

## 2022-10-05 PROCEDURE — 82670 ASSAY OF TOTAL ESTRADIOL: CPT

## 2022-10-05 PROCEDURE — 84144 ASSAY OF PROGESTERONE: CPT

## 2022-10-05 PROCEDURE — 83002 ASSAY OF GONADOTROPIN (LH): CPT

## 2022-10-05 PROCEDURE — 76830 TRANSVAGINAL US NON-OB: CPT | Performed by: OBSTETRICS & GYNECOLOGY

## 2022-10-20 ENCOUNTER — HOSPITAL ENCOUNTER (OUTPATIENT)
Age: 32
Discharge: STILL ADMITTED OR ADMINISTRATIVE | End: 2022-10-20
Payer: COMMERCIAL

## 2022-10-31 ENCOUNTER — LAB ENCOUNTER (OUTPATIENT)
Dept: LAB | Facility: HOSPITAL | Age: 32
End: 2022-10-31
Attending: OBSTETRICS & GYNECOLOGY
Payer: COMMERCIAL

## 2022-10-31 DIAGNOSIS — Z32.00 PREGNANCY EXAMINATION OR TEST, PREGNANCY UNCONFIRMED: Primary | ICD-10-CM

## 2022-10-31 LAB
B-HCG SERPL-ACNC: <1 MIU/ML
PROGEST SERPL-MCNC: 4.62 NG/ML

## 2022-10-31 PROCEDURE — 36415 COLL VENOUS BLD VENIPUNCTURE: CPT

## 2022-10-31 PROCEDURE — 84144 ASSAY OF PROGESTERONE: CPT

## 2022-10-31 PROCEDURE — 84702 CHORIONIC GONADOTROPIN TEST: CPT

## 2022-11-01 ENCOUNTER — OFFICE VISIT (OUTPATIENT)
Dept: FAMILY MEDICINE CLINIC | Facility: CLINIC | Age: 32
End: 2022-11-01
Payer: COMMERCIAL

## 2022-11-01 ENCOUNTER — E-VISIT (OUTPATIENT)
Dept: TELEHEALTH | Age: 32
End: 2022-11-01

## 2022-11-01 VITALS
HEIGHT: 66 IN | SYSTOLIC BLOOD PRESSURE: 132 MMHG | WEIGHT: 282 LBS | BODY MASS INDEX: 45.32 KG/M2 | RESPIRATION RATE: 16 BRPM | TEMPERATURE: 97 F | DIASTOLIC BLOOD PRESSURE: 98 MMHG | HEART RATE: 108 BPM | OXYGEN SATURATION: 99 %

## 2022-11-01 DIAGNOSIS — J06.9 URI WITH COUGH AND CONGESTION: Primary | ICD-10-CM

## 2022-11-01 DIAGNOSIS — J06.9 VIRAL URI WITH COUGH: Primary | ICD-10-CM

## 2022-11-01 PROCEDURE — 3080F DIAST BP >= 90 MM HG: CPT | Performed by: NURSE PRACTITIONER

## 2022-11-01 PROCEDURE — 99421 OL DIG E/M SVC 5-10 MIN: CPT | Performed by: PHYSICIAN ASSISTANT

## 2022-11-01 PROCEDURE — 3008F BODY MASS INDEX DOCD: CPT | Performed by: NURSE PRACTITIONER

## 2022-11-01 PROCEDURE — 99213 OFFICE O/P EST LOW 20 MIN: CPT | Performed by: NURSE PRACTITIONER

## 2022-11-01 PROCEDURE — 3075F SYST BP GE 130 - 139MM HG: CPT | Performed by: NURSE PRACTITIONER

## 2022-11-01 RX ORDER — BENZONATATE 200 MG/1
200 CAPSULE ORAL 3 TIMES DAILY PRN
Qty: 30 CAPSULE | Refills: 0 | Status: SHIPPED | OUTPATIENT
Start: 2022-11-01 | End: 2022-11-11

## 2022-11-02 ENCOUNTER — E-VISIT (OUTPATIENT)
Dept: TELEHEALTH | Age: 32
End: 2022-11-02

## 2022-11-02 DIAGNOSIS — Z02.9 ADMINISTRATIVE ENCOUNTER: Primary | ICD-10-CM

## 2022-11-02 DIAGNOSIS — J06.9 ACUTE URI: Primary | ICD-10-CM

## 2022-11-03 ENCOUNTER — TELEMEDICINE (OUTPATIENT)
Dept: TELEHEALTH | Age: 32
End: 2022-11-03

## 2022-11-03 ENCOUNTER — APPOINTMENT (OUTPATIENT)
Dept: TELEHEALTH | Age: 32
End: 2022-11-03

## 2022-11-03 ENCOUNTER — E-VISIT (OUTPATIENT)
Dept: FAMILY MEDICINE CLINIC | Facility: CLINIC | Age: 32
End: 2022-11-03

## 2022-11-03 ENCOUNTER — E-VISIT (OUTPATIENT)
Dept: TELEHEALTH | Age: 32
End: 2022-11-03

## 2022-11-03 DIAGNOSIS — Z02.9 ADMINISTRATIVE ENCOUNTER: Primary | ICD-10-CM

## 2022-11-03 DIAGNOSIS — J01.90 ACUTE NON-RECURRENT SINUSITIS, UNSPECIFIED LOCATION: Primary | ICD-10-CM

## 2022-11-03 PROCEDURE — 99214 OFFICE O/P EST MOD 30 MIN: CPT | Performed by: PHYSICIAN ASSISTANT

## 2022-11-03 RX ORDER — FLUTICASONE PROPIONATE 50 MCG
2 SPRAY, SUSPENSION (ML) NASAL DAILY
Qty: 1 EACH | Refills: 0 | Status: SHIPPED | OUTPATIENT
Start: 2022-11-03 | End: 2023-10-29

## 2022-11-03 RX ORDER — AMOXICILLIN AND CLAVULANATE POTASSIUM 875; 125 MG/1; MG/1
1 TABLET, FILM COATED ORAL 2 TIMES DAILY
Qty: 20 TABLET | Refills: 0 | Status: SHIPPED | OUTPATIENT
Start: 2022-11-03 | End: 2022-11-13

## 2022-11-03 RX ORDER — DEXTROMETHORPHAN HYDROBROMIDE AND PROMETHAZINE HYDROCHLORIDE 15; 6.25 MG/5ML; MG/5ML
5 SYRUP ORAL 4 TIMES DAILY PRN
Qty: 120 ML | Refills: 0 | Status: SHIPPED | OUTPATIENT
Start: 2022-11-03

## 2022-11-03 NOTE — PATIENT INSTRUCTIONS
Augmentin 875 mg twice daily for 10 days. Recommend probiotics while on this medication to prevent antibiotics associated diarrhea or yeast infections. Examples include yogurt with active live cultures; or in capsule/granule forms such as Florastor, Culturelle, Aligne or acidophilus. Flonase as prescribed:  2 sprays in each nostril daily, or 1 spray in each nostril twice daily. If you develop a nosebleed, stop medication and restart at half the dose (1 spray in each nostril daily) after you have not had a nosebleed for 2 days. If nosebleed recurs, then stop medication completely. Stop if you develop worsening headaches. Promethazine DM for cough. This medication may cause drowsiness/sedation. Avoid driving or operating heavy machinery while on this medication. Encourage fluids, humidifier/vaporizor at bedside, elevate head of bed (sleep with extra pillow), vapor rub to chest, steam therapy if no fever, warm compresses for sinus pressure if no fever, salt water gargles for sore throat, lozenges for sore throat, may try over the counter saline nasal spray or irrigation kit (use distilled water with irrigation kit) for sinus pressure/congestion, get plenty of rest.    Off work through Saturday. If your symptoms fail to improve or you note new/worsening symptoms at any time you need to seek IN PERSON evaluation at a medical facility such as [address] [place] [address] clinic, Immediate Care or emergency department pending severity of symptoms.

## 2022-11-03 NOTE — PROGRESS NOTES
See questionnaire. Pt has previously had 4 visits with providers (2 Evisits, a video visit, and [address] visit) the past 24 hours, all treatments recommended/prescribed have been unsuccessful in managing pt's symptoms and c/o worsening HA. Recommend pt be evaluated in person give limited capabilities and scope of Evisit.      Marleni Jones PA-C, 11/02/22, 7:54 PM

## 2022-11-04 ENCOUNTER — V-VISIT (OUTPATIENT)
Dept: FAMILY MEDICINE | Age: 32
End: 2022-11-04

## 2022-11-04 ENCOUNTER — TELEMEDICINE (OUTPATIENT)
Dept: TELEHEALTH | Age: 32
End: 2022-11-04

## 2022-11-04 DIAGNOSIS — R69 DIAGNOSIS DEFERRED: Primary | ICD-10-CM

## 2022-11-04 DIAGNOSIS — J06.9 URI WITH COUGH AND CONGESTION: Primary | ICD-10-CM

## 2022-11-04 PROCEDURE — 99212 OFFICE O/P EST SF 10 MIN: CPT | Performed by: PHYSICIAN ASSISTANT

## 2022-11-05 ENCOUNTER — E-VISIT (OUTPATIENT)
Dept: TELEHEALTH | Age: 32
End: 2022-11-05

## 2022-11-05 DIAGNOSIS — J06.9 VIRAL URI: Primary | ICD-10-CM

## 2022-11-07 ENCOUNTER — E-VISIT (OUTPATIENT)
Dept: TELEHEALTH | Age: 32
End: 2022-11-07

## 2022-11-07 ENCOUNTER — PATIENT MESSAGE (OUTPATIENT)
Dept: INTERNAL MEDICINE CLINIC | Facility: CLINIC | Age: 32
End: 2022-11-07

## 2022-11-07 DIAGNOSIS — Z02.9 ADMINISTRATIVE ENCOUNTER: Primary | ICD-10-CM

## 2022-11-07 NOTE — TELEPHONE ENCOUNTER
Pt asking if she can be squeezed in tomorrow afternoon with [facility] or should she just go to [address] again?

## 2022-11-08 ENCOUNTER — HOSPITAL ENCOUNTER (OUTPATIENT)
Age: 32
Discharge: HOME | End: 2022-11-08
Payer: COMMERCIAL

## 2022-11-08 ENCOUNTER — PATIENT MESSAGE (OUTPATIENT)
Dept: INTERNAL MEDICINE CLINIC | Facility: CLINIC | Age: 32
End: 2022-11-08

## 2022-11-08 VITALS
WEIGHT: 282 LBS | HEIGHT: 66 IN | TEMPERATURE: 99 F | RESPIRATION RATE: 20 BRPM | SYSTOLIC BLOOD PRESSURE: 155 MMHG | BODY MASS INDEX: 45.32 KG/M2 | HEART RATE: 109 BPM | OXYGEN SATURATION: 99 % | DIASTOLIC BLOOD PRESSURE: 106 MMHG

## 2022-11-08 DIAGNOSIS — B34.9 VIRAL SYNDROME: Primary | ICD-10-CM

## 2022-11-08 DIAGNOSIS — J40 BRONCHITIS: ICD-10-CM

## 2022-11-08 LAB
POCT INFLUENZA A: NEGATIVE
POCT INFLUENZA B: NEGATIVE
SARS-COV-2 RNA RESP QL NAA+PROBE: NOT DETECTED

## 2022-11-08 PROCEDURE — 99213 OFFICE O/P EST LOW 20 MIN: CPT

## 2022-11-08 PROCEDURE — 87502 INFLUENZA DNA AMP PROBE: CPT | Performed by: NURSE PRACTITIONER

## 2022-11-08 RX ORDER — ALBUTEROL SULFATE 90 UG/1
2 AEROSOL, METERED RESPIRATORY (INHALATION) ONCE
Status: COMPLETED | OUTPATIENT
Start: 2022-11-08 | End: 2022-11-08

## 2022-11-08 RX ORDER — PREDNISONE 20 MG/1
40 TABLET ORAL DAILY
Qty: 10 TABLET | Refills: 0 | Status: SHIPPED | OUTPATIENT
Start: 2022-11-08 | End: 2022-11-13

## 2022-11-08 RX ORDER — ALBUTEROL SULFATE 90 UG/1
8 AEROSOL, METERED RESPIRATORY (INHALATION) ONCE
Status: DISCONTINUED | OUTPATIENT
Start: 2022-11-08 | End: 2022-11-08

## 2022-11-08 RX ORDER — ALBUTEROL SULFATE 90 UG/1
2 AEROSOL, METERED RESPIRATORY (INHALATION) 4 TIMES DAILY
Status: DISCONTINUED | OUTPATIENT
Start: 2022-11-08 | End: 2022-11-08

## 2022-11-08 NOTE — TELEPHONE ENCOUNTER
Future Appointments   Date Time Provider Brendan Marquez   11/8/2022  1:00 PM Ryan Monsalve, DO [facility] [address] [facility]

## 2022-11-08 NOTE — ED INITIAL ASSESSMENT (HPI)
Cough, runny nose, headache, sore throat x1.5 weeks. Father was dx with RSV. Was prescribed antibiotics during an electronic visit 2 days ago.

## 2022-11-09 ENCOUNTER — OFFICE VISIT (OUTPATIENT)
Dept: FAMILY MEDICINE CLINIC | Facility: CLINIC | Age: 32
End: 2022-11-09
Payer: COMMERCIAL

## 2022-11-09 VITALS
RESPIRATION RATE: 18 BRPM | OXYGEN SATURATION: 98 % | BODY MASS INDEX: 45.16 KG/M2 | DIASTOLIC BLOOD PRESSURE: 70 MMHG | HEIGHT: 66 IN | SYSTOLIC BLOOD PRESSURE: 130 MMHG | WEIGHT: 281 LBS | HEART RATE: 114 BPM | TEMPERATURE: 98 F

## 2022-11-09 DIAGNOSIS — J06.9 VIRAL URI WITH COUGH: ICD-10-CM

## 2022-11-09 DIAGNOSIS — R05.1 ACUTE COUGH: Primary | ICD-10-CM

## 2022-11-09 PROCEDURE — 3075F SYST BP GE 130 - 139MM HG: CPT | Performed by: NURSE PRACTITIONER

## 2022-11-09 PROCEDURE — 3008F BODY MASS INDEX DOCD: CPT | Performed by: NURSE PRACTITIONER

## 2022-11-09 PROCEDURE — 99213 OFFICE O/P EST LOW 20 MIN: CPT | Performed by: NURSE PRACTITIONER

## 2022-11-09 PROCEDURE — 3078F DIAST BP <80 MM HG: CPT | Performed by: NURSE PRACTITIONER

## 2022-11-10 ENCOUNTER — E-VISIT (OUTPATIENT)
Dept: TELEHEALTH | Age: 32
End: 2022-11-10

## 2022-11-10 DIAGNOSIS — R05.1 ACUTE COUGH: Primary | ICD-10-CM

## 2022-11-11 ENCOUNTER — E-VISIT (OUTPATIENT)
Dept: TELEHEALTH | Age: 32
End: 2022-11-11

## 2022-11-11 DIAGNOSIS — R11.0 NAUSEA: Primary | ICD-10-CM

## 2022-11-11 RX ORDER — ONDANSETRON 4 MG/1
4 TABLET, ORALLY DISINTEGRATING ORAL EVERY 8 HOURS PRN
Qty: 20 TABLET | Refills: 0 | Status: SHIPPED | OUTPATIENT
Start: 2022-11-11

## 2022-11-12 ENCOUNTER — E-VISIT (OUTPATIENT)
Dept: TELEHEALTH | Age: 32
End: 2022-11-12

## 2022-11-12 DIAGNOSIS — Z02.9 ENCOUNTERS FOR ADMINISTRATIVE PURPOSES: Primary | ICD-10-CM

## 2022-11-12 DIAGNOSIS — R19.7 DIARRHEA, UNSPECIFIED TYPE: Primary | ICD-10-CM

## 2022-11-16 ENCOUNTER — E-VISIT (OUTPATIENT)
Dept: TELEHEALTH | Age: 32
End: 2022-11-16

## 2022-11-16 DIAGNOSIS — M54.50 ACUTE RIGHT-SIDED LOW BACK PAIN WITHOUT SCIATICA: Primary | ICD-10-CM

## 2022-11-16 DIAGNOSIS — Z02.9 ADMINISTRATIVE ENCOUNTER: Primary | ICD-10-CM

## 2022-11-16 PROCEDURE — 99421 OL DIG E/M SVC 5-10 MIN: CPT | Performed by: NURSE PRACTITIONER

## 2022-11-16 NOTE — PROGRESS NOTES
Refer to patient Questionnaire and responses. See MyChart messages. 10 minutes spent in direct communication with patient via [address] [facility].

## 2022-11-17 ENCOUNTER — TELEPHONE (OUTPATIENT)
Dept: INTERNAL MEDICINE CLINIC | Facility: CLINIC | Age: 32
End: 2022-11-17

## 2022-11-17 ENCOUNTER — E-VISIT (OUTPATIENT)
Dept: TELEHEALTH | Age: 32
End: 2022-11-17

## 2022-11-17 DIAGNOSIS — R52 PAIN: Primary | ICD-10-CM

## 2022-11-17 RX ORDER — IBUPROFEN 600 MG/1
600 TABLET ORAL EVERY 6 HOURS PRN
Qty: 30 TABLET | Refills: 0 | Status: SHIPPED | OUTPATIENT
Start: 2022-11-17

## 2022-11-17 NOTE — TELEPHONE ENCOUNTER
Pt was in UC 11/8 and they found cyst on her ovary and lesion on her kidney-she wants appt ASAP to discuss what she needs to do and [facility] has nothing available

## 2022-11-17 NOTE — PROGRESS NOTES
Refer to patient Questionnaire and responses. See [employer] messages. Note for Employer. 10 minutes spent in direct communication with patient via [address] [facility]. See 2nd E-visit initiated at 2:30pm.  Patient initiated a second E-visit related to the same problem. Patient was charged through the 2nd encounter.

## 2022-11-17 NOTE — TELEPHONE ENCOUNTER
Please clarify if there is anything on our end that needs to be done at this time. Patient can schedule with me next week if needed too.

## 2022-11-17 NOTE — TELEPHONE ENCOUNTER
Patient completing virtual with provider at this time to discuss findings, pain, pain medication and work note.

## 2022-11-18 ENCOUNTER — E-VISIT (OUTPATIENT)
Dept: TELEHEALTH | Age: 32
End: 2022-11-18

## 2022-11-18 DIAGNOSIS — Z02.9 ENCOUNTERS FOR ADMINISTRATIVE PURPOSES: Primary | ICD-10-CM

## 2022-11-20 ENCOUNTER — E-VISIT (OUTPATIENT)
Dept: TELEHEALTH | Age: 32
End: 2022-11-20

## 2022-11-20 DIAGNOSIS — Z02.9 ADMINISTRATIVE ENCOUNTER: Primary | ICD-10-CM

## 2022-11-20 NOTE — PROGRESS NOTES
Patient has been seeking care for severe back and right lower quadrant back pain via E-visits repeatedly. Patient has been directed to her PCP and to the ED without follow up. E-visit is cancelled with no charge and advised ED f/u to patient.

## 2022-11-21 ENCOUNTER — HOSPITAL ENCOUNTER (OUTPATIENT)
Dept: ULTRASOUND IMAGING | Facility: HOSPITAL | Age: 32
Discharge: HOME | End: 2022-11-21
Attending: OBSTETRICS & GYNECOLOGY
Payer: COMMERCIAL

## 2022-11-21 DIAGNOSIS — Z31.49 ENCOUNTER FOR INVESTIGATION AND TESTING FOR PROCREATIVE MANAGEMENT: ICD-10-CM

## 2022-11-21 PROCEDURE — 76830 TRANSVAGINAL US NON-OB: CPT | Performed by: OBSTETRICS & GYNECOLOGY

## 2022-11-22 ENCOUNTER — E-VISIT (OUTPATIENT)
Dept: TELEHEALTH | Age: 32
End: 2022-11-22

## 2022-11-22 DIAGNOSIS — Z02.9 ADMINISTRATIVE ENCOUNTER: Primary | ICD-10-CM

## 2022-11-23 ENCOUNTER — LAB ENCOUNTER (OUTPATIENT)
Dept: LAB | Facility: HOSPITAL | Age: 32
End: 2022-11-23
Attending: OBSTETRICS & GYNECOLOGY
Payer: COMMERCIAL

## 2022-11-23 DIAGNOSIS — Z31.49 INVESTIGATION AND TESTING FOR PROCREATION MANAGEMENT: ICD-10-CM

## 2022-11-23 DIAGNOSIS — Z31.89 ENCOUNTER FOR ARTIFICIAL INSEMINATION: Primary | ICD-10-CM

## 2022-11-23 LAB
ESTRADIOL SERPL-MCNC: 202.6 PG/ML
LH SERPL-ACNC: 5.1 MIU/ML
PROGEST SERPL-MCNC: 3.34 NG/ML

## 2022-11-23 PROCEDURE — 82670 ASSAY OF TOTAL ESTRADIOL: CPT

## 2022-11-23 PROCEDURE — 83002 ASSAY OF GONADOTROPIN (LH): CPT

## 2022-11-23 PROCEDURE — 36415 COLL VENOUS BLD VENIPUNCTURE: CPT

## 2022-11-23 PROCEDURE — 84144 ASSAY OF PROGESTERONE: CPT

## 2022-12-02 ENCOUNTER — E-VISIT (OUTPATIENT)
Dept: TELEHEALTH | Age: 32
End: 2022-12-02

## 2022-12-02 DIAGNOSIS — Z02.9 ENCOUNTERS FOR ADMINISTRATIVE PURPOSES: Primary | ICD-10-CM

## 2022-12-09 ENCOUNTER — E-VISIT (OUTPATIENT)
Dept: TELEHEALTH | Age: 32
End: 2022-12-09

## 2022-12-09 DIAGNOSIS — Z02.9 ENCOUNTERS FOR ADMINISTRATIVE PURPOSES: Primary | ICD-10-CM

## 2022-12-16 ENCOUNTER — E-VISIT (OUTPATIENT)
Dept: TELEHEALTH | Age: 32
End: 2022-12-16

## 2022-12-16 DIAGNOSIS — R30.0 DYSURIA: ICD-10-CM

## 2022-12-16 DIAGNOSIS — K59.00 CONSTIPATION, UNSPECIFIED CONSTIPATION TYPE: Primary | ICD-10-CM

## 2022-12-16 PROCEDURE — 99422 OL DIG E/M SVC 11-20 MIN: CPT | Performed by: PHYSICIAN ASSISTANT

## 2023-01-13 ENCOUNTER — E-VISIT (OUTPATIENT)
Dept: TELEHEALTH | Age: 33
End: 2023-01-13

## 2023-01-13 DIAGNOSIS — Z02.9 ENCOUNTERS FOR ADMINISTRATIVE PURPOSES: Primary | ICD-10-CM

## 2023-01-18 ENCOUNTER — E-VISIT (OUTPATIENT)
Dept: TELEHEALTH | Age: 33
End: 2023-01-18

## 2023-01-18 ENCOUNTER — PATIENT MESSAGE (OUTPATIENT)
Dept: NEUROLOGY | Facility: CLINIC | Age: 33
End: 2023-01-18

## 2023-01-18 DIAGNOSIS — R20.2 LEFT HAND PARESTHESIA: Primary | ICD-10-CM

## 2023-01-18 DIAGNOSIS — Z02.9 ADMINISTRATIVE ENCOUNTER: Primary | ICD-10-CM

## 2023-01-18 NOTE — TELEPHONE ENCOUNTER
Per pt MyChart message, she went to  ER on 1/9/2023 and reports she was to be admitted for work up but refused. Pt requesting Dr. Rosanne Moore review ER notes and advise on next steps. Routed to provider.

## 2023-01-18 NOTE — TELEPHONE ENCOUNTER
From: Michelle Linares  To: Octavia Karimi MD  Sent: 1/18/2023 3:40 PM CST  Subject: Test results     [facility] have dr Rivka Gao review my er due to  Left hand numbness. I was at [facility]. Noted white matter changes as well. Was suppose to be admitted for full work up for neurology due to my hands but I denied to be admitted. But my numbness is getting worse.  Please inform the Dr of what needs to be done

## 2023-01-19 RX ORDER — GABAPENTIN 100 MG/1
100 CAPSULE ORAL 3 TIMES DAILY
Qty: 90 CAPSULE | Refills: 2 | Status: SHIPPED | OUTPATIENT
Start: 2023-01-19

## 2023-01-25 ENCOUNTER — E-VISIT (OUTPATIENT)
Dept: TELEHEALTH | Age: 33
End: 2023-01-25

## 2023-01-25 DIAGNOSIS — R51.9 ACUTE NONINTRACTABLE HEADACHE, UNSPECIFIED HEADACHE TYPE: Primary | ICD-10-CM

## 2023-01-25 DIAGNOSIS — R53.83 FATIGUE, UNSPECIFIED TYPE: ICD-10-CM

## 2023-01-25 PROCEDURE — 99422 OL DIG E/M SVC 11-20 MIN: CPT | Performed by: NURSE PRACTITIONER

## 2023-01-27 ENCOUNTER — PATIENT MESSAGE (OUTPATIENT)
Dept: NEUROLOGY | Facility: CLINIC | Age: 33
End: 2023-01-27

## 2023-01-27 NOTE — TELEPHONE ENCOUNTER
Dr Damien Rodriguez states patient can try 200-300mg Gabapentin nightly to see if that helps. [employer] message sent to patient with dr Carpenter Likens recommendation and also to inform patient that Dr Damien Rodriguez has an gillian't available on Monday at 9:50.

## 2023-01-30 ENCOUNTER — OFFICE VISIT (OUTPATIENT)
Dept: NEUROLOGY | Facility: CLINIC | Age: 33
End: 2023-01-30
Payer: COMMERCIAL

## 2023-01-30 VITALS
SYSTOLIC BLOOD PRESSURE: 130 MMHG | HEART RATE: 136 BPM | DIASTOLIC BLOOD PRESSURE: 74 MMHG | WEIGHT: 277 LBS | RESPIRATION RATE: 16 BRPM | BODY MASS INDEX: 45 KG/M2

## 2023-01-30 DIAGNOSIS — M79.642 LEFT HAND PAIN: ICD-10-CM

## 2023-01-30 DIAGNOSIS — R20.2 LEFT HAND PARESTHESIA: Primary | ICD-10-CM

## 2023-01-30 DIAGNOSIS — M54.12 CERVICAL RADICULOPATHY AT C6: ICD-10-CM

## 2023-01-30 DIAGNOSIS — G43.009 MIGRAINE WITHOUT AURA AND WITHOUT STATUS MIGRAINOSUS, NOT INTRACTABLE: ICD-10-CM

## 2023-01-30 RX ORDER — ASPIRIN 81 MG/1
81 TABLET, COATED ORAL DAILY
COMMUNITY
Start: 2023-01-09

## 2023-01-30 RX ORDER — TOPIRAMATE 50 MG/1
50 TABLET, FILM COATED ORAL DAILY
Qty: 30 TABLET | Refills: 5 | Status: SHIPPED | OUTPATIENT
Start: 2023-01-30

## 2023-01-30 NOTE — PROGRESS NOTES
Patient states left hand numbness and pain. Patient also states a burning sensation in the left hand. Patient states increase in weakness in the left hand. Patient states she is having dizziness with the gabapentin. Patient states migraines are controlled.

## 2023-02-06 ENCOUNTER — OFFICE VISIT (OUTPATIENT)
Dept: NEUROLOGY | Facility: CLINIC | Age: 33
End: 2023-02-06
Payer: COMMERCIAL

## 2023-02-06 ENCOUNTER — PATIENT MESSAGE (OUTPATIENT)
Dept: NEUROLOGY | Facility: CLINIC | Age: 33
End: 2023-02-06

## 2023-02-06 VITALS — BODY MASS INDEX: 45 KG/M2 | WEIGHT: 277 LBS

## 2023-02-06 DIAGNOSIS — M79.642 LEFT HAND PAIN: Primary | ICD-10-CM

## 2023-02-06 DIAGNOSIS — M54.12 CERVICAL RADICULOPATHY AT C6: ICD-10-CM

## 2023-02-06 DIAGNOSIS — R20.2 LEFT HAND PARESTHESIA: ICD-10-CM

## 2023-02-06 PROCEDURE — 99213 OFFICE O/P EST LOW 20 MIN: CPT | Performed by: OTHER

## 2023-02-08 ENCOUNTER — PATIENT MESSAGE (OUTPATIENT)
Dept: NEUROLOGY | Facility: CLINIC | Age: 33
End: 2023-02-08

## 2023-02-08 ENCOUNTER — E-VISIT (OUTPATIENT)
Dept: TELEHEALTH | Age: 33
End: 2023-02-08

## 2023-02-08 DIAGNOSIS — R11.0 NAUSEA: Primary | ICD-10-CM

## 2023-02-08 PROCEDURE — 99421 OL DIG E/M SVC 5-10 MIN: CPT | Performed by: NURSE PRACTITIONER

## 2023-02-08 RX ORDER — ONDANSETRON 8 MG/1
TABLET, ORALLY DISINTEGRATING ORAL
Qty: 15 TABLET | Refills: 0 | Status: SHIPPED | OUTPATIENT
Start: 2023-02-08

## 2023-02-08 NOTE — TELEPHONE ENCOUNTER
Called the patient and left message.  [facility] popped up when I ordered MRI C spine- its an urgent order so should be done on same day

## 2023-02-09 NOTE — PROGRESS NOTES
Refer to patient Questionnaire and responses. See MyChart messages. 10 minutes spent in direct communication with patient via [facility].

## 2023-02-22 ENCOUNTER — E-VISIT (OUTPATIENT)
Dept: TELEHEALTH | Age: 33
End: 2023-02-22

## 2023-02-22 DIAGNOSIS — J06.9 URI WITH COUGH AND CONGESTION: Primary | ICD-10-CM

## 2023-02-22 PROCEDURE — 99421 OL DIG E/M SVC 5-10 MIN: CPT | Performed by: PHYSICIAN ASSISTANT

## 2023-02-22 RX ORDER — DEXTROMETHORPHAN HYDROBROMIDE AND PROMETHAZINE HYDROCHLORIDE 15; 6.25 MG/5ML; MG/5ML
5 SYRUP ORAL 4 TIMES DAILY PRN
Qty: 118 ML | Refills: 0 | Status: SHIPPED | OUTPATIENT
Start: 2023-02-22 | End: 2023-03-04

## 2023-03-02 ENCOUNTER — E-VISIT (OUTPATIENT)
Dept: TELEHEALTH | Age: 33
End: 2023-03-02

## 2023-03-02 DIAGNOSIS — R19.7 DIARRHEA, UNSPECIFIED TYPE: Primary | ICD-10-CM

## 2023-03-03 ENCOUNTER — E-VISIT (OUTPATIENT)
Dept: TELEHEALTH | Age: 33
End: 2023-03-03

## 2023-03-03 DIAGNOSIS — R42 DIZZINESS: ICD-10-CM

## 2023-03-03 DIAGNOSIS — R19.7 DIARRHEA, UNSPECIFIED TYPE: Primary | ICD-10-CM

## 2023-03-09 ENCOUNTER — E-VISIT (OUTPATIENT)
Dept: TELEHEALTH | Age: 33
End: 2023-03-09

## 2023-03-09 DIAGNOSIS — R68.83 CHILLS: ICD-10-CM

## 2023-03-09 DIAGNOSIS — R51.9 SINUS HEADACHE: Primary | ICD-10-CM

## 2023-03-31 ENCOUNTER — OFFICE VISIT (OUTPATIENT)
Dept: OCCUPATIONAL MEDICINE | Age: 33
End: 2023-03-31
Attending: PHYSICIAN ASSISTANT

## 2023-03-31 DIAGNOSIS — Z02.1 PRE-EMPLOYMENT HEALTH SCREENING EXAMINATION: Primary | ICD-10-CM

## 2023-03-31 PROCEDURE — 86480 TB TEST CELL IMMUN MEASURE: CPT

## 2023-04-03 LAB
M TB IFN-G CD4+ T-CELLS BLD-ACNC: 0.03 IU/ML
M TB TUBERC IFN-G BLD QL: NEGATIVE
M TB TUBERC IGNF/MITOGEN IGNF CONTROL: >10 IU/ML
QFT TB1 AG MINUS NIL: 0 IU/ML
QFT TB2 AG MINUS NIL: 0.01 IU/ML

## 2023-05-04 ENCOUNTER — OFFICE VISIT (OUTPATIENT)
Dept: FAMILY MEDICINE CLINIC | Facility: CLINIC | Age: 33
End: 2023-05-04
Payer: COMMERCIAL

## 2023-05-04 VITALS
OXYGEN SATURATION: 100 % | DIASTOLIC BLOOD PRESSURE: 89 MMHG | HEART RATE: 109 BPM | BODY MASS INDEX: 45.8 KG/M2 | HEIGHT: 66 IN | SYSTOLIC BLOOD PRESSURE: 152 MMHG | WEIGHT: 285 LBS | TEMPERATURE: 98 F | RESPIRATION RATE: 20 BRPM

## 2023-05-04 DIAGNOSIS — L30.9 DERMATITIS: Primary | ICD-10-CM

## 2023-05-04 PROCEDURE — 3008F BODY MASS INDEX DOCD: CPT | Performed by: NURSE PRACTITIONER

## 2023-05-04 PROCEDURE — 3079F DIAST BP 80-89 MM HG: CPT | Performed by: NURSE PRACTITIONER

## 2023-05-04 PROCEDURE — 99213 OFFICE O/P EST LOW 20 MIN: CPT | Performed by: NURSE PRACTITIONER

## 2023-05-04 PROCEDURE — 3077F SYST BP >= 140 MM HG: CPT | Performed by: NURSE PRACTITIONER

## 2023-05-04 RX ORDER — PHENTERMINE HYDROCHLORIDE 37.5 MG/1
CAPSULE ORAL
COMMUNITY
Start: 2023-03-19

## 2023-05-04 RX ORDER — PANTOPRAZOLE SODIUM 40 MG/1
TABLET, DELAYED RELEASE ORAL
COMMUNITY
Start: 2023-02-14 | End: 2023-05-04 | Stop reason: ALTCHOICE

## 2023-05-04 RX ORDER — CLOMIPHENE CITRATE 50 MG/1
TABLET ORAL
COMMUNITY
Start: 2023-04-25 | End: 2023-05-04 | Stop reason: ALTCHOICE

## 2023-05-05 ENCOUNTER — E-VISIT (OUTPATIENT)
Dept: TELEHEALTH | Age: 33
End: 2023-05-05

## 2023-05-05 DIAGNOSIS — Z02.9 ADMINISTRATIVE ENCOUNTER: Primary | ICD-10-CM

## 2023-05-10 ENCOUNTER — E-VISIT (OUTPATIENT)
Dept: TELEHEALTH | Age: 33
End: 2023-05-10

## 2023-05-10 DIAGNOSIS — Z02.9 ENCOUNTER FOR ADMINISTRATIVE EXAMINATIONS, UNSPECIFIED: Primary | ICD-10-CM

## 2023-05-10 DIAGNOSIS — T78.40XD ALLERGIC REACTION, SUBSEQUENT ENCOUNTER: Primary | ICD-10-CM

## 2023-05-10 PROCEDURE — 99423 OL DIG E/M SVC 21+ MIN: CPT | Performed by: NURSE PRACTITIONER

## 2023-05-10 RX ORDER — METHYLPREDNISOLONE 4 MG/1
TABLET ORAL
Qty: 1 EACH | Refills: 0 | Status: SHIPPED | OUTPATIENT
Start: 2023-05-10

## 2023-05-13 ENCOUNTER — E-VISIT (OUTPATIENT)
Dept: TELEHEALTH | Age: 33
End: 2023-05-13

## 2023-05-13 DIAGNOSIS — R51.9 ACUTE NONINTRACTABLE HEADACHE, UNSPECIFIED HEADACHE TYPE: Primary | ICD-10-CM

## 2023-05-13 DIAGNOSIS — R79.89 ELEVATED TSH: ICD-10-CM

## 2023-05-23 ENCOUNTER — TELEMEDICINE (OUTPATIENT)
Dept: NEUROLOGY | Facility: CLINIC | Age: 33
End: 2023-05-23
Payer: COMMERCIAL

## 2023-05-23 DIAGNOSIS — G56.02 CARPAL TUNNEL SYNDROME OF LEFT WRIST: ICD-10-CM

## 2023-05-23 DIAGNOSIS — R42 DIZZINESS: Primary | ICD-10-CM

## 2023-05-23 PROCEDURE — 99213 OFFICE O/P EST LOW 20 MIN: CPT | Performed by: OTHER

## 2023-08-18 ENCOUNTER — E-VISIT (OUTPATIENT)
Dept: TELEHEALTH | Age: 33
End: 2023-08-18

## 2023-08-18 DIAGNOSIS — Z02.9 ADMINISTRATIVE ENCOUNTER: Primary | ICD-10-CM

## 2023-08-19 NOTE — PROGRESS NOTES
Patient was referred to the ED tonight or to the [facility] in the am.      E-visit cancelled with no charge.

## 2023-09-15 ENCOUNTER — E-VISIT (OUTPATIENT)
Dept: TELEHEALTH | Age: 33
End: 2023-09-15
Payer: MEDICAID

## 2023-09-15 DIAGNOSIS — R05.1 ACUTE COUGH: Primary | ICD-10-CM

## 2023-09-15 RX ORDER — BENZONATATE 200 MG/1
200 CAPSULE ORAL 3 TIMES DAILY PRN
Qty: 21 CAPSULE | Refills: 0 | Status: SHIPPED | OUTPATIENT
Start: 2023-09-15

## 2023-09-17 ENCOUNTER — E-VISIT (OUTPATIENT)
Dept: TELEHEALTH | Age: 33
End: 2023-09-17
Payer: MEDICAID

## 2023-09-17 DIAGNOSIS — R68.89 FLU-LIKE SYMPTOMS: Primary | ICD-10-CM

## 2023-09-17 RX ORDER — BENZONATATE 200 MG/1
200 CAPSULE ORAL 3 TIMES DAILY PRN
Qty: 30 CAPSULE | Refills: 0 | Status: SHIPPED | OUTPATIENT
Start: 2023-09-17

## 2023-09-20 ENCOUNTER — TELEMEDICINE (OUTPATIENT)
Dept: TELEHEALTH | Age: 33
End: 2023-09-20
Payer: MEDICAID

## 2023-09-20 DIAGNOSIS — U07.1 POSITIVE SELF-ADMINISTERED ANTIGEN TEST FOR COVID-19: Primary | ICD-10-CM

## 2023-09-20 PROCEDURE — 99213 OFFICE O/P EST LOW 20 MIN: CPT | Performed by: NURSE PRACTITIONER

## 2023-09-20 NOTE — PROGRESS NOTES
VIDEO COVID    Virtual/Telephone Check-In    Mitchell Robles verbally consents to a Virtual/Telephone Check-In service on 09/20/23. Patient has been referred to the [facility] website at [email or web address] to review the yearly Consent to Treat document. Patient understands and accepts financial responsibility for any deductible, co-insurance and/or co-pays associated with this service. Telehealth Verbal Consent   I conducted a telehealth visit with Mitchell Robles today, 09/20/23, which was completed using two-way, real-time interactive audio and video communication. This has been done in good lucho to provide continuity of care in the best interest of the provider-patient relationship, due to the COVID -19 public health crisis/national emergency where restrictions of face-to-face office visits are ongoing. Every conscious effort was taken to allow for sufficient and adequate time to complete the visit. The patient was made aware of the limitations of the telehealth visit, including treatment limitations as no physical exam could be performed. The patient was advised to call 911 or to go to the ER in case there was an emergency. The patient was also advised of the potential privacy & security concerns related to the telehealth platform. The patient was made aware of where to find [facility] notice of privacy practices, telehealth consent form and other related consent forms and documents. which are located on the [facility] website. The patient verbally agreed to telehealth consent form, related consents and the risks discussed. Lastly, the patient confirmed that they were in [place]. Included in this visit, time may have been spent reviewing labs, medications, radiology tests and decision making. Appropriate medical decision-making and tests are ordered as detailed in the plan of care above.   Coding/billing information is submitted for this visit based on complexity of care and/or time spent for the visit. CHIEF COMPLAINT:  No chief complaint on file. HPI:  Jihan Barrientos is a 27year old female who presents for a video visit. Patient reports Last Thursday with cough, congestion, fevers, sore throat and fatigue. Reports has done several e-visits for URI symptoms, given cough supressant as well. States symptoms are slightly improving, but now has tested + for Covid. Had negative tests earlier in illness. Reports  + as well. .  Patient denies shortness of breatht/breathing problems. Patient has tried otc mucinex and Rx tessalon for symptoms, which has  seemed to help. Current Outpatient Medications   Medication Sig Dispense Refill    benzonatate 200 MG Oral Cap Take 1 capsule (200 mg total) by mouth 3 (three) times daily as needed for cough. 30 capsule 0    benzonatate 200 MG Oral Cap Take 1 capsule (200 mg total) by mouth 3 (three) times daily as needed for cough. 21 capsule 0    Phentermine HCl 37.5 MG Oral Cap       topiramate 50 MG Oral Tab Take 1 tablet (50 mg total) by mouth daily. 30 tablet 5    Multiple Vitamin Oral Tab Take 1 tablet by mouth daily.       Levothyroxine Sodium 175 MCG Oral Tab One tablet Monday through Friday, two tablets Saturdays and Sundays 102 tablet 3     Past Medical History:   Diagnosis Date    Anemia     Anesthesia complication     Bilateral chronic vestibular neuritis     Concussion 34/55/7238    H/O complications due to general anesthesia     pt reports she had a hard time waking up from foot surgery    Left foot pain     Migraines     Obesity, unspecified     Panic     PCOS (polycystic ovarian syndrome)     Postsurgical hypothyroidism     In 2015: total thyroidectomy for benign goiter    POTS (postural orthostatic tachycardia syndrome)     Tachycardia     Thyroid nodule 12/15/2014    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 She has been cleared for dc to snf for rehab per all consultants     Past Surgical History:   Procedure Laterality Date    D & C  6/22/2011    D & C  2009    D & C  10/12/2012    DILATION/CURETTAGE,DIAGNOSTIC      HYSTEROSCOPY  6/22/2011    LAPAROSCOPY,DIAGNOSTIC      x3 r/t POS    LAPAROSCOPY,PELVIC,BIOPSY  2008    drill ovary    OTHER SURGICAL HISTORY Left     foot sx x3    OTHER SURGICAL HISTORY  03/2012    operative laparoscopy with electrocautery of multiple ovarian cysts bilaterally. it was lysis of a few pelvic adhesions    OTHER SURGICAL HISTORY      LAPAROSCOPY, OPERATIVE , POSSIBLE LAPAROTOMY WITH/WO TUBAL PERFUSION performed by Paulette Hays MD at [facility]  07/30/2018    Operative laparoscopy, endometrial sampling, Right peritubal cystectomy, Left ovarian cystectomy, fulgration of endometriosis    SURGICAL BARIATRICS - INTERNAL  10/23/2018    Gastric Sleeve at [facility] by Dr. Basia Osorio Bilateral 01/15/2016       Social History     Socioeconomic History    Marital status:    Tobacco Use    Smoking status: Never    Smokeless tobacco: Never   Vaping Use    Vaping Use: Never used   Substance and Sexual Activity    Alcohol use:  Yes     Alcohol/week: 0.0 standard drinks of alcohol     Comment: social    Drug use: No    Sexual activity: Yes     Partners: Male   Other Topics Concern    Caffeine Concern Yes     Comment: Daily; 1 cup coffee    Exercise No     Comment: 3 x a week 30  min     Seat Belt Yes   Social History Narrative    Single, lives at home with her father, no children, works at [address]:  GENERAL: ok appetite  SKIN: no rashes or abnormal skin lesions  HEENT: See HPI  LUNGS: denies shortness of breath or wheezing, See HPI  CARDIOVASCULAR: denies chest pain or palpitations   GI: denies N/V/C or abdominal pain  NEURO: Denies headaches    EXAM:  General: Alert, Well-appearing, and In no acute distress  Respiratory: Speaking in full sentences comfortably  Nasal congestion noted whent alking  Normal work of breathing  No cough during visit  Head: Normocephalic  Nose: No obvious nasal discharge. Skin: No obvious rashes or lesions from what observed. No results found for this or any previous visit (from the past 24 hour(s)). ASSESSMENT AND PLAN:  Trenton Altamirano is a 27year old female who presents with symptoms that are consistent with    ASSESSMENT:   Positive self-administered antigen test for covid-19  (primary encounter diagnosis)    PLAN: Pt. Out of window for Paxlovid. CDC quarantine discussed as well as red flags for ED. See patient Instructions    Meds & Refills for this Visit:  Requested Prescriptions      No prescriptions requested or ordered in this encounter       Risks, benefits, and side effects of medication explained and discussed. There are no Patient Instructions on file for this visit. The patient indicates understanding of these issues and agrees to the plan. The patient is asked to return if sx's persist or worsen. Trenton Altamirano advised to follow CDC guidelines for self isolation and symptomatic treatment as outlined on CDC Patient Guidelines. Trenton Altamirano understands video visit evaluation is not a substitute for face-to-face examination or emergency care. Patient advised to go to ER or call 911 for worsening symptoms or acute distress.

## 2023-09-22 ENCOUNTER — E-VISIT (OUTPATIENT)
Dept: TELEHEALTH | Age: 33
End: 2023-09-22
Payer: MEDICAID

## 2023-09-22 DIAGNOSIS — R05.8 POST-VIRAL COUGH SYNDROME: ICD-10-CM

## 2023-09-22 DIAGNOSIS — Z02.9 ADMINISTRATIVE ENCOUNTER: Primary | ICD-10-CM

## 2023-09-22 DIAGNOSIS — B00.1 HERPES LABIALIS: Primary | ICD-10-CM

## 2023-09-22 DIAGNOSIS — Z86.16 HISTORY OF COVID-19: ICD-10-CM

## 2023-09-22 RX ORDER — VALACYCLOVIR HYDROCHLORIDE 1 G/1
TABLET, FILM COATED ORAL
Qty: 4 TABLET | Refills: 0 | Status: SHIPPED | OUTPATIENT
Start: 2023-09-22

## 2023-09-22 NOTE — PROGRESS NOTES
Refer to patient Questionnaire and responses. See MyChart messages. 25 minutes spent in direct communication with patient via [facility].

## 2023-09-23 NOTE — PROGRESS NOTES
Duplicate request for E-visit. Patient was evaluated and treatment. See duplicate request for E-visit. Documentation in first visit entry by patient. This visit entry is cancelled with no charge.

## 2023-09-25 ENCOUNTER — E-VISIT (OUTPATIENT)
Dept: TELEHEALTH | Age: 33
End: 2023-09-25
Payer: MEDICAID

## 2023-09-25 DIAGNOSIS — Z02.9 ENCOUNTERS FOR ADMINISTRATIVE PURPOSES: Primary | ICD-10-CM

## 2023-09-25 PROBLEM — R20.2 NUMBNESS AND TINGLING IN LEFT HAND: Status: ACTIVE | Noted: 2023-01-09

## 2023-09-25 PROBLEM — R20.0 NUMBNESS AND TINGLING IN LEFT HAND: Status: ACTIVE | Noted: 2023-01-09

## 2023-09-26 NOTE — PROGRESS NOTES
HPI:  Eh Cardona is a 27year old female who presents for an evisit. See [employer] communications above. Current Outpatient Medications   Medication Sig Dispense Refill    valACYclovir 1 G Oral Tab Take 2 tablets (2,000 mg) po 12 hours apart as needed for cold sores 4 tablet 0    benzonatate 200 MG Oral Cap Take 1 capsule (200 mg total) by mouth 3 (three) times daily as needed for cough. 30 capsule 0    benzonatate 200 MG Oral Cap Take 1 capsule (200 mg total) by mouth 3 (three) times daily as needed for cough. 21 capsule 0    Phentermine HCl 37.5 MG Oral Cap       topiramate 50 MG Oral Tab Take 1 tablet (50 mg total) by mouth daily. 30 tablet 5    Multiple Vitamin Oral Tab Take 1 tablet by mouth daily. Levothyroxine Sodium 175 MCG Oral Tab One tablet Monday through Friday, two tablets Saturdays and Sundays 102 tablet 3     Past Medical History:   Diagnosis Date    Anemia     Anesthesia complication     Bilateral chronic vestibular neuritis     Concussion 16/00/0065    H/O complications due to general anesthesia     pt reports she had a hard time waking up from foot surgery    Left foot pain     Migraines     Obesity, unspecified     Panic     PCOS (polycystic ovarian syndrome)     Postsurgical hypothyroidism     In 2015: total thyroidectomy for benign goiter    POTS (postural orthostatic tachycardia syndrome)     Tachycardia     Thyroid nodule 12/15/2014    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   She has been cleared for dc to snf for rehab per all consultants     Past Surgical History:   Procedure Laterality Date    D & C  6/22/2011    D & C  2009    D & C  10/12/2012    DILATION/CURETTAGE,DIAGNOSTIC      HYSTEROSCOPY  6/22/2011    LAPAROSCOPY,DIAGNOSTIC      x3 r/t POS    LAPAROSCOPY,PELVIC,BIOPSY  2008    drill ovary    OTHER SURGICAL HISTORY Left     foot sx x3    OTHER SURGICAL HISTORY  03/2012    operative laparoscopy with electrocautery of multiple ovarian cysts bilaterally. it was lysis of a few pelvic adhesions    OTHER SURGICAL HISTORY      LAPAROSCOPY, OPERATIVE , POSSIBLE LAPAROTOMY WITH/WO TUBAL PERFUSION performed by Fifi Medrano MD at [facility]  07/30/2018    Operative laparoscopy, endometrial sampling, Right peritubal cystectomy, Left ovarian cystectomy, fulgration of endometriosis    SURGICAL BARIATRICS - INTERNAL  10/23/2018    Gastric Sleeve at [facility] by Dr. Dickerson Fore Bilateral 01/15/2016       Social History     Socioeconomic History    Marital status:    Tobacco Use    Smoking status: Never    Smokeless tobacco: Never   Vaping Use    Vaping Use: Never used   Substance and Sexual Activity    Alcohol use: Yes     Alcohol/week: 0.0 standard drinks of alcohol     Comment: social    Drug use: No    Sexual activity: Yes     Partners: Male   Other Topics Concern    Caffeine Concern Yes     Comment: Daily; 1 cup coffee    Exercise No     Comment: 3 x a week 30  min     Seat Belt Yes   Social History Narrative    Single, lives at home with her father, no children, works at [employer]          No results found for this or any previous visit (from the past 25 hour(s)). ASSESSMENT AND PLAN:      ASSESSMENT:   Encounters for administrative purposes  (primary encounter diagnosis)    PLAN: Meds as below. See patient Instructions  -Patient requesting thyroid testing. Advised unable to perform via telehealth. Meds & Refills for this Visit:  Requested Prescriptions      No prescriptions requested or ordered in this encounter       Risks, benefits, and side effects of medication explained and discussed. There are no Patient Instructions on file for this visit. The patient indicates understanding of these issues and agrees to the plan. See attached patient references.   The patient is asked to return if sx's persist or worsen. Mitchell Robles understands evisit evaluation is not a substitute for face-to-face examination or emergency care. Patient advised to go to ER or call 911 for worsening symptoms or acute distress.

## 2023-11-16 ENCOUNTER — E-VISIT (OUTPATIENT)
Dept: TELEHEALTH | Age: 33
End: 2023-11-16
Payer: MEDICAID

## 2023-11-16 DIAGNOSIS — N94.10 DYSPAREUNIA IN FEMALE: ICD-10-CM

## 2023-11-16 DIAGNOSIS — N64.4 BREAST PAIN, RIGHT: Primary | ICD-10-CM

## 2023-11-16 DIAGNOSIS — N94.9 VAGINAL BURNING: ICD-10-CM

## 2023-11-16 NOTE — PROGRESS NOTES
Cata Garcia is a 26year old female who initiated e-visit care today. HPI:   See answers to questionnaire submission     Current Outpatient Medications   Medication Sig Dispense Refill    triamcinolone 0.1 % External Cream Apply 1 Application topically 2 (two) times daily. APPLY TO AFFECTED AREA      progesterone 200 MG Oral Cap Take 1 capsule (200 mg total) by mouth nightly. TAKE AT BEDTIME      gabapentin 100 MG Oral Cap Take 1 capsule (100 mg total) by mouth. valACYclovir 1 G Oral Tab Take 2 tablets (2,000 mg) po 12 hours apart as needed for cold sores 4 tablet 0    Phentermine HCl 37.5 MG Oral Cap       topiramate 50 MG Oral Tab Take 1 tablet (50 mg total) by mouth daily. 30 tablet 5    Multiple Vitamin Oral Tab Take 1 tablet by mouth daily. Levothyroxine Sodium 175 MCG Oral Tab One tablet Monday through Friday, two tablets Saturdays and Sundays 102 tablet 3      Past Medical History:   Diagnosis Date    Anemia     Anesthesia complication     Bilateral chronic vestibular neuritis     Concussion 24/34/8778    H/O complications due to general anesthesia     pt reports she had a hard time waking up from foot surgery    Left foot pain     Migraines     Obesity, unspecified     Panic     PCOS (polycystic ovarian syndrome)     Postsurgical hypothyroidism     In 2015: total thyroidectomy for benign goiter    POTS (postural orthostatic tachycardia syndrome)     Tachycardia     Thyroid nodule 12/15/2014    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   She has been cleared for dc to snf for rehab per all consultants      Past Surgical History:   Procedure Laterality Date    D & C  6/22/2011    D & C  2009    D & C  10/12/2012    DILATION/CURETTAGE,DIAGNOSTIC      HYSTEROSCOPY  6/22/2011    LAPAROSCOPY,DIAGNOSTIC      x3 r/t POS    LAPAROSCOPY,PELVIC,BIOPSY  2008    drill ovary    OTHER SURGICAL HISTORY Left     foot sx x3    OTHER SURGICAL HISTORY  03/2012    operative laparoscopy with electrocautery of multiple ovarian cysts bilaterally. it was lysis of a few pelvic adhesions    OTHER SURGICAL HISTORY      LAPAROSCOPY, OPERATIVE , POSSIBLE LAPAROTOMY WITH/WO TUBAL PERFUSION performed by Bree Oseguera MD at [facility]  07/30/2018    Operative laparoscopy, endometrial sampling, Right peritubal cystectomy, Left ovarian cystectomy, fulgration of endometriosis    SURGICAL BARIATRICS - INTERNAL  10/23/2018    Gastric Sleeve at [facility] by Dr. Chucky Becerra Bilateral 01/15/2016      Family History   Problem Relation Age of Onset    Breast Cancer Mother 45    Cancer Mother         breast    Cancer Maternal Grandmother         Cervical    Thyroid disease Maternal Grandmother         Possible thyroid cancer    High Cholesterol Father     Hypertension Father     Heart Disease Father     Other (Other) Father         heart bypass surgery    Hypertension Paternal Grandfather     Diabetes Paternal Grandfather     Stroke Neg       Social History:  Social History     Socioeconomic History    Marital status:    Tobacco Use    Smoking status: Never    Smokeless tobacco: Never   Vaping Use    Vaping Use: Never used   Substance and Sexual Activity    Alcohol use:  Yes     Alcohol/week: 0.0 standard drinks of alcohol     Comment: social    Drug use: No    Sexual activity: Yes     Partners: Male   Other Topics Concern    Caffeine Concern Yes     Comment: Daily; 1 cup coffee    Exercise No     Comment: 3 x a week 30  min     Seat Belt Yes   Social History Narrative    Single, lives at home with her father, no children, works at [employer]:       Diagnoses and all orders for this visit:    Breast pain, right    Dyspareunia in female    Vaginal burning       Advised seeking care in person to assess breast pain and discuss vaginal concerns. To ED tonight if severe. Advised PCP/GYN first line if unable to get in can go to  that has [address]. Comfort care advised in interm. Duration of  the service:  24 minutes      See [employer] message exchange and Patient Instructions for Comfort Care and patient education.

## 2023-11-20 ENCOUNTER — E-VISIT (OUTPATIENT)
Dept: TELEHEALTH | Age: 33
End: 2023-11-20
Payer: MEDICAID

## 2023-11-20 DIAGNOSIS — R50.9 FEVER, UNSPECIFIED FEVER CAUSE: ICD-10-CM

## 2023-11-20 DIAGNOSIS — R06.2 WHEEZING: ICD-10-CM

## 2023-11-20 DIAGNOSIS — R05.1 ACUTE COUGH: Primary | ICD-10-CM

## 2023-11-21 NOTE — PROGRESS NOTES
Tommy Crespo is a 26year old female. HPI:   See answers to questions above. Current Outpatient Medications   Medication Sig Dispense Refill    triamcinolone 0.1 % External Cream Apply 1 Application topically 2 (two) times daily. APPLY TO AFFECTED AREA      progesterone 200 MG Oral Cap Take 1 capsule (200 mg total) by mouth nightly. TAKE AT BEDTIME      gabapentin 100 MG Oral Cap Take 1 capsule (100 mg total) by mouth. valACYclovir 1 G Oral Tab Take 2 tablets (2,000 mg) po 12 hours apart as needed for cold sores 4 tablet 0    Phentermine HCl 37.5 MG Oral Cap       topiramate 50 MG Oral Tab Take 1 tablet (50 mg total) by mouth daily. 30 tablet 5    Multiple Vitamin Oral Tab Take 1 tablet by mouth daily. Levothyroxine Sodium 175 MCG Oral Tab One tablet Monday through Friday, two tablets Saturdays and Sundays 102 tablet 3      Past Medical History:   Diagnosis Date    Anemia     Anesthesia complication     Bilateral chronic vestibular neuritis     Concussion 14/60/8981    H/O complications due to general anesthesia     pt reports she had a hard time waking up from foot surgery    Left foot pain     Migraines     Obesity, unspecified     Panic     PCOS (polycystic ovarian syndrome)     Postsurgical hypothyroidism     In 2015: total thyroidectomy for benign goiter    POTS (postural orthostatic tachycardia syndrome)     Tachycardia     Thyroid nodule 12/15/2014    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   She has been cleared for dc to snf for rehab per all consultants      Past Surgical History:   Procedure Laterality Date    D & C  6/22/2011    D & C  2009    D & C  10/12/2012    DILATION/CURETTAGE,DIAGNOSTIC      HYSTEROSCOPY  6/22/2011    LAPAROSCOPY,DIAGNOSTIC      x3 r/t POS    LAPAROSCOPY,PELVIC,BIOPSY  2008    drill ovary    OTHER SURGICAL HISTORY Left     foot sx x3    OTHER SURGICAL HISTORY  03/2012    operative laparoscopy with electrocautery of multiple ovarian cysts bilaterally. it was lysis of a few pelvic adhesions    OTHER SURGICAL HISTORY      LAPAROSCOPY, OPERATIVE , POSSIBLE LAPAROTOMY WITH/WO TUBAL PERFUSION performed by Efrain Centeno MD at [facility]  07/30/2018    Operative laparoscopy, endometrial sampling, Right peritubal cystectomy, Left ovarian cystectomy, fulgration of endometriosis    SURGICAL BARIATRICS - INTERNAL  10/23/2018    Gastric Sleeve at [facility] by Dr. Nia Babcock Bilateral 01/15/2016      Family History   Problem Relation Age of Onset    Breast Cancer Mother 45    Cancer Mother         breast    Cancer Maternal Grandmother         Cervical    Thyroid disease Maternal Grandmother         Possible thyroid cancer    High Cholesterol Father     Hypertension Father     Heart Disease Father     Other (Other) Father         heart bypass surgery    Hypertension Paternal Grandfather     Diabetes Paternal Grandfather     Stroke Neg       Social History:  Social History     Socioeconomic History    Marital status:    Tobacco Use    Smoking status: Never    Smokeless tobacco: Never   Vaping Use    Vaping Use: Never used   Substance and Sexual Activity    Alcohol use: Yes     Alcohol/week: 0.0 standard drinks of alcohol     Comment: social    Drug use: No    Sexual activity: Yes     Partners: Male   Other Topics Concern    Caffeine Concern Yes     Comment: Daily; 1 cup coffee    Exercise No     Comment: 3 x a week 30  min     Seat Belt Yes   Social History Narrative    Single, lives at home with her father, no children, works at [email or web address]:     No diagnosis found. Due to reported worsening symptoms with wheezing and fever, recommended in person evaluation at immediate care center. Advised to go to ER for any severe symptoms such as difficulty breathing.      Meds & Refills for this Visit:  Requested Prescriptions      No prescriptions requested or ordered in this encounter       Duration of  the service:  8 minutes    Patient advised to follow up with PCP if no improvement or worsening of symptoms  Refer to [employer] message for specific patient instructions

## 2023-12-01 ENCOUNTER — E-VISIT (OUTPATIENT)
Dept: TELEHEALTH | Age: 33
End: 2023-12-01
Payer: MEDICAID

## 2023-12-01 DIAGNOSIS — Z02.9 ADMINISTRATIVE ENCOUNTER: Primary | ICD-10-CM

## 2023-12-01 PROCEDURE — 99421 OL DIG E/M SVC 5-10 MIN: CPT | Performed by: NURSE PRACTITIONER

## 2023-12-04 NOTE — PROGRESS NOTES
Refer to patient Questionnaire and responses. See MyChart messages. E-visit cancelled with no charge.

## 2023-12-09 ENCOUNTER — APPOINTMENT (OUTPATIENT)
Dept: FAMILY MEDICINE | Age: 33
End: 2023-12-09

## 2023-12-09 ENCOUNTER — NURSE TRIAGE (OUTPATIENT)
Dept: TELEHEALTH | Age: 33
End: 2023-12-09

## 2023-12-09 ENCOUNTER — E-VISIT (OUTPATIENT)
Dept: TELEHEALTH | Age: 33
End: 2023-12-09
Payer: MEDICAID

## 2023-12-09 DIAGNOSIS — R53.83 FATIGUE, UNSPECIFIED TYPE: Primary | ICD-10-CM

## 2023-12-09 DIAGNOSIS — N94.6 MENSTRUAL CRAMPS: ICD-10-CM

## 2023-12-10 NOTE — PROGRESS NOTES
Eh Cardona is a 26year old female. HPI:   See answers to questions above. Current Outpatient Medications   Medication Sig Dispense Refill    triamcinolone 0.1 % External Cream Apply 1 Application topically 2 (two) times daily. APPLY TO AFFECTED AREA      progesterone 200 MG Oral Cap Take 1 capsule (200 mg total) by mouth nightly. TAKE AT BEDTIME      gabapentin 100 MG Oral Cap Take 1 capsule (100 mg total) by mouth. valACYclovir 1 G Oral Tab Take 2 tablets (2,000 mg) po 12 hours apart as needed for cold sores 4 tablet 0    Phentermine HCl 37.5 MG Oral Cap       topiramate 50 MG Oral Tab Take 1 tablet (50 mg total) by mouth daily. 30 tablet 5    Multiple Vitamin Oral Tab Take 1 tablet by mouth daily. Levothyroxine Sodium 175 MCG Oral Tab One tablet Monday through Friday, two tablets Saturdays and Sundays 102 tablet 3      Past Medical History:   Diagnosis Date    Anemia     Anesthesia complication     Bilateral chronic vestibular neuritis     Concussion 33/31/2472    H/O complications due to general anesthesia     pt reports she had a hard time waking up from foot surgery    Left foot pain     Migraines     Obesity, unspecified     Panic     PCOS (polycystic ovarian syndrome)     Postsurgical hypothyroidism     In 2015: total thyroidectomy for benign goiter    POTS (postural orthostatic tachycardia syndrome)     Tachycardia     Thyroid nodule 12/15/2014    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   She has been cleared for dc to snf for rehab per all consultants      Past Surgical History:   Procedure Laterality Date    D & C  6/22/2011    D & C  2009    D & C  10/12/2012    DILATION/CURETTAGE,DIAGNOSTIC      HYSTEROSCOPY  6/22/2011    LAPAROSCOPY,DIAGNOSTIC      x3 r/t POS    LAPAROSCOPY,PELVIC,BIOPSY  2008    drill ovary    OTHER SURGICAL HISTORY Left     foot sx x3    OTHER SURGICAL HISTORY  03/2012    operative laparoscopy with electrocautery of multiple ovarian cysts bilaterally. it was lysis of a few pelvic adhesions    OTHER SURGICAL HISTORY      LAPAROSCOPY, OPERATIVE , POSSIBLE LAPAROTOMY WITH/WO TUBAL PERFUSION performed by Diana Hanson MD at [facility]  07/30/2018    Operative laparoscopy, endometrial sampling, Right peritubal cystectomy, Left ovarian cystectomy, fulgration of endometriosis    SURGICAL BARIATRICS - INTERNAL  10/23/2018    Gastric Sleeve at [facility] by Dr. Nilay Roman Bilateral 01/15/2016      Family History   Problem Relation Age of Onset    Breast Cancer Mother 45    Cancer Mother         breast    Cancer Maternal Grandmother         Cervical    Thyroid disease Maternal Grandmother         Possible thyroid cancer    High Cholesterol Father     Hypertension Father     Heart Disease Father     Other (Other) Father         heart bypass surgery    Hypertension Paternal Grandfather     Diabetes Paternal Grandfather     Stroke Neg       Social History:  Social History     Socioeconomic History    Marital status:    Tobacco Use    Smoking status: Never    Smokeless tobacco: Never   Vaping Use    Vaping Use: Never used   Substance and Sexual Activity    Alcohol use: Yes     Alcohol/week: 0.0 standard drinks of alcohol     Comment: social    Drug use: No    Sexual activity: Yes     Partners: Male   Other Topics Concern    Caffeine Concern Yes     Comment: Daily; 1 cup coffee    Exercise No     Comment: 3 x a week 30  min     Seat Belt Yes   Social History Narrative    Single, lives at home with her father, no children, works at [address]:     Encounter Diagnoses   Name Primary? Fatigue, unspecified type Yes    Menstrual cramps        Advised tylenol/ ibuprofen for menstrual cramps. Advised follow up with pcp for further evaluation of fatigue.    ER precautions given for worsening or severe symptoms      Meds & Refills for this Visit:  Requested Prescriptions      No prescriptions requested or ordered in this encounter       Duration of  the service:  7 minutes    Patient advised to follow up with PCP if no improvement or worsening of symptoms  Refer to MyChart message for specific patient instructions

## 2023-12-11 ENCOUNTER — E-ADVICE (OUTPATIENT)
Dept: FAMILY MEDICINE | Age: 33
End: 2023-12-11

## 2023-12-11 ENCOUNTER — TELEPHONE (OUTPATIENT)
Dept: TELEHEALTH | Age: 33
End: 2023-12-11

## 2023-12-11 ENCOUNTER — APPOINTMENT (OUTPATIENT)
Dept: FAMILY MEDICINE | Age: 33
End: 2023-12-11

## 2023-12-14 ENCOUNTER — E-ADVICE (OUTPATIENT)
Dept: FAMILY MEDICINE | Age: 33
End: 2023-12-14

## 2023-12-14 ENCOUNTER — NURSE TRIAGE (OUTPATIENT)
Dept: FAMILY MEDICINE | Age: 33
End: 2023-12-14

## 2023-12-14 ENCOUNTER — V-VISIT (OUTPATIENT)
Dept: FAMILY MEDICINE | Age: 33
End: 2023-12-14

## 2023-12-14 DIAGNOSIS — G47.00 INSOMNIA, UNSPECIFIED TYPE: ICD-10-CM

## 2023-12-14 DIAGNOSIS — R51.9 ACUTE NONINTRACTABLE HEADACHE, UNSPECIFIED HEADACHE TYPE: Primary | ICD-10-CM

## 2023-12-14 PROCEDURE — 99203 OFFICE O/P NEW LOW 30 MIN: CPT | Performed by: NURSE PRACTITIONER

## 2023-12-14 RX ORDER — HYDROXYZINE HYDROCHLORIDE 25 MG/1
25 TABLET, FILM COATED ORAL NIGHTLY PRN
Qty: 10 TABLET | Refills: 0 | Status: SHIPPED | OUTPATIENT
Start: 2023-12-14

## 2023-12-14 RX ORDER — SUMATRIPTAN 50 MG/1
TABLET, FILM COATED ORAL
Qty: 6 TABLET | Refills: 0 | Status: SHIPPED | OUTPATIENT
Start: 2023-12-14

## 2023-12-14 ASSESSMENT — HEADACHE QUESTIONNAIRES
DO YOU HAVE ANY KNOWN ALLERGIES?: N
HAVE YOU SEEN A HEALTH CARE PROVIDER FOR THIS CONCERN WITHIN THE LAST 7 DAYS?: N
HOW LONG HAVE YOU HAD HEADACHES?: A FEW MONTHS
WHICH OF THE FOLLOWING DESCRIBES YOUR HEADACHES?: PAIN THAT COMES AND GOES
ON A SCALE OF 1–10, WHERE 10 IS THE WORST HEADACHE YOU CAN IMAGINE, HOW PAINFUL WAS YOUR WORST RECENT HEADACHE?: 6
WHICH OF THE FOLLOWING DESCRIBES THE LOCATION OF YOUR HEADACHE?: PAIN IN THE BACK OF MY HEAD
DO YOU HAVE ANY CHRONIC ILLNESSES SUCH AS DIABETES, HEART DISEASE, LUNG DISEASE, OR ANY ILLNESS THAT WOULD WEAKEN YOUR ABILITY TO FIGHT INFECTION?: NO
DO YOU HAVE A HEADACHE WHEN YOU EAT A CERTAIN FOOD, TAKE A CERTAIN MEDICINE, OR HAVE A CERTAIN DRINK?: NO
DO YOU TAKE MEDICATIONS THAT CAN SUPPRESS THE IMMUNE SYSTEM (CHEMOTHERAPY, STEROIDS, TRANSPLANT ANTIREJECTION MEDICATIONS)?: N
HAVE YOU EXPERIENCED ANY OF THE FOLLOWING: A MEDICATION WAS CHANGED/STARTED/STOPPED RECENTLY, NEW EYEGLASSES OR LOST EYEGLASSES?: NONE OF THE ABOVE
ARE YOU PREGNANT?: N
HAVE YOU RECENTLY USED OR STOPPED ANY OF THE FOLLOWING SUBSTANCES: COCAINE, THC, METHAMPHETAMINES, ALCOHOL, OPIOIDS, OXYCODONE OR MORPHINE?: N
ARE YOU TAKING ANY OF THE FOLLOWING FOR YOUR HEADACHE: ASPIRIN, TYLENOL OR ACETAMINOPHEN, MOTRIN/IBUPROFEN/ALEVE/NAPROXEN, PRESCRIPTION MEDICATION, SOMEONE ELSE'S MEDICATION?: MOTRIN/IBUPROFEN/ALEVE/NAPROXEN
DO YOU HAVE ANY OF THE FOLLOWING: DEPRESSION/SADNESS, SLEEPING TOO MUCH OR NOT ENOUGH, CHANGE IN APPETITE?: SLEEPING TOO MUCH OR NOT ENOUGH
WHICH OF THE FOLLOWING DESCIBES YOUR HEADACHE?: THE RETURN OF A PROBLEM I USED TO HAVE IN THE PAST
WAS THERE A MEDICINE THAT YOU TOOK IN THE PAST THAT HELPED YOUR HEADACHES?: NO
ARE YOUR HEADACHES GETTING MORE INTENSE WITH TIME?: NO
HOW LONG HAS YOUR WORST RECENT HEADACHE LASTED?: MORE THAN 6 HOURS
ARE YOU BREASTFEEDING?: N
HOW LONG HAS YOU MOST RECENT HEADACHE BEEN GOING ON?: MORE THAN 6 HOURS
ARE YOUR HEADACHES GETTING MORE FREQUENT WITH TIME?: I CAN'T TELL
DID YOU FALL DOWN AND HIT YOUR HEAD, OR DID SOMEONE OR SOMETHING HIT YOU IN THE HEAD IN THE RECENT PAST?: N
HAVE YOU EVER BEEN TOLD YOU HAVE A SPECIFIC TYPE OF HEADACHE?: NO

## 2023-12-17 ENCOUNTER — APPOINTMENT (OUTPATIENT)
Dept: FAMILY MEDICINE | Age: 33
End: 2023-12-17

## 2023-12-17 ENCOUNTER — E-VISIT (OUTPATIENT)
Dept: FAMILY MEDICINE | Age: 33
End: 2023-12-17

## 2023-12-17 ENCOUNTER — NURSE TRIAGE (OUTPATIENT)
Dept: FAMILY MEDICINE | Age: 33
End: 2023-12-17

## 2023-12-17 ENCOUNTER — V-VISIT (OUTPATIENT)
Dept: FAMILY MEDICINE | Age: 33
End: 2023-12-17

## 2023-12-17 ENCOUNTER — E-VISIT (OUTPATIENT)
Dept: OTHER | Age: 33
End: 2023-12-17

## 2023-12-17 DIAGNOSIS — R69 DIAGNOSIS DEFERRED: Primary | ICD-10-CM

## 2023-12-17 DIAGNOSIS — J02.9 SORE THROAT: Primary | ICD-10-CM

## 2023-12-17 PROCEDURE — 99422 OL DIG E/M SVC 11-20 MIN: CPT | Performed by: NURSE PRACTITIONER

## 2023-12-20 ENCOUNTER — NURSE TRIAGE (OUTPATIENT)
Dept: TELEHEALTH | Age: 33
End: 2023-12-20

## 2023-12-20 ENCOUNTER — APPOINTMENT (OUTPATIENT)
Dept: FAMILY MEDICINE | Age: 33
End: 2023-12-20

## 2023-12-22 ENCOUNTER — V-VISIT (OUTPATIENT)
Dept: FAMILY MEDICINE | Age: 33
End: 2023-12-22

## 2023-12-22 ENCOUNTER — TELEMEDICINE (OUTPATIENT)
Dept: TELEHEALTH | Age: 33
End: 2023-12-22
Payer: MEDICAID

## 2023-12-22 DIAGNOSIS — R51.9 HEADACHE, UNSPECIFIED HEADACHE TYPE: Primary | ICD-10-CM

## 2023-12-22 DIAGNOSIS — Z02.89 ENCOUNTER TO OBTAIN EXCUSE FROM WORK: Primary | ICD-10-CM

## 2023-12-22 DIAGNOSIS — M54.50 ACUTE LOW BACK PAIN WITHOUT SCIATICA, UNSPECIFIED BACK PAIN LATERALITY: ICD-10-CM

## 2023-12-22 PROCEDURE — 99213 OFFICE O/P EST LOW 20 MIN: CPT | Performed by: NURSE PRACTITIONER

## 2023-12-22 NOTE — PROGRESS NOTES
Virtual/Telephone Check-In    Mary Duque verbally consents to a Virtual/Telephone Check-In service on 12/22/23. Patient has been referred to the [facility] website at [email or web address] to review the yearly Consent to Treat document. Patient understands and accepts financial responsibility for any deductible, co-insurance and/or co-pays associated with this service. Telehealth Verbal Consent   I conducted a telehealth visit with Mary Duque today, 12/22/23, which was completed using two-way, real-time interactive audio and video communication. This has been done in good lucho to provide continuity of care in the best interest of the provider-patient relationship, due to the COVID -19 public health crisis/national emergency where restrictions of face-to-face office visits are ongoing. Every conscious effort was taken to allow for sufficient and adequate time to complete the visit. The patient was made aware of the limitations of the telehealth visit, including treatment limitations as no physical exam could be performed. The patient was advised to call 911 or to go to the ER in case there was an emergency. The patient was also advised of the potential privacy & security concerns related to the telehealth platform. The patient was made aware of where to find [facility] notice of privacy practices, telehealth consent form and other related consent forms and documents. which are located on the [facility] website. The patient verbally agreed to telehealth consent form, related consents and the risks discussed. Lastly, the patient confirmed that they were in [place]. Included in this visit, time may have been spent reviewing labs, medications, radiology tests and decision making. Appropriate medical decision-making and tests are ordered as detailed in the plan of care above. Coding/billing information is submitted for this visit based on complexity of care and/or time spent for the visit.     CHIEF COMPLAINT:  No chief complaint on file. HPI:  Mary Duque is a 26year old female who presents for a video visit. Patient reports throbbing headache to back of head and low back pain for the past day. Rates pain as 6/10. Denies any fever, chills, numbness/tingling, weakness, urinary issues or other URI Symptoms. Denies neck stiffness. Pt states she is 4 days post IUI and unsure of what she can take. Patient has tried Tylenol for symptoms, which has not seemed to help. Current Outpatient Medications   Medication Sig Dispense Refill    triamcinolone 0.1 % External Cream Apply 1 Application topically 2 (two) times daily. APPLY TO AFFECTED AREA      progesterone 200 MG Oral Cap Take 1 capsule (200 mg total) by mouth nightly. TAKE AT BEDTIME      gabapentin 100 MG Oral Cap Take 1 capsule (100 mg total) by mouth. valACYclovir 1 G Oral Tab Take 2 tablets (2,000 mg) po 12 hours apart as needed for cold sores 4 tablet 0    Phentermine HCl 37.5 MG Oral Cap       topiramate 50 MG Oral Tab Take 1 tablet (50 mg total) by mouth daily. 30 tablet 5    Multiple Vitamin Oral Tab Take 1 tablet by mouth daily.       Levothyroxine Sodium 175 MCG Oral Tab One tablet Monday through Friday, two tablets Saturdays and Sundays 102 tablet 3     Past Medical History:   Diagnosis Date    Anemia     Anesthesia complication     Bilateral chronic vestibular neuritis     Concussion 55/76/2038    H/O complications due to general anesthesia     pt reports she had a hard time waking up from foot surgery    Left foot pain     Migraines     Obesity, unspecified     Panic     PCOS (polycystic ovarian syndrome)     Postsurgical hypothyroidism     In 2015: total thyroidectomy for benign goiter    POTS (postural orthostatic tachycardia syndrome)     Tachycardia     Thyroid nodule 12/15/2014    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 She has been cleared for dc to snf for rehab per all consultants     Past Surgical History:   Procedure Laterality Date    D & C  6/22/2011    D & C  2009    D & C  10/12/2012    DILATION/CURETTAGE,DIAGNOSTIC      HYSTEROSCOPY  6/22/2011    LAPAROSCOPY,DIAGNOSTIC      x3 r/t POS    LAPAROSCOPY,PELVIC,BIOPSY  2008    drill ovary    OTHER SURGICAL HISTORY Left     foot sx x3    OTHER SURGICAL HISTORY  03/2012    operative laparoscopy with electrocautery of multiple ovarian cysts bilaterally. it was lysis of a few pelvic adhesions    OTHER SURGICAL HISTORY      LAPAROSCOPY, OPERATIVE , POSSIBLE LAPAROTOMY WITH/WO TUBAL PERFUSION performed by Bree Oseguera MD at [facility]  07/30/2018    Operative laparoscopy, endometrial sampling, Right peritubal cystectomy, Left ovarian cystectomy, fulgration of endometriosis    SURGICAL BARIATRICS - INTERNAL  10/23/2018    Gastric Sleeve at [facility] by Dr. Chucky Becerra Bilateral 01/15/2016       Social History     Socioeconomic History    Marital status:    Tobacco Use    Smoking status: Never    Smokeless tobacco: Never   Vaping Use    Vaping Use: Never used   Substance and Sexual Activity    Alcohol use:  Yes     Alcohol/week: 0.0 standard drinks of alcohol     Comment: social    Drug use: No    Sexual activity: Yes     Partners: Male   Other Topics Concern    Caffeine Concern Yes     Comment: Daily; 1 cup coffee    Exercise No     Comment: 3 x a week 30  min     Seat Belt Yes   Social History Narrative    Single, lives at home with her father, no children, works at [address]:  GENERAL: ok appetite  SKIN: no rashes or abnormal skin lesions  HEENT: See HPI  LUNGS: denies shortness of breath or wheezing, See HPI  CARDIOVASCULAR: denies chest pain or palpitations   GI: denies N/V/C or abdominal pain  NEURO: see HPI    EXAM:  General: Alert, Well-appearing, and In no acute distress  Respiratory:   Speaking in full sentences comfortably  Normal work of breathing  No cough during visit  Head: Normocephalic  Nose: No obvious nasal discharge. Skin: No obvious rashes or lesions from what observed. No results found for this or any previous visit (from the past 24 hour(s)). ASSESSMENT AND PLAN:  Charlie Licea is a 26year old female who presents with symptoms that are consistent with    ASSESSMENT:   Encounter Diagnoses   Name Primary? Headache, unspecified headache type Yes    Acute low back pain without sciatica, unspecified back pain laterality        PLAN: Discussed limitations of telehealth with eval of headache and back pain post IUI. Since tylenol has not helped with symptoms advised higher level of care at [facility] or ER. The patient indicates understanding of these issues and agrees to the plan. Charlie Licea understands video visit evaluation is not a substitute for face-to-face examination or emergency care. Patient advised to go to ER or call 911 for worsening symptoms or acute distress.

## 2023-12-27 ENCOUNTER — V-VISIT (OUTPATIENT)
Dept: FAMILY MEDICINE | Age: 33
End: 2023-12-27

## 2023-12-27 DIAGNOSIS — M54.50 ACUTE MIDLINE LOW BACK PAIN WITHOUT SCIATICA: Primary | ICD-10-CM

## 2023-12-27 PROCEDURE — 99213 OFFICE O/P EST LOW 20 MIN: CPT | Performed by: NURSE PRACTITIONER

## 2023-12-27 RX ORDER — PREDNISONE 20 MG/1
40 TABLET ORAL DAILY
Qty: 10 TABLET | Refills: 0 | Status: SHIPPED | OUTPATIENT
Start: 2023-12-27 | End: 2024-01-01

## 2023-12-27 RX ORDER — CYCLOBENZAPRINE HCL 10 MG
10 TABLET ORAL 3 TIMES DAILY PRN
Qty: 15 TABLET | Refills: 0 | Status: SHIPPED | OUTPATIENT
Start: 2023-12-27 | End: 2024-01-01

## 2024-01-12 ENCOUNTER — E-VISIT (OUTPATIENT)
Dept: FAMILY MEDICINE | Age: 34
End: 2024-01-12

## 2024-01-12 ENCOUNTER — E-VISIT (OUTPATIENT)
Dept: TELEHEALTH | Age: 34
End: 2024-01-12
Payer: MEDICAID

## 2024-01-12 DIAGNOSIS — J02.9 ACUTE PHARYNGITIS, UNSPECIFIED ETIOLOGY: Primary | ICD-10-CM

## 2024-01-12 DIAGNOSIS — Z02.89 ENCOUNTER TO OBTAIN EXCUSE FROM WORK: ICD-10-CM

## 2024-01-12 DIAGNOSIS — J02.9 SORE THROAT: Primary | ICD-10-CM

## 2024-01-12 PROCEDURE — 99422 OL DIG E/M SVC 11-20 MIN: CPT | Performed by: NURSE PRACTITIONER

## 2024-01-18 ENCOUNTER — APPOINTMENT (OUTPATIENT)
Dept: FAMILY MEDICINE | Age: 34
End: 2024-01-18

## 2024-01-18 ENCOUNTER — NURSE TRIAGE (OUTPATIENT)
Dept: TELEHEALTH | Age: 34
End: 2024-01-18

## 2024-01-18 ENCOUNTER — E-VISIT (OUTPATIENT)
Dept: FAMILY MEDICINE | Age: 34
End: 2024-01-18

## 2024-01-18 ASSESSMENT — HEADACHE QUESTIONNAIRES
HAVE YOU RECENTLY USED OR STOPPED ANY OF THE FOLLOWING SUBSTANCES: COCAINE, THC, METHAMPHETAMINES, ALCOHOL, OPIOIDS, OXYCODONE OR MORPHINE?: N
DO YOU TAKE MEDICATIONS THAT CAN SUPPRESS THE IMMUNE SYSTEM (CHEMOTHERAPY, STEROIDS, TRANSPLANT ANTIREJECTION MEDICATIONS)?: N
HAVE YOU EXPERIENCED ANY OF THE FOLLOWING: A MEDICATION WAS CHANGED/STARTED/STOPPED RECENTLY, NEW EYEGLASSES OR LOST EYEGLASSES?: NONE OF THE ABOVE
ARE YOU TAKING ANY OF THE FOLLOWING FOR YOUR HEADACHE: ASPIRIN, TYLENOL OR ACETAMINOPHEN, MOTRIN/IBUPROFEN/ALEVE/NAPROXEN, PRESCRIPTION MEDICATION, SOMEONE ELSE'S MEDICATION?: NONE OF THE ABOVE
HOW LONG HAS YOUR WORST RECENT HEADACHE LASTED?: 3–6 HOURS
DO YOU HAVE ANY KNOWN ALLERGIES?: N
HOW LONG HAS YOU MOST RECENT HEADACHE BEEN GOING ON?: 1–3 HOURS
HAVE YOU RECENTLY USED OR STOPPED ANY OF THE FOLLOWING SUBSTANCES: COCAINE, THC, METHAMPHETAMINES, ALCOHOL, OPIOIDS, OXYCODONE OR MORPHINE?: N
ARE YOUR HEADACHES GETTING MORE INTENSE WITH TIME?: NO
ARE YOUR HEADACHES GETTING MORE INTENSE WITH TIME?: NO
ON A SCALE OF 1–10, WHERE 10 IS THE WORST HEADACHE YOU CAN IMAGINE, HOW PAINFUL WAS YOUR WORST RECENT HEADACHE?: 7
HAVE YOU EXPERIENCED ANY OF THE FOLLOWING: A MEDICATION WAS CHANGED/STARTED/STOPPED RECENTLY, NEW EYEGLASSES OR LOST EYEGLASSES?: NONE OF THE ABOVE
ARE YOU TAKING ANY OF THE FOLLOWING FOR YOUR HEADACHE: ASPIRIN, TYLENOL OR ACETAMINOPHEN, MOTRIN/IBUPROFEN/ALEVE/NAPROXEN, PRESCRIPTION MEDICATION, SOMEONE ELSE'S MEDICATION?: MOTRIN/IBUPROFEN/ALEVE/NAPROXEN
WHICH OF THE FOLLOWING DESCIBES YOUR HEADACHE?: A HISTORY OF HEADACHES
WHICH OF THE FOLLOWING DESCRIBES THE LOCATION OF YOUR HEADACHE?: I CAN'T DESCRIBE MY HEADACHES IN THIS WAY
DO YOU HAVE ANY OF THE FOLLOWING: DEPRESSION/SADNESS, SLEEPING TOO MUCH OR NOT ENOUGH, CHANGE IN APPETITE?: NONE OF THE ABOVE
HOW LONG HAVE YOU HAD HEADACHES?: SEVERAL YEARS
HAVE YOU EVER BEEN TOLD YOU HAVE A SPECIFIC TYPE OF HEADACHE?: NO
DID YOU FALL DOWN AND HIT YOUR HEAD, OR DID SOMEONE OR SOMETHING HIT YOU IN THE HEAD IN THE RECENT PAST?: N
ARE YOU BREASTFEEDING?: N
ON A SCALE OF 1–10, WHERE 10 IS THE WORST HEADACHE YOU CAN IMAGINE, HOW PAINFUL WAS YOUR WORST RECENT HEADACHE?: 6
ARE YOUR HEADACHES GETTING MORE FREQUENT WITH TIME?: NO
WHICH OF THE FOLLOWING DESCRIBES THE LOCATION OF YOUR HEADACHE?: PAIN IN THE BACK OF MY HEAD
DID YOU FALL DOWN AND HIT YOUR HEAD, OR DID SOMEONE OR SOMETHING HIT YOU IN THE HEAD IN THE RECENT PAST?: N
WAS THERE A MEDICINE THAT YOU TOOK IN THE PAST THAT HELPED YOUR HEADACHES?: NO
WHICH OF THE FOLLOWING DESCRIBES YOUR HEADACHES?: CONSTANT PAIN
WHICH OF THE FOLLOWING DESCRIBES YOUR HEADACHES?: CONSTANT PAIN
DO YOU HAVE ANY CHRONIC ILLNESSES SUCH AS DIABETES, HEART DISEASE, LUNG DISEASE, OR ANY ILLNESS THAT WOULD WEAKEN YOUR ABILITY TO FIGHT INFECTION?: NO
ARE YOU PREGNANT?: N
HOW LONG HAVE YOU HAD HEADACHES?: MANY YEARS
DO YOU TAKE MEDICATIONS THAT CAN SUPPRESS THE IMMUNE SYSTEM (CHEMOTHERAPY, STEROIDS, TRANSPLANT ANTIREJECTION MEDICATIONS)?: N
HAVE YOU EVER BEEN TOLD YOU HAVE A SPECIFIC TYPE OF HEADACHE?: NO
WAS THERE A MEDICINE THAT YOU TOOK IN THE PAST THAT HELPED YOUR HEADACHES?: NO
DO YOU HAVE A HEADACHE WHEN YOU EAT A CERTAIN FOOD, TAKE A CERTAIN MEDICINE, OR HAVE A CERTAIN DRINK?: NO
DO YOU HAVE ANY KNOWN ALLERGIES?: N
WHICH OF THE FOLLOWING DESCIBES YOUR HEADACHE?: THE RETURN OF A PROBLEM I USED TO HAVE IN THE PAST
HOW LONG HAS YOU MOST RECENT HEADACHE BEEN GOING ON?: 1–3 HOURS
DO YOU HAVE A HEADACHE WHEN YOU EAT A CERTAIN FOOD, TAKE A CERTAIN MEDICINE, OR HAVE A CERTAIN DRINK?: NO
ARE YOU BREASTFEEDING?: N
HAVE YOU SEEN A HEALTH CARE PROVIDER FOR THIS CONCERN WITHIN THE LAST 7 DAYS?: N
DO YOU HAVE ANY OF THE FOLLOWING: DEPRESSION/SADNESS, SLEEPING TOO MUCH OR NOT ENOUGH, CHANGE IN APPETITE?: NONE OF THE ABOVE
ARE YOU PREGNANT?: N
HOW LONG HAS YOUR WORST RECENT HEADACHE LASTED?: 1–3 HOURS
HAVE YOU SEEN A HEALTH CARE PROVIDER FOR THIS CONCERN WITHIN THE LAST 7 DAYS?: N
PLEASE GIVE ADDITIONAL DETAILS ABOUT WHAT YOU ARE CURRENTLY TAKING FOR YOUR HEADACHE.: NAPROXEN
ARE YOUR HEADACHES GETTING MORE FREQUENT WITH TIME?: NO
DO YOU HAVE ANY CHRONIC ILLNESSES SUCH AS DIABETES, HEART DISEASE, LUNG DISEASE, OR ANY ILLNESS THAT WOULD WEAKEN YOUR ABILITY TO FIGHT INFECTION?: NO

## 2024-01-21 ENCOUNTER — APPOINTMENT (OUTPATIENT)
Dept: FAMILY MEDICINE | Age: 34
End: 2024-01-21

## 2024-01-21 ENCOUNTER — NURSE TRIAGE (OUTPATIENT)
Dept: TELEHEALTH | Age: 34
End: 2024-01-21

## 2024-01-21 ENCOUNTER — E-VISIT (OUTPATIENT)
Dept: TELEHEALTH | Age: 34
End: 2024-01-21
Payer: MEDICAID

## 2024-01-21 DIAGNOSIS — K13.0 DRY LIPS: Primary | ICD-10-CM

## 2024-01-21 PROCEDURE — 99421 OL DIG E/M SVC 5-10 MIN: CPT | Performed by: PHYSICIAN ASSISTANT

## 2024-01-24 ENCOUNTER — PATIENT MESSAGE (OUTPATIENT)
Dept: INTERNAL MEDICINE CLINIC | Facility: CLINIC | Age: 34
End: 2024-01-24

## 2024-01-24 ENCOUNTER — OFFICE VISIT (OUTPATIENT)
Dept: INTERNAL MEDICINE CLINIC | Facility: CLINIC | Age: 34
End: 2024-01-24
Payer: MEDICAID

## 2024-01-24 VITALS
HEART RATE: 78 BPM | DIASTOLIC BLOOD PRESSURE: 78 MMHG | BODY MASS INDEX: 47.09 KG/M2 | SYSTOLIC BLOOD PRESSURE: 122 MMHG | RESPIRATION RATE: 16 BRPM | HEIGHT: 66 IN | WEIGHT: 293 LBS

## 2024-01-24 DIAGNOSIS — N97.9 INFERTILITY, FEMALE: ICD-10-CM

## 2024-01-24 DIAGNOSIS — E55.9 VITAMIN D DEFICIENCY: ICD-10-CM

## 2024-01-24 DIAGNOSIS — E28.2 PCOS (POLYCYSTIC OVARIAN SYNDROME): Primary | ICD-10-CM

## 2024-01-24 DIAGNOSIS — Z51.81 THERAPEUTIC DRUG MONITORING: ICD-10-CM

## 2024-01-24 DIAGNOSIS — Z78.9 VEGETARIAN DIET: ICD-10-CM

## 2024-01-24 DIAGNOSIS — E66.01 OBESITY, CLASS III, BMI 40-49.9 (MORBID OBESITY) (HCC): ICD-10-CM

## 2024-01-24 DIAGNOSIS — E66.01 CLASS 3 SEVERE OBESITY DUE TO EXCESS CALORIES WITHOUT SERIOUS COMORBIDITY WITH BODY MASS INDEX (BMI) OF 45.0 TO 49.9 IN ADULT (HCC): ICD-10-CM

## 2024-01-24 PROBLEM — G90.A POSTURAL ORTHOSTATIC TACHYCARDIA SYNDROME: Status: ACTIVE | Noted: 2017-07-30

## 2024-01-24 PROCEDURE — 3074F SYST BP LT 130 MM HG: CPT | Performed by: INTERNAL MEDICINE

## 2024-01-24 PROCEDURE — 99204 OFFICE O/P NEW MOD 45 MIN: CPT | Performed by: INTERNAL MEDICINE

## 2024-01-24 PROCEDURE — 3078F DIAST BP <80 MM HG: CPT | Performed by: INTERNAL MEDICINE

## 2024-01-24 PROCEDURE — 3008F BODY MASS INDEX DOCD: CPT | Performed by: INTERNAL MEDICINE

## 2024-01-24 RX ORDER — TIRZEPATIDE 2.5 MG/.5ML
2.5 INJECTION, SOLUTION SUBCUTANEOUS WEEKLY
Qty: 2 ML | Refills: 0 | Status: SHIPPED | OUTPATIENT
Start: 2024-01-24

## 2024-01-24 NOTE — PROGRESS NOTES
Patient teaching on Zepbound performed. Patient demonstrated back, all questions were answered and patient verbalized understanding. QUINN

## 2024-01-24 NOTE — PROGRESS NOTES
HISTORY OF PRESENT ILLNESS  Chief Complaint   Patient presents with    Weight Problem     Wants to have a baby, gastric sleeve 2018        Alberto Alberts is a 33 year old female new to our office today for initiation of medical weight loss program.  Patient presents today with c/o excess weight.     Adult highest weight 270   Lowest weight after 190 lb.   Felt that she got  in 2019 and started gaining weight   Was not able to tolerate phentermine, tried topamax   Tried metformin and tried herbal and stomach upset       Reason/goal for weight loss:  Weight lose 80 lb     Previous weight loss efforts in the past/medication: as above     Interest in Bariatric surgery: gastric sleeve     Barriers to weight loss: n/a       Wt Readings from Last 6 Encounters:   01/24/24 (!) 309 lb (140.2 kg)   05/04/23 285 lb (129.3 kg)   02/06/23 277 lb (125.6 kg)   01/30/23 277 lb (125.6 kg)   11/09/22 281 lb (127.5 kg)   11/08/22 282 lb (127.9 kg)          Breakfast Lunch Dinner Snacks Fluids   Piece of cheese  Coffee   Salad, feta, cheese    Scrambled eggs        Sometimes feels hunger     Social hx and lifestyle reviewed:    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   Marital status:  yes, plans to do IVF and goal to lose 40 lb    Support: good   Tobacco use: neg  ETOH use: 1 wine in the evening   Supplements: negative   Exercise: negative   Stress level: very high /10  Sleep hours and integrity: not good     MEDICAL HISTORY  PMH reviewed:   Cardiac disorders:negative    Depression/anxiety: negative   Glaucoma: negative   Kidney stones: negative   Eating disorder: negative   Migraines: negative   Seizures: negative   Joint-related conditions:negative   Liver disease: negative   Renal disease: negative   Diabetes: negative  Thyroid disease: YES    Constipation: negative  DVT: negative    Family or personal history of Pancreatic issues / Medullary Thyroid Cancer: negative    History of bariatric surgery: negative     FMH  reviewed: obesity in parent/s or sibling: negative    REVIEW OF SYSTEMS  GENERAL: feels well otherwise,   NECK: denies thickening   LUNGS: denies shortness of breath with exertion, no apnea   CARDIOVASCULAR: denies chest pain on exertion , denies palpitations or pedal edema  GI: denies abdominal pain.  No N/V/D/C  MUSCULOSKELETAL: denies joint pains   NEURO: denies headaches   PSYCH: denies change in behavior or mood, denies feeling sad or depressed     EXAM  /78   Pulse 78   Resp 16   Ht 5' 6\" (1.676 m)   Wt (!) 309 lb (140.2 kg)   LMP 12/06/2023 (Approximate)   BMI 49.87 kg/m² , Percent body fat: F >32% , M >25%           GENERAL: well developed, well nourished, in no apparent distress,   SKIN: warm, pink, dry without rashes to exposed area   EYES: conjunctiva pink, sclera non icteric, PERRL  HEENT: atraumatic, normocephalic, O/p: Mallampati score- 3  NECK: supple, non tender, no adenopathy, no thyromegaly,   LUNGS: CTA in all fields, breathing non labored  CARDIO: RRR without murmur, normal S1 and S2 without clicks or gallops, no pedal edema. EKG in office completed- Normal axis, NSR without any ST or T wave changes QTc   GI: rotund, no masses, HSM or tenderness  MUSCULOSKELETAL: grossly intact   NEURO: Oriented times three, full ROM of bilateral UE/LE  PSYCH: pleasant, cooperative, normal mood and affect,     Lab Results   Component Value Date    WBC 5.2 08/19/2022    RBC 4.53 08/19/2022    HGB 13.8 08/19/2022    HCT 41.7 08/19/2022    MCV 92.1 08/19/2022    MCH 30.5 08/19/2022    MCHC 33.1 08/19/2022    RDW 13.9 08/19/2022    .0 08/19/2022    MPV 9.6 07/05/2018     Lab Results   Component Value Date    GLU 88 12/21/2021    BUN 7 12/21/2021    BUNCREA 10.0 12/19/2020    CREATSERUM 0.77 12/21/2021    ANIONGAP 5 12/21/2021    GFR 90 01/23/2018    GFRNAA 102 12/21/2021    GFRAA 117 12/21/2021    CA 9.0 12/21/2021    OSMOCALC 287 12/21/2021    ALKPHO 82 12/21/2021    AST 33 12/21/2021    ALT 36  12/21/2021    BILT 0.6 12/21/2021    TP 6.7 12/21/2021    ALB 3.2 (L) 12/21/2021    GLOBULIN 3.5 12/21/2021     12/21/2021    K 4.0 12/21/2021     12/21/2021    CO2 27.0 12/21/2021     Lab Results   Component Value Date     12/21/2021    A1C 5.3 12/21/2021     Lab Results   Component Value Date    CHOLEST 195.00 09/25/2020    TRIG 49.00 09/25/2020    HDL 93.6 09/25/2020    LDL 92 09/25/2020    VLDL 10 09/25/2020    TCHDLRATIO 1.68 02/02/2016    NONHDLC 53 02/02/2016     Lab Results   Component Value Date    T4F 0.8 08/19/2022    TSH >100.000 (H) 08/19/2022     Lab Results   Component Value Date    B12 375 01/27/2020     Lab Results   Component Value Date    VITD 16.9 (L) 08/19/2022       Current Outpatient Medications on File Prior to Visit   Medication Sig Dispense Refill    triamcinolone 0.1 % External Cream Apply 1 Application topically 2 (two) times daily. APPLY TO AFFECTED AREA      gabapentin 100 MG Oral Cap Take 1 capsule (100 mg total) by mouth.      valACYclovir 1 G Oral Tab Take 2 tablets (2,000 mg) po 12 hours apart as needed for cold sores 4 tablet 0    Multiple Vitamin Oral Tab Take 1 tablet by mouth daily.      Levothyroxine Sodium 175 MCG Oral Tab One tablet Monday through Friday, two tablets Saturdays and Sundays 102 tablet 3     No current facility-administered medications on file prior to visit.       ASSESSMENT  Initial Weight Data and Goal Weight Loss:       Diagnoses and all orders for this visit:    PCOS (polycystic ovarian syndrome)  -     [facility] Weight Management Medical Nutrition Therapy - DIETITIAN [facility] Location  -     B12 AND FOLATE    Class 3 severe obesity due to excess calories without serious comorbidity with body mass index (BMI) of 45.0 to 49.9 in adult (HCC)  -     [facility] Weight Management Medical Nutrition Therapy - DIETITIAN [facility] Location  -     B12 AND FOLATE    Therapeutic drug monitoring  -     [facility] Weight Management  Medical Nutrition Therapy - DIETITIAN - [place] Location  -     B12 AND FOLATE    Obesity, Class III, BMI 40-49.9 (morbid obesity) ([facility])  -     [facility] Weight Management Medical Nutrition Therapy - DIETITIAN - [place] Location  -     B12 AND FOLATE    Infertility, female  -     B12 AND FOLATE    Vitamin D deficiency  -     B12 AND FOLATE    Vegetarian diet  -     B12 AND FOLATE    Other orders  -     Tirzepatide-Weight Management (ZEPBOUND) 2.5 MG/0.5ML Subcutaneous Solution Auto-injector; Inject 2.5 mg into the skin once a week.        PLAN  Medication use and side effects reviewed with patient.  Medication contraindications:   Cannot take saxenda or wegovy due to national back order  Cannot take qsymia/ phentermine / diethylpropion, has taken in the past no efficacy   Cannot take orlistat due to history of gastric sleeve and hypothyroidism     Trial of zepbound  -advised of side effects and adverse effects of this medication  Injection teaching in OV   [facility]   Reviewed labs  Reviewed while active on GLP1 and 3 month post treatment , cannot concieve, must use backup method of birth control   Goal of 40 lb weight loss prior to when they will even offer resumption of treatment but my personal recommendation would be 70-80 lb   Follow up with dietitian and psychologist as recommended.  Discussed the role of sleep and stress in weight management.  Labs orders as above.  Counseled on comprehensive weight loss plan including attention to nutrition, exercise and behavior/stress management for success. See patient instruction below for more details.  Weight Loss Consent to treat reviewed and signed.    There are no Patient Instructions on file for this visit.    No follow-ups on file.    Patient verbalizes understanding.    Cate Ge MD

## 2024-01-25 NOTE — TELEPHONE ENCOUNTER
UPDATED: pt send in copies of cards. Please update and send back to triage. Thank you!      : pt states that while at visit yesterday, Dr Ge said she had pt's insurance correct with Aetna and Medicaid. I do not see Aetna in system; only an old C and Medicaid. Is is possible we collected this yesterday and it isn't in system yet? Please let triage know when insurance is updated to reflect Aetna and Medicaid so that we can proceed with PA. Thank you.

## 2024-01-25 NOTE — TELEPHONE ENCOUNTER
From: Alberto Alberts  To: Cate Ge  Sent: 1/24/2024 1:31 PM CST  Subject: Weight loss medication     I have called [facility] and they said they received the order but they sent a prior authorisation to the office.

## 2024-01-29 NOTE — TELEPHONE ENCOUNTER
[facility] is notorious about stock issues. If its not in by today Id recommend another pharmacy. [facility]

## 2024-02-01 ENCOUNTER — NURSE TRIAGE (OUTPATIENT)
Dept: TELEHEALTH | Age: 34
End: 2024-02-01

## 2024-02-01 ENCOUNTER — E-VISIT (OUTPATIENT)
Dept: FAMILY MEDICINE | Age: 34
End: 2024-02-01

## 2024-02-01 DIAGNOSIS — Z02.89 ENCOUNTER TO OBTAIN EXCUSE FROM WORK: ICD-10-CM

## 2024-02-01 DIAGNOSIS — R51.9 GENERALIZED HEADACHES: Primary | ICD-10-CM

## 2024-02-01 RX ORDER — NAPROXEN 500 MG/1
TABLET ORAL
Qty: 10 TABLET | Refills: 0 | Status: SHIPPED | OUTPATIENT
Start: 2024-02-01 | End: 2024-02-11

## 2024-02-01 ASSESSMENT — HEADACHE QUESTIONNAIRES
ARE YOU PREGNANT?: N
DO YOU HAVE ANY OF THE FOLLOWING: DEPRESSION/SADNESS, SLEEPING TOO MUCH OR NOT ENOUGH, CHANGE IN APPETITE?: NONE OF THE ABOVE
HOW LONG HAS YOUR WORST RECENT HEADACHE LASTED?: 1–3 HOURS
ARE YOU BREASTFEEDING?: N
DO YOU HAVE ANY KNOWN ALLERGIES?: N
HOW LONG HAS YOUR WORST RECENT HEADACHE LASTED?: LESS THAN AN HOUR
ARE YOUR HEADACHES GETTING MORE INTENSE WITH TIME?: NO
WHICH OF THE FOLLOWING DESCRIBES THE LOCATION OF YOUR HEADACHE?: PAIN IN ONE SIDE OF MY HEAD
WHICH OF THE FOLLOWING DESCRIBES THE LOCATION OF YOUR HEADACHE?: PAIN IN ONE SIDE OF MY HEAD
HOW LONG HAS YOU MOST RECENT HEADACHE BEEN GOING ON?: 1–3 HOURS
ON A SCALE OF 1–10, WHERE 10 IS THE WORST HEADACHE YOU CAN IMAGINE, HOW PAINFUL WAS YOUR WORST RECENT HEADACHE?: 7
ARE YOU PREGNANT?: N
HAVE YOU EVER BEEN TOLD YOU HAVE A SPECIFIC TYPE OF HEADACHE?: NO
ARE YOU TAKING ANY OF THE FOLLOWING FOR YOUR HEADACHE: ASPIRIN, TYLENOL OR ACETAMINOPHEN, MOTRIN/IBUPROFEN/ALEVE/NAPROXEN, PRESCRIPTION MEDICATION, SOMEONE ELSE'S MEDICATION?: NONE OF THE ABOVE
HAVE YOU EXPERIENCED ANY OF THE FOLLOWING: A MEDICATION WAS CHANGED/STARTED/STOPPED RECENTLY, NEW EYEGLASSES OR LOST EYEGLASSES?: NONE OF THE ABOVE
ARE YOU BREASTFEEDING?: N
WHICH OF THE FOLLOWING DESCIBES YOUR HEADACHE?: THE RETURN OF A PROBLEM I USED TO HAVE IN THE PAST
ARE YOUR HEADACHES GETTING MORE FREQUENT WITH TIME?: I CAN'T TELL
DO YOU HAVE A HEADACHE WHEN YOU EAT A CERTAIN FOOD, TAKE A CERTAIN MEDICINE, OR HAVE A CERTAIN DRINK?: NO
HOW LONG HAVE YOU HAD HEADACHES?: ABOUT A YEAR OR LESS
HOW LONG HAS YOU MOST RECENT HEADACHE BEEN GOING ON?: MORE THAN 6 HOURS
DO YOU HAVE A HEADACHE WHEN YOU EAT A CERTAIN FOOD, TAKE A CERTAIN MEDICINE, OR HAVE A CERTAIN DRINK?: NO
WHICH OF THE FOLLOWING DESCRIBES YOUR HEADACHES?: PAIN THAT COMES AND GOES
WAS THERE A MEDICINE THAT YOU TOOK IN THE PAST THAT HELPED YOUR HEADACHES?: NO
DO YOU HAVE ANY CHRONIC ILLNESSES SUCH AS DIABETES, HEART DISEASE, LUNG DISEASE, OR ANY ILLNESS THAT WOULD WEAKEN YOUR ABILITY TO FIGHT INFECTION?: NO
HAVE YOU RECENTLY USED OR STOPPED ANY OF THE FOLLOWING SUBSTANCES: COCAINE, THC, METHAMPHETAMINES, ALCOHOL, OPIOIDS, OXYCODONE OR MORPHINE?: N
DID YOU FALL DOWN AND HIT YOUR HEAD, OR DID SOMEONE OR SOMETHING HIT YOU IN THE HEAD IN THE RECENT PAST?: N
DO YOU TAKE MEDICATIONS THAT CAN SUPPRESS THE IMMUNE SYSTEM (CHEMOTHERAPY, STEROIDS, TRANSPLANT ANTIREJECTION MEDICATIONS)?: N
DID YOU FALL DOWN AND HIT YOUR HEAD, OR DID SOMEONE OR SOMETHING HIT YOU IN THE HEAD IN THE RECENT PAST?: N
ARE YOUR HEADACHES GETTING MORE INTENSE WITH TIME?: NO
ARE YOUR HEADACHES GETTING MORE FREQUENT WITH TIME?: NO
HAVE YOU SEEN A HEALTH CARE PROVIDER FOR THIS CONCERN WITHIN THE LAST 7 DAYS?: N
HAVE YOU EXPERIENCED ANY OF THE FOLLOWING: A MEDICATION WAS CHANGED/STARTED/STOPPED RECENTLY, NEW EYEGLASSES OR LOST EYEGLASSES?: NONE OF THE ABOVE
HOW LONG HAVE YOU HAD HEADACHES?: ABOUT A YEAR OR LESS
WHICH OF THE FOLLOWING DESCIBES YOUR HEADACHE?: A HISTORY OF HEADACHES
ARE YOU TAKING ANY OF THE FOLLOWING FOR YOUR HEADACHE: ASPIRIN, TYLENOL OR ACETAMINOPHEN, MOTRIN/IBUPROFEN/ALEVE/NAPROXEN, PRESCRIPTION MEDICATION, SOMEONE ELSE'S MEDICATION?: PRESCRIPTION MEDICATION
ON A SCALE OF 1–10, WHERE 10 IS THE WORST HEADACHE YOU CAN IMAGINE, HOW PAINFUL WAS YOUR WORST RECENT HEADACHE?: 7
WHICH OF THE FOLLOWING DESCRIBES YOUR HEADACHES?: PAIN THAT COMES AND GOES
DO YOU HAVE ANY KNOWN ALLERGIES?: N
HAVE YOU RECENTLY USED OR STOPPED ANY OF THE FOLLOWING SUBSTANCES: COCAINE, THC, METHAMPHETAMINES, ALCOHOL, OPIOIDS, OXYCODONE OR MORPHINE?: N
HAVE YOU SEEN A HEALTH CARE PROVIDER FOR THIS CONCERN WITHIN THE LAST 7 DAYS?: N
DO YOU HAVE ANY OF THE FOLLOWING: DEPRESSION/SADNESS, SLEEPING TOO MUCH OR NOT ENOUGH, CHANGE IN APPETITE?: NONE OF THE ABOVE
HAVE YOU EVER BEEN TOLD YOU HAVE A SPECIFIC TYPE OF HEADACHE?: NO
DO YOU HAVE ANY CHRONIC ILLNESSES SUCH AS DIABETES, HEART DISEASE, LUNG DISEASE, OR ANY ILLNESS THAT WOULD WEAKEN YOUR ABILITY TO FIGHT INFECTION?: NO
WAS THERE A MEDICINE THAT YOU TOOK IN THE PAST THAT HELPED YOUR HEADACHES?: NO
DO YOU TAKE MEDICATIONS THAT CAN SUPPRESS THE IMMUNE SYSTEM (CHEMOTHERAPY, STEROIDS, TRANSPLANT ANTIREJECTION MEDICATIONS)?: N

## 2024-02-07 ENCOUNTER — PATIENT MESSAGE (OUTPATIENT)
Dept: NEUROLOGY | Facility: CLINIC | Age: 34
End: 2024-02-07

## 2024-02-07 ENCOUNTER — LAB ENCOUNTER (OUTPATIENT)
Dept: LAB | Facility: HOSPITAL | Age: 34
End: 2024-02-07
Attending: OBSTETRICS & GYNECOLOGY
Payer: COMMERCIAL

## 2024-02-07 DIAGNOSIS — N92.6 IRREGULAR MENSTRUAL CYCLE: ICD-10-CM

## 2024-02-07 DIAGNOSIS — Z87.59 HISTORY OF MISCARRIAGE: ICD-10-CM

## 2024-02-07 LAB
ALBUMIN SERPL-MCNC: 3.2 G/DL (ref 3.4–5)
ALBUMIN/GLOB SERPL: 0.9 {RATIO} (ref 1–2)
ALP LIVER SERPL-CCNC: 77 U/L
ALT SERPL-CCNC: 21 U/L
ANION GAP SERPL CALC-SCNC: 4 MMOL/L (ref 0–18)
AST SERPL-CCNC: 20 U/L (ref 15–37)
BILIRUB SERPL-MCNC: 0.9 MG/DL (ref 0.1–2)
BUN BLD-MCNC: 10 MG/DL (ref 9–23)
CALCIUM BLD-MCNC: 8.8 MG/DL (ref 8.5–10.1)
CHLORIDE SERPL-SCNC: 113 MMOL/L (ref 98–112)
CO2 SERPL-SCNC: 24 MMOL/L (ref 21–32)
CREAT BLD-MCNC: 0.87 MG/DL
DHEA-S SERPL-MCNC: 54 UG/DL
EGFRCR SERPLBLD CKD-EPI 2021: 89 ML/MIN/1.73M2 (ref 60–?)
FASTING STATUS PATIENT QL REPORTED: NO
FOLATE SERPL-MCNC: 8.6 NG/ML (ref 8.7–?)
GLOBULIN PLAS-MCNC: 3.5 G/DL (ref 2.8–4.4)
GLUCOSE BLD-MCNC: 89 MG/DL (ref 70–99)
OSMOLALITY SERPL CALC.SUM OF ELEC: 291 MOSM/KG (ref 275–295)
POTASSIUM SERPL-SCNC: 3.5 MMOL/L (ref 3.5–5.1)
PROGEST SERPL-MCNC: 0.53 NG/ML
PROLACTIN SERPL-MCNC: 22.9 NG/ML
PROT SERPL-MCNC: 6.7 G/DL (ref 6.4–8.2)
SODIUM SERPL-SCNC: 141 MMOL/L (ref 136–145)
VIT B12 SERPL-MCNC: 392 PG/ML (ref 193–986)

## 2024-02-07 PROCEDURE — 82627 DEHYDROEPIANDROSTERONE: CPT

## 2024-02-07 PROCEDURE — 84143 ASSAY OF 17-HYDROXYPREGNENO: CPT

## 2024-02-07 PROCEDURE — 82607 VITAMIN B-12: CPT | Performed by: INTERNAL MEDICINE

## 2024-02-07 PROCEDURE — 85732 THROMBOPLASTIN TIME PARTIAL: CPT

## 2024-02-07 PROCEDURE — 36415 COLL VENOUS BLD VENIPUNCTURE: CPT

## 2024-02-07 PROCEDURE — 85610 PROTHROMBIN TIME: CPT

## 2024-02-07 PROCEDURE — 85705 THROMBOPLASTIN INHIBITION: CPT

## 2024-02-07 PROCEDURE — 84146 ASSAY OF PROLACTIN: CPT

## 2024-02-07 PROCEDURE — 84144 ASSAY OF PROGESTERONE: CPT

## 2024-02-07 PROCEDURE — 82746 ASSAY OF FOLIC ACID SERUM: CPT | Performed by: INTERNAL MEDICINE

## 2024-02-07 PROCEDURE — 80053 COMPREHEN METABOLIC PANEL: CPT

## 2024-02-07 PROCEDURE — 85613 RUSSELL VIPER VENOM DILUTED: CPT

## 2024-02-07 PROCEDURE — 81241 F5 GENE: CPT

## 2024-02-07 PROCEDURE — 84410 TESTOSTERONE BIOAVAILABLE: CPT

## 2024-02-07 NOTE — TELEPHONE ENCOUNTER
From: Alberto Alberts  To: Layne Tyreeqtadar  Sent: 2/7/2024 8:19 AM CST  Subject: New concern     Hello,    Over the passed week I have had dizziness with eye pressure pain causing me a massive headache and I fainted twice. However my  and I choose not to been seen in an er because he was seen 3 weeks ago and misdiagnosed him. So if the dr can order me some  Labs or anything to figure out what is wrong with me that would be great. I was suppose to see the dr today but I am to dizzy to Drive and my  finishes work later this afternoon. And if we can do the tests stat that would be good. Tell the dr I have good insurance my  works In hospital so she can’t do a stat order she can admit me for tests but I will not be seen in the er. But we are a concerned because I never fainted in my life and I fainted twice.

## 2024-02-08 ENCOUNTER — APPOINTMENT (OUTPATIENT)
Dept: FAMILY MEDICINE | Age: 34
End: 2024-02-08

## 2024-02-08 ENCOUNTER — NURSE TRIAGE (OUTPATIENT)
Dept: TELEHEALTH | Age: 34
End: 2024-02-08

## 2024-02-08 ASSESSMENT — HEADACHE QUESTIONNAIRES
DO YOU HAVE ANY OF THE FOLLOWING: DEPRESSION/SADNESS, SLEEPING TOO MUCH OR NOT ENOUGH, CHANGE IN APPETITE?: CHANGE IN APPETITE
HOW LONG HAS YOU MOST RECENT HEADACHE BEEN GOING ON?: 1–3 HOURS
HAVE YOU EXPERIENCED ANY OF THE FOLLOWING: A MEDICATION WAS CHANGED/STARTED/STOPPED RECENTLY, NEW EYEGLASSES OR LOST EYEGLASSES?: NONE OF THE ABOVE
DO YOU TAKE MEDICATIONS THAT CAN SUPPRESS THE IMMUNE SYSTEM (CHEMOTHERAPY, STEROIDS, TRANSPLANT ANTIREJECTION MEDICATIONS)?: N
ON A SCALE OF 1–10, WHERE 10 IS THE WORST HEADACHE YOU CAN IMAGINE, HOW PAINFUL WAS YOUR WORST RECENT HEADACHE?: 7
WHICH OF THE FOLLOWING DESCIBES YOUR HEADACHE?: THE RETURN OF A PROBLEM I USED TO HAVE IN THE PAST
ARE YOUR HEADACHES GETTING MORE FREQUENT WITH TIME?: I CAN'T TELL
WHICH OF THE FOLLOWING DESCRIBES YOUR HEADACHES?: PAIN THAT COMES AND GOES
DO YOU HAVE ANY CHRONIC ILLNESSES SUCH AS DIABETES, HEART DISEASE, LUNG DISEASE, OR ANY ILLNESS THAT WOULD WEAKEN YOUR ABILITY TO FIGHT INFECTION?: NO
HAVE YOU RECENTLY USED OR STOPPED ANY OF THE FOLLOWING SUBSTANCES: COCAINE, THC, METHAMPHETAMINES, ALCOHOL, OPIOIDS, OXYCODONE OR MORPHINE?: N
DO YOU HAVE ANY KNOWN ALLERGIES?: N
HOW LONG HAVE YOU HAD HEADACHES?: SEVERAL YEARS
DO YOU HAVE A HEADACHE WHEN YOU EAT A CERTAIN FOOD, TAKE A CERTAIN MEDICINE, OR HAVE A CERTAIN DRINK?: NO
ARE YOU PREGNANT?: N
DID YOU FALL DOWN AND HIT YOUR HEAD, OR DID SOMEONE OR SOMETHING HIT YOU IN THE HEAD IN THE RECENT PAST?: N
HAVE YOU SEEN A HEALTH CARE PROVIDER FOR THIS CONCERN WITHIN THE LAST 7 DAYS?: N
WHICH OF THE FOLLOWING DESCRIBES THE LOCATION OF YOUR HEADACHE?: I CAN'T DESCRIBE MY HEADACHES IN THIS WAY
HAVE YOU EVER BEEN TOLD YOU HAVE A SPECIFIC TYPE OF HEADACHE?: NO
ARE YOU BREASTFEEDING?: N
ARE YOUR HEADACHES GETTING MORE INTENSE WITH TIME?: I CAN'T TELL
WAS THERE A MEDICINE THAT YOU TOOK IN THE PAST THAT HELPED YOUR HEADACHES?: NO
HOW LONG HAS YOUR WORST RECENT HEADACHE LASTED?: LESS THAN AN HOUR
ARE YOU TAKING ANY OF THE FOLLOWING FOR YOUR HEADACHE: ASPIRIN, TYLENOL OR ACETAMINOPHEN, MOTRIN/IBUPROFEN/ALEVE/NAPROXEN, PRESCRIPTION MEDICATION, SOMEONE ELSE'S MEDICATION?: TYLENOL OR ACETAMINOPHEN

## 2024-02-08 NOTE — TELEPHONE ENCOUNTER
Tried to reach patient again today but no answer. TIANAB on both patient and her 's VM to call [facility]

## 2024-02-09 LAB
APTT PPP: 30.5 SECONDS (ref 23.3–35.6)
LA 3 SCREEN W REFLEX-IMP: NEGATIVE
PROTHROMBIN TIME: 14.9 SECONDS (ref 11.6–14.8)
SCREEN DRVVT: 1.04 S (ref 0–1.29)
SCREEN DRVVT: NEGATIVE S

## 2024-02-12 LAB
17-OH PROGESTERONE: 110 NG/DL
SEX HORM BIND GLOB: 76.5 NMOL/L
TESTOST % FREE+WEAK BND: 6.7 %
TESTOST FREE+WEAK BND: 3.3 NG/DL
TESTOSTERONE TOT /MS: 49 NG/DL

## 2024-02-20 ENCOUNTER — TELEPHONE (OUTPATIENT)
Dept: INTERNAL MEDICINE CLINIC | Facility: CLINIC | Age: 34
End: 2024-02-20

## 2024-02-20 ENCOUNTER — NURSE ONLY (OUTPATIENT)
Dept: INTERNAL MEDICINE CLINIC | Facility: CLINIC | Age: 34
End: 2024-02-20
Payer: MEDICAID

## 2024-02-20 DIAGNOSIS — E53.8 B12 DEFICIENCY: Primary | ICD-10-CM

## 2024-02-20 PROCEDURE — 96372 THER/PROPH/DIAG INJ SC/IM: CPT | Performed by: NURSE PRACTITIONER

## 2024-02-20 RX ORDER — TIRZEPATIDE 2.5 MG/.5ML
2.5 INJECTION, SOLUTION SUBCUTANEOUS WEEKLY
Qty: 2 ML | Refills: 0 | Status: CANCELLED | OUTPATIENT
Start: 2024-02-20

## 2024-02-20 RX ORDER — TIRZEPATIDE 5 MG/.5ML
5 INJECTION, SOLUTION SUBCUTANEOUS WEEKLY
Qty: 2 ML | Refills: 1 | Status: SHIPPED | OUTPATIENT
Start: 2024-02-20 | End: 2024-02-21

## 2024-02-20 RX ORDER — CYANOCOBALAMIN 1000 UG/ML
1000 INJECTION, SOLUTION INTRAMUSCULAR; SUBCUTANEOUS ONCE
Status: COMPLETED | OUTPATIENT
Start: 2024-02-20 | End: 2024-02-20

## 2024-02-20 RX ADMIN — CYANOCOBALAMIN 1000 MCG: 1000 INJECTION, SOLUTION INTRAMUSCULAR; SUBCUTANEOUS at 17:11:00

## 2024-02-20 NOTE — TELEPHONE ENCOUNTER
Requesting zepbound increase  LOV: 1/24/24  RTC: not noted  Last Relevant Labs: na  Filled: 2/20/24 #2ml with 1 refills Zepbound 5 mg sent    Future Appointments   Date Time Provider Department Center   2/21/2024  2:20 PM Layne Christopher MD [facility]   3/26/2024 11:30 AM [facility] NURSE [facility]   5/8/2024 10:20 AM Cate Ge MD [facility]

## 2024-02-21 RX ORDER — TIRZEPATIDE 5 MG/.5ML
5 INJECTION, SOLUTION SUBCUTANEOUS WEEKLY
Qty: 2 ML | Refills: 1 | Status: SHIPPED | OUTPATIENT
Start: 2024-02-21

## 2024-03-04 NOTE — PATIENT INSTRUCTIONS
[facility]    Hematology/Oncology Clinic Note    Mar 4, 2024   Subjective   REFERRAL SOURCE: Asher Damon MD    REASON FOR VISIT: Hospital discharge follow up    HISTORY OF PRESENT ILLNESS:  Ms. Clementine Kathleen is a 27 year old female who presents for consultation regarding newly diagnosed large B-cell lymphoma.     She first noticed some neck lumps around [place], no pain or erythema. They did not resolve after a month, so she underwent FNA on 1/29/24 which showed atypical lymphoid infiltrate suspicious for lymphoma with rare Manny-Ava cells; flow cytometry was negative. Subsequently underwent left level 3 excisional LN biopsy on 2/9/24, which shows large B-cell lymphoma (ddx PMBCL vs DLBCL NOS) with rare abnormal B-cells on flow cytometry. PET shows bilateral cervical and mediastinal hypermetabolic lymphadenopathy (largest 4.4 cm with SUVmax 27 in a prevascular LN); no disease below diaphragm.    She is here today with her partner Dima and parents who flew in from [place]. She feels well overall and has not noticed significant change in the neck nodes in the last month. She has occasional chest tightness and dry cough but no palpitations or difficulty breathing. Also denies fevers, night sweats, N/V, abd pain, diarrhea, bleeding, or numbness/tingling. 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 She is very active and does triathlons.       Nausea  Tip top  GERD  Flank       INTERVAL HISTORY     2/28: Neulasta +/- Emend (if nausea persistent)  - 3/1: Port placement  - 3/4: AMEENA follow up  - Twice weekly labs  - Inhaled pentamidine for pjp ppx     PAST MEDICAL HISTORY:  Idiopathic hypersomnia   Interstitial cystitis   GERD    FAMILY HISTORY:  Uncle had leukemia in his 50s.  Aunt had melanoma.     SOCIAL HISTORY:  Never smoker. Denies alcohol use.  [place], [place] [facility] [place] significant other Dima. Her  Schedule MRI parents live in [place].   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 Dima is a general surgery resident here as well.      No Known Allergies    Current Outpatient Medications:     acyclovir (ZOVIRAX) 400 MG tablet, Take 1 tablet (400 mg) by mouth 2 times daily, Disp: 60 tablet, Rfl: 0    allopurinol (ZYLOPRIM) 300 MG tablet, Take 1 tablet (300 mg) by mouth daily, Disp: 15 tablet, Rfl: 0    cimetidine (TAGAMET) 200 MG tablet, Take 200 mg by mouth 2 times daily, Disp: , Rfl:     dexAMETHasone (DECADRON) 4 MG tablet, Take 2 tablets (8 mg) by mouth daily Start taking Wednesday, 2/28, for two days., Disp: 4 tablet, Rfl: 0    fluconazole (DIFLUCAN) 200 MG tablet, Take 1 tablet (200 mg) by mouth daily, Disp: 30 tablet, Rfl: 0    levofloxacin (LEVAQUIN) 250 MG tablet, Take 1 tablet (250 mg) by mouth daily, Disp: 30 tablet, Rfl: 0    Levonorgestrel (KYLEENA IU), 1 Device by Intrauterine route once, Disp: , Rfl:     modafinil (PROVIGIL) 200 MG tablet, Take 1 tablet (200 mg) by mouth daily as needed, Disp: , Rfl:     ondansetron (ZOFRAN) 4 MG tablet, Take 1-2 tablets (4-8 mg) by mouth every 8 hours as needed for nausea or vomiting, Disp: 30 tablet, Rfl: 0    pantoprazole (PROTONIX) 40 MG EC tablet, Take 1 tablet (40 mg) by mouth every morning, Disp: 30 tablet, Rfl: 0    polyethylene glycol (MIRALAX) 17 GM/Dose powder, Take 17 g by mouth daily as needed for constipation, Disp: 510 g, Rfl: 0    prochlorperazine (COMPAZINE) 10 MG tablet, Take 1 tablet (10 mg) by mouth every 6 hours as needed for nausea or vomiting, Disp: 30 tablet, Rfl: 0    senna-docusate (SENOKOT-S/PERICOLACE) 8.6-50 MG tablet, Take 1 tablet by mouth daily as needed for constipation, Disp: 15 tablet, Rfl: 0     REVIEW OF SYSTEMS:  12-point ROS reviewed and negative other than that mentioned in HPI.     Objective   VITAL SIGNS:  LMP 01/31/2024 (Exact Date)     ECOG PS: 0     PHYSICAL EXAM:  General: Awake, alert, in no acute distress. Oriented x 3.  HEENT:  Normocephalic, atraumatic. No scleral icterus.   Lymph: 1.5 cm left supraclavicular LAD. No axillary LAD appreciated.   CV: Regular rate and rhythm. No murmurs, rubs, or gallops appreciated.  Resp: Good inspiratory effort, lungs clear to auscultation bilaterally.  GI: Abdomen soft, nontender, nondistended. No masses or organomegaly appreciated.   Ext: No peripheral edema bilaterally.  Neuro: CN II-XII grossly intact. No focal deficits.   Skin: No rash, unusual bruising or prominent lesions.  Psych: Pleasant, normal affect.    LABS:  I reviewed the following labs:  Lab Results   Component Value Date    WBC 45.8 (H) 2024    HGB 12.6 2024    HCT 36.7 2024    MCV 85 2024     2024    INR 1.18 (H) 2024     Lab Results   Component Value Date     2024    POTASSIUM 4.2 2024    CR 0.68 2024    BUN 19.9 2024    CHLORIDE 102 2024    DEE 8.7 2024    GLC 95 2024      Lab Results   Component Value Date    ALT 10 2024    AST 11 2024    ALKPHOS 67 2024    BILITOTAL 1.0 2024      Lab Results   Component Value Date     2024    URIC 1.3 (L) 2024     IMAGIN24 PET-CT:  IMPRESSION: In this patient with lymphoma:  1. Deauville 5 bilateral cervical and upper mediastinal hypermetabolic  lymphadenopathy.   2. No splenomegaly, lymphadenopathy below the diaphragm, or bone  marrow involvement demonstrated. Limited disease by Lugano criteria.     PATHOLOGY:  24 Left level 3 excisional biopsy:  Lymph node, left level 3, excisional biopsy:  - Involved by large B cell lymphoma  - See comment    The morphologic and immunophenotypic findings are diagnostic of large B cell lymphoma - the differential diagnosis includes DLBCL, non-germinal center subtype, NOS verus primary mediastinal large B-cell lymphoma (PMBL).     Many of the features suggest PMBL including the patient's age, gender, sites of involvement,  dim CD30 expression, and prominent fibrotic background.  However, the neoplastic cells lack CD23.  We are pursuing additional testing as described below to further characterize this LBCL.     Immunostains for PDL-1, , and MAL are pending to further subclassify and will be reported in an addendum. Additionally, we will be sending out unstained slides to the [facility] for the Clyfs4CA Assay, which helps genetically distinguish between DLBCL and PMBL - these results will be reported separately (will be a scanned report into a LAB MISC field).     Other pending ancillary studies include MYC, BCL2, and BCL6 FISH to exclude double/triple hit large-B cell lymphoma given MYC expression observed by IHC. These ancillary studies are in progress and will be reported separately.     Flow cytometry analysis on concurrent specimen ([id number]) was suspicious for rare abnormal B cells (they were large with bright expression of CD19 and CD20, but lacked CD5, CD10, and surface light chains) and showed no definitive aberrant immunophenotype on T cells.     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 Initial FNA suggestive of possible Hodgkin lymphoma; however excisional biopsy shows large B-cell lymphoma with differential diagnosis of primary mediastinal large B-cell lymphoma vs DLBCL NOS. PET with cervical and mediastinal lymphadenopathy only. Overall presentation would be quite consistent with PMBCL given her age, gender, sites of involvement, dim CD30 expression, and prominent fibrotic background. However, the neoplastic cells lack CD23 so additional testing is in process (IHC for PDL-1, , MAL and [id number] gene expression assay) to help distinguish.     Otherwise we reviewed her overall diagnosis and prognosis of large B-cell lymphoma (PMBCL vs stage 2 DLBCL). Since we would like to get started with chemotherapy quickly, we discussed  treating as if her disease is PMBCL for Cycle 1 with dose-adjusted R-EPOCH while awaiting the pending additional studies. If studies come back more suggestive of DLBCL NOS later, we can de-escalate therapy to R-CHOP for later cycles. She is going to undergo egg retrieval either 2/18 or 2/19 and TTE 2/19, so will plan for admission late next week. For Cycle 1 will need PICC but will pursue port placement for later cycles (if continuing with R-EPOCH). Also, Cycle 1 will be inpatient but we are in the process of rolling out our outpatient R-EPOCH protocol, so can look into this for Cycle 2 and beyond if she is interested (she wants to think about it).     We reviewed the logistics and schedule of R-EPOCH (inpatient 5-day admission for chemotherapy every 3 weeks, outpatient biweekly lab checks in between). We reviewed potential side effects including possible Rituximab infusion reaction the first cycle, tumor lysis syndrome, hair loss, cytopenias/infection, fatigue, GI symptoms, neuropathy, anthracycline cardiotoxicity, and very low chance of therapy-related myeloid neoplasm down the road. We will plan for a total of 6 cycles with first restaging PET-CT after 2 cycles. Patient was understanding and in agreement with this plan. In the single-arm phase 2 prospective study of R-EPOCH (without radiation) for PMBCL, 5-year EFS was 93% and OS 97% (10.1056/[id number]).     Fertility preservation  Already established with [facility] and currently undergoing fertility injections with plan for egg retrieval on 2/18 or 2/19.     Ppx  - TLS ppx: baseline uric acid normal. Start allopurinol 300 mg daily. Encouraged good hydration.  - ID ppx: start  mg BID. Levo and fluc when ANC <1.0. Monthly pentamidine for PJP ppx.   - Vaccines: up to date on COVID and flu shots.     Plan from today's clinical encounter  Add reglan  Does not want home care ofr cycle 2. Prefers hospital admisson      Transition Care Management  Services  Admission Date: 02/22/24    Discharge Date: 02/27/24    Discharge Diagnosis: lymphoma    Interactive contact date: 2/29/2024  Face-to-face visit date: 3/4/2024  Medical complexity decision making: High complexity ([id number])        59 minutes spent on the date of the encounter doing chart review, review of test results, interpretation of tests, patient visit, documentation, discussion with other provider(s), and discussion with family     Nereida LIVE, ANP-BC, AOCNP  [facility] [address] [id number] [phone] (pager)

## 2024-03-07 ENCOUNTER — TELEPHONE (OUTPATIENT)
Dept: FAMILY MEDICINE | Age: 34
End: 2024-03-07

## 2024-03-07 ENCOUNTER — E-ADVICE (OUTPATIENT)
Dept: FAMILY MEDICINE | Age: 34
End: 2024-03-07

## 2024-03-07 ENCOUNTER — APPOINTMENT (OUTPATIENT)
Dept: FAMILY MEDICINE | Age: 34
End: 2024-03-07

## 2024-03-16 ENCOUNTER — E-VISIT (OUTPATIENT)
Dept: FAMILY MEDICINE | Age: 34
End: 2024-03-16

## 2024-03-16 DIAGNOSIS — Z02.89 ENCOUNTER TO OBTAIN EXCUSE FROM WORK: ICD-10-CM

## 2024-03-16 DIAGNOSIS — R51.9 GENERALIZED HEADACHES: ICD-10-CM

## 2024-03-16 DIAGNOSIS — B34.9 PHARYNGITIS WITH VIRAL SYNDROME: Primary | ICD-10-CM

## 2024-03-16 DIAGNOSIS — J02.9 PHARYNGITIS WITH VIRAL SYNDROME: Primary | ICD-10-CM

## 2024-03-16 RX ORDER — LIDOCAINE HYDROCHLORIDE 20 MG/ML
10-15 SOLUTION OROPHARYNGEAL EVERY 4 HOURS PRN
Qty: 100 ML | Refills: 0 | Status: SHIPPED | OUTPATIENT
Start: 2024-03-16 | End: 2024-03-19

## 2024-03-16 RX ORDER — ACETAMINOPHEN 325 MG/1
650 TABLET ORAL EVERY 4 HOURS PRN
Qty: 30 TABLET | Refills: 0 | Status: SHIPPED | COMMUNITY
Start: 2024-03-16

## 2024-03-16 RX ORDER — IBUPROFEN 200 MG
400 TABLET ORAL EVERY 6 HOURS PRN
Qty: 30 TABLET | Refills: 0 | Status: SHIPPED | COMMUNITY
Start: 2024-03-16

## 2024-03-21 ENCOUNTER — LAB ENCOUNTER (OUTPATIENT)
Dept: LAB | Age: 34
End: 2024-03-21
Attending: INTERNAL MEDICINE
Payer: COMMERCIAL

## 2024-03-21 ENCOUNTER — PATIENT MESSAGE (OUTPATIENT)
Dept: INTERNAL MEDICINE CLINIC | Facility: CLINIC | Age: 34
End: 2024-03-21

## 2024-03-21 DIAGNOSIS — E89.0 POSTSURGICAL HYPOTHYROIDISM: Primary | ICD-10-CM

## 2024-03-21 LAB
T4 FREE SERPL-MCNC: 0.7 NG/DL (ref 0.8–1.7)
TSI SER-ACNC: 11.1 MIU/ML (ref 0.36–3.74)

## 2024-03-21 PROCEDURE — 84443 ASSAY THYROID STIM HORMONE: CPT

## 2024-03-21 PROCEDURE — 36415 COLL VENOUS BLD VENIPUNCTURE: CPT

## 2024-03-21 PROCEDURE — 84439 ASSAY OF FREE THYROXINE: CPT

## 2024-03-21 RX ORDER — TIRZEPATIDE 5 MG/.5ML
5 INJECTION, SOLUTION SUBCUTANEOUS WEEKLY
Qty: 2 ML | Refills: 1 | Status: SHIPPED | OUTPATIENT
Start: 2024-03-21

## 2024-03-21 RX ORDER — TIRZEPATIDE 10 MG/.5ML
10 INJECTION, SOLUTION SUBCUTANEOUS WEEKLY
Qty: 2 ML | Refills: 0 | Status: CANCELLED | OUTPATIENT
Start: 2024-03-21

## 2024-03-21 NOTE — TELEPHONE ENCOUNTER
From: Alberto Alberts  To: Cate Ge  Sent: 3/21/2024 9:11 AM CDT  Subject: Medication     Requesting to increase dosage as I am doing good on 5mg to 10 or 15mg. No major side effects continuing to lose weight which is good.

## 2024-03-26 ENCOUNTER — NURSE ONLY (OUTPATIENT)
Dept: INTERNAL MEDICINE CLINIC | Facility: CLINIC | Age: 34
End: 2024-03-26
Payer: COMMERCIAL

## 2024-03-26 DIAGNOSIS — E53.8 B12 DEFICIENCY: Primary | ICD-10-CM

## 2024-03-26 PROCEDURE — 96372 THER/PROPH/DIAG INJ SC/IM: CPT | Performed by: NURSE PRACTITIONER

## 2024-03-26 RX ORDER — CYANOCOBALAMIN 1000 UG/ML
1000 INJECTION, SOLUTION INTRAMUSCULAR; SUBCUTANEOUS ONCE
Status: COMPLETED | OUTPATIENT
Start: 2024-03-26 | End: 2024-03-26

## 2024-03-26 RX ADMIN — CYANOCOBALAMIN 1000 MCG: 1000 INJECTION, SOLUTION INTRAMUSCULAR; SUBCUTANEOUS at 11:38:00

## 2024-03-26 NOTE — H&P
Has The Growth Been Previously Biopsied?: has been previously biopsied [facility]  History and Physical     Betsy Jacobo Patient Status:  Emergency    10/27/1990 MRN [id number]   Location [address] Attending No att. providers found   [facility] Day # 0 PCP Berna Garibay MD     Chief Comp LAPAROSCOPY,PELVIC,BIOPSY      Comment: drill ovary  01/13/15: OTHER SURGICAL HISTORY      Comment: Subtotal Thyroidectomy   No date: OTHER SURGICAL HISTORY Left      Comment: foot sx x3  No date: THYROIDECTOMY    Social History:  reports that she has neve sounds. No rebound, guarding or organomegaly. Neurologic: No focal neurological deficits. CNII-XII grossly intact. Musculoskeletal: Moves all extremities. Extremities: No edema or cyanosis. Integument: No rashes or lesions.    Psychiatric: Appropriate m

## 2024-04-30 ENCOUNTER — NURSE ONLY (OUTPATIENT)
Dept: INTERNAL MEDICINE CLINIC | Facility: CLINIC | Age: 34
End: 2024-04-30
Payer: COMMERCIAL

## 2024-04-30 DIAGNOSIS — E53.8 B12 DEFICIENCY: Primary | ICD-10-CM

## 2024-04-30 RX ORDER — CYANOCOBALAMIN 1000 UG/ML
1000 INJECTION, SOLUTION INTRAMUSCULAR; SUBCUTANEOUS ONCE
Status: SHIPPED | OUTPATIENT
Start: 2024-04-30

## 2024-05-24 ENCOUNTER — E-VISIT (OUTPATIENT)
Dept: TELEHEALTH | Age: 34
End: 2024-05-24

## 2024-05-24 DIAGNOSIS — R42 DIZZINESS: Primary | ICD-10-CM

## 2024-05-24 DIAGNOSIS — Z02.89 ENCOUNTER TO OBTAIN EXCUSE FROM WORK: ICD-10-CM

## 2024-05-24 NOTE — PROGRESS NOTES
Alberto Alberts is a 33 year old female.  HPI:   See answers to questions above.     Current Outpatient Medications   Medication Sig Dispense Refill    Tirzepatide-Weight Management (ZEPBOUND) 5 MG/0.5ML Subcutaneous Solution Auto-injector Inject 5 mg into the skin once a week. 2 mL 1    Tirzepatide-Weight Management (ZEPBOUND) 5 MG/0.5ML Subcutaneous Solution Auto-injector Inject 5 mg into the skin once a week. 2 mL 1    Tirzepatide-Weight Management (ZEPBOUND) 2.5 MG/0.5ML Subcutaneous Solution Auto-injector Inject 2.5 mg into the skin once a week. 2 mL 0    triamcinolone 0.1 % External Cream Apply 1 Application topically 2 (two) times daily. APPLY TO AFFECTED AREA      gabapentin 100 MG Oral Cap Take 1 capsule (100 mg total) by mouth.      valACYclovir 1 G Oral Tab Take 2 tablets (2,000 mg) po 12 hours apart as needed for cold sores 4 tablet 0    Multiple Vitamin Oral Tab Take 1 tablet by mouth daily.      Levothyroxine Sodium 175 MCG Oral Tab One tablet Monday through Friday, two tablets Saturdays and Sundays 102 tablet 3      Past Medical History:    Anemia    Anesthesia complication    Bilateral chronic vestibular neuritis    Concussion    H/O complications due to general anesthesia    pt reports she had a hard time waking up from foot surgery    Left foot pain    Migraines    Obesity, unspecified    Panic    PCOS (polycystic ovarian syndrome)    Postsurgical hypothyroidism    In 2015: total thyroidectomy for benign goiter    POTS (postural orthostatic tachycardia syndrome)    Tachycardia    Thyroid nodule    Vestibular neuronitis    Overview:  Last Assessment & Plan:  Since apparently her insurance company does not work on the weekend, we will wait for their eval on 6/13/16.  She has been cleared for dc to snf for rehab per all consultants      Past Surgical History:   Procedure Laterality Date    D & c  6/22/2011    D & c  2009    D & c  10/12/2012    Dilation/curettage,diagnostic      Hysteroscopy   6/22/2011    Laparoscopy,diagnostic      x3 r/t POS    Laparoscopy,pelvic,biopsy  2008    drill ovary    Other surgical history Left     foot sx x3    Other surgical history  03/2012    operative laparoscopy with electrocautery of multiple ovarian cysts bilaterally. it was lysis of a few pelvic adhesions    Other surgical history      LAPAROSCOPY, OPERATIVE , POSSIBLE LAPAROTOMY WITH/WO TUBAL PERFUSION performed by Bal Benito MD at [facility]    Other surgical history  07/30/2018    Operative laparoscopy, endometrial sampling, Right peritubal cystectomy, Left ovarian cystectomy, fulgration of endometriosis    Surgical bariatrics - internal  10/23/2018    Gastric Sleeve at [place] by Dr. Cooper    Thyroidectomy Bilateral 01/15/2016      Family History   Problem Relation Age of Onset    Breast Cancer Mother 38    Cancer Mother         breast    Cancer Maternal Grandmother         Cervical    Thyroid disease Maternal Grandmother         Possible thyroid cancer    High Cholesterol Father     Hypertension Father     Heart Disease Father     Other (Other) Father         heart bypass surgery    Hypertension Paternal Grandfather     Diabetes Paternal Grandfather     Stroke Neg       Social History:  Social History     Socioeconomic History    Marital status:    Tobacco Use    Smoking status: Never    Smokeless tobacco: Never   Vaping Use    Vaping status: Never Used   Substance and Sexual Activity    Alcohol use: Yes     Alcohol/week: 0.0 standard drinks of alcohol     Comment: social    Drug use: No    Sexual activity: Yes     Partners: Male   Other Topics Concern    Caffeine Concern Yes     Comment: Daily; 1 cup coffee    Exercise No     Comment: 3 x a week 30  min     Seat Belt Yes   Social History Narrative    Single, lives at home with her father, no children, works at [employer]         ASSESSMENT AND PLAN:     Encounter Diagnosis   Name Primary?    Dizziness Yes       Advised to be seen in person to  evaluate dizziness  Work excuse sent via [employer] today    Meds & Refills for this Visit:  Requested Prescriptions      No prescriptions requested or ordered in this encounter       Duration of  the service:  5 minutes

## 2024-05-29 ENCOUNTER — E-VISIT (OUTPATIENT)
Dept: TELEHEALTH | Age: 34
End: 2024-05-29

## 2024-05-29 DIAGNOSIS — R19.7 DIARRHEA, UNSPECIFIED TYPE: Primary | ICD-10-CM

## 2024-05-29 PROCEDURE — 99421 OL DIG E/M SVC 5-10 MIN: CPT | Performed by: NURSE PRACTITIONER

## 2024-05-29 NOTE — PROGRESS NOTES
Alberto Alberts is a 33 year old female submitting e-visit for diarrhea.  HPI:   See answers to questionnaire and [employer] message exchange    Current Outpatient Medications   Medication Sig Dispense Refill    Tirzepatide-Weight Management (ZEPBOUND) 5 MG/0.5ML Subcutaneous Solution Auto-injector Inject 5 mg into the skin once a week. 2 mL 1    Tirzepatide-Weight Management (ZEPBOUND) 5 MG/0.5ML Subcutaneous Solution Auto-injector Inject 5 mg into the skin once a week. 2 mL 1    Tirzepatide-Weight Management (ZEPBOUND) 2.5 MG/0.5ML Subcutaneous Solution Auto-injector Inject 2.5 mg into the skin once a week. 2 mL 0    triamcinolone 0.1 % External Cream Apply 1 Application topically 2 (two) times daily. APPLY TO AFFECTED AREA      gabapentin 100 MG Oral Cap Take 1 capsule (100 mg total) by mouth.      valACYclovir 1 G Oral Tab Take 2 tablets (2,000 mg) po 12 hours apart as needed for cold sores 4 tablet 0    Multiple Vitamin Oral Tab Take 1 tablet by mouth daily.      Levothyroxine Sodium 175 MCG Oral Tab One tablet Monday through Friday, two tablets Saturdays and Sundays 102 tablet 3      Past Medical History:    Anemia    Anesthesia complication    Bilateral chronic vestibular neuritis    Concussion    H/O complications due to general anesthesia    pt reports she had a hard time waking up from foot surgery    Left foot pain    Migraines    Obesity, unspecified    Panic    PCOS (polycystic ovarian syndrome)    Postsurgical hypothyroidism    In 2015: total thyroidectomy for benign goiter    POTS (postural orthostatic tachycardia syndrome)    Tachycardia    Thyroid nodule    Vestibular neuronitis    Overview:  Last Assessment & Plan:  Since apparently her insurance company does not work on the weekend, we will wait for their eval on 6/13/16.  She has been cleared for dc to snf for rehab per all consultants      Past Surgical History:   Procedure Laterality Date    D & c  6/22/2011    D & c  2009    D & c  10/12/2012     Dilation/curettage,diagnostic      Hysteroscopy  6/22/2011    Laparoscopy,diagnostic      x3 r/t POS    Laparoscopy,pelvic,biopsy  2008    drill ovary    Other surgical history Left     foot sx x3    Other surgical history  03/2012    operative laparoscopy with electrocautery of multiple ovarian cysts bilaterally. it was lysis of a few pelvic adhesions    Other surgical history      LAPAROSCOPY, OPERATIVE , POSSIBLE LAPAROTOMY WITH/WO TUBAL PERFUSION performed by Bal Benito MD at [facility] LOCATION    Other surgical history  07/30/2018    Operative laparoscopy, endometrial sampling, Right peritubal cystectomy, Left ovarian cystectomy, fulgration of endometriosis    Surgical bariatrics - internal  10/23/2018    Gastric Sleeve at [place] by Dr. Cooper    Thyroidectomy Bilateral 01/15/2016      Family History   Problem Relation Age of Onset    Breast Cancer Mother 38    Cancer Mother         breast    Cancer Maternal Grandmother         Cervical    Thyroid disease Maternal Grandmother         Possible thyroid cancer    High Cholesterol Father     Hypertension Father     Heart Disease Father     Other (Other) Father         heart bypass surgery    Hypertension Paternal Grandfather     Diabetes Paternal Grandfather     Stroke Neg       Social History:  Social History     Socioeconomic History    Marital status:    Tobacco Use    Smoking status: Never    Smokeless tobacco: Never   Vaping Use    Vaping status: Never Used   Substance and Sexual Activity    Alcohol use: Yes     Alcohol/week: 0.0 standard drinks of alcohol     Comment: social    Drug use: No    Sexual activity: Yes     Partners: Male   Other Topics Concern    Caffeine Concern Yes     Comment: Daily; 1 cup coffee    Exercise No     Comment: 3 x a week 30  min     Seat Belt Yes   Social History Narrative    Single, lives at home with her father, no children, works at [employer]         ASSESSMENT AND PLAN:       Diagnoses and all orders for this  visit:    Diarrhea, unspecified type    Tolerating fluids  Start florastor  Diet information provided  Supportive measures  Patient advised to follow up with PCP if no improvement in 1-2 days  Refer to [employer] message exchange for specific patient instructions    Duration of  the service:  10 minutes

## 2024-06-11 ENCOUNTER — OFFICE VISIT (OUTPATIENT)
Dept: INTERNAL MEDICINE CLINIC | Facility: CLINIC | Age: 34
End: 2024-06-11
Payer: COMMERCIAL

## 2024-06-11 VITALS
DIASTOLIC BLOOD PRESSURE: 78 MMHG | WEIGHT: 283 LBS | RESPIRATION RATE: 16 BRPM | HEIGHT: 66 IN | HEART RATE: 124 BPM | SYSTOLIC BLOOD PRESSURE: 110 MMHG | BODY MASS INDEX: 45.48 KG/M2

## 2024-06-11 DIAGNOSIS — Z72.3 PROBLEMS RELATED TO LACK OF PHYSICAL EXERCISE: ICD-10-CM

## 2024-06-11 DIAGNOSIS — Z78.9 VEGETARIAN DIET: ICD-10-CM

## 2024-06-11 DIAGNOSIS — E28.2 PCOS (POLYCYSTIC OVARIAN SYNDROME): Primary | ICD-10-CM

## 2024-06-11 DIAGNOSIS — N97.9 INFERTILITY, FEMALE: ICD-10-CM

## 2024-06-11 DIAGNOSIS — Z51.81 THERAPEUTIC DRUG MONITORING: ICD-10-CM

## 2024-06-11 DIAGNOSIS — E53.8 B12 DEFICIENCY: ICD-10-CM

## 2024-06-11 PROCEDURE — 99214 OFFICE O/P EST MOD 30 MIN: CPT | Performed by: INTERNAL MEDICINE

## 2024-06-11 RX ORDER — LETROZOLE 2.5 MG/1
TABLET, FILM COATED ORAL
COMMUNITY
Start: 2024-06-10

## 2024-06-11 RX ORDER — TIRZEPATIDE 10 MG/.5ML
10 INJECTION, SOLUTION SUBCUTANEOUS WEEKLY
Qty: 2 ML | Refills: 0 | Status: SHIPPED | OUTPATIENT
Start: 2024-06-11 | End: 2024-07-03

## 2024-06-11 RX ORDER — TIRZEPATIDE 7.5 MG/.5ML
7.5 INJECTION, SOLUTION SUBCUTANEOUS WEEKLY
Qty: 2 ML | Refills: 0 | Status: SHIPPED | OUTPATIENT
Start: 2024-06-11 | End: 2024-07-03

## 2024-06-11 RX ORDER — TIRZEPATIDE 12.5 MG/.5ML
12.5 INJECTION, SOLUTION SUBCUTANEOUS WEEKLY
Qty: 2 ML | Refills: 0 | Status: SHIPPED | OUTPATIENT
Start: 2024-06-11 | End: 2024-07-03

## 2024-06-11 RX ORDER — NAPROXEN 500 MG/1
TABLET ORAL
COMMUNITY

## 2024-06-11 NOTE — PROGRESS NOTES
HISTORY OF PRESENT ILLNESS  Chief Complaint   Patient presents with    Weight Check     DOWN 26        Alberto Alberts is a 33 year old female here for follow up in medical weight loss program.     Denies chest pain, shortness of breath, dizziness, blurred vision, headache, paresthesia, nausea/vomiting.     Has cut out soda   Reviewed 24 dietary recall   Has been doing alternatives with vitamin water  Reviewed elevated TSh was 27   Dose was adjusted with PCP   Not exercising/ does not like to sweat   Tries to exercise when she can          Wt Readings from Last 6 Encounters:   06/11/24 283 lb (128.4 kg)   01/24/24 (!) 309 lb (140.2 kg)   05/04/23 285 lb (129.3 kg)   02/06/23 277 lb (125.6 kg)   01/30/23 277 lb (125.6 kg)   11/09/22 281 lb (127.5 kg)            Breakfast Lunch Dinner Snacks Fluids   Reviewed 24 dietary recal   Cheese and coffee   Green tea throughout the day    Pasta salad   Burger without bun   Has been cutting out bread and rice   Cutting out sugar             REVIEW OF SYSTEMS  GENERAL HEALTH: feels well otherwise, denied any fevers chills or night sweats   RESPIRATORY: denies shortness of breath   CARDIOVASCULAR: denies chest pain  GI: denies abdominal pain    EXAM  /78   Pulse (!) 124   Resp 16   Ht 5' 6\" (1.676 m)   Wt 283 lb (128.4 kg)   LMP 06/11/2024 (Approximate)   BMI 45.68 kg/m²   GENERAL: well developed, well nourished,in no apparent distress, A/O x3  SKIN: no rashes,no suspicious lesions  HEENT: atraumatic, normocephalic, OP-clear, PERRL  NECK: supple,no adenopathy  LUNGS: clear to auscultation bilaterally   CARDIO: RRR without murmur  GI: good BS's,NT/ND, no masses or HSM  EXTREMITIES: no cyanosis, no clubbing, no edema    Lab Results   Component Value Date    WBC 5.2 08/19/2022    RBC 4.53 08/19/2022    HGB 13.8 08/19/2022    HCT 41.7 08/19/2022    MCV 92.1 08/19/2022    MCH 30.5 08/19/2022    MCHC 33.1 08/19/2022    RDW 13.9 08/19/2022    .0 08/19/2022    MPV 9.6  07/05/2018     Lab Results   Component Value Date    GLU 89 02/07/2024    BUN 10 02/07/2024    BUNCREA 10.0 12/19/2020    CREATSERUM 0.87 02/07/2024    ANIONGAP 4 02/07/2024    GFR 90 01/23/2018    GFRNAA 102 12/21/2021    GFRAA 117 12/21/2021    CA 8.8 02/07/2024    OSMOCALC 291 02/07/2024    ALKPHO 77 02/07/2024    AST 20 02/07/2024    ALT 21 02/07/2024    BILT 0.9 02/07/2024    TP 6.7 02/07/2024    ALB 3.2 (L) 02/07/2024    GLOBULIN 3.5 02/07/2024     02/07/2024    K 3.5 02/07/2024     (H) 02/07/2024    CO2 24.0 02/07/2024     Lab Results   Component Value Date     12/21/2021    A1C 5.3 12/21/2021     Lab Results   Component Value Date    CHOLEST 195.00 09/25/2020    TRIG 49.00 09/25/2020    HDL 93.6 09/25/2020    LDL 92 09/25/2020    VLDL 10 09/25/2020    TCHDLRATIO 1.68 02/02/2016    NONHDLC 53 02/02/2016     Lab Results   Component Value Date    T4F 0.7 (L) 03/21/2024    TSH 11.100 (H) 03/21/2024     Lab Results   Component Value Date    B12 392 02/07/2024     Lab Results   Component Value Date    VITD 16.9 (L) 08/19/2022       Current Outpatient Medications on File Prior to Visit   Medication Sig Dispense Refill    letrozole 2.5 MG Oral Tab       naproxen 500 MG Oral Tab TAKE 1 TABLET BY MOUTH AT ONSET OF MIGRAINE HEADACHE. MAY TAKE 1 TABLET 12 HOUR LATER IF HEADACHE IS STILL PRESENT      gabapentin 100 MG Oral Cap Take 1 capsule (100 mg total) by mouth.      Multiple Vitamin Oral Tab Take 1 tablet by mouth daily.      Levothyroxine Sodium 175 MCG Oral Tab One tablet Monday through Friday, two tablets Saturdays and Sundays 102 tablet 3     Current Facility-Administered Medications on File Prior to Visit   Medication Dose Route Frequency Provider Last Rate Last Admin    cyanocobalamin (Vitamin B12) 1000 MCG/ML injection 1,000 mcg  1,000 mcg Intramuscular Once            ASSESSMENT  Analyzed weight data:       Diagnoses and all orders for this visit:    PCOS (polycystic ovarian  syndrome)    Therapeutic drug monitoring    Infertility, female    B12 deficiency    Vegetarian diet    Problems related to lack of physical exercise    Other orders  -     Tirzepatide-Weight Management (ZEPBOUND) 7.5 MG/0.5ML Subcutaneous Solution Auto-injector; Inject 7.5 mg into the skin once a week for 4 doses.  -     Tirzepatide-Weight Management (ZEPBOUND) 10 MG/0.5ML Subcutaneous Solution Auto-injector; Inject 10 mg into the skin once a week for 4 doses.  -     Tirzepatide-Weight Management (ZEPBOUND) 12.5 MG/0.5ML Subcutaneous Solution Auto-injector; Inject 12.5 mg into the skin once a week for 4 doses.        PLAN  Initial consult: 1/24/24: 309 lb   Down 26 lb   Total time spent on chart review, pre-charting, obtaining history, counseling, and educating, reviewing labs was 30 minutes.  Reviewed increase in fiber and balancing diet  Continue zepbound   -advised of side effects and adverse effects of this medication  Reviewed need/ role of physical activity  Medication use and side effects reviewed with patient.  Medication contraindications:   Cannot take saxenda or wegovy due to national back order  Cannot take qsymia/ phentermine / diethylpropion, has taken in the past no efficacy   Cannot take orlistat due to history of gastric sleeve and hypothyroidism     Injection teaching in OV   Referralto dietitian   Reviewed labs  Reviewed while active on GLP1 and 3 month post treatment , cannot concieve, must use backup method of birth control   Goal of 40 lb weight loss prior to when they will even offer resumption of treatment but my personal recommendation would be 70-80 lb     Nutrition: low carb diet/ recommended to eat breakfast daily/ regular protein intake  Medication use and side effects reviewed with patient.  Medication contraindications: n/a  Follow up with dietitian and psychologist as recommended.  Discussed the role of sleep and stress in weight management.  Counseled on comprehensive weight loss  plan including attention to nutrition, exercise and behavior/stress management for success. See patient instruction below for more details.  Discussed strategies to overcome barriers to successful weight loss and weight maintenance  FITTE: ACSM recommendations (150-300 minutes/ week in active weight loss)   Weight Loss consent to treat reviewed and signed    Patient Instructions   www."[email or web address]  [facility]  [address], [place] [address] · [address]  [phone]    Return in about 8 weeks (around 8/6/2024).    Patient verbalizes understanding.    Cate Ge MD

## 2024-06-11 NOTE — PATIENT INSTRUCTIONS
[email or web address]  [facility]  [address], [place] [address] · [address]  [phone]

## 2024-06-19 ENCOUNTER — OFFICE VISIT (OUTPATIENT)
Dept: NEUROLOGY | Facility: CLINIC | Age: 34
End: 2024-06-19

## 2024-06-19 VITALS
RESPIRATION RATE: 18 BRPM | HEIGHT: 66 IN | HEART RATE: 116 BPM | DIASTOLIC BLOOD PRESSURE: 88 MMHG | BODY MASS INDEX: 45.48 KG/M2 | WEIGHT: 283 LBS | SYSTOLIC BLOOD PRESSURE: 114 MMHG | OXYGEN SATURATION: 98 %

## 2024-06-19 DIAGNOSIS — G56.02 CARPAL TUNNEL SYNDROME OF LEFT WRIST: ICD-10-CM

## 2024-06-19 DIAGNOSIS — M79.601 RIGHT ARM PAIN: Primary | ICD-10-CM

## 2024-06-19 PROCEDURE — 99213 OFFICE O/P EST LOW 20 MIN: CPT | Performed by: OTHER

## 2024-06-19 NOTE — PATIENT INSTRUCTIONS
Refill policies:    Allow 2-3 business days for refills; controlled substances may take longer.  Contact your pharmacy at least 5 days prior to running out of medication and have them send an electronic request or submit request through the “request refill” option in your [employer] account.  Refills are not addressed on weekends; covering physicians do not authorize routine medications on weekends.  No narcotics or controlled substances are refilled after noon on Fridays or by on call physicians.  By law, narcotics must be electronically prescribed.  A 30 day supply with no refills is the maximum allowed.  If your prescription is due for a refill, you may be due for a follow up appointment.  To best provide you care, patients receiving routine medications need to be seen at least once a year.  Patients receiving narcotic/controlled substance medications need to be seen at least once every 3 months.  In the event that your preferred pharmacy does not have the requested medication in stock (e.g. Backordered), it is your responsibility to find another pharmacy that has the requested medication available.  We will gladly send a new prescription to that pharmacy at your request.    Scheduling Tests:    If your physician has ordered radiology tests such as MRI or CT scans, please contact Central Scheduling at [phone] right away to schedule the test.  Once scheduled, the [facility] Centralized Referral Team will work with your insurance carrier to obtain pre-certification or prior authorization.  Depending on your insurance carrier, approval may take 3-10 days.  It is highly recommended patients assure they have received an authorization before having a test performed.  If test is done without insurance authorization, patient may be responsible for the entire amount billed.      Precertification and Prior Authorizations:  If your physician has recommended that you have a procedure or additional testing performed the [facility]  Centralized Referral Team will contact your insurance carrier to obtain pre-certification or prior authorization.    You are strongly encouraged to contact your insurance carrier to verify that your procedure/test has been approved and is a COVERED benefit.  Although the [facility] Centralized Referral Team does its due diligence, the insurance carrier gives the disclaimer that \"Although the procedure is authorized, this does not guarantee payment.\"    Ultimately the patient is responsible for payment.   Thank you for your understanding in this matter.  Paperwork Completion:  If you require FMLA or disability paperwork for your recovery, please make sure to either drop it off or have it faxed to our office at [phone]. Be sure the form has your name and date of birth on it.  The form will be faxed to our Forms Department and they will complete it for you.  There is a 25$ fee for all forms that need to be filled out.  Please be aware there is a 10-14 day turnaround time.  You will need to sign a release of information (EUGENE) form if your paperwork does not come with one.  You may call the Forms Department with any questions at [phone].  Their fax number is [phone].

## 2024-06-19 NOTE — PROGRESS NOTES
HPI:    Patient ID: Alberto Alberts is a 33 year old female.          Alberto is a 33-year-old female who presented with complaints of right forearm pain.  She was last seen about a year ago for possible left carpal tunnel and at that time EMG nerve conduction study was ordered but patient never completed the study.  She states that she has been having a localized burning sensation just below the antecubital fossa and tender to touch. No swelling or redness. No injury reported  Denies any tingling and numbness in the right arm or hand  States left hand paresthesia and pain has improved. 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        Office visit: May 2023  Patient is a 32 year old female seen today for follow up.  States left hand/arm pain has improved. Intermittently gets tingling and numbness left hand  States started having dizziness for last 1 week, feels dizzy when head movements but no spinning sensation. Mild HA but no major migraine  She woke up with a mild fever but no URI symptoms  Completed MRI cervical spine which showed no disc bulging or any spondylosis      Last office visit note:    Alberto Alberts is a 32 year old female who presents for left hand pain and numbness.   States she went to [facility] on 1/9 with left hand numbness. Per ED note came in with c/o persistent L hand numbness. Pt denies associated weakness. She developed associated chest pain as well. Midsternal chest pain, non radiating, onset w/ rest, not associated with SOB or diaphoresis, N/V. Aspiring given x1. CP now resolved.   In the ED, the patient was noted to have tachycardia, normal bp Her laboratory w/u was unremarkable. Negative troponin, normal CBC, CRP. EKG w/o acute ST changes. CT head w/o acute findings. CTA w/o LVO but notable for periventricular and subcortical white matter hypoattenuation advanced for age. MRI brain negative for any acute infarct and show chronic white matter changes.       She continues to have pain is mainly in the first 3 fingers associated with numbness.  Patient also states a burning sensation in the left hand. Denies any hand  weakness. Gabapentin is helping the symptoms but noted dizziness with the gabapentin.   EMG and NCS recommended, states insurance wont approved it.     Previous history  She has history of migraines, chronic dizziness, POTS, chronic non-specific white matter lesions.  She reports for the 2 weeks or so headaches have returned back she is getting headaches on the right side affecting her vision, still vision gets blurry and she is getting some eye twitching.  The pain will last for few hours but when she takes over-the-counter medication and got that down to couple hours.  She states insurance did not approve her MRI of the pituitary gland.      She states that she is trying to conceive and not able and wondering if it is due to pituitary adenoma. She further reports that few years ago MRI brain/pituitary showed pituitary adenoma and had Neurosurgery and was advised to repeat MRI. Following that she had MRI brain but no pituitary lesion reported. She states since she is not able to get pregnant she thinks it could be due to hormonal issues related to pituitary lesions.        HISTORY:  Past Medical History:    Anemia    Anesthesia complication    Bilateral chronic vestibular neuritis    Concussion    H/O complications due to general anesthesia    pt reports she had a hard time waking up from foot surgery    Left foot pain    Migraines    Obesity, unspecified    Panic    PCOS (polycystic ovarian syndrome)    Postsurgical hypothyroidism    In 2015: total thyroidectomy for benign goiter    POTS (postural orthostatic tachycardia syndrome)    Tachycardia    Thyroid nodule    Vestibular neuronitis    Overview:  Last Assessment & Plan:  Since apparently her insurance company does not work on the weekend, we will wait for their eval on 6/13/16.  She has been  cleared for dc to [facility] for rehab per all consultants      Past Surgical History:   Procedure Laterality Date    D & c  6/22/2011    D & c  2009    D & c  10/12/2012    Dilation/curettage,diagnostic      Hysteroscopy  6/22/2011    Laparoscopy,diagnostic      x3 r/t POS    Laparoscopy,pelvic,biopsy  2008    drill ovary    Other surgical history Left     foot sx x3    Other surgical history  03/2012    operative laparoscopy with electrocautery of multiple ovarian cysts bilaterally. it was lysis of a few pelvic adhesions    Other surgical history      LAPAROSCOPY, OPERATIVE , POSSIBLE LAPAROTOMY WITH/WO TUBAL PERFUSION performed by Bal Benito MD at [facility] OR LOCATION    Other surgical history  07/30/2018    Operative laparoscopy, endometrial sampling, Right peritubal cystectomy, Left ovarian cystectomy, fulgration of endometriosis    Surgical bariatrics - internal  10/23/2018    Gastric Sleeve at [place] by Dr. Cooper    Thyroidectomy Bilateral 01/15/2016      Family History   Problem Relation Age of Onset    Breast Cancer Mother 38    Cancer Mother         breast    Cancer Maternal Grandmother         Cervical    Thyroid disease Maternal Grandmother         Possible thyroid cancer    High Cholesterol Father     Hypertension Father     Heart Disease Father     Other (Other) Father         heart bypass surgery    Hypertension Paternal Grandfather     Diabetes Paternal Grandfather     Stroke Neg       Social History     Socioeconomic History    Marital status:    Tobacco Use    Smoking status: Never    Smokeless tobacco: Never   Vaping Use    Vaping status: Never Used   Substance and Sexual Activity    Alcohol use: Yes     Alcohol/week: 0.0 standard drinks of alcohol     Comment: social    Drug use: No    Sexual activity: Yes     Partners: Male   Other Topics Concern    Caffeine Concern Yes     Comment: Daily; 1 cup coffee    Exercise No     Comment: 3 x a week 30  min     Seat Belt Yes   Social History  Narrative    Single, lives at home with her father, no children, works at [employer]                Review of Systems   Constitutional: Negative.    HENT: Negative.     Eyes: Negative.  Negative for photophobia and visual disturbance.   Respiratory: Negative.     Cardiovascular:  Negative for palpitations.   Gastrointestinal:  Negative for nausea and vomiting.   Musculoskeletal: Negative.  Negative for neck pain and neck stiffness.   Skin: Negative.    Neurological:  Positive for numbness. Negative for dizziness, syncope, light-headedness and headaches.   All other systems reviewed and are negative.           Current Outpatient Medications   Medication Sig Dispense Refill    letrozole 2.5 MG Oral Tab       naproxen 500 MG Oral Tab TAKE 1 TABLET BY MOUTH AT ONSET OF MIGRAINE HEADACHE. MAY TAKE 1 TABLET 12 HOUR LATER IF HEADACHE IS STILL PRESENT      gabapentin 100 MG Oral Cap Take 1 capsule (100 mg total) by mouth.      Multiple Vitamin Oral Tab Take 1 tablet by mouth daily.      Levothyroxine Sodium 175 MCG Oral Tab One tablet Monday through Friday, two tablets Saturdays and Sundays 102 tablet 3    Tirzepatide-Weight Management (ZEPBOUND) 12.5 MG/0.5ML Subcutaneous Solution Auto-injector Inject 12.5 mg into the skin once a week for 4 doses. 2 mL 0     Allergies:No Known Allergies   PHYSICAL EXAM:   Physical Exam  Blood pressure 114/88, pulse 116, resp. rate 18, height 66\", weight 283 lb (128.4 kg), last menstrual period 06/11/2024, SpO2 98%, not currently breastfeeding.    General Appearance: Well nourished, well developed, no apparent distress.   HEENT: Normocephalic and atraumatic. Normal sclera.   Cardiovascular: Normal rate, regular rhythm and normal heart sounds.    Pulmonary/Chest: Effort normal and breath sounds normal.   Abdominal: Soft. Bowel sounds are normal.   Skin: dry, clean and intact  Ext: peripheral pulses present. No redness or swelling  Negative Phalen's and Tinel's sign     Neurological:  Patient is  awake, alert and oriented to person, place and time   Normal memory, attention/concentration, speech and language.    Cranial Nerves:   II: Visual fields: normal  III: Pupils: equal, round, reactive to light  III,IV,VI: Extra Ocular Movements: intact  V: Facial sensation: intact  VII: Facial strength: intact  VIII: Hearing: intact  IX: Palate: intact  XI: Shoulder shrug: intact  XII: Tongue movement: normal    Motor: Normal tone. Strength is  5 out of 5 in all extremities bilaterally.  DTR: present    Sensory: Sensory examination is normal to light touch and pinprick     Coordination: Finger-to-nose, heel-to-shin, and rapid alternating movements are normal bilaterally without evidence of dysmetria.    Gait: normal.            ASSESSMENT/PLAN:       ICD-10-CM    1. Right arm pain  M79.601 EMG ([facility])      2. Carpal tunnel syndrome of left wrist  G56.02 EMG ([facility] [place])            RUE pain, localized burning sensation below right antecubital area  Does not sound like carpal tunnel or ulnar neuropathy  Has suspected CTS on the left  EMG and NCS still pending, will add RUE as well  Follow with PCP for US RUE if necessary      2. Left hand paresthesia, suspected carpal tunnel  MRI cervical spine negative for any disc bulging or spondylosis  EMG and NCS pending  Continue use of hand splint in night  Avoid repetitive movements       3. History of white matter disease unspecified  Had extensive work up done in the past. No signs of MS  Obtain most recent MRI brain done at [facility] for direct comparison          Follow up in about 3-6 months      See orders and medications filed with this encounter. The patient indicates understanding of these issues and agrees with the plan.          No orders of the defined types were placed in this encounter.      Meds This Visit:  Requested Prescriptions      No prescriptions requested or ordered in this encounter       Imaging & Referrals:  None       ID#[id number]    Neurologic  Problem  The patient's pertinent negatives include no syncope. Pertinent negatives include no dizziness, headaches, light-headedness, nausea, neck pain, palpitations or vomiting.   Migraine   Associated symptoms include numbness. Pertinent negatives include no dizziness, nausea, neck pain, photophobia or vomiting.

## 2024-06-20 ENCOUNTER — E-ADVICE (OUTPATIENT)
Dept: FAMILY MEDICINE | Age: 34
End: 2024-06-20

## 2024-06-20 ENCOUNTER — APPOINTMENT (OUTPATIENT)
Dept: FAMILY MEDICINE | Age: 34
End: 2024-06-20

## 2024-07-05 ENCOUNTER — PATIENT MESSAGE (OUTPATIENT)
Dept: INTERNAL MEDICINE CLINIC | Facility: CLINIC | Age: 34
End: 2024-07-05

## 2024-07-05 DIAGNOSIS — E66.01 CLASS 3 SEVERE OBESITY DUE TO EXCESS CALORIES WITHOUT SERIOUS COMORBIDITY WITH BODY MASS INDEX (BMI) OF 45.0 TO 49.9 IN ADULT (HCC): Primary | ICD-10-CM

## 2024-07-05 RX ORDER — TIRZEPATIDE 10 MG/.5ML
10 INJECTION, SOLUTION SUBCUTANEOUS WEEKLY
Qty: 2 ML | Refills: 0 | Status: SHIPPED | OUTPATIENT
Start: 2024-07-05 | End: 2024-07-27

## 2024-07-05 RX ORDER — TIRZEPATIDE 12.5 MG/.5ML
12.5 INJECTION, SOLUTION SUBCUTANEOUS WEEKLY
Qty: 2 ML | Refills: 0 | Status: SHIPPED | OUTPATIENT
Start: 2024-07-05 | End: 2024-07-27

## 2024-07-05 NOTE — TELEPHONE ENCOUNTER
From: Alberto Alberts  To: Cate Ge  Sent: 7/5/2024 1:29 PM CDT  Subject: Medication     Can you tell me where the dr sent my 10 mg to? Also can you call in the 12 mg for me she wrote me the script but I can’t find it. Can you call it in to [facility]? The phone number is [phone]. [facility] is out of stock In all [facility].

## 2024-07-05 NOTE — TELEPHONE ENCOUNTER
ON 6/11/24 zepbound 10 mg and 12.5 mg were ordered.  10 mg was sent to [place]  Another office discontinued the 10 mg order?

## 2024-07-16 ENCOUNTER — PATIENT MESSAGE (OUTPATIENT)
Dept: FAMILY MEDICINE CLINIC | Facility: CLINIC | Age: 34
End: 2024-07-16

## 2024-07-19 ENCOUNTER — E-VISIT (OUTPATIENT)
Dept: TELEHEALTH | Age: 34
End: 2024-07-19
Payer: COMMERCIAL

## 2024-07-19 DIAGNOSIS — J06.9 VIRAL URI WITH COUGH: Primary | ICD-10-CM

## 2024-07-19 RX ORDER — DEXTROMETHORPHAN HYDROBROMIDE AND PROMETHAZINE HYDROCHLORIDE 15; 6.25 MG/5ML; MG/5ML
5 SYRUP ORAL 4 TIMES DAILY PRN
Qty: 118 ML | Refills: 0 | Status: SHIPPED | OUTPATIENT
Start: 2024-07-19 | End: 2024-07-29

## 2024-07-19 NOTE — PROGRESS NOTES
Alberto Alberts is a 33 year old female.  HPI:   See answers to questions above.     Current Outpatient Medications   Medication Sig Dispense Refill    promethazine-dextromethorphan 6.25-15 MG/5ML Oral Syrup Take 5 mL by mouth 4 (four) times daily as needed for cough. 118 mL 0    Tirzepatide-Weight Management (ZEPBOUND) 10 MG/0.5ML Subcutaneous Solution Auto-injector Inject 10 mg into the skin once a week for 4 doses. 2 mL 0    Tirzepatide-Weight Management (ZEPBOUND) 12.5 MG/0.5ML Subcutaneous Solution Auto-injector Inject 12.5 mg into the skin once a week for 4 doses. After completion of 10 mg zepbound for 4 weeks 2 mL 0    letrozole 2.5 MG Oral Tab       naproxen 500 MG Oral Tab TAKE 1 TABLET BY MOUTH AT ONSET OF MIGRAINE HEADACHE. MAY TAKE 1 TABLET 12 HOUR LATER IF HEADACHE IS STILL PRESENT      gabapentin 100 MG Oral Cap Take 1 capsule (100 mg total) by mouth.      Multiple Vitamin Oral Tab Take 1 tablet by mouth daily.      Levothyroxine Sodium 175 MCG Oral Tab One tablet Monday through Friday, two tablets Saturdays and Sundays 102 tablet 3      Past Medical History:    Anemia    Anesthesia complication    Bilateral chronic vestibular neuritis    Concussion    H/O complications due to general anesthesia    pt reports she had a hard time waking up from foot surgery    Left foot pain    Migraines    Obesity, unspecified    Panic    PCOS (polycystic ovarian syndrome)    Postsurgical hypothyroidism    In 2015: total thyroidectomy for benign goiter    POTS (postural orthostatic tachycardia syndrome)    Tachycardia    Thyroid nodule    Vestibular neuronitis    Overview:  Last Assessment & Plan:  Since apparently her insurance company does not work on the weekend, we will wait for their eval on 6/13/16.  She has been cleared for dc to snf for rehab per all consultants      Past Surgical History:   Procedure Laterality Date    D & c  6/22/2011    D & c  2009    D & c  10/12/2012    Dilation/curettage,diagnostic       Hysteroscopy  6/22/2011    Laparoscopy,diagnostic      x3 r/t POS    Laparoscopy,pelvic,biopsy  2008    drill ovary    Other surgical history Left     foot sx x3    Other surgical history  03/2012    operative laparoscopy with electrocautery of multiple ovarian cysts bilaterally. it was lysis of a few pelvic adhesions    Other surgical history      LAPAROSCOPY, OPERATIVE , POSSIBLE LAPAROTOMY WITH/WO TUBAL PERFUSION performed by Bal Benito MD at [facility] OR LOCATION    Other surgical history  07/30/2018    Operative laparoscopy, endometrial sampling, Right peritubal cystectomy, Left ovarian cystectomy, fulgration of endometriosis    Surgical bariatrics - internal  10/23/2018    Gastric Sleeve at [place] by Dr. Cooper    Thyroidectomy Bilateral 01/15/2016      Family History   Problem Relation Age of Onset    Breast Cancer Mother 38    Cancer Mother         breast    Cancer Maternal Grandmother         Cervical    Thyroid disease Maternal Grandmother         Possible thyroid cancer    High Cholesterol Father     Hypertension Father     Heart Disease Father     Other (Other) Father         heart bypass surgery    Hypertension Paternal Grandfather     Diabetes Paternal Grandfather     Stroke Neg       Social History:  Social History     Socioeconomic History    Marital status:    Tobacco Use    Smoking status: Never    Smokeless tobacco: Never   Vaping Use    Vaping status: Never Used   Substance and Sexual Activity    Alcohol use: Yes     Alcohol/week: 0.0 standard drinks of alcohol     Comment: social    Drug use: No    Sexual activity: Yes     Partners: Male   Other Topics Concern    Caffeine Concern Yes     Comment: Daily; 1 cup coffee    Exercise No     Comment: 3 x a week 30  min     Seat Belt Yes   Social History Narrative    Single, lives at home with her father, no children, works at [employer]         ASSESSMENT AND PLAN:     Encounter Diagnosis   Name Primary?    Viral URI with cough Yes           Meds  & Refills for this Visit:  Requested Prescriptions     Signed Prescriptions Disp Refills    promethazine-dextromethorphan 6.25-15 MG/5ML Oral Syrup 118 mL 0     Sig: Take 5 mL by mouth 4 (four) times daily as needed for cough.       Duration of  the service:  10 minutes    Patient advised to follow up with PCP if no improvement or worsening of symptoms  Refer to MyChart message for specific patient instructions

## 2024-07-22 ENCOUNTER — TELEPHONE (OUTPATIENT)
Dept: FAMILY MEDICINE CLINIC | Facility: CLINIC | Age: 34
End: 2024-07-22

## 2024-07-22 NOTE — TELEPHONE ENCOUNTER
GoYoDeohart message     Appointment For: Alberto DEVI Aristeo ([id number])  Visit Type: MYCHART EXAM (2964)     7/23/2024   11:30 AM  30 mins.  Christiane Estes             [facility] [address]     Patient Comments:  Fatigue lightheaded thyroid up and down. And fertility issues.

## 2024-07-25 ENCOUNTER — LAB ENCOUNTER (OUTPATIENT)
Dept: LAB | Facility: HOSPITAL | Age: 34
End: 2024-07-25
Attending: NURSE PRACTITIONER
Payer: COMMERCIAL

## 2024-07-25 ENCOUNTER — OFFICE VISIT (OUTPATIENT)
Dept: FAMILY MEDICINE CLINIC | Facility: CLINIC | Age: 34
End: 2024-07-25
Payer: COMMERCIAL

## 2024-07-25 VITALS
WEIGHT: 281 LBS | HEART RATE: 112 BPM | RESPIRATION RATE: 16 BRPM | BODY MASS INDEX: 45.16 KG/M2 | HEIGHT: 66 IN | TEMPERATURE: 97 F | DIASTOLIC BLOOD PRESSURE: 78 MMHG | SYSTOLIC BLOOD PRESSURE: 117 MMHG

## 2024-07-25 DIAGNOSIS — E55.9 VITAMIN D DEFICIENCY: ICD-10-CM

## 2024-07-25 DIAGNOSIS — K08.89 PAIN, DENTAL: ICD-10-CM

## 2024-07-25 DIAGNOSIS — R53.83 FATIGUE: Primary | ICD-10-CM

## 2024-07-25 DIAGNOSIS — R53.83 FATIGUE, UNSPECIFIED TYPE: Primary | ICD-10-CM

## 2024-07-25 DIAGNOSIS — R23.3 EASY BRUISING: ICD-10-CM

## 2024-07-25 DIAGNOSIS — E28.2 PCOS (POLYCYSTIC OVARIAN SYNDROME): ICD-10-CM

## 2024-07-25 DIAGNOSIS — N97.9 INFERTILITY, FEMALE: ICD-10-CM

## 2024-07-25 DIAGNOSIS — Z00.00 PERIODIC HEALTH ASSESSMENT, GENERAL SCREENING, ADULT: ICD-10-CM

## 2024-07-25 DIAGNOSIS — R23.3 SPONTANEOUS ECCHYMOSES: ICD-10-CM

## 2024-07-25 LAB
APTT PPP: 33.6 SECONDS (ref 23–36)
BASOPHILS # BLD AUTO: 0.07 X10(3) UL (ref 0–0.2)
BASOPHILS NFR BLD AUTO: 0.8 %
DEPRECATED HBV CORE AB SER IA-ACNC: 24.1 NG/ML
EOSINOPHIL # BLD AUTO: 0.1 X10(3) UL (ref 0–0.7)
EOSINOPHIL NFR BLD AUTO: 1.2 %
ERYTHROCYTE [DISTWIDTH] IN BLOOD BY AUTOMATED COUNT: 15.7 %
HCT VFR BLD AUTO: 44 %
HGB BLD-MCNC: 15 G/DL
IMM GRANULOCYTES # BLD AUTO: 0.03 X10(3) UL (ref 0–1)
IMM GRANULOCYTES NFR BLD: 0.3 %
INR BLD: 1.18 (ref 0.8–1.2)
IRON SATN MFR SERPL: 25 %
IRON SERPL-MCNC: 76 UG/DL
LYMPHOCYTES # BLD AUTO: 2.4 X10(3) UL (ref 1–4)
LYMPHOCYTES NFR BLD AUTO: 27.7 %
MCH RBC QN AUTO: 30.2 PG (ref 26–34)
MCHC RBC AUTO-ENTMCNC: 34.1 G/DL (ref 31–37)
MCV RBC AUTO: 88.5 FL
MONOCYTES # BLD AUTO: 0.99 X10(3) UL (ref 0.1–1)
MONOCYTES NFR BLD AUTO: 11.4 %
NEUTROPHILS # BLD AUTO: 5.06 X10 (3) UL (ref 1.5–7.7)
NEUTROPHILS # BLD AUTO: 5.06 X10(3) UL (ref 1.5–7.7)
NEUTROPHILS NFR BLD AUTO: 58.6 %
PLATELET # BLD AUTO: 226 10(3)UL (ref 150–450)
PROTHROMBIN TIME: 15 SECONDS (ref 11.6–14.8)
RBC # BLD AUTO: 4.97 X10(6)UL
TOTAL IRON BINDING CAPACITY: 300 UG/DL (ref 250–425)
TRANSFERRIN SERPL-MCNC: 249 MG/DL (ref 250–380)
VIT B12 SERPL-MCNC: 927 PG/ML (ref 211–911)
VIT D+METAB SERPL-MCNC: 27.5 NG/ML (ref 30–100)
WBC # BLD AUTO: 8.7 X10(3) UL (ref 4–11)

## 2024-07-25 PROCEDURE — 82306 VITAMIN D 25 HYDROXY: CPT

## 2024-07-25 PROCEDURE — 85610 PROTHROMBIN TIME: CPT

## 2024-07-25 PROCEDURE — 83540 ASSAY OF IRON: CPT

## 2024-07-25 PROCEDURE — 82607 VITAMIN B-12: CPT

## 2024-07-25 PROCEDURE — 83550 IRON BINDING TEST: CPT

## 2024-07-25 PROCEDURE — 85025 COMPLETE CBC W/AUTO DIFF WBC: CPT

## 2024-07-25 PROCEDURE — 36415 COLL VENOUS BLD VENIPUNCTURE: CPT

## 2024-07-25 PROCEDURE — 82728 ASSAY OF FERRITIN: CPT

## 2024-07-25 PROCEDURE — 85730 THROMBOPLASTIN TIME PARTIAL: CPT

## 2024-07-25 RX ORDER — TRIAMCINOLONE ACETONIDE 1 MG/G
100 CREAM TOPICAL DAILY
COMMUNITY
Start: 2024-07-08

## 2024-07-25 RX ORDER — CHORIOGONADOTROPIN ALFA 250 UG/.5ML
250 INJECTION, SOLUTION SUBCUTANEOUS
COMMUNITY
Start: 2024-07-19

## 2024-07-25 NOTE — PROGRESS NOTES
Alberto Alberts is a 33 year old female.  HPI:   New patient, establish care.  Reports easy bruising- more noticeable to patient over the last 3 months. Reports she feels safe at home, no one is harming her.   Has been having some dental pain- right upper tooth. Has not seen a dentist in some time.    Seeing Dr. Ge--on Zepbound. Frustrated with her weight. She has lost 28 lbs since 01/2024. Reports she eats a lot of fruits (berries), vegetables w feta cheese, does not like meat- will eat boiled eggs. Does not like chicken, beef, fish.  does a lot of cooking at home. Wondering if other medication she can take for weight loss.     Seeing Dr. Sushil Morin- [facility], doing IUI- had IUI 07/22/2024 with Clomid/Femara/Ovidrel. Her OBGYN is Dr. Benito.  was advised to quit smoking.    Has been feeling more fatigued. Sees Dr. Adler- [facility]- recent TSH/T4 stable.    Due for annual physical.    Wt Readings from Last 6 Encounters:   07/25/24 281 lb (127.5 kg)   06/19/24 283 lb (128.4 kg)   06/11/24 283 lb (128.4 kg)   01/24/24 (!) 309 lb (140.2 kg)   05/04/23 285 lb (129.3 kg)   02/06/23 277 lb (125.6 kg)     Current Outpatient Medications   Medication Sig Dispense Refill    OVIDREL 250 MCG/0.5ML Subcutaneous Injection Inject 250 mcg into the skin.      CLOMID 50 MG Oral Tab Take 2 tablets (100 mg total) by mouth daily.      Tirzepatide-Weight Management (ZEPBOUND) 12.5 MG/0.5ML Subcutaneous Solution Auto-injector Inject 12.5 mg into the skin once a week for 4 doses. After completion of 10 mg zepbound for 4 weeks 2 mL 0    letrozole 2.5 MG Oral Tab       naproxen 500 MG Oral Tab TAKE 1 TABLET BY MOUTH AT ONSET OF MIGRAINE HEADACHE. MAY TAKE 1 TABLET 12 HOUR LATER IF HEADACHE IS STILL PRESENT      Multiple Vitamin Oral Tab Take 1 tablet by mouth daily.      Levothyroxine Sodium 175 MCG Oral Tab One tablet Monday through Friday, two tablets Saturdays and Sundays 102 tablet 3      Past Medical  History:    Anemia    Anesthesia complication    Bilateral chronic vestibular neuritis    Concussion    H/O complications due to general anesthesia    pt reports she had a hard time waking up from foot surgery    Left foot pain    Migraines    Obesity, unspecified    Panic    PCOS (polycystic ovarian syndrome)    Postsurgical hypothyroidism    In 2015: total thyroidectomy for benign goiter    POTS (postural orthostatic tachycardia syndrome)    Tachycardia    Thyroid nodule    Vestibular neuronitis    Overview:  Last Assessment & Plan:  Since apparently her insurance company does not work on the weekend, we will wait for their eval on 6/13/16.  She has been cleared for dc to snf for rehab per all consultants      Social History:  Social History     Socioeconomic History    Marital status:    Tobacco Use    Smoking status: Never    Smokeless tobacco: Never   Vaping Use    Vaping status: Never Used   Substance and Sexual Activity    Alcohol use: Yes     Alcohol/week: 0.0 standard drinks of alcohol     Comment: social    Drug use: No    Sexual activity: Yes     Partners: Male   Other Topics Concern    Caffeine Concern Yes     Comment: Daily; 1 cup coffee    Exercise No     Comment: 3 x a week 30  min     Seat Belt Yes   Social History Narrative    Single, lives at home with her father, no children, works at [employer]        REVIEW OF SYSTEMS:   GENERAL HEALTH: feels well otherwise  RESPIRATORY: denies shortness of breath with exertion  CARDIOVASCULAR: denies chest pain on exertion  GI: denies abdominal pain  NEURO: denies headaches  Musculoskeletal: No motor deficits  EXAM:   /78 (BP Location: Left arm, Patient Position: Sitting, Cuff Size: large)   Pulse 112   Temp 97.3 °F (36.3 °C) (Temporal)   Resp 16   Ht 5' 6\" (1.676 m)   Wt 281 lb (127.5 kg)   LMP 06/11/2024 (Approximate)   BMI 45.35 kg/m²   GENERAL: well developed, well nourished,in no apparent distress  HEENT: TM clear bilaterally, nose no  congestion, throat clear no erythema without mass. No erythema or swelling surrounding any teeth.  EYES: PERRLA, EOM intact, sclera clear without injection  NECK: supple,no adenopathy  LUNGS: clear to auscultation no rales, rhonchi or wheezes  CARDIO: RRR without murmur  GI: Normal bowel sounds ×4 quadrant, no hepatosplenomegaly or masses, and no tenderness   EXTREMITIES: no cyanosis, clubbing or edema +2 posterior tibial pulses  Musculoskeletal: No gross deficit  Neurological: nerves II through XII grossly intact no sensorimotor deficit  Psychological: Mood and affect are normal.  Good communication skills.  ASSESSMENT AND PLAN:     Encounter Diagnoses   Name Primary?    Fatigue, unspecified type Yes    Easy bruising     Pain, dental     Infertility, female     PCOS (polycystic ovarian syndrome)     Vitamin D deficiency        No orders of the defined types were placed in this encounter.      Meds & Refills for this Visit:  Requested Prescriptions      No prescriptions requested or ordered in this encounter       Imaging & Consults:  None    Follow Up with:  No follow-up provider specified.    1. Fatigue, unspecified type    2. Easy bruising    3. Pain, dental    4. Infertility, female    5. PCOS (polycystic ovarian syndrome)    6. Vitamin D deficiency      Will check labs today--fatigue panel and PT/INR given easy bruising. Patient reports she does take NSAIDs prn--advised on limiting NSAIDs. Await results for further recommendations.  Advised following up with dentist for dental pain- no s/s of infection on limited exam today- pt voiced understanding.  Follow up with weight loss clinic- Dr. Ge.  Continue to focus on healthier diet--discussed increasing protein in diet, routine exercise, weight loss. Body mass index is 45.35 kg/m².  Schedule annual physical.        The patient indicates understanding of these issues and agrees to the plan.  The patient is asked to return in 1-2 months annual physical.

## 2024-07-29 ENCOUNTER — PATIENT MESSAGE (OUTPATIENT)
Dept: FAMILY MEDICINE CLINIC | Facility: CLINIC | Age: 34
End: 2024-07-29

## 2024-07-29 ENCOUNTER — E-VISIT (OUTPATIENT)
Dept: TELEHEALTH | Age: 34
End: 2024-07-29
Payer: COMMERCIAL

## 2024-07-29 DIAGNOSIS — N89.8 VAGINAL DISCHARGE: Primary | ICD-10-CM

## 2024-07-29 NOTE — TELEPHONE ENCOUNTER
From: Alberto Alberts  To: Christiane Estes  Sent: 7/29/2024 9:54 AM CDT  Subject: Stress/ anxiety     So i haven’t been sleeping well and stressing out about everything that’s stressing me out. I would go on small trip but i have two weeks till I find out results of my iui. So is there is something temporary Christiane can give me for this

## 2024-07-29 NOTE — TELEPHONE ENCOUNTER
Please call patient. With her undergoing fertility treatments (including IUI), I would recommend her asking her fertility provider what is safe for her to take during this time.

## 2024-07-30 DIAGNOSIS — E66.01 CLASS 3 SEVERE OBESITY DUE TO EXCESS CALORIES WITHOUT SERIOUS COMORBIDITY WITH BODY MASS INDEX (BMI) OF 45.0 TO 49.9 IN ADULT (HCC): ICD-10-CM

## 2024-07-30 PROCEDURE — 99421 OL DIG E/M SVC 5-10 MIN: CPT | Performed by: PHYSICIAN ASSISTANT

## 2024-07-30 NOTE — PROGRESS NOTES
HPI:  Alberto Alberts is a 33 year old female who presents for an evisit.  See [employer] communications above.    Current Outpatient Medications   Medication Sig Dispense Refill    OVIDREL 250 MCG/0.5ML Subcutaneous Injection Inject 250 mcg into the skin.      CLOMID 50 MG Oral Tab Take 2 tablets (100 mg total) by mouth daily.      letrozole 2.5 MG Oral Tab       naproxen 500 MG Oral Tab TAKE 1 TABLET BY MOUTH AT ONSET OF MIGRAINE HEADACHE. MAY TAKE 1 TABLET 12 HOUR LATER IF HEADACHE IS STILL PRESENT      Multiple Vitamin Oral Tab Take 1 tablet by mouth daily.      Levothyroxine Sodium 175 MCG Oral Tab One tablet Monday through Friday, two tablets Saturdays and Sundays 102 tablet 3     Past Medical History:    Anemia    Anesthesia complication    Bilateral chronic vestibular neuritis    Concussion    H/O complications due to general anesthesia    pt reports she had a hard time waking up from foot surgery    Left foot pain    Migraines    Obesity, unspecified    Panic    PCOS (polycystic ovarian syndrome)    Postsurgical hypothyroidism    In 2015: total thyroidectomy for benign goiter    POTS (postural orthostatic tachycardia syndrome)    Tachycardia    Thyroid nodule    Vestibular neuronitis    Overview:  Last Assessment & Plan:  Since apparently her insurance company does not work on the weekend, we will wait for their eval on 6/13/16.  She has been cleared for dc to snf for rehab per all consultants     Past Surgical History:   Procedure Laterality Date    D & c  6/22/2011    D & c  2009    D & c  10/12/2012    Dilation/curettage,diagnostic      Hysteroscopy  6/22/2011    Laparoscopy,diagnostic      x3 r/t POS    Laparoscopy,pelvic,biopsy  2008    drill ovary    Other surgical history Left     foot sx x3    Other surgical history  03/2012    operative laparoscopy with electrocautery of multiple ovarian cysts bilaterally. it was lysis of a few pelvic adhesions    Other surgical history      LAPAROSCOPY, OPERATIVE ,  POSSIBLE LAPAROTOMY WITH/WO TUBAL PERFUSION performed by Bal Benito MD at [facility] OR LOCATION    Other surgical history  07/30/2018    Operative laparoscopy, endometrial sampling, Right peritubal cystectomy, Left ovarian cystectomy, fulgration of endometriosis    Surgical bariatrics - internal  10/23/2018    Gastric Sleeve at [place] by Dr. Cooper    Thyroidectomy Bilateral 01/15/2016       Social History     Socioeconomic History    Marital status:    Tobacco Use    Smoking status: Never    Smokeless tobacco: Never   Vaping Use    Vaping status: Never Used   Substance and Sexual Activity    Alcohol use: Yes     Alcohol/week: 0.0 standard drinks of alcohol     Comment: social    Drug use: No    Sexual activity: Yes     Partners: Male   Other Topics Concern    Caffeine Concern Yes     Comment: Daily; 1 cup coffee    Exercise No     Comment: 3 x a week 30  min     Seat Belt Yes   Social History Narrative    Single, lives at home with her father, no children, works at [employer]          No results found for this or any previous visit (from the past 24 hour(s)).    ASSESSMENT AND PLAN:      ASSESSMENT:   Encounter Diagnosis   Name Primary?    Vaginal discharge Yes       PLAN: Meds as below.  See patient Instructions  -Total of 5 minutes spent with patient.  Patient with recent IUI.  Now with vaginal discharge and abdominal discomfort.  Pt advised to contact OBGYN office.     Meds & Refills for this Visit:  Requested Prescriptions      No prescriptions requested or ordered in this encounter       Risks, benefits, and side effects of medication explained and discussed.    There are no Patient Instructions on file for this visit.    The patient indicates understanding of these issues and agrees to the plan.  See attached patient references.  The patient is asked to return if sx's persist or worsen.    Alberto Alberts understands evisit evaluation is not a substitute for face-to-face examination or emergency care.  Patient advised to go to ER or call 911 for worsening symptoms or acute distress.

## 2024-08-13 NOTE — TELEPHONE ENCOUNTER
Schedule office visit to discuss. Can use [facility] if needing sooner appt than currently scheduled for later this month.

## 2024-08-16 ENCOUNTER — E-VISIT (OUTPATIENT)
Dept: TELEHEALTH | Age: 34
End: 2024-08-16
Payer: COMMERCIAL

## 2024-08-16 DIAGNOSIS — R51.9 HEADACHE, UNSPECIFIED HEADACHE TYPE: Primary | ICD-10-CM

## 2024-08-16 NOTE — PROGRESS NOTES
Alberto Alberts is a 33 year old female.  HPI:   See answers to questions above.     Current Outpatient Medications   Medication Sig Dispense Refill    OVIDREL 250 MCG/0.5ML Subcutaneous Injection Inject 250 mcg into the skin.      CLOMID 50 MG Oral Tab Take 2 tablets (100 mg total) by mouth daily.      letrozole 2.5 MG Oral Tab       naproxen 500 MG Oral Tab TAKE 1 TABLET BY MOUTH AT ONSET OF MIGRAINE HEADACHE. MAY TAKE 1 TABLET 12 HOUR LATER IF HEADACHE IS STILL PRESENT      Multiple Vitamin Oral Tab Take 1 tablet by mouth daily.      Levothyroxine Sodium 175 MCG Oral Tab One tablet Monday through Friday, two tablets Saturdays and Sundays 102 tablet 3      Past Medical History:    Anemia    Anesthesia complication    Bilateral chronic vestibular neuritis    Concussion    H/O complications due to general anesthesia    pt reports she had a hard time waking up from foot surgery    Left foot pain    Migraines    Obesity, unspecified    Panic    PCOS (polycystic ovarian syndrome)    Postsurgical hypothyroidism    In 2015: total thyroidectomy for benign goiter    POTS (postural orthostatic tachycardia syndrome)    Tachycardia    Thyroid nodule    Vestibular neuronitis    Overview:  Last Assessment & Plan:  Since apparently her insurance company does not work on the weekend, we will wait for their eval on 6/13/16.  She has been cleared for dc to snf for rehab per all consultants      Past Surgical History:   Procedure Laterality Date    D & c  6/22/2011    D & c  2009    D & c  10/12/2012    Dilation/curettage,diagnostic      Hysteroscopy  6/22/2011    Laparoscopy,diagnostic      x3 r/t POS    Laparoscopy,pelvic,biopsy  2008    drill ovary    Other surgical history Left     foot sx x3    Other surgical history  03/2012    operative laparoscopy with electrocautery of multiple ovarian cysts bilaterally. it was lysis of a few pelvic adhesions    Other surgical history      LAPAROSCOPY, OPERATIVE , POSSIBLE LAPAROTOMY  WITH/WO TUBAL PERFUSION performed by Bal Benito MD at [facility] OR LOCATION    Other surgical history  07/30/2018    Operative laparoscopy, endometrial sampling, Right peritubal cystectomy, Left ovarian cystectomy, fulgration of endometriosis    Surgical bariatrics - internal  10/23/2018    Gastric Sleeve at [place] by Dr. Cooper    Thyroidectomy Bilateral 01/15/2016      Family History   Problem Relation Age of Onset    Breast Cancer Mother 38    Cancer Mother         breast    Cancer Maternal Grandmother         Cervical    Thyroid disease Maternal Grandmother         Possible thyroid cancer    High Cholesterol Father     Hypertension Father     Heart Disease Father     Other (Other) Father         heart bypass surgery    Hypertension Paternal Grandfather     Diabetes Paternal Grandfather     Stroke Neg       Social History:  Social History     Socioeconomic History    Marital status:    Tobacco Use    Smoking status: Never    Smokeless tobacco: Never   Vaping Use    Vaping status: Never Used   Substance and Sexual Activity    Alcohol use: Yes     Alcohol/week: 0.0 standard drinks of alcohol     Comment: social    Drug use: No    Sexual activity: Yes     Partners: Male   Other Topics Concern    Caffeine Concern Yes     Comment: Daily; 1 cup coffee    Exercise No     Comment: 3 x a week 30  min     Seat Belt Yes   Social History Narrative    Single, lives at home with her father, no children, works at [employer]         ASSESSMENT AND PLAN:     Encounter Diagnosis   Name Primary?    Headache, unspecified headache type Yes     Advised in person evaluation for headache unrelieved by Tylenol      Meds & Refills for this Visit:  Requested Prescriptions      No prescriptions requested or ordered in this encounter       Duration of  the service:  5 minutes

## 2024-08-20 ENCOUNTER — PATIENT MESSAGE (OUTPATIENT)
Dept: NEUROLOGY | Facility: CLINIC | Age: 34
End: 2024-08-20

## 2024-08-20 NOTE — TELEPHONE ENCOUNTER
From: Alberto Alberts  To: Layne Christopher  Sent: 8/20/2024 1:51 PM CDT  Subject: Headache     So I am getting headache but it’s little  Different this time. The headache is more tension with feel like something is electric is on the side of my head. However I have been under a lot of stress and anxiety. I am also on weight loss medication and I am not sleeping much. What does she recommend as good headache tension medicine and should I worry that about the electric feeling and that pots has returned.

## 2024-08-21 RX ORDER — GABAPENTIN 300 MG/1
300 CAPSULE ORAL NIGHTLY
Qty: 30 CAPSULE | Refills: 2 | Status: SHIPPED | OUTPATIENT
Start: 2024-08-21

## 2024-08-21 RX ORDER — TIRZEPATIDE 12.5 MG/.5ML
12.5 INJECTION, SOLUTION SUBCUTANEOUS WEEKLY
Qty: 2 ML | Refills: 0 | Status: SHIPPED | OUTPATIENT
Start: 2024-08-21 | End: 2024-09-12

## 2024-08-21 NOTE — TELEPHONE ENCOUNTER
Last seen 6/11/24    9/10/2024  1:20 PM Cate Ge MD [facility]       Cannot use other pharmacies.  Cannot find 12.5 mg dose - only lower.  Asking for alternative

## 2024-08-21 NOTE — TELEPHONE ENCOUNTER
She can try Gabapentin for headaches and electric feeling but may take some time to be effective  .Do some relaxation technique and anxiety control it will help her headaches    Is she still on fertility treatment ?  I think Gabapentin should be fine but she should check with her OB.

## 2024-09-09 ENCOUNTER — TELEPHONE (OUTPATIENT)
Dept: INTERNAL MEDICINE CLINIC | Facility: CLINIC | Age: 34
End: 2024-09-09

## 2024-09-09 NOTE — TELEPHONE ENCOUNTER
Can we call Dr. Benito's office. What are they requesting? Below is what is noted from T.E. with RN. We need information from the provider's office, not patient requested.    Patient is having a laparoscopic ovarian drilling procedure done on October 3 with Dr Bal Benito at [facility]. She states Dr Benito is not requesting a pre-op, however patient would like to see Christiane Estes before the surgery to discuss her tachycardia prior to surgery.           Can use a HFU in the next 1-2 weeks, but may need to see cardiology pending patient reported tachycardia.

## 2024-09-09 NOTE — TELEPHONE ENCOUNTER
Patient called back. Per patient, there is no need for a pre-op or medical clearance. She personally wants an appointment to discuss her own concerns about her tachycardia and her blood pressure, no urgent or current issues regarding them. Patient is scheduled to see Christiane Estes 9/24/24. Patient asks if she can EKG done prior to this visit to review?

## 2024-09-09 NOTE — TELEPHONE ENCOUNTER
Patient notified Christiane will determine what testing the patient will need after her evaluation in the office. Pt. Agreed to plan and verbalized understanding

## 2024-09-09 NOTE — TELEPHONE ENCOUNTER
Called Dr. Bal Benito's office. Spoke with Yoli, surgery coordinator. As written by Dr. Benito in pt's chart, no medical clearance needed. Called the patient and left a message to call back.

## 2024-09-09 NOTE — TELEPHONE ENCOUNTER
Patient was looking for a pre op appointment but [facility] does not have any time available before the surgery date 10/3. Patient is requesting this pre op herself and really only wants an EKG. Please advise thank you!

## 2024-09-16 ENCOUNTER — PATIENT MESSAGE (OUTPATIENT)
Dept: NEUROLOGY | Facility: CLINIC | Age: 34
End: 2024-09-16

## 2024-09-16 DIAGNOSIS — M54.2 NECK PAIN: Primary | ICD-10-CM

## 2024-09-16 DIAGNOSIS — R51.9 INTRACTABLE HEADACHE, UNSPECIFIED CHRONICITY PATTERN, UNSPECIFIED HEADACHE TYPE: ICD-10-CM

## 2024-09-16 NOTE — TELEPHONE ENCOUNTER
From: Alberto Alberts  To: Layne Clementsqtadamario  Sent: 9/16/2024 9:12 AM CDT  Subject: Mri    Hello, can you please have the dr order me a mri for my head and spine. I am getting severe headaches and neck pain. I would appreciate that thank you

## 2024-09-17 NOTE — TELEPHONE ENCOUNTER
Provider ordered MRI of Brain.  Pt requesting that MRI orders be faxed to [facility].      RN requested fax number and informed pt she will need to bring a disk of images to the [place] office for reivew.

## 2024-09-22 ENCOUNTER — V-VISIT (OUTPATIENT)
Dept: FAMILY MEDICINE | Age: 34
End: 2024-09-22

## 2024-09-22 DIAGNOSIS — M79.642 LEFT HAND PAIN: Primary | ICD-10-CM

## 2024-09-22 PROCEDURE — 99213 OFFICE O/P EST LOW 20 MIN: CPT | Performed by: NURSE PRACTITIONER

## 2024-09-24 ENCOUNTER — OFFICE VISIT (OUTPATIENT)
Dept: FAMILY MEDICINE CLINIC | Facility: CLINIC | Age: 34
End: 2024-09-24
Payer: COMMERCIAL

## 2024-09-24 ENCOUNTER — EKG ENCOUNTER (OUTPATIENT)
Dept: LAB | Age: 34
End: 2024-09-24
Attending: NURSE PRACTITIONER
Payer: COMMERCIAL

## 2024-09-24 ENCOUNTER — LAB ENCOUNTER (OUTPATIENT)
Dept: LAB | Age: 34
End: 2024-09-24
Attending: NURSE PRACTITIONER
Payer: COMMERCIAL

## 2024-09-24 VITALS
BODY MASS INDEX: 43.87 KG/M2 | TEMPERATURE: 97 F | RESPIRATION RATE: 18 BRPM | HEART RATE: 100 BPM | DIASTOLIC BLOOD PRESSURE: 78 MMHG | WEIGHT: 273 LBS | SYSTOLIC BLOOD PRESSURE: 118 MMHG | HEIGHT: 65.98 IN

## 2024-09-24 DIAGNOSIS — G90.A POTS (POSTURAL ORTHOSTATIC TACHYCARDIA SYNDROME): ICD-10-CM

## 2024-09-24 DIAGNOSIS — R00.0 TACHYCARDIA: ICD-10-CM

## 2024-09-24 DIAGNOSIS — E28.2 PCOS (POLYCYSTIC OVARIAN SYNDROME): ICD-10-CM

## 2024-09-24 DIAGNOSIS — R00.2 PALPITATIONS: ICD-10-CM

## 2024-09-24 DIAGNOSIS — R00.2 PALPITATIONS: Primary | ICD-10-CM

## 2024-09-24 DIAGNOSIS — N92.6 IRREGULAR MENSES: ICD-10-CM

## 2024-09-24 LAB
ALBUMIN SERPL-MCNC: 4 G/DL (ref 3.2–4.8)
ALBUMIN/GLOB SERPL: 1.4 {RATIO} (ref 1–2)
ALP LIVER SERPL-CCNC: 75 U/L
ALT SERPL-CCNC: 21 U/L
ANION GAP SERPL CALC-SCNC: 8 MMOL/L (ref 0–18)
AST SERPL-CCNC: 29 U/L (ref ?–34)
ATRIAL RATE: 106 BPM
B-HCG SERPL-ACNC: ABNORMAL MIU/ML
BASOPHILS # BLD AUTO: 0.03 X10(3) UL (ref 0–0.2)
BASOPHILS NFR BLD AUTO: 0.5 %
BILIRUB SERPL-MCNC: 1.2 MG/DL (ref 0.3–1.2)
BUN BLD-MCNC: <5 MG/DL (ref 9–23)
CALCIUM BLD-MCNC: 9.2 MG/DL (ref 8.7–10.4)
CHLORIDE SERPL-SCNC: 106 MMOL/L (ref 98–112)
CO2 SERPL-SCNC: 23 MMOL/L (ref 21–32)
CREAT BLD-MCNC: 0.82 MG/DL
EGFRCR SERPLBLD CKD-EPI 2021: 96 ML/MIN/1.73M2 (ref 60–?)
EOSINOPHIL # BLD AUTO: 0.08 X10(3) UL (ref 0–0.7)
EOSINOPHIL NFR BLD AUTO: 1.4 %
ERYTHROCYTE [DISTWIDTH] IN BLOOD BY AUTOMATED COUNT: 14.8 %
FASTING STATUS PATIENT QL REPORTED: NO
GLOBULIN PLAS-MCNC: 2.8 G/DL (ref 2–3.5)
GLUCOSE BLD-MCNC: 101 MG/DL (ref 70–99)
HCT VFR BLD AUTO: 43.1 %
HGB BLD-MCNC: 14.9 G/DL
IMM GRANULOCYTES # BLD AUTO: 0.01 X10(3) UL (ref 0–1)
IMM GRANULOCYTES NFR BLD: 0.2 %
LYMPHOCYTES # BLD AUTO: 1.58 X10(3) UL (ref 1–4)
LYMPHOCYTES NFR BLD AUTO: 27.1 %
MCH RBC QN AUTO: 31.4 PG (ref 26–34)
MCHC RBC AUTO-ENTMCNC: 34.6 G/DL (ref 31–37)
MCV RBC AUTO: 90.7 FL
MONOCYTES # BLD AUTO: 1.05 X10(3) UL (ref 0.1–1)
MONOCYTES NFR BLD AUTO: 18 %
NEUTROPHILS # BLD AUTO: 3.08 X10 (3) UL (ref 1.5–7.7)
NEUTROPHILS # BLD AUTO: 3.08 X10(3) UL (ref 1.5–7.7)
NEUTROPHILS NFR BLD AUTO: 52.8 %
P AXIS: 24 DEGREES
P-R INTERVAL: 198 MS
PLATELET # BLD AUTO: 197 10(3)UL (ref 150–450)
POTASSIUM SERPL-SCNC: 4.1 MMOL/L (ref 3.5–5.1)
PROT SERPL-MCNC: 6.8 G/DL (ref 5.7–8.2)
Q-T INTERVAL: 350 MS
QRS DURATION: 90 MS
QTC CALCULATION (BEZET): 464 MS
R AXIS: 53 DEGREES
RBC # BLD AUTO: 4.75 X10(6)UL
SODIUM SERPL-SCNC: 137 MMOL/L (ref 136–145)
T AXIS: 16 DEGREES
T4 FREE SERPL-MCNC: 1.8 NG/DL (ref 0.8–1.7)
TSI SER-ACNC: 0.29 MIU/ML (ref 0.55–4.78)
VENTRICULAR RATE: 106 BPM
WBC # BLD AUTO: 5.8 X10(3) UL (ref 4–11)

## 2024-09-24 PROCEDURE — 84443 ASSAY THYROID STIM HORMONE: CPT | Performed by: NURSE PRACTITIONER

## 2024-09-24 PROCEDURE — 93005 ELECTROCARDIOGRAM TRACING: CPT

## 2024-09-24 PROCEDURE — 93010 ELECTROCARDIOGRAM REPORT: CPT | Performed by: INTERNAL MEDICINE

## 2024-09-24 PROCEDURE — 84702 CHORIONIC GONADOTROPIN TEST: CPT | Performed by: NURSE PRACTITIONER

## 2024-09-24 PROCEDURE — 99214 OFFICE O/P EST MOD 30 MIN: CPT | Performed by: NURSE PRACTITIONER

## 2024-09-24 PROCEDURE — 80053 COMPREHEN METABOLIC PANEL: CPT | Performed by: NURSE PRACTITIONER

## 2024-09-24 PROCEDURE — 84439 ASSAY OF FREE THYROXINE: CPT | Performed by: NURSE PRACTITIONER

## 2024-09-24 PROCEDURE — 85025 COMPLETE CBC W/AUTO DIFF WBC: CPT | Performed by: NURSE PRACTITIONER

## 2024-09-24 NOTE — PROGRESS NOTES
Alberto Alberts is a 33 year old female.  HPI:   She is planning to have ovarian drilling in October with Dr. Benito. Seeing fertility specialist at [facility], has done IUI. Periods are irregular. LMP: 2 months ago.   Patient had labs completed 08/2024 with Dr. Cristopher Doll. TSH 30.110, T4 0.89, dose was adjusted at that time.    She reports elevated heart rate and palpitations for the last 1-2 months. Occurs 2-3 times/week, last time episode was last night. Symptoms last until she falls asleep. Reports her heart rate has gone up to 120 bpm. She denies any dizziness or lightheadedness with position changes. She denies any chest pain or shortness or breath. Has not seen cardiology previously. Known hx of POTS.    She is taking Zepbound 15 mg--started Zepbound 3-4 months ago. Sees Dr. Ge with the weight loss clinic.    Wt Readings from Last 6 Encounters:   09/24/24 273 lb (123.8 kg)   07/25/24 281 lb (127.5 kg)   06/19/24 283 lb (128.4 kg)   06/11/24 283 lb (128.4 kg)   01/24/24 (!) 309 lb (140.2 kg)   05/04/23 285 lb (129.3 kg)     Current Outpatient Medications   Medication Sig Dispense Refill    gabapentin 300 MG Oral Cap Take 1 capsule (300 mg total) by mouth nightly. 30 capsule 2    naproxen 500 MG Oral Tab TAKE 1 TABLET BY MOUTH AT ONSET OF MIGRAINE HEADACHE. MAY TAKE 1 TABLET 12 HOUR LATER IF HEADACHE IS STILL PRESENT      Multiple Vitamin Oral Tab Take 1 tablet by mouth daily.      Levothyroxine Sodium 175 MCG Oral Tab One tablet Monday through Friday, two tablets Saturdays and Sundays 102 tablet 3      Past Medical History:    Anemia    Anesthesia complication    Bilateral chronic vestibular neuritis    Concussion    H/O complications due to general anesthesia    pt reports she had a hard time waking up from foot surgery    Left foot pain    Migraines    Obesity, unspecified    Panic    PCOS (polycystic ovarian syndrome)    Postsurgical hypothyroidism    In 2015: total thyroidectomy for benign  goiter    POTS (postural orthostatic tachycardia syndrome)    Tachycardia    Thyroid nodule    Vestibular neuronitis    Overview:  Last Assessment & Plan:  Since apparently her insurance company does not work on the weekend, we will wait for their eval on 6/13/16.  She has been cleared for dc to snf for rehab per all consultants      Social History:  Social History     Socioeconomic History    Marital status:    Tobacco Use    Smoking status: Never    Smokeless tobacco: Never   Vaping Use    Vaping status: Never Used   Substance and Sexual Activity    Alcohol use: Yes     Alcohol/week: 0.0 standard drinks of alcohol     Comment: social    Drug use: No    Sexual activity: Yes     Partners: Male   Other Topics Concern    Caffeine Concern Yes     Comment: Daily; 1 cup coffee    Exercise No     Comment: 3 x a week 30  min     Seat Belt Yes   Social History Narrative    Single, lives at home with her father, no children, works at [employer]        REVIEW OF SYSTEMS:   GENERAL HEALTH: feels well otherwise  RESPIRATORY: denies shortness of breath with exertion  CARDIOVASCULAR: denies chest pain on exertion  GI: denies abdominal pain  Musculoskeletal: No motor deficits  EXAM:   /78   Pulse 100   Temp 97.2 °F (36.2 °C)   Resp 18   Ht 5' 5.98\" (1.676 m)   Wt 273 lb (123.8 kg)   LMP 06/11/2024 (Approximate)   BMI 44.08 kg/m²   GENERAL: well developed, well nourished,in no apparent distress  EYES: sclera clear without injection  NECK: supple,no adenopathy, thyroid normal to palpation  LUNGS: clear to auscultation no rales, rhonchi or wheezes  CARDIO: RRR without murmur  EXTREMITIES: no cyanosis, clubbing or edema  Musculoskeletal: No gross deficit  Psychological: Mood and affect are normal.  Good communication skills.  ASSESSMENT AND PLAN:     Encounter Diagnoses   Name Primary?    Palpitations Yes    Tachycardia     Irregular menses     PCOS (polycystic ovarian syndrome)     POTS (postural orthostatic  tachycardia syndrome)        1. Palpitations  - CBC W Differential W Platelet [E]  - Comp Metabolic Panel (14) [E]  - TSH and Free T4 [E]  - EKG 12 Lead performed at [facility]; Future  - CARD ECHO 2D DOPPLER (CPT=93306); Future  - CARD ZIO EXTENDED MONITOR 8-14 DAYS (CPT=93246/28756); Future    2. Tachycardia  - CBC W Differential W Platelet [E]  - Comp Metabolic Panel (14) [E]  - TSH and Free T4 [E]  - EKG 12 Lead performed at [facility]; Future  - CARD ECHO 2D DOPPLER (CPT=93306); Future  - CARD ZIO EXTENDED MONITOR 8-14 DAYS (CPT=93246/60752); Future    3. Irregular menses  - HCG, Beta Subunit (Quant Pregnancy Test) [E]    4. PCOS (polycystic ovarian syndrome)    5. POTS (postural orthostatic tachycardia syndrome)  - Cardio Referral - Internal       Orders Placed This Encounter   Procedures    CBC W Differential W Platelet [E]    Comp Metabolic Panel (14) [E]    TSH and Free T4 [E]    HCG, Beta Subunit (Quant Pregnancy Test) [E]       Meds & Refills for this Visit:  Requested Prescriptions      No prescriptions requested or ordered in this encounter       Imaging & Consults:  CARDIO - INTERNAL  CARD ECHO 2D DOPPLER (CPT=93306)  CARD ZIO EXTENDED MONITOR 8-14 DAYS (CPT=93246/01627)    Follow Up with:  No follow-up provider specified.      Labs today.  EKG today.  ECHO as ordered. Reviewed echo from 2019.   Known hx of POTS per chart- per patient she has not seen cardiology previously.  Referral Dr. Marks.  Await results for further recommendations.    The patient indicates understanding of these issues and agrees to the plan.  The patient is asked to return in 1 month.     yes no

## 2024-09-25 ENCOUNTER — PATIENT MESSAGE (OUTPATIENT)
Dept: FAMILY MEDICINE CLINIC | Facility: CLINIC | Age: 34
End: 2024-09-25

## 2024-09-25 ENCOUNTER — TELEPHONE (OUTPATIENT)
Dept: FAMILY MEDICINE CLINIC | Facility: CLINIC | Age: 34
End: 2024-09-25

## 2024-09-25 NOTE — TELEPHONE ENCOUNTER
Late entry 09/24/2024.  Called pt with + hcg result. Patient has not been taking prenatal vitamin- advised to start taking prenatal vitamin with DHA. Patient reports she is no longer taking Gabapentin. She is currently taking Levothyroxine as well as Zepbound. Discussed with patient Levothyroxine is safe during pregnancy, Zepbound is not- she will need to stop this medication. Patient reports her last dose was 09/23/2024.  Discussed with pt will reach out in AM to Dr. Benito to discuss results and next steps as well as forwarding results of her TSH/T4 to her endocrinologist for further management. Pt voiced understanding and agreeable with plan of care.

## 2024-09-26 ENCOUNTER — PATIENT MESSAGE (OUTPATIENT)
Dept: FAMILY MEDICINE CLINIC | Facility: CLINIC | Age: 34
End: 2024-09-26

## 2024-09-26 NOTE — TELEPHONE ENCOUNTER
From: Alberto Albrets  To: Christiane Estes  Sent: 9/25/2024 1:13 PM CDT  Subject: Blood Test     Should I get another pregnancy test Tommrow? Could you please ask aaron. I haven’t heard anything yet from  Dr Benito and dr knight

## 2024-09-26 NOTE — TELEPHONE ENCOUNTER
Called patient and she confirmed appointment is today with Dr. Benito. Patient will speak to Dr. Benito today at appointment.

## 2024-09-27 ENCOUNTER — LAB ENCOUNTER (OUTPATIENT)
Dept: LAB | Age: 34
End: 2024-09-27
Attending: OBSTETRICS & GYNECOLOGY
Payer: COMMERCIAL

## 2024-09-27 DIAGNOSIS — Z11.3 SCREENING EXAMINATION FOR VENEREAL DISEASE: ICD-10-CM

## 2024-09-27 DIAGNOSIS — Z87.59 ULTRASOUND SCAN TO CONFIRM FETAL VIABILITY WITH HISTORY OF MISCARRIAGE (HCC): ICD-10-CM

## 2024-09-27 DIAGNOSIS — Z12.4 ROUTINE CERVICAL SMEAR: ICD-10-CM

## 2024-09-27 DIAGNOSIS — Z36.9 ENCOUNTER FOR ANTENATAL SCREENING OF MOTHER (HCC): ICD-10-CM

## 2024-09-27 DIAGNOSIS — O36.80X0 ULTRASOUND SCAN TO CONFIRM FETAL VIABILITY WITH HISTORY OF MISCARRIAGE (HCC): ICD-10-CM

## 2024-09-27 DIAGNOSIS — Z32.01 PREGNANCY EXAMINATION OR TEST, POSITIVE RESULT (HCC): Primary | ICD-10-CM

## 2024-09-27 LAB — B-HCG SERPL-ACNC: ABNORMAL MIU/ML

## 2024-09-27 PROCEDURE — 84702 CHORIONIC GONADOTROPIN TEST: CPT

## 2024-09-30 ENCOUNTER — LAB ENCOUNTER (OUTPATIENT)
Dept: LAB | Age: 34
End: 2024-09-30
Attending: NURSE PRACTITIONER
Payer: COMMERCIAL

## 2024-09-30 ENCOUNTER — OFFICE VISIT (OUTPATIENT)
Dept: FAMILY MEDICINE CLINIC | Facility: CLINIC | Age: 34
End: 2024-09-30
Payer: COMMERCIAL

## 2024-09-30 VITALS
TEMPERATURE: 98 F | WEIGHT: 273 LBS | RESPIRATION RATE: 16 BRPM | BODY MASS INDEX: 43.87 KG/M2 | HEIGHT: 65.98 IN | HEART RATE: 100 BPM | SYSTOLIC BLOOD PRESSURE: 120 MMHG | DIASTOLIC BLOOD PRESSURE: 78 MMHG

## 2024-09-30 DIAGNOSIS — Z32.01 POSITIVE PREGNANCY TEST (HCC): ICD-10-CM

## 2024-09-30 DIAGNOSIS — M25.562 ACUTE PAIN OF LEFT KNEE: Primary | ICD-10-CM

## 2024-09-30 DIAGNOSIS — N92.6 IRREGULAR MENSES: ICD-10-CM

## 2024-09-30 DIAGNOSIS — E03.9 HYPOTHYROIDISM, UNSPECIFIED TYPE: ICD-10-CM

## 2024-09-30 LAB — B-HCG SERPL-ACNC: ABNORMAL MIU/ML

## 2024-09-30 PROCEDURE — 99214 OFFICE O/P EST MOD 30 MIN: CPT | Performed by: NURSE PRACTITIONER

## 2024-09-30 PROCEDURE — 84702 CHORIONIC GONADOTROPIN TEST: CPT | Performed by: NURSE PRACTITIONER

## 2024-09-30 RX ORDER — LEVOTHYROXINE SODIUM 200 UG/1
TABLET ORAL
COMMUNITY
Start: 2024-09-27

## 2024-09-30 RX ORDER — PROGESTERONE 200 MG/1
200 CAPSULE ORAL DAILY
COMMUNITY
Start: 2024-09-26 | End: 2024-12-25

## 2024-09-30 NOTE — PROGRESS NOTES
Alberto Alberts is a 33 year old female.  HPI:   Patient presents today reporting 3 day history of left knee pain. She denies any trauma or injury. Pain rated as a \"5/10\". No difficulties bearing weight or ambulating. Reports pain today is better, notes pain more so with position changes.   OTC: None    + pregnant. Seeing Dr. Fred HERRERA. She is requesting a hcg level today. She is on progesterone supplement. She is scheduled for US later this week- she is nervous for this with her history of pregnancy loss.  No vaginal spotting or bleeding.    Levothyroxine adjusted with Endo after recent TSH/T4 results.    HCG levels:  2024- 16,406.9  2024- ,849.3    Wt Readings from Last 6 Encounters:   24 273 lb (123.8 kg)   24 273 lb (123.8 kg)   24 281 lb (127.5 kg)   24 283 lb (128.4 kg)   24 283 lb (128.4 kg)   24 (!) 309 lb (140.2 kg)     Current Outpatient Medications   Medication Sig Dispense Refill    levothyroxine 200 MCG Oral Tab       progesterone 200 MG Oral Cap Take 1 capsule (200 mg total) by mouth daily.      Prenatal Multivit-Min-Fe-FA (PRE- OR) Take by mouth.      naproxen 500 MG Oral Tab TAKE 1 TABLET BY MOUTH AT ONSET OF MIGRAINE HEADACHE. MAY TAKE 1 TABLET 12 HOUR LATER IF HEADACHE IS STILL PRESENT (Patient not taking: Reported on 2024)        Past Medical History:    Anemia    Anesthesia complication    Bilateral chronic vestibular neuritis    Concussion    H/O complications due to general anesthesia    pt reports she had a hard time waking up from foot surgery    Left foot pain    Migraines    Obesity, unspecified    Panic    PCOS (polycystic ovarian syndrome)    Postsurgical hypothyroidism    In 2015: total thyroidectomy for benign goiter    POTS (postural orthostatic tachycardia syndrome)    Tachycardia    Thyroid nodule    Vestibular neuronitis    Overview:  Last Assessment & Plan:  Since apparently her insurance company does not work on the  weekend, we will wait for their eval on 6/13/16.  She has been cleared for dc to snf for rehab per all consultants      Social History:  Social History     Socioeconomic History    Marital status:    Tobacco Use    Smoking status: Never    Smokeless tobacco: Never   Vaping Use    Vaping status: Never Used   Substance and Sexual Activity    Alcohol use: Yes     Alcohol/week: 0.0 standard drinks of alcohol     Comment: social    Drug use: No    Sexual activity: Yes     Partners: Male   Other Topics Concern    Caffeine Concern Yes     Comment: Daily; 1 cup coffee    Exercise No     Comment: 3 x a week 30  min     Seat Belt Yes   Social History Narrative    Single, lives at home with her father, no children, works at [employer]        REVIEW OF SYSTEMS:   GENERAL HEALTH: feels well otherwise  RESPIRATORY: denies shortness of breath with exertion  CARDIOVASCULAR: denies chest pain on exertion  GI: denies abdominal pain  NEURO: denies headaches  Musculoskeletal: reports left knee pain  EXAM:   /78   Pulse 100   Temp 97.5 °F (36.4 °C) (Temporal)   Resp 16   Ht 5' 5.98\" (1.676 m)   Wt 273 lb (123.8 kg)   LMP 08/12/2024 (Approximate)   BMI 44.09 kg/m²   GENERAL: well developed, well nourished,in no apparent distress  EYES: sclera clear without injection  NECK: supple,no adenopathy, thyroid normal to palpation  LUNGS: clear to auscultation no rales, rhonchi or wheezes  CARDIO: RRR without murmur  EXTREMITIES: no cyanosis, clubbing or edema +2 posterior tibial pulses  Musculoskeletal:  no discomfort with flexion, extension of bilateral knee. No calf swelling or tenderness. Negative varus/valgus/lachmans. +2 patellar reflexes.   Psychological: Mood and affect are normal. Good communication skills.  ASSESSMENT AND PLAN:     Encounter Diagnoses   Name Primary?    Acute pain of left knee Yes    Positive pregnancy test (HCC)     Irregular menses     Hypothyroidism, unspecified type        1. Acute pain of left  knee    2. Positive pregnancy test (HCC)  - HCG, Beta Subunit (Quant Pregnancy Test) [E]    3. Irregular menses    4. Hypothyroidism, unspecified type       Orders Placed This Encounter   Procedures    HCG, Beta Subunit (Quant Pregnancy Test) [E]       Meds & Refills for this Visit:  Requested Prescriptions      No prescriptions requested or ordered in this encounter       Imaging & Consults:  None    Follow Up with:  No follow-up provider specified.    Lengthy conversation with patient surrounding pregnancy and patient's concerns with her hx of pregnancy loss.  Will check hcg level today per patient request. Discussed with pt following up with OBGYN and with her scheduled ultrasound.   Continue prenatal vitamin.  Discussed gentle stretches with the knee, can alternate ice/heat.   Reviewed with pt NO NSAID given pregnancy.       The patient indicates understanding of these issues and agrees to the plan.  The patient is asked to return in 1 month prn.

## 2024-09-30 NOTE — TELEPHONE ENCOUNTER
HCG level continues to rise. Would need to follow up with Dr. Benito regarding ultrasound/recommendations.

## 2024-10-10 ENCOUNTER — PATIENT MESSAGE (OUTPATIENT)
Dept: FAMILY MEDICINE CLINIC | Facility: CLINIC | Age: 34
End: 2024-10-10

## 2024-10-10 NOTE — TELEPHONE ENCOUNTER
Attempted to call patient. No answer, left a detailed message to identifiable voice mail that she needs to follow up with OB specialists per Christiane ACEVEDO. Sent recommendation to [facility] as well. Advised to call our office if any question or concern.

## 2024-10-10 NOTE — TELEPHONE ENCOUNTER
"Use artificial tears up to four times a day (Refresh Plus, Systane Balance, or generic artificial tears are ok. Avoid \"get the red out\" drops).   Glasses prescription given     Connie Martinez MD  [phone]    " Needs to follow up with her OB

## 2024-10-17 ENCOUNTER — E-VISIT (OUTPATIENT)
Dept: TELEHEALTH | Age: 34
End: 2024-10-17
Payer: COMMERCIAL

## 2024-10-17 DIAGNOSIS — K59.00 CONSTIPATION, UNSPECIFIED CONSTIPATION TYPE: ICD-10-CM

## 2024-10-17 DIAGNOSIS — Z34.90 PREGNANCY, UNSPECIFIED GESTATIONAL AGE (HCC): ICD-10-CM

## 2024-10-17 DIAGNOSIS — M54.50 LOW BACK PAIN WITHOUT SCIATICA, UNSPECIFIED BACK PAIN LATERALITY, UNSPECIFIED CHRONICITY: ICD-10-CM

## 2024-10-17 DIAGNOSIS — Z02.89 ENCOUNTER TO OBTAIN EXCUSE FROM WORK: Primary | ICD-10-CM

## 2024-10-17 PROCEDURE — 99421 OL DIG E/M SVC 5-10 MIN: CPT | Performed by: NURSE PRACTITIONER

## 2024-10-17 NOTE — PROGRESS NOTES
Alberto Alberts is a 33 year old female submitting e-visit for work note.  HPI:   See answers to questionnaire and [employer] message exchange  Reports mild low back pain, nausea, constipation.  Pt is pregnant.  Requesting work note until sees OB for symptoms.     Current Outpatient Medications   Medication Sig Dispense Refill    levothyroxine 200 MCG Oral Tab       progesterone 200 MG Oral Cap Take 1 capsule (200 mg total) by mouth daily.      Prenatal Multivit-Min-Fe-FA (PRE-RAMOS OR) Take by mouth.        Past Medical History:    Anemia    Anesthesia complication    Bilateral chronic vestibular neuritis    Concussion    H/O complications due to general anesthesia    pt reports she had a hard time waking up from foot surgery    Left foot pain    Migraines    Obesity, unspecified    Panic    PCOS (polycystic ovarian syndrome)    Postsurgical hypothyroidism    In : total thyroidectomy for benign goiter    POTS (postural orthostatic tachycardia syndrome)    Tachycardia    Thyroid nodule    Vestibular neuronitis    Overview:  Last Assessment & Plan:  Since apparently her insurance company does not work on the weekend, we will wait for their eval on 16.  She has been cleared for dc to snf for rehab per all consultants      Past Surgical History:   Procedure Laterality Date    D & c  2011    D & c      D & c  10/12/2012    Dilation/curettage,diagnostic      Hysteroscopy  2011    Laparoscopy,diagnostic      x3 r/t POS    Laparoscopy,pelvic,biopsy      drill ovary    Other surgical history Left     foot sx x3    Other surgical history  2012    operative laparoscopy with electrocautery of multiple ovarian cysts bilaterally. it was lysis of a few pelvic adhesions    Other surgical history      LAPAROSCOPY, OPERATIVE , POSSIBLE LAPAROTOMY WITH/WO TUBAL PERFUSION performed by Bal Benito MD at [facility] OR LOCATION    Other surgical history  2018    Operative laparoscopy, endometrial  sampling, Right peritubal cystectomy, Left ovarian cystectomy, fulgration of endometriosis    Surgical bariatrics - internal  10/23/2018    Gastric Sleeve at [place] by Dr. Cooper    Thyroidectomy Bilateral 01/15/2016      Family History   Problem Relation Age of Onset    Breast Cancer Mother 38    Cancer Mother         breast    Cancer Maternal Grandmother         Cervical    Thyroid disease Maternal Grandmother         Possible thyroid cancer    High Cholesterol Father     Hypertension Father     Heart Disease Father     Other (Other) Father         heart bypass surgery    Hypertension Paternal Grandfather     Diabetes Paternal Grandfather     Stroke Neg       Social History:  Social History     Socioeconomic History    Marital status:    Tobacco Use    Smoking status: Never    Smokeless tobacco: Never   Vaping Use    Vaping status: Never Used   Substance and Sexual Activity    Alcohol use: Yes     Alcohol/week: 0.0 standard drinks of alcohol     Comment: social    Drug use: No    Sexual activity: Yes     Partners: Male   Other Topics Concern    Caffeine Concern Yes     Comment: Daily; 1 cup coffee    Exercise No     Comment: 3 x a week 30  min     Seat Belt Yes   Social History Narrative    Single, lives at home with her father, no children, works at [employer]         No results found for this or any previous visit (from the past 24 hours).    ASSESSMENT AND PLAN:       Diagnoses and all orders for this visit:    Encounter to obtain excuse from work    Low back pain without sciatica, unspecified back pain laterality, unspecified chronicity    Constipation, unspecified constipation type      Work note provided  Increase fiber, fluids, regular exercise, prune juice, gentle stretching  Refer to [email or web address] message exchange for specific patient instructions    Duration of the E-visit service:  10 minutes

## 2024-10-29 ENCOUNTER — PATIENT MESSAGE (OUTPATIENT)
Dept: FAMILY MEDICINE CLINIC | Facility: CLINIC | Age: 34
End: 2024-10-29

## 2024-10-30 ENCOUNTER — TELEPHONE (OUTPATIENT)
Dept: FAMILY MEDICINE CLINIC | Facility: CLINIC | Age: 34
End: 2024-10-30

## 2024-10-30 NOTE — TELEPHONE ENCOUNTER
She needs to follow up with her endocrinologist who is managing her thyroid. Would advise again reaching out to their office and inform them of need for follow up.

## 2024-10-30 NOTE — TELEPHONE ENCOUNTER
Thyroid test results are a little off. Labs done by [facility].     Patient states she is pregnant and wants to know if she needs to go to the ER. Please advise patient.

## 2024-11-14 ENCOUNTER — LAB ENCOUNTER (OUTPATIENT)
Dept: LAB | Facility: HOSPITAL | Age: 34
End: 2024-11-14
Attending: NURSE PRACTITIONER
Payer: COMMERCIAL

## 2024-11-14 ENCOUNTER — OFFICE VISIT (OUTPATIENT)
Dept: FAMILY MEDICINE CLINIC | Facility: CLINIC | Age: 34
End: 2024-11-14
Payer: COMMERCIAL

## 2024-11-14 VITALS
RESPIRATION RATE: 16 BRPM | HEIGHT: 65.98 IN | TEMPERATURE: 97 F | WEIGHT: 275.81 LBS | SYSTOLIC BLOOD PRESSURE: 112 MMHG | BODY MASS INDEX: 44.33 KG/M2 | HEART RATE: 96 BPM | DIASTOLIC BLOOD PRESSURE: 68 MMHG

## 2024-11-14 DIAGNOSIS — Z3A.12 12 WEEKS GESTATION OF PREGNANCY (HCC): ICD-10-CM

## 2024-11-14 DIAGNOSIS — M54.31 RIGHT SIDED SCIATICA: Primary | ICD-10-CM

## 2024-11-14 LAB
BILIRUB UR QL STRIP.AUTO: NEGATIVE
GLUCOSE UR STRIP.AUTO-MCNC: NORMAL MG/DL
KETONES UR STRIP.AUTO-MCNC: NEGATIVE MG/DL
LEUKOCYTE ESTERASE UR QL STRIP.AUTO: NEGATIVE
NITRITE UR QL STRIP.AUTO: NEGATIVE
PH UR STRIP.AUTO: 6.5 [PH] (ref 5–8)
PROT UR STRIP.AUTO-MCNC: NEGATIVE MG/DL
RBC #/AREA URNS AUTO: >10 /HPF
RBC UR QL AUTO: NEGATIVE
SP GR UR STRIP.AUTO: <1.005 (ref 1–1.03)
UROBILINOGEN UR STRIP.AUTO-MCNC: NORMAL MG/DL

## 2024-11-14 PROCEDURE — 99214 OFFICE O/P EST MOD 30 MIN: CPT | Performed by: NURSE PRACTITIONER

## 2024-11-14 PROCEDURE — 81001 URINALYSIS AUTO W/SCOPE: CPT | Performed by: NURSE PRACTITIONER

## 2024-11-14 PROCEDURE — 87086 URINE CULTURE/COLONY COUNT: CPT | Performed by: NURSE PRACTITIONER

## 2024-11-14 NOTE — PROGRESS NOTES
Alberto Alberts is a 34 year old female.  HPI:   Patient presents today reporting 2 day history of right leg pain- starts to the right buttock and radiates down to the right knee. Denies any injury or trauma. Pain worse with certain movements. Rates pain as a\"7/10\". Has used ice/heat with minimal improvement. OTC: None  No hematuria. No urinary urgency, frequency or dysuria.  Requesting work note.    12 weeks pregnant, TALYA 2025, has appt with Dr. Benito later today. She denies any vaginal bleeding, spotting, cramping. Taking vaginal progesterone.    Wt Readings from Last 6 Encounters:   24 275 lb 12.8 oz (125.1 kg)   24 273 lb (123.8 kg)   24 273 lb (123.8 kg)   24 281 lb (127.5 kg)   24 283 lb (128.4 kg)   24 283 lb (128.4 kg)     Current Outpatient Medications   Medication Sig Dispense Refill    levothyroxine 200 MCG Oral Tab       progesterone 200 MG Oral Cap Take 1 capsule (200 mg total) by mouth daily.      Prenatal Multivit-Min-Fe-FA (PRE- OR) Take by mouth.        Past Medical History:    Anemia    Anesthesia complication    Bilateral chronic vestibular neuritis    Concussion    H/O complications due to general anesthesia    pt reports she had a hard time waking up from foot surgery    Left foot pain    Migraines    Obesity, unspecified    Panic    PCOS (polycystic ovarian syndrome)    Postsurgical hypothyroidism    In 2015: total thyroidectomy for benign goiter    POTS (postural orthostatic tachycardia syndrome)    Tachycardia    Thyroid nodule    Vestibular neuronitis    Overview:  Last Assessment & Plan:  Since apparently her insurance company does not work on the weekend, we will wait for their eval on 16.  She has been cleared for dc to snf for rehab per all consultants      Social History:  Social History     Socioeconomic History    Marital status:    Tobacco Use    Smoking status: Never    Smokeless tobacco: Never   Vaping Use    Vaping  status: Never Used   Substance and Sexual Activity    Alcohol use: Yes     Alcohol/week: 0.0 standard drinks of alcohol     Comment: social    Drug use: No    Sexual activity: Yes     Partners: Male   Other Topics Concern    Caffeine Concern Yes     Comment: Daily; 1 cup coffee    Exercise No     Comment: 3 x a week 30  min     Seat Belt Yes   Social History Narrative    Single, lives at home with her father, no children, works at [employer]        REVIEW OF SYSTEMS:   GENERAL HEALTH: feels well otherwise  RESPIRATORY: denies shortness of breath with exertion  CARDIOVASCULAR: denies chest pain on exertion  GI: denies abdominal pain  : denies any urinary urgency, frequency or dysuria. Denies hematuria.  NEURO: denies headaches  Musculoskeletal: SEE HPI  EXAM:   /68   Pulse 96   Temp 97.3 °F (36.3 °C) (Temporal)   Resp 16   Ht 5' 5.98\" (1.676 m)   Wt 275 lb 12.8 oz (125.1 kg)   LMP 08/12/2024 (Approximate)   BMI 44.54 kg/m²   GENERAL: well developed, well nourished,in no apparent distress  EYES: sclera clear without injection  NECK: supple,no adenopathy  LUNGS: clear to auscultation no rales, rhonchi or wheezes  CARDIO: RRR without murmur  EXTREMITIES: no cyanosis, clubbing or edema +2 posterior tibial pulses  Musculoskeletal: No discomfort with palpation down spine. No muscle spasms noted. No CVA tenderness with murphys punch. + discomfort with right straight leg raise. No discomfort with left. +2 patellar reflexes. No calf tenderness. No swelling.  Neurological: nerves II through XII grossly intact no sensorimotor deficit  Psychological: Mood and affect are normal.  Good communication skills.  ASSESSMENT AND PLAN:     Encounter Diagnoses   Name Primary?    Right sided sciatica Yes    12 weeks gestation of pregnancy ([facility])        1. Right sided sciatica  - Physical Therapy Referral - [facility]  - UA/M With Culture Reflex [E]    2. 12 weeks gestation of pregnancy ([facility])       Orders Placed This Encounter    Procedures    UA/M With Culture Reflex [E]       Meds & Refills for this Visit:  Requested Prescriptions      No prescriptions requested or ordered in this encounter       Imaging & Consults:  OP REFERRAL TO [facility]    Follow Up with:  No follow-up provider specified.    Discussed holding on any imaging at this time given symptoms x 2 days and 12 wks pregnant and in the setting of no injury/trauma.  UA- pt unable to void during office visit- will have pt obtain UA at hospital. Denies any urinary symptoms. No CVA tenderness, no reported flank pain.  Discussed gentle stretches, can use heating pad (do not apply heat directly to skin)- 20 mins on/off.   PT referral.  Discussed NO NSAID given pregnancy--reviewed with patient.  PT referral placed.  Work note provided.  Continue follow up/care with OBGYN    The patient indicates understanding of these issues and agrees to the plan.  The patient is asked to return in 1 month prn.

## 2024-11-18 ENCOUNTER — PATIENT MESSAGE (OUTPATIENT)
Dept: FAMILY MEDICINE CLINIC | Facility: CLINIC | Age: 34
End: 2024-11-18

## 2024-11-18 NOTE — TELEPHONE ENCOUNTER
Left message to call back. Need to inform of recommendations below and unable to provide handicap permit. Need to be assessed at ER for worsening symptoms.

## 2024-11-18 NOTE — TELEPHONE ENCOUNTER
RN to call. Based off message below, patient should be evaluated at the ER as symptoms seem to have significantly worsened.

## 2024-11-19 NOTE — TELEPHONE ENCOUNTER
She needs to be re-evaluated for her symptoms--again, if symptoms are as severe as listed below the recommendation would be to be evaluated at  ER. She can see any open provider in the office as I do not have any availability to overbook tomorrow or Thursday. Unable to write work note without evaluation.

## 2024-11-19 NOTE — TELEPHONE ENCOUNTER
Patient called back. Per patient, her OB/Gyne does not want her to be treated by an ER physician, or any additional providers. Patient does not want to go to ER.     Patient states she is able to walk, but she is limping. This causes difficulties at work. Therefore, she is asking for a work note for this upcoming Friday and Saturday 11/22 and 11/23.     Patient has appointment with obstetrician on Tuesday 11/26. They will not provide this letter until she is seen.

## 2024-11-20 ENCOUNTER — E-VISIT (OUTPATIENT)
Dept: TELEHEALTH | Age: 34
End: 2024-11-20
Payer: COMMERCIAL

## 2024-11-20 DIAGNOSIS — M54.50 LOW BACK PAIN, UNSPECIFIED BACK PAIN LATERALITY, UNSPECIFIED CHRONICITY, UNSPECIFIED WHETHER SCIATICA PRESENT: Primary | ICD-10-CM

## 2024-11-20 NOTE — PROGRESS NOTES
Alberto Alberts is a 34 year old female.  HPI:   See answers to questions above.     Current Outpatient Medications   Medication Sig Dispense Refill    levothyroxine 200 MCG Oral Tab       progesterone 200 MG Oral Cap Take 1 capsule (200 mg total) by mouth daily.      Prenatal Multivit-Min-Fe-FA (PRE-RAMOS OR) Take by mouth.        Past Medical History:    Anemia    Anesthesia complication    Bilateral chronic vestibular neuritis    Concussion    H/O complications due to general anesthesia    pt reports she had a hard time waking up from foot surgery    Left foot pain    Migraines    Obesity, unspecified    Panic    PCOS (polycystic ovarian syndrome)    Postsurgical hypothyroidism    In 2015: total thyroidectomy for benign goiter    POTS (postural orthostatic tachycardia syndrome)    Tachycardia    Thyroid nodule    Vestibular neuronitis    Overview:  Last Assessment & Plan:  Since apparently her insurance company does not work on the weekend, we will wait for their eval on 16.  She has been cleared for dc to snf for rehab per all consultants      Past Surgical History:   Procedure Laterality Date    D & c  2011    D & c  2009    D & c  10/12/2012    Dilation/curettage,diagnostic      Hysteroscopy  2011    Laparoscopy,diagnostic      x3 r/t POS    Laparoscopy,pelvic,biopsy      drill ovary    Other surgical history Left     foot sx x3    Other surgical history  2012    operative laparoscopy with electrocautery of multiple ovarian cysts bilaterally. it was lysis of a few pelvic adhesions    Other surgical history      LAPAROSCOPY, OPERATIVE , POSSIBLE LAPAROTOMY WITH/WO TUBAL PERFUSION performed by Bal Benito MD at [facility] OR LOCATION    Other surgical history  2018    Operative laparoscopy, endometrial sampling, Right peritubal cystectomy, Left ovarian cystectomy, fulgration of endometriosis    Surgical bariatrics - internal  10/23/2018    Gastric Sleeve at [facility] by Dr. Cooper     Thyroidectomy Bilateral 01/15/2016      Family History   Problem Relation Age of Onset    Breast Cancer Mother 38    Cancer Mother         breast    Cancer Maternal Grandmother         Cervical    Thyroid disease Maternal Grandmother         Possible thyroid cancer    High Cholesterol Father     Hypertension Father     Heart Disease Father     Other (Other) Father         heart bypass surgery    Hypertension Paternal Grandfather     Diabetes Paternal Grandfather     Stroke Neg       Social History:  Social History     Socioeconomic History    Marital status:    Tobacco Use    Smoking status: Never    Smokeless tobacco: Never   Vaping Use    Vaping status: Never Used   Substance and Sexual Activity    Alcohol use: Yes     Alcohol/week: 0.0 standard drinks of alcohol     Comment: social    Drug use: No    Sexual activity: Yes     Partners: Male   Other Topics Concern    Caffeine Concern Yes     Comment: Daily; 1 cup coffee    Exercise No     Comment: 3 x a week 30  min     Seat Belt Yes   Social History Narrative    Single, lives at home with her father, no children, works at [employer]         ASSESSMENT AND PLAN:     Encounter Diagnosis   Name Primary?    Low back pain, unspecified back pain laterality, unspecified chronicity, unspecified whether sciatica present Yes       Pt 12 weeks pregnant, with back pain radiating into leg. Need for higher level of care due to pregnancy and s/s    Meds & Refills for this Visit:  Requested Prescriptions      No prescriptions requested or ordered in this encounter       Duration of  the service:  5 minutes

## 2024-11-22 ENCOUNTER — PATIENT MESSAGE (OUTPATIENT)
Dept: FAMILY MEDICINE CLINIC | Facility: CLINIC | Age: 34
End: 2024-11-22

## 2024-11-22 NOTE — TELEPHONE ENCOUNTER
We do not refer to inpatient rehab? An order was placed for outpatient physical therapy at her last office visit.

## 2024-12-10 ENCOUNTER — OFFICE VISIT (OUTPATIENT)
Dept: PHYSICAL THERAPY | Facility: HOSPITAL | Age: 34
End: 2024-12-10
Attending: NURSE PRACTITIONER
Payer: COMMERCIAL

## 2024-12-10 ENCOUNTER — TELEPHONE (OUTPATIENT)
Dept: PHYSICAL THERAPY | Facility: HOSPITAL | Age: 34
End: 2024-12-10

## 2024-12-10 DIAGNOSIS — M54.31 RIGHT SIDED SCIATICA: Primary | ICD-10-CM

## 2024-12-10 PROCEDURE — 97140 MANUAL THERAPY 1/> REGIONS: CPT

## 2024-12-10 PROCEDURE — 97161 PT EVAL LOW COMPLEX 20 MIN: CPT

## 2024-12-10 PROCEDURE — 97110 THERAPEUTIC EXERCISES: CPT

## 2024-12-10 NOTE — PROGRESS NOTES
SPINE EVALUATION:     Diagnosis:   Right sided sciatica (M54.31)         Referring Provider: Christiane Estes  Date of Evaluation:    12/10/2024    Precautions:  None    Precautions: Fall Risk , High-risk pregnancy, multigravida of advanced maternal age, antepartum [O09.529 ]; POTS  Next MD visit:   none scheduled  Date of Surgery: n/a     PATIENT SUMMARY   Alberto Alberts is a 34 year old female who presents to therapy today with complaints of R leg pain that started 4 weeks ago that has worsened. NKI. Pt c/o pain lateral R buttock to knee. Denies N/T. 16 weeks pregnant. 1st pregnancy. No history of back or sciatic pain. 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 Using heat and ice.  Pt describes pain level current 7/10, at best 7/10, at worst 9/10.   Current functional limitations include sleep, walking, rolling, transferring, driving, climb stairs, standing.      present with pt throughout treatment    Alberto describes prior level of function no limitations. Pt goals include less pain and get back to work.  Past medical history was reviewed with Alberto. Significant findings include  has a past medical history of Anemia, Anesthesia complication, Bilateral chronic vestibular neuritis, Concussion (10/11/2020), H/O complications due to general anesthesia, Left foot pain, Migraines, Obesity, unspecified, Panic, PCOS (polycystic ovarian syndrome), Postsurgical hypothyroidism, POTS (postural orthostatic tachycardia syndrome), Tachycardia, Thyroid nodule (12/15/2014), and Vestibular neuronitis (6/3/2016).   Pt denies diplopia, dysarthria, dysphasia, dizziness, drop attacks, bowel/bladder changes, saddle anesthesia, and KENDRA LE N/T.    ASSESSMENT  Alberto presents to physical therapy evaluation with primary c/o R leg pain. The results of the objective tests and measures show decreased range of motion and strength, extreme antalgic gait, + neural signs.  Functional deficits  include but are not limited to sleep, walking, rolling, transferring.  Signs and symptoms are consistent with diagnosis of Right sided sciatica (M54.31). Pt and PT discussed evaluation findings, pathology, POC and HEP.  Pt voiced understanding and performs HEP correctly without reported pain. Skilled Physical Therapy is medically necessary to address the above impairments and reach functional goals.     OBJECTIVE:   Observation/Posture: trunk FW bent with walking, FHP, slouched posture, posterior pelvic tilt in sitting  Neuro Screen: SILT, DTR's +2 B LE's    Lumbar AROM: (* denotes performed with pain)  Flexion: 70-holding onto RW  Extension: 25  Sidebending: R 30; L 25-c/o R leg pain  Rotation: R 30; L 30    Hip ROM:   AROM (in degrees) (R) (L)   Hip Flexion 90* 90   Hip IR 10* 10   Hip ER 30* 30     Accessory motion: decreased symptoms CPA L3-5  Palpation: Moderate tenderness lower back lumbar paraspinal mm, B QL, moderate tenderness Right Quads, HS, Hip add/abd     Strength: (* denotes performed with pain)  LE   Hip flexion (L2): R 2/5*; L 5/5  Hip abduction: R 2/5*; L 5/5  Hip Extension: R 2/5*; L 5/5   Hip ER: R 2+/5*; L 5/5  Hip IR: R 2+/5*; L 5/5  Knee Flexion: R 3+/5; L 5/5   Knee extension (L3): R 2+/5*; L 5/5   DF (L4): R 4/5; L 5/5  Great Toe Ext (L5): R 4/5, L 5/5  PF (S1): R 4/5; L 5/5    Tested in NWB  Pain R LE with all movements     Flexibility:   LE   Not assessed secondary to pt's extreme pain     Special tests:   Slump: + R  SLR: R +, L -  Active SLR: R +, L -  Long Axis Distraction: no reduction of pain      SI Cluster:  SI Distraction Provocation: neg  SI Compression Provocation: neg  Direct palpation of posterior SI ligament: R neg, L yasmine     Gait: pt ambulates on level ground with assistive device of rolling walker, antalgia, decreased step length R, decreased stance phase R, and decreased hip/knee flex or ext R .  Balance: SLS R 0 sec, L 0 sec- pt in excessive pain in standing    Today’s  Treatment and Response:   Pt education was provided on exam findings, treatment diagnosis, treatment plan, expectations, and prognosis. Pt was also provided recommendations for activity modifications, modalities as needed [ice/heat], postural corrections, pain science education , and importance of remaining active    MT-CPA L3-5 gr II multiple bouts , foam roller R lateral hip, STM R piriformis, R lumbar paraspinals    Education on pertinent anatomy, pathophysiology of condition, biomechanics relevant to current condition , prognosis, purpose of heat/ice for pain relieving modalities     Patient was educated on good postural habits, biomechanics of functional performance, gait training w RW  Sitting well back in chair with back supported at all times, without slouching. Need to interrupt sitting every 30 min.  To change positions with onset of any distal symptoms.To self limit static postures.   Recommendations of pregnancy abdominal support, massage (family training provided)  Neuromuscular Re-education: improve balance,coordination,posture, proprioception     Patient was instructed in and issued a HEP for: R sciatic nn glides x10 hourly    Pt decreased from 7/10 to 5/10 end of session.    Charges: PT Eval Low Complexity, MT 10, TE 15      Total Timed Treatment: 25 min     Total Treatment Time: 50 min     PLAN OF CARE:    Goals: (to be met in 10 visits)   Pt will improve transversus abdominis recruitment to perform proper isometric contraction without requiring verbal or tactile cuing to promote advancement of therex   Pt will demonstrate good understanding of proper posture and body mechanics to decrease pain and improve spinal safety   Pt will improve lumbar spine AROM flexion to >/=100 deg to allow increase ease with bending forward to don shoes   Pt will report improved symptom centralization and absence of radicular symptoms for 3 consecutive days to improve function with ADL   Pt will have decreased  paraspinal mm tension to tolerate standing >/=30 minutes for work and home activities   Pt will demonstrate improved core strength to be able to perform transfer with </=2/10 pain   Pt will be independent and compliant with comprehensive HEP to maintain progress achieved in PT       Frequency / Duration: Patient will be seen for 2 x/week or a total of 10 visits over a 90 day period. Treatment will include: Gait training, Manual Therapy, Neuromuscular Re-education, Self-Care Home Management, Therapeutic Exercise, Home Exercise Program instruction, and Modalities to include: heat/ice    Education or treatment limitation: None  Rehab Potential:good    Oswestry Disability Index Score  No data recorded    Patient/Family/Caregiver was advised of these findings, precautions, and treatment options and has agreed to actively participate in planning and for this course of care.    Thank you for your referral. Please co-sign or sign and return this letter via fax as soon as possible to [phone]. If you have any questions, please contact me at Dept: [phone]    Sincerely,  Electronically signed by therapist: Monica Carbone, PT    Physician's certification required: Yes  I certify the need for these services furnished under this plan of treatment and while under my care.    X___________________________________________________ Date____________________    Certification From: 12/10/2024  To:3/10/2025

## 2024-12-13 ENCOUNTER — PATIENT MESSAGE (OUTPATIENT)
Dept: NEUROLOGY | Facility: CLINIC | Age: 34
End: 2024-12-13

## 2024-12-16 NOTE — TELEPHONE ENCOUNTER
Layne Christopher MD [facility] [place] Nurse1 minute ago (4:30 PM)       Other than Tylenol and may be tylenol with codeine no other medications recommended in pregnancy    She can ask her OB if she can use lidocaine patch    Thanks

## 2024-12-17 ENCOUNTER — OFFICE VISIT (OUTPATIENT)
Dept: PHYSICAL THERAPY | Facility: HOSPITAL | Age: 34
End: 2024-12-17
Attending: NURSE PRACTITIONER
Payer: COMMERCIAL

## 2024-12-17 PROCEDURE — 97140 MANUAL THERAPY 1/> REGIONS: CPT

## 2024-12-17 PROCEDURE — 97112 NEUROMUSCULAR REEDUCATION: CPT

## 2024-12-17 PROCEDURE — 97110 THERAPEUTIC EXERCISES: CPT

## 2024-12-17 NOTE — PROGRESS NOTES
SPINE EVALUATION:       Diagnosis:   Right sided sciatica (M54.31)      Referring Provider: Christiane Estes  Date of Evaluation:    12/10/2024    Precautions:    Precautions: Fall Risk , High-risk pregnancy, multigravida of advanced maternal age, antepartum ,POTS    Next MD visit:   none scheduled  Date of Surgery: n/a   Insurance Primary/Secondary: [employer] INS / MEDICAID     # Auth Visits: 60 visit            Subjective: Having difficulty sleeping. Unable to go up stairs to bedroom so has been sleeping in a chair. Having difficulty transferring out of low couch. Has to use RW all the time when walking. Trying to do nerve flossing but everything is painful.  17 weeks pregnant     present throughout session.     Pain: 7.5/10      Objective: Tx flow sheet     Palpation: Moderate tenderness lower back lumbar paraspinal mm, B QL, moderate tenderness Right Quads, HS, Hip add/abd   Gait: pt ambulates on level ground with assistive device of rolling walker, antalgia, decreased step length R, decreased stance phase R, and decreased hip/knee flex or ext R.      Lumbar AROM: (* denotes performed with pain)  Flexion: 70-holding onto RW  Extension: 25  Sidebending: R 30; L 25-c/o R leg pain  Rotation: R 30; L 30    Hip ROM:   AROM (in degrees) (R) (L)   Hip Flexion 90* 90   Hip IR 10* 10   Hip ER 30* 30     Accessory motion: decreased symptoms CPA L3-5    Strength: (* denotes performed with pain)  LE   Hip flexion (L2): R 2/5*  Hip abduction: R 2/5*  Hip Extension: R 2/5*   Hip ER: R 2+/5*  Hip IR: R 2+/5*  Knee Flexion: R 3+/5;   Knee extension (L3): R 2+/5*   DF (L4): R 4/5  Great Toe Ext (L5): R 4/5  PF (S1): R 4/5    Tested in NWB  Pain R LE with all movements       Special tests:   Slump: + R  SLR: R +,  Active SLR: R +,      Balance: SLS R 0 sec, L 0 sec- pt in excessive pain in standing    Assessment: Improved ambulation with increased heel toe sequencing. Pt reported less pain with walking. Fair tolerance to  treatment today secondary to pain. Pt unable to lie on side and required use of RW with all walking.       Goals:   Goals: (to be met in 10 visits) -progressing  Pt will improve transversus abdominis recruitment to perform proper isometric contraction without requiring verbal or tactile cuing to promote advancement of therex   Pt will demonstrate good understanding of proper posture and body mechanics to decrease pain and improve spinal safety   Pt will improve lumbar spine AROM flexion to >/=100 deg to allow increase ease with bending forward to don shoes   Pt will report improved symptom centralization and absence of radicular symptoms for 3 consecutive days to improve function with ADL   Pt will have decreased paraspinal mm tension to tolerate standing >/=30 minutes for work and home activities   Pt will demonstrate improved core strength to be able to perform transfer with </=2/10 pain   Pt will be independent and compliant with comprehensive HEP to maintain progress achieved in PT     Plan: Continue plan of care as pt tolerates with focus on centralization of symptoms.   Date: 12/17/2024  TX#: 2/10 Date:                 TX#: 3/ Date:                 TX#: 4/ Date:                 TX#: 5/ Date:   Tx#: 6/   NM     Gait training    Pt Ed:  Posture ed  Transfers  Sitting positioning-lumbar roll  Sleeping positions         TE  Sitting-R sciatic nn glides 3x10    iso hip add 5 sec x5    Iso ER 5 sec x10    Lunge-R hip flexor stretch 6 in 10 sec x3         MT    FW sitting-  CPA L3-5 gr II-III multiple bouts    STM R piriformis, R lumbar paraspinals           HEP: nerve flossing x10 TID, gait training, lumbar roll    Charges: NM 15 TE 10 MT 13      Total Timed Treatment: 38 min  Total Treatment Time: 38 min

## 2024-12-19 ENCOUNTER — APPOINTMENT (OUTPATIENT)
Dept: PHYSICAL THERAPY | Facility: HOSPITAL | Age: 34
End: 2024-12-19
Attending: NURSE PRACTITIONER
Payer: COMMERCIAL

## 2024-12-20 ENCOUNTER — PATIENT MESSAGE (OUTPATIENT)
Dept: FAMILY MEDICINE CLINIC | Facility: CLINIC | Age: 34
End: 2024-12-20

## 2024-12-20 NOTE — TELEPHONE ENCOUNTER
Tried to call patient to discuss symptoms.     No answer. Left message to call back. I stated I will respond back to [employer] message, as well.

## 2024-12-20 NOTE — TELEPHONE ENCOUNTER
She needs to be re-evaluated. She can follow up in the office with provider she will be establishing care with. Please also let patient know [employer] messages can take 5-7 days for response- if she has urgent needs, recommend calling the office and speaking with a triage nurse.

## 2024-12-24 ENCOUNTER — OFFICE VISIT (OUTPATIENT)
Dept: PHYSICAL THERAPY | Facility: HOSPITAL | Age: 34
End: 2024-12-24
Attending: NURSE PRACTITIONER
Payer: COMMERCIAL

## 2024-12-24 PROCEDURE — 97110 THERAPEUTIC EXERCISES: CPT

## 2024-12-24 NOTE — PROGRESS NOTES
SPINE EVALUATION:       Diagnosis:   Right sided sciatica (M54.31)      Referring Provider: Christiane Estes  Date of Evaluation:    12/10/2024    Precautions:    Precautions: Fall Risk , High-risk pregnancy, multigravida of advanced maternal age, antepartum ,POTS    Next MD visit:   none scheduled  Date of Surgery: n/a   Insurance Primary/Secondary: [employer] INS / MEDICAID     # Auth Visits: 60 visit            Subjective: R sciatic nerve pain for the past 6 weeks. Pain is in right buttock radiating to the knee; knee is swollen. Sleeps in a recliner; unable to get upstairs. 18 weeks pregnant. No numbness/no tingling. Started gradually and has been getting worse. Has been using walker for the past 4 weeks.   Worse with walking. Getting up from chair. Putting weight on right foot. Pain with coughing and sneezing: horrible pain on the lateral thigh. No symptoms below the knee but right calf feels stiff.   Has been very active prior to the symptoms onset.   Tylenol for pain with no relief.      present throughout session.     Pain: 7.5/10      Objective: Tx flow sheet     Palpation: Moderate tenderness lower back lumbar paraspinal mm, B QL, moderate tenderness Right Quads, HS, Hip add/abd   Gait: pt ambulates on level ground with assistive device of rolling walker, antalgia, decreased step length R, decreased stance phase R, and decreased hip/knee flex or ext R.      Lumbar AROM: (* denotes performed with pain)  Flexion: 70-holding onto RW  Extension: 25  Sidebending: R 30; L 25-c/o R leg pain  Rotation: R 30; L 30    Hip ROM:   AROM (in degrees) (R) (L)   Hip Flexion 90* 90   Hip IR 10* 10   Hip ER 30* 30     Accessory motion: decreased symptoms CPA L3-5    Strength: (* denotes performed with pain)  LE   Hip flexion (L2): R 2/5*  Hip abduction: R 2/5*  Hip Extension: R 2/5*   Hip ER: R 2+/5*  Hip IR: R 2+/5*  Knee Flexion: R 3+/5;   Knee extension (L3): R 2+/5*   DF (L4): R 4/5  Great Toe Ext (L5): R 4/5  PF (S1):  R 4/5    Tested in NWB  Pain R LE with all movements       Special tests:   Slump: + R  SLR: R +,  Active SLR: R +,      Balance: SLS R 0 sec, L 0 sec- pt in excessive pain in standing    Assessment: Interventions are limited due to patient unable to obtain any position other than standing or sitting. Attempted spinal traction with the help of the wall bar by having patient move into a hanging squat position. This was challenging due to poor movement coordination and compensating with PPT and thoracic flexion. Modified to anterior pelvic tilts in standing progressed to lumbar extensions in standing. Both were tolerated well without no increase in right lower extremity pain. Had patient practice nerve glides in standing. Educated extensively on the importance of staying active, use of lumbar roll with sitting, nerve glides in sitting and standing. Instructed to work on lumbar extension in standing and APT's, educated on the symptoms centralization vs peripheralization.       Goals:   Goals: (to be met in 10 visits) -progressing  Pt will improve transversus abdominis recruitment to perform proper isometric contraction without requiring verbal or tactile cuing to promote advancement of therex   Pt will demonstrate good understanding of proper posture and body mechanics to decrease pain and improve spinal safety   Pt will improve lumbar spine AROM flexion to >/=100 deg to allow increase ease with bending forward to don shoes   Pt will report improved symptom centralization and absence of radicular symptoms for 3 consecutive days to improve function with ADL   Pt will have decreased paraspinal mm tension to tolerate standing >/=30 minutes for work and home activities   Pt will demonstrate improved core strength to be able to perform transfer with </=2/10 pain   Pt will be independent and compliant with comprehensive HEP to maintain progress achieved in PT     Plan: Continue plan of care as pt tolerates with focus on  centralization of symptoms.   Date: 12/17/2024  TX#: 2/10 Date:    12/24/2024             TX#: 3/10 Date:                 TX#: 4/ Date:                 TX#: 5/ Date:   Tx#: 6/   NM     Gait training    Pt Ed:  Posture ed  Transfers  Sitting positioning-lumbar roll  Sleeping positions   TE  Sitting APT (burning lateral thigh)  Standing APT  Standing back extension at the wall bar  Hanging squats with assist for movement coordination  Hip Hinge   Nerve glides in standing (foot forward: knee FL/EXT)      TE  Sitting-R sciatic nn glides 3x10    iso hip add 5 sec x5    Iso ER 5 sec x10    Lunge-R hip flexor stretch 6 in 10 sec x3   Nerve glides with L foot back  Gait training  Lumbar roll      MT    FW sitting-  CPA L3-5 gr II-III multiple bouts    STM R piriformis, R lumbar paraspinals           HEP: nerve flossing x10 TID, gait training, lumbar roll. ABP and back extension in standing    Charges: TE 3    Total Timed Treatment: 45 min  Total Treatment Time: 45 min

## 2024-12-27 DIAGNOSIS — G57.01 SCIATIC NERVE DISEASE, RIGHT: Primary | ICD-10-CM

## 2024-12-31 ENCOUNTER — PATIENT MESSAGE (OUTPATIENT)
Dept: OBGYN CLINIC | Facility: CLINIC | Age: 34
End: 2024-12-31

## 2024-12-31 ENCOUNTER — INITIAL PRENATAL (OUTPATIENT)
Dept: OBGYN CLINIC | Facility: CLINIC | Age: 34
End: 2024-12-31
Payer: MEDICAID

## 2024-12-31 VITALS
HEART RATE: 87 BPM | WEIGHT: 278 LBS | DIASTOLIC BLOOD PRESSURE: 72 MMHG | BODY MASS INDEX: 44.68 KG/M2 | HEIGHT: 66 IN | SYSTOLIC BLOOD PRESSURE: 118 MMHG

## 2024-12-31 DIAGNOSIS — H90.5: ICD-10-CM

## 2024-12-31 DIAGNOSIS — M54.41 ACUTE RIGHT-SIDED BACK PAIN WITH SCIATICA: Primary | ICD-10-CM

## 2024-12-31 DIAGNOSIS — Q87.89: ICD-10-CM

## 2024-12-31 DIAGNOSIS — H35.52: ICD-10-CM

## 2024-12-31 PROBLEM — M54.30 SCIATICA: Status: ACTIVE | Noted: 2024-12-31

## 2024-12-31 PROBLEM — G90.A POSTURAL ORTHOSTATIC TACHYCARDIA SYNDROME: Status: RESOLVED | Noted: 2017-07-30 | Resolved: 2024-12-31

## 2024-12-31 NOTE — PROGRESS NOTES
[facility]  Obstetrics and Gynecology    Subjective:     Alberto Alberts is a 34 year old  female presents as a transfer of care, d/b first trimester ultrasound with TALYA of 2025 currently 19w1d.     She is extremely frustrated with the trajectory of her care. She noted sudden onset of sciatic pain at 13 weeks and it has rapidly progressed. She is unable to ambulate without a walker. She has been in physical therapy with no relief. She is requesting narcotic pain medication. She states she was dated by a first trimester pelvic ultrasound performed through her previous OB's office. Care Everywhere demonstrates ultrasound performed on 10/3/2024 - unable to view results. Per review of notes EDC 25. She states she plans to terminate the pregnancy if her Anatomy ultrasound demonstrates anomalies. 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 She was told she needs to have appointments every 2 weeks for management of her high-risk pregnancy.       CARRIER for Usher Syndrome, Type 2A  Positive for the likely pathogenic variant c.50077O>G (p.V1986E) in the USH2A gene. 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 Carrier screening for this individual's partner is suggested.          Patient's last menstrual period was 2024 (approximate)..     Review of Systems:  General: no complaints per category. See HPI for additional information.   Breast: no complaints per category. See HPI for additional information.   Respiratory: no complaints per category. See HPI for additional information.   Cardiovascular: no complaints per category. See HPI for additional information.   GI: no complaints per category. See HPI for additional information.   : no complaints per category. See HPI for additional information.   Heme: no complaints per  category. See HPI for additional information.     OB History:   OB History    Para Term  AB Living   2 0 0 0 1 0   SAB IAB Ectopic Multiple Live Births   0 0 1 0        # Outcome Date GA Lbr Amilcar/2nd Weight Sex Type Anes PTL Lv   2 Current            1 Ectopic 2020               Gyne History:  Menarche: 14  Period Cycle (Days): 30 - currently pregnant  Period Duration (Days): 7  Period Flow: heavy tapering down  Use of Birth Control (if yes, specify type): None  Hx Prior Abnormal Pap: No  Pap Date: 20  Pap Result Notes: wnl  Patient's last menstrual period was 2024 (approximate).    -2020 - NILM, HPV neg   - 2022 - reported 'normal' per patient       Meds:  Medications Ordered Prior to Encounter[1]    All:  Allergies[2]    PMH:  Past Medical History:    Anemia    Anesthesia complication    Bilateral chronic vestibular neuritis    Concussion    H/O complications due to general anesthesia    pt reports she had a hard time waking up from foot surgery    Hypothyroidism    Irregular menses    Left foot pain    Migraines    Obesity, unspecified    Panic    PCOS (polycystic ovarian syndrome)    Postsurgical hypothyroidism    In 2015: total thyroidectomy for benign goiter    Postural orthostatic tachycardia syndrome    POTS (postural orthostatic tachycardia syndrome)    Tachycardia    Thyroid nodule    Vestibular neuronitis    Overview:  Last Assessment & Plan:  Since apparently her insurance company does not work on the weekend, we will wait for their eval on 16.  She has been cleared for dc to snf for rehab per all consultants       PSH:  Past Surgical History:   Procedure Laterality Date    D & c  2011    D & c      D & c  10/12/2012    Dilation/curettage,diagnostic      Hysteroscopy  2011    Laparoscopy,diagnostic      x3 r/t POS    Laparoscopy,pelvic,biopsy      drill ovary    Other surgical history Left     foot sx x3    Other surgical history  2012     operative laparoscopy with electrocautery of multiple ovarian cysts bilaterally. it was lysis of a few pelvic adhesions    Other surgical history      LAPAROSCOPY, OPERATIVE , POSSIBLE LAPAROTOMY WITH/WO TUBAL PERFUSION performed by Bal Benito MD at [facility]    Other surgical history  2018    Operative laparoscopy, endometrial sampling, Right peritubal cystectomy, Left ovarian cystectomy, fulgration of endometriosis    Surgical bariatrics - internal  10/23/2018    Gastric Sleeve at [place] by Dr. Cooper    Thyroidectomy Bilateral 01/15/2016       Objective:     Vitals:    24 0918   BP: 118/72   Pulse: 87   Weight: 278 lb (126.1 kg)   Height: 66\"         Body mass index is 44.87 kg/m².    General: AAO.NAD. Ambulating with walker   CVS exam: normal peripheral perfusion  Chest: non-labored breathing, no tachypnea   Abdominal exam: DECLINED    Pelvic exam: DECLINED       Assessment:     Alberto Alberts is a 34 year old  female presents as a transfer of care, d/b first trimester ultrasound with TALYA of 2025 currently 19w1d.       Plan:     Patient Active Problem List    Diagnosis    Sciatica     [ ] continue Physical therapy   [ ] pain management clinic       Usher syndrome type 2A (HCC)     - Julia records (care everywhere): CARRIER for Usher Syndrome Type 2A   positive for the likely pathogenic variant c.34621G>G (p.[id number]) in the USH2A gene.   [ ] Genetic counseling       Numbness and tingling in left hand    Hx of ectopic pregnancy    Vegetarian diet    Dizziness    Post-surgical hypothyroidism     In 2015: total thyroidectomy for benign goiter      Autonomic disorder    POTS (postural orthostatic tachycardia syndrome)    Pituitary microadenoma ([facility])    Family history of malignant neoplasm of breast in first degree relative diagnosed when younger than 50 years of age    Vitamin D deficiency    Nodular goiter    PCOS (polycystic ovarian syndrome)    Hypoparathyroidism ([facility])     Class 3 severe obesity due to excess calories without serious comorbidity with body mass index (BMI) of 45.0 to 49.9 in adult (HCC)     [ ] Level 2 [facility] referral scheduled 1/17 per patient   [ ] Weekly NSTs at 36 weeks   [ ] Discuss Panorama          Transfer of Care   - Lengthy discussion with patient regarding trajectory of sciatic pain in pregnancy  - Discussed likely inability change appointment with MFM given office closure for holiday season   - Referral sent to pain management clinic for further evaluation. Message sent to communicate with Florentin Osorio P.A-C regarding steroid injections for sciatic nerve pain.   - After discussion, patient and partner enquired about sexual activity. Discussed that patient is permitted to engage in sexual activity but noted pain and discomfort given patient's inability to ambulate without walker.     PNC   - Pt counseled on safety, diet, exercise, caffiene, tobacco, ETOH, sexual activity, ER precautions, diet, exercise, appropriate otc medication use, substance abuse   - Counseled on taking a PNV with 0.4mg of folic acid   -Carrier screening, neural tube defect screening and hemoglobinopathy screening reviewed and discussed with patient; information provided and patient considering options and recommended to request desired screening at follow-up visit  - advised to follow up to establish prenatal care     All of the findings and plan were discussed with the patient.  She notes understanding and agrees with the plan of care.  All questions were answered to the best of my ability at this time.    Total patient time was 40 minutes in evaluation, consultation, and coordination of care. This included face to face and non-face to face actions. The patient's questions and concerns that were addressed.     RTC in 2 weeks for NOB visit or sooner if needed     Liban Hoffman MD   [facility] - OBGYN      Note to patient and family   The 21st Century Cures Act makes medical notes available to  patients in the interest of transparency.  However, please be advised that this is a medical document.  It is intended as [id number] communication.  It is written and medical language may contain abbreviations or verbiage that are technical and unfamiliar.  It may appear blunt or direct.  Medical documents are intended to carry relevant information, facts as evident, and the clinical opinion of the practitioner.                 [1]   Current Outpatient Medications on File Prior to Visit   Medication Sig Dispense Refill    levothyroxine 200 MCG Oral Tab       Prenatal Multivit-Min-Fe-FA (PRE-RAMOS OR) Take by mouth.       Current Facility-Administered Medications on File Prior to Visit   Medication Dose Route Frequency Provider Last Rate Last Admin    cyanocobalamin (Vitamin B12) 1000 MCG/ML injection 1,000 mcg  1,000 mcg Intramuscular Once        [2] No Known Allergies

## 2025-01-02 ENCOUNTER — TELEPHONE (OUTPATIENT)
Dept: OBGYN CLINIC | Facility: CLINIC | Age: 35
End: 2025-01-02

## 2025-01-02 NOTE — TELEPHONE ENCOUNTER
A second message today was left for pt to call the office for the 2 wk ALEJANDRA appt. (1st call was on Tuesday 12/31/24 after pt's appt).

## 2025-01-02 NOTE — TELEPHONE ENCOUNTER
----- Message from Liban Hoffman sent at 12/31/2024  2:17 PM CST -----  Regarding: Routine OB  Patient is a NOB (transfer of care) at 20w1d with severe sciatic pain - needs to be scheduled Q2 weeks for ALEJANDRA appointments per her previous OB/GYN's recommendations.

## 2025-01-02 NOTE — TELEPHONE ENCOUNTER
See TE 1/2/25, \"appointment Request (ALEJANDRA appointment) voice mail left for patient to call office for scheduling.   Opioid Counseling: I discussed with the patient the potential side effects of opioids including but not limited to addiction, altered mental status, and depression. I stressed avoiding alcohol, benzodiazepines, muscle relaxants and sleep aids unless specifically okayed by a physician. The patient verbalized understanding of the proper use and possible adverse effects of opioids. All of the patient's questions and concerns were addressed. They were instructed to flush the remaining pills down the toilet if they did not need them for pain.

## 2025-01-03 NOTE — TELEPHONE ENCOUNTER
Multiple attempts to contact pt. No call back to office.  Contacted spouse on verbal consent and left a message as well to call office if they wish to proceed with appts or not.

## 2025-01-06 ENCOUNTER — TELEPHONE (OUTPATIENT)
Dept: PAIN CLINIC | Facility: CLINIC | Age: 35
End: 2025-01-06

## 2025-01-06 ENCOUNTER — TELEPHONE (OUTPATIENT)
Dept: PHYSICAL THERAPY | Facility: HOSPITAL | Age: 35
End: 2025-01-06

## 2025-01-06 NOTE — TELEPHONE ENCOUNTER
Received Referral Alejandra from Sharri  SHANDRA for patient to schedule. Please assist in scheduling as a new patient when she calls back.

## 2025-01-08 ENCOUNTER — OFFICE VISIT (OUTPATIENT)
Dept: PHYSICAL THERAPY | Facility: HOSPITAL | Age: 35
End: 2025-01-08
Attending: NURSE PRACTITIONER
Payer: COMMERCIAL

## 2025-01-08 PROCEDURE — 97140 MANUAL THERAPY 1/> REGIONS: CPT

## 2025-01-08 PROCEDURE — 97112 NEUROMUSCULAR REEDUCATION: CPT

## 2025-01-08 PROCEDURE — 97110 THERAPEUTIC EXERCISES: CPT

## 2025-01-08 NOTE — PROGRESS NOTES
SPINE EVALUATION:       Diagnosis:   Right sided sciatica (M54.31)      Referring Provider: Christiane Estes  Date of Evaluation:    12/10/2024    Precautions:    Precautions: Fall Risk , High-risk pregnancy, multigravida of advanced maternal age, antepartum ,POTS    Next MD visit:   none scheduled  Date of Surgery: n/a   Insurance Primary/Secondary: [employer] INS / MEDICAID     # Auth Visits: 60 visit            Subjective:   No pain in sitting but has pain in sitting and when sleeping. Getting better but very slowly. Wants to progress to using cane vs walker. Sleeps in recliner and futon but having a lot of difficulty with sleeping due to pain.No numbness/tingling. Had  testing done and negative for blood clot. Medrol becky helped to decrease swelling.  20 weeks pregnant     present throughout session.     Pain: 0/10-standing          Objective: Tx flow sheet     Palpation: tenderness/tightness R piriformis   Gait: pt ambulates on level ground with assistive device of rolling walker, antalgia, decreased step length R, decreased stance phase R, and decreased hip/knee flex or ext R.      Lumbar AROM: (* denotes performed with pain)  Flexion: 70-holding onto RW  Extension: 25  Sidebending: R 30; L 25-c/o R leg pain  Rotation: R 30; L 30    Hip ROM:   AROM (in degrees) (R) (L)   Hip Flexion 90* 90   Hip IR 10* 10   Hip ER 30* 30     Accessory motion: decreased symptoms CPA L3-5    Strength: (* denotes performed with pain)  LE   Hip flexion (L2): R 2/5*  Hip abduction: R 2/5*  Hip Extension: R 2/5*   Hip ER: R 2+/5*  Hip IR: R 2+/5*  Knee Flexion: R 3+/5;   Knee extension (L3): R 2+/5*   DF (L4): R 4/5  Great Toe Ext (L5): R 4/5  PF (S1): R 4/5    Tested in NWB  Pain R LE with all movements       Special tests:   Slump: + R  SLR: R +,  Active SLR: R +,      Balance: SLS R 0 sec, L 0 sec- pt in excessive pain in standing    Assessment: Improved tolerance to treatment today which was performed in standing. Pt able to  ambulate with less antalgia and increased wb R LE after combination of education, Manual therapy, and exercise. Patient instructed to: work on weightbearing on R LE with knee ext, maintain activity level for nerve/muscle health and sleep suggestions and transferring up stairs>bed. Upgraded HEP to include piriformis stretch with assistive of  who demonstrated correct ex in clinic.    Goals:   Goals: (to be met in 10 visits) -progressing  Pt will improve transversus abdominis recruitment to perform proper isometric contraction without requiring verbal or tactile cuing to promote advancement of therex   Pt will demonstrate good understanding of proper posture and body mechanics to decrease pain and improve spinal safety   Pt will improve lumbar spine AROM flexion to >/=100 deg to allow increase ease with bending forward to don shoes   Pt will report improved symptom centralization and absence of radicular symptoms for 3 consecutive days to improve function with ADL   Pt will have decreased paraspinal mm tension to tolerate standing >/=30 minutes for work and home activities   Pt will demonstrate improved core strength to be able to perform transfer with </=2/10 pain   Pt will be independent and compliant with comprehensive HEP to maintain progress achieved in PT     Plan: Continue plan of care as pt tolerates with focus on centralization of symptoms.   Date: 12/17/2024  TX#: 2/10 Date:    12/24/2024             TX#: 3/10 Date:         1/8/2025         TX#: 4/ Date:                 TX#: 5/ Date:   Tx#: 6/   NM     Gait training    Pt Ed:  Posture ed  Transfers  Sitting positioning-lumbar roll  Sleeping positions   TE  Sitting APT (burning lateral thigh)  Standing APT  Standing back extension at the wall bar  Hanging squats with assist for movement coordination  Hip Hinge   Nerve glides in standing (foot forward: knee FL/EXT) NM  Subj  cause and effect technique with activities of daily living     Posture  ed  Lumbar roll  Sitting positions  Transfers-sit>stand, upstairs/bed  Sleeping suggestions  i       TE  Sitting-R sciatic nn glides 3x10    iso hip add 5 sec x5    Iso ER 5 sec x10    Lunge-R hip flexor stretch 6 in 10 sec x3   Nerve glides with L foot back  Gait training  Lumbar roll TE  REDD w table behind back 2x10    Weightshift x20    Standing-R hip ext 2x10    Amb w RW 30 feet x2 w cueing    R piriformis stretch 5 sec x10-HEP     also assist pt with piriformis stretch    L piriformis stretch 20 sec    ischemic compression R piriformis-HEP             MT    FW sitting-  CPA L3-5 gr II-III multiple bouts    STM R piriformis, R lumbar paraspinals      MT  Standing-  FR R piriformis/sciatic nn  ischemic compression R piriformis     HEP: nerve flossing x10 TID, gait training, lumbar roll. ABP and back extension in standing  Access Code: [email or web address]  URL: [email or web address]  Date: 01/08/2025  Prepared by: Monica Carbone    Exercises  - Seated Piriformis Stretch with Trunk Bend  - 1 x daily - 7 x weekly - 1 sets - 5 reps - 20 hold  - Supine Figure 4 Piriformis Stretch  - 1 x daily - 7 x weekly - 1 sets - 5 reps - 20 hold  - Seated Piriformis Stretch  - 1 x daily - 7 x weekly - 1 sets - 5 reps - 20 hold    Charges: TE 15 NM 15 MT 15   Total Timed Treatment: 45 min  Total Treatment Time: 45 min

## 2025-01-09 ENCOUNTER — APPOINTMENT (OUTPATIENT)
Dept: PHYSICAL THERAPY | Facility: HOSPITAL | Age: 35
End: 2025-01-09
Attending: NURSE PRACTITIONER
Payer: COMMERCIAL

## 2025-01-10 ENCOUNTER — PATIENT MESSAGE (OUTPATIENT)
Dept: NEUROLOGY | Facility: CLINIC | Age: 35
End: 2025-01-10

## 2025-01-13 ENCOUNTER — APPOINTMENT (OUTPATIENT)
Dept: PHYSICAL THERAPY | Facility: HOSPITAL | Age: 35
End: 2025-01-13
Attending: NURSE PRACTITIONER
Payer: COMMERCIAL

## 2025-01-14 ENCOUNTER — OFFICE VISIT (OUTPATIENT)
Dept: PHYSICAL THERAPY | Facility: HOSPITAL | Age: 35
End: 2025-01-14
Attending: NURSE PRACTITIONER
Payer: COMMERCIAL

## 2025-01-14 PROCEDURE — 97112 NEUROMUSCULAR REEDUCATION: CPT

## 2025-01-14 PROCEDURE — 97110 THERAPEUTIC EXERCISES: CPT

## 2025-01-14 NOTE — PROGRESS NOTES
SPINE EVALUATION:       Diagnosis:   Right sided sciatica (M54.31)      Referring Provider: Christiane Estes  Date of Evaluation:    12/10/2024    Precautions:    Precautions: Fall Risk , High-risk pregnancy, multigravida of advanced maternal age, antepartum ,POTS    Next MD visit:   none scheduled  Date of Surgery: n/a   Insurance Primary/Secondary: [employer] INS / MEDICAID     # Auth Visits: 60 visit            Subjective: Wants to progress to cane. Having less pain. Doing piriformis stretch.  21 weeks pregnant     present throughout session.     Pain: 0/10-standing 6/10 R LE single leg stance          Objective: Tx flow sheet   1/14/2025   Palpation: tenderness/tightness R piriformis   Gait: pt ambulates on level ground with assistive device of rolling walker, antalgia, decreased step length R, decreased stance phase R, and decreased hip/knee flex or ext R.R hip/foot ER    1/8/2025    Lumbar AROM: (* denotes performed with pain)  Flexion: 70-holding onto RW  Extension: 25  Sidebending: R 30; L 25-c/o R leg pain  Rotation: R 30; L 30    Hip ROM:   AROM (in degrees) (R) (L)   Hip Flexion 90* 90   Hip IR 10* 10   Hip ER 30* 30     Accessory motion: decreased symptoms CPA L3-5    Strength: (* denotes performed with pain)  LE   Hip flexion (L2): R 2/5*  Hip abduction: R 2/5*  Hip Extension: R 2/5*   Hip ER: R 2+/5*  Hip IR: R 2+/5*  Knee Flexion: R 3+/5;   Knee extension (L3): R 2+/5*   DF (L4): R 4/5  Great Toe Ext (L5): R 4/5  PF (S1): R 4/5    Tested in NWB  Pain R LE with all movements       Special tests:   Slump: + R  SLR: R +,  Active SLR: R +,      Balance: SLS R 0 sec, L 0 sec- pt in excessive pain in standing    Assessment: Improved tolerance to treatment with progression to leg presses. Pt having a lot of difficulty putting full weight down R LE. Decreased mm guarding and improved gait sequencing end of treatment with neutral R LE and overall increased wb R. Pt not safe to progress to cane due to pain  with full weightbearing R LE. Pt required assist with transferring on/off shuttle/leg press    Goals:   Goals: (to be met in 10 visits) -progressing  Pt will improve transversus abdominis recruitment to perform proper isometric contraction without requiring verbal or tactile cuing to promote advancement of therex   Pt will demonstrate good understanding of proper posture and body mechanics to decrease pain and improve spinal safety   Pt will improve lumbar spine AROM flexion to >/=100 deg to allow increase ease with bending forward to don shoes   Pt will report improved symptom centralization and absence of radicular symptoms for 3 consecutive days to improve function with ADL   Pt will have decreased paraspinal mm tension to tolerate standing >/=30 minutes for work and home activities   Pt will demonstrate improved core strength to be able to perform transfer with </=2/10 pain   Pt will be independent and compliant with comprehensive HEP to maintain progress achieved in PT     Plan: Continue plan of care as pt tolerates with focus on centralization of symptoms.   Date: 12/17/2024  TX#: 2/10 Date:    12/24/2024             TX#: 3/10 Date:         1/8/2025         TX#: 4/ Date:           1/14/2025       TX#: 5/ Date:   Tx#: 6/   NM     Gait training    Pt Ed:  Posture ed  Transfers  Sitting positioning-lumbar roll  Sleeping positions   TE  Sitting APT (burning lateral thigh)  Standing APT  Standing back extension at the wall bar  Hanging squats with assist for movement coordination  Hip Hinge   Nerve glides in standing (foot forward: knee FL/EXT) NM  Subj  cause and effect technique with activities of daily living     Posture ed  Lumbar roll  Sitting positions  Transfers-sit>stand, upstairs/bed  Sleeping suggestions     NM  Subj  cause and effect technique with activities of daily living     Gait training with cane    TE  Sitting-R sciatic nn glides 3x10    iso hip add 5 sec x5    Iso ER 5 sec x10    Lunge-R hip  flexor stretch 6 in 10 sec x3   Nerve glides with L foot back  Gait training  Lumbar roll TE  REDD w table behind back 2x10    Weightshift x20    Standing-R hip ext 2x10    Amb w RW 30 feet x2 w cueing    R piriformis stretch 5 sec x10-HEP     also assist pt with piriformis stretch    L piriformis stretch 20 sec    ischemic compression R piriformis-HEP         TE  Heel toe walking 2 laps  SS x1 lap  Heel raises x10  Shuttle DLP 25# 4x10 w transfer assistance   Sitting  R nn glides /knee flex/ext x20  R piriformis stretch w assist 20 sec x3    Reviewed HEP      MT    FW sitting-  CPA L3-5 gr II-III multiple bouts    STM R piriformis, R lumbar paraspinals      MT  Standing-  FR R piriformis/sciatic nn  ischemic compression R piriformis x    HEP: nerve flossing x10 TID, gait training, lumbar roll. ABP and back extension in standing  Access Code: [email or web address]  URL: [email or web address]  Date: 01/08/2025  Prepared by: Monica Carbone    Exercises  - Seated Piriformis Stretch with Trunk Bend  - 1 x daily - 7 x weekly - 1 sets - 5 reps - 20 hold  - Supine Figure 4 Piriformis Stretch  - 1 x daily - 7 x weekly - 1 sets - 5 reps - 20 hold  - Seated Piriformis Stretch  - 1 x daily - 7 x weekly - 1 sets - 5 reps - 20 hold    Charges: TE 15 NM 15   Total Timed Treatment: 30 min  Total Treatment Time: 30 min

## 2025-01-14 NOTE — TELEPHONE ENCOUNTER
Patient was called and I spoke to the patient.  She is currently pregnant 21 weeks and started having sciatica pain that is progressively getting worse and has difficulty walking.  Patient was referred to pain clinic and waiting to see them.      States that headache and neck pain is better. Had intractable neck pain and MRI cervical spine was ordered in Sept which patient scheduled it now in Feb.   Discussed with the patient that if headaches has improved and there is no new neurological symptoms we can hold MRI brain.     Advise to see OB and Pain medicine as planned  Call us with update. She indicates understanding

## 2025-01-15 ENCOUNTER — APPOINTMENT (OUTPATIENT)
Dept: PHYSICAL THERAPY | Facility: HOSPITAL | Age: 35
End: 2025-01-15
Attending: NURSE PRACTITIONER
Payer: COMMERCIAL

## 2025-01-15 PROCEDURE — 97112 NEUROMUSCULAR REEDUCATION: CPT

## 2025-01-15 PROCEDURE — 97110 THERAPEUTIC EXERCISES: CPT

## 2025-01-15 NOTE — PROGRESS NOTES
SPINE EVALUATION:       Diagnosis:   Right sided sciatica (M54.31)      Referring Provider: Christiane Estes  Date of Evaluation:    12/10/2024    Precautions:    Precautions: Fall Risk , High-risk pregnancy, multigravida of advanced maternal age, antepartum ,POTS    Next MD visit:   none scheduled  Date of Surgery: n/a   Insurance Primary/Secondary: [employer] INS / MEDICAID     # Auth Visits: 60 visit            Subjective: Wants to progress to cane. Having less pain. No buttock pain. Has some R knee pain now.  21 weeks pregnant     present throughout session.     Pain: 0/10-standing 5/10 R LE single leg stance          Objective: Tx flow sheet   1/15/2025   Palpation: tenderness/tightness R piriformis, tender R distal ITB   Gait: pt ambulates on level ground with assistive device of rolling walker, antalgia, decreased step length R, decreased stance phase R, and decreased hip/knee flex or ext R.R hip/foot ER    1/8/2025    Lumbar AROM: (* denotes performed with pain)  Flexion: 70-holding onto RW  Extension: 25  Sidebending: R 30; L 25-c/o R leg pain  Rotation: R 30; L 30    Hip ROM:   AROM (in degrees) (R) (L)   Hip Flexion 90* 90   Hip IR 10* 10   Hip ER 30* 30     Accessory motion: decreased symptoms CPA L3-5    Strength: (* denotes performed with pain)  LE   Hip flexion (L2): R 2/5*  Hip abduction: R 2/5*  Hip Extension: R 2/5*   Hip ER: R 2+/5*  Hip IR: R 2+/5*  Knee Flexion: R 3+/5;   Knee extension (L3): R 2+/5*   DF (L4): R 4/5  Great Toe Ext (L5): R 4/5  PF (S1): R 4/5    Tested in NWB  Pain R LE with all movements       Special tests:   Slump: + R  SLR: R +,  Active SLR: R +,      Balance: SLS R 0 sec, L 0 sec- pt in excessive pain in standing    Assessment: Improved tolerance to treatment with progression to recumbent bike. Pt having a lot of difficulty putting full weight down R LE but improved tolerance today. Decreased mm guarding and improved gait sequencing end of treatment with neutral R LE and  overall increased wb R. Pt required less assistance from therapist to perform piriformis stretching. Pt not safe to progress to cane due to pain with full weightbearing R LE. Did try SBQC in clinic as well today. Pt required assist with transferring on/off shuttle/leg press.    Goals:   Goals: (to be met in 10 visits) -progressing  Pt will improve transversus abdominis recruitment to perform proper isometric contraction without requiring verbal or tactile cuing to promote advancement of therex   Pt will demonstrate good understanding of proper posture and body mechanics to decrease pain and improve spinal safety   Pt will improve lumbar spine AROM flexion to >/=100 deg to allow increase ease with bending forward to don shoes   Pt will report improved symptom centralization and absence of radicular symptoms for 3 consecutive days to improve function with ADL   Pt will have decreased paraspinal mm tension to tolerate standing >/=30 minutes for work and home activities   Pt will demonstrate improved core strength to be able to perform transfer with </=2/10 pain   Pt will be independent and compliant with comprehensive HEP to maintain progress achieved in PT     Plan: Continue plan of care as pt tolerates with focus on centralization of symptoms.   Date: 12/17/2024  TX#: 2/10 Date:    12/24/2024             TX#: 3/10 Date:         1/8/2025         TX#: 4/ Date:           1/14/2025       TX#: 5/ Date: 1/15/2025   Tx#: 6/     NM     Gait training    Pt Ed:  Posture ed  Transfers  Sitting positioning-lumbar roll  Sleeping positions   TE  Sitting APT (burning lateral thigh)  Standing APT  Standing back extension at the wall bar  Hanging squats with assist for movement coordination  Hip Hinge   Nerve glides in standing (foot forward: knee FL/EXT) NM  Subj  cause and effect technique with activities of daily living     Posture ed  Lumbar roll  Sitting positions  Transfers-sit>stand, upstairs/bed  Sleeping suggestions      NM  Subj  cause and effect technique with activities of daily living     Gait training with cane NM  Subj  cause and effect technique with activities of daily living     Gait training with cane, SBQC    Gait training w RW 50 ftx4     TE  Sitting-R sciatic nn glides 3x10    iso hip add 5 sec x5    Iso ER 5 sec x10    Lunge-R hip flexor stretch 6 in 10 sec x3   Nerve glides with L foot back  Gait training  Lumbar roll TE  REDD w table behind back 2x10    Weightshift x20    Standing-R hip ext 2x10    Amb w RW 30 feet x2 w cueing    R piriformis stretch 5 sec x10-HEP     also assist pt with piriformis stretch    L piriformis stretch 20 sec    ischemic compression R piriformis-HEP         TE  Heel toe walking 2 laps  SS x1 lap  Heel raises x10  Shuttle DLP 25# 4x10 w transfer assistance   Sitting  R nn glides /knee flex/ext x20  R piriformis stretch w assist 20 sec x3    Reviewed HEP   TE  Rhona/phys R LE symptoms  NuStep 10 min L1  Heel toe walking 2 laps  SS x1 lap  Heel raises x10  Shuttle DLP 25# 2x20 w transfer assistance   Sitting  R nn glides /knee flex/ext 2x20  R piriformis stretch w assist 20 sec x3  REDD 2x10         MT    FW sitting-  CPA L3-5 gr II-III multiple bouts    STM R piriformis, R lumbar paraspinals      MT  Standing-  FR R piriformis/sciatic nn  ischemic compression R piriformis x MT  Min distal R ITB     HEP: nerve flossing x10 TID, gait training, lumbar roll. ABP and back extension in standing  Access Code: [email or web address]  URL: [email or web address]  Date: 01/08/2025  Prepared by: Moniac Carbone    Exercises  - Seated Piriformis Stretch with Trunk Bend  - 1 x daily - 7 x weekly - 1 sets - 5 reps - 20 hold  - Supine Figure 4 Piriformis Stretch  - 1 x daily - 7 x weekly - 1 sets - 5 reps - 20 hold  - Seated Piriformis Stretch  - 1 x daily - 7 x weekly - 1 sets - 5 reps - 20 hold    Charges: TE2 30 NM 15   Total Timed Treatment: 45 min  Total Treatment Time: 45  min

## 2025-01-21 ENCOUNTER — APPOINTMENT (OUTPATIENT)
Dept: PHYSICAL THERAPY | Facility: HOSPITAL | Age: 35
End: 2025-01-21
Attending: NURSE PRACTITIONER
Payer: COMMERCIAL

## 2025-01-29 ENCOUNTER — TELEPHONE (OUTPATIENT)
Dept: PHYSICAL THERAPY | Facility: HOSPITAL | Age: 35
End: 2025-01-29

## 2025-01-29 ENCOUNTER — APPOINTMENT (OUTPATIENT)
Dept: PHYSICAL THERAPY | Facility: HOSPITAL | Age: 35
End: 2025-01-29
Attending: NURSE PRACTITIONER
Payer: COMMERCIAL

## 2025-02-05 ENCOUNTER — APPOINTMENT (OUTPATIENT)
Dept: PHYSICAL THERAPY | Facility: HOSPITAL | Age: 35
End: 2025-02-05
Attending: NURSE PRACTITIONER
Payer: COMMERCIAL

## 2025-02-06 ENCOUNTER — TELEPHONE (OUTPATIENT)
Dept: INTERNAL MEDICINE CLINIC | Facility: CLINIC | Age: 35
End: 2025-02-06

## 2025-02-06 NOTE — TELEPHONE ENCOUNTER
No show #1     Letter being sent via Mcychart and mail     Future Appointments   Date Time Provider Department Center   2/12/2025 11:45 AM Malgorzata Rosenberg, PT [facility]   8/26/2025 12:20 PM Cate Ge MD [facility]

## 2025-02-12 ENCOUNTER — APPOINTMENT (OUTPATIENT)
Dept: PHYSICAL THERAPY | Facility: HOSPITAL | Age: 35
End: 2025-02-12
Attending: NURSE PRACTITIONER
Payer: COMMERCIAL

## 2025-03-11 ENCOUNTER — TELEPHONE (OUTPATIENT)
Dept: PHYSICAL THERAPY | Facility: HOSPITAL | Age: 35
End: 2025-03-11

## 2025-03-11 NOTE — TELEPHONE ENCOUNTER
LMOM regarding opening in physical therapy today at 9:15. Pt to call back if this time works for her.

## 2025-03-18 ENCOUNTER — OFFICE VISIT (OUTPATIENT)
Dept: INTERNAL MEDICINE CLINIC | Facility: CLINIC | Age: 35
End: 2025-03-18
Payer: COMMERCIAL

## 2025-03-18 VITALS
HEART RATE: 102 BPM | SYSTOLIC BLOOD PRESSURE: 114 MMHG | DIASTOLIC BLOOD PRESSURE: 60 MMHG | RESPIRATION RATE: 18 BRPM | HEIGHT: 65.75 IN | TEMPERATURE: 99 F | WEIGHT: 271.38 LBS | OXYGEN SATURATION: 99 % | BODY MASS INDEX: 44.14 KG/M2

## 2025-03-18 DIAGNOSIS — Z3A.30 30 WEEKS GESTATION OF PREGNANCY (HCC): ICD-10-CM

## 2025-03-18 DIAGNOSIS — M79.671 RIGHT FOOT PAIN: Primary | ICD-10-CM

## 2025-03-18 PROCEDURE — 99213 OFFICE O/P EST LOW 20 MIN: CPT | Performed by: FAMILY MEDICINE

## 2025-03-18 RX ORDER — SERTRALINE HYDROCHLORIDE 25 MG/1
25 TABLET, FILM COATED ORAL DAILY
Qty: 90 TABLET | Refills: 0 | Status: SHIPPED | OUTPATIENT
Start: 2025-03-18

## 2025-03-23 NOTE — PROGRESS NOTES
Subjective:   Patient ID: Alberto Alberts is a 34 year old female.    HPI Patient is 30 weeks pregnant. Has had right foot pain with walking for the past few weeks. No pain when not walking. No specific injury. Hasn't tried anything for this.     History/Other:   Review of Systems   Musculoskeletal:  Negative for back pain and joint swelling.   Neurological:  Negative for weakness and numbness.     Current Outpatient Medications   Medication Sig Dispense Refill    sertraline 25 MG Oral Tab Take 1 tablet (25 mg total) by mouth daily. 90 tablet 0    levothyroxine 200 MCG Oral Tab       Prenatal Multivit-Min-Fe-FA (PRE-RAMOS OR) Take by mouth.       Allergies:Allergies[1]    Objective:   Physical Exam  Vitals reviewed.   Constitutional:       Appearance: Normal appearance. She is well-developed.   HENT:      Head: Normocephalic and atraumatic.   Pulmonary:      Effort: Pulmonary effort is normal.   Musculoskeletal:      Right foot: Tenderness present. No swelling or bony tenderness. Normal pulse.        Legs:    Neurological:      Mental Status: She is alert.   Psychiatric:         Mood and Affect: Mood normal.         Behavior: Behavior normal.         Assessment & Plan:   1. Right foot pain    2. 30 weeks gestation of pregnancy (HCC)    Discussed with patient, can take tylenol if needed for pain. Avoid NSAIDs. Try neoprene brace when walking.     No orders of the defined types were placed in this encounter.      Meds This Visit:  Requested Prescriptions     Signed Prescriptions Disp Refills    sertraline 25 MG Oral Tab 90 tablet 0     Sig: Take 1 tablet (25 mg total) by mouth daily.       Imaging & Referrals:  None         [1] No Known Allergies

## 2025-04-05 ENCOUNTER — E-VISIT (OUTPATIENT)
Dept: TELEHEALTH | Age: 35
End: 2025-04-05
Payer: COMMERCIAL

## 2025-04-05 DIAGNOSIS — M54.9 BACK PAIN, UNSPECIFIED BACK LOCATION, UNSPECIFIED BACK PAIN LATERALITY, UNSPECIFIED CHRONICITY: Primary | ICD-10-CM

## 2025-04-07 ENCOUNTER — E-VISIT (OUTPATIENT)
Dept: TELEHEALTH | Age: 35
End: 2025-04-07
Payer: COMMERCIAL

## 2025-04-07 DIAGNOSIS — M54.9 BACK PAIN, UNSPECIFIED BACK LOCATION, UNSPECIFIED BACK PAIN LATERALITY, UNSPECIFIED CHRONICITY: Primary | ICD-10-CM

## 2025-04-07 NOTE — PROGRESS NOTES
HPI:  Alberto Alberts is a 34 year old female who presents for an evisit.  See [employer] communications above.    Current Outpatient Medications   Medication Sig Dispense Refill    sertraline 25 MG Oral Tab Take 1 tablet (25 mg total) by mouth daily. 90 tablet 0    levothyroxine 200 MCG Oral Tab       Prenatal Multivit-Min-Fe-FA (PRE-RAMOS OR) Take by mouth.       Past Medical History:    Anemia    Anesthesia complication    Bilateral chronic vestibular neuritis    Concussion    H/O complications due to general anesthesia    pt reports she had a hard time waking up from foot surgery    Hypothyroidism    Irregular menses    Left foot pain    Migraines    Obesity, unspecified    Panic    PCOS (polycystic ovarian syndrome)    Postsurgical hypothyroidism    In 2015: total thyroidectomy for benign goiter    Postural orthostatic tachycardia syndrome    POTS (postural orthostatic tachycardia syndrome)    Tachycardia    Thyroid nodule    Vestibular neuronitis    Overview:  Last Assessment & Plan:  Since apparently her insurance company does not work on the weekend, we will wait for their eval on 16.  She has been cleared for dc to snf for rehab per all consultants     Past Surgical History:   Procedure Laterality Date    D & c  2011    D & c  2009    D & c  10/12/2012    Dilation/curettage,diagnostic      Hysteroscopy  2011    Laparoscopy,diagnostic      x3 r/t POS    Laparoscopy,pelvic,biopsy      drill ovary    Other surgical history Left     foot sx x3    Other surgical history  2012    operative laparoscopy with electrocautery of multiple ovarian cysts bilaterally. it was lysis of a few pelvic adhesions    Other surgical history      LAPAROSCOPY, OPERATIVE , POSSIBLE LAPAROTOMY WITH/WO TUBAL PERFUSION performed by Bal Benito MD at [facility] LOCATION    Other surgical history  2018    Operative laparoscopy, endometrial sampling, Right peritubal cystectomy, Left ovarian cystectomy,  fulgration of endometriosis    Surgical bariatrics - internal  10/23/2018    Gastric Sleeve at [place] by Dr. Cooper    Thyroidectomy Bilateral 01/15/2016       Social History     Socioeconomic History    Marital status:    Tobacco Use    Smoking status: Never    Smokeless tobacco: Never   Vaping Use    Vaping status: Never Used   Substance and Sexual Activity    Alcohol use: Yes     Alcohol/week: 0.0 standard drinks of alcohol     Comment: social    Drug use: No    Sexual activity: Yes     Partners: Male   Other Topics Concern    Caffeine Concern Yes     Comment: Daily; 1 cup coffee    Exercise No     Comment: 3 x a week 30  min     Seat Belt Yes   Social History Narrative    Single, lives at home with her father, no children, works at [employer]          No results found for this or any previous visit (from the past 24 hours).    ASSESSMENT AND PLAN:      ASSESSMENT:   Encounter Diagnosis   Name Primary?    Back pain, unspecified back location, unspecified back pain laterality, unspecified chronicity Yes       PLAN: Meds as below.  See patient Instructions  -Total of 4 minutes spent with patient.  Sxs out of scope for an e-visit.  OB or ER advised.     Meds & Refills for this Visit:  Requested Prescriptions      No prescriptions requested or ordered in this encounter       Risks, benefits, and side effects of medication explained and discussed.    There are no Patient Instructions on file for this visit.    The patient indicates understanding of these issues and agrees to the plan.  See attached patient references.  The patient is asked to return if sx's persist or worsen.    Alberto Alberts understands evisit evaluation is not a substitute for face-to-face examination or emergency care. Patient advised to go to ER or call 911 for worsening symptoms or acute distress.

## 2025-04-07 NOTE — PROGRESS NOTES
HPI:  Alberto Alberts is a 34 year old female who presents for an evisit.  See [employer] communications above.    Current Outpatient Medications   Medication Sig Dispense Refill    sertraline 25 MG Oral Tab Take 1 tablet (25 mg total) by mouth daily. 90 tablet 0    levothyroxine 200 MCG Oral Tab       Prenatal Multivit-Min-Fe-FA (PRE-RAMOS OR) Take by mouth.       Past Medical History:    Anemia    Anesthesia complication    Bilateral chronic vestibular neuritis    Concussion    H/O complications due to general anesthesia    pt reports she had a hard time waking up from foot surgery    Hypothyroidism    Irregular menses    Left foot pain    Migraines    Obesity, unspecified    Panic    PCOS (polycystic ovarian syndrome)    Postsurgical hypothyroidism    In 2015: total thyroidectomy for benign goiter    Postural orthostatic tachycardia syndrome    POTS (postural orthostatic tachycardia syndrome)    Tachycardia    Thyroid nodule    Vestibular neuronitis    Overview:  Last Assessment & Plan:  Since apparently her insurance company does not work on the weekend, we will wait for their eval on 16.  She has been cleared for dc to snf for rehab per all consultants     Past Surgical History:   Procedure Laterality Date    D & c  2011    D & c  2009    D & c  10/12/2012    Dilation/curettage,diagnostic      Hysteroscopy  2011    Laparoscopy,diagnostic      x3 r/t POS    Laparoscopy,pelvic,biopsy      drill ovary    Other surgical history Left     foot sx x3    Other surgical history  2012    operative laparoscopy with electrocautery of multiple ovarian cysts bilaterally. it was lysis of a few pelvic adhesions    Other surgical history      LAPAROSCOPY, OPERATIVE , POSSIBLE LAPAROTOMY WITH/WO TUBAL PERFUSION performed by Bal Benito MD at [facility] LOCATION    Other surgical history  2018    Operative laparoscopy, endometrial sampling, Right peritubal cystectomy, Left ovarian cystectomy,  fulgration of endometriosis    Surgical bariatrics - internal  10/23/2018    Gastric Sleeve at [place] by Dr. Cooper    Thyroidectomy Bilateral 01/15/2016       Social History     Socioeconomic History    Marital status:    Tobacco Use    Smoking status: Never    Smokeless tobacco: Never   Vaping Use    Vaping status: Never Used   Substance and Sexual Activity    Alcohol use: Yes     Alcohol/week: 0.0 standard drinks of alcohol     Comment: social    Drug use: No    Sexual activity: Yes     Partners: Male   Other Topics Concern    Caffeine Concern Yes     Comment: Daily; 1 cup coffee    Exercise No     Comment: 3 x a week 30  min     Seat Belt Yes   Social History Narrative    Single, lives at home with her father, no children, works at [employer]          No results found for this or any previous visit (from the past 24 hours).    ASSESSMENT AND PLAN:      ASSESSMENT:   Encounter Diagnosis   Name Primary?    Back pain, unspecified back location, unspecified back pain laterality, unspecified chronicity Yes       PLAN: Meds as below.  See patient Instructions  -Total of 4 minutes spent with patient.  Patient is pregnant and reports constipation and back pain.  OBGYN or ER advised.     Meds & Refills for this Visit:  Requested Prescriptions      No prescriptions requested or ordered in this encounter       Risks, benefits, and side effects of medication explained and discussed.    There are no Patient Instructions on file for this visit.    The patient indicates understanding of these issues and agrees to the plan.  See attached patient references.  The patient is asked to return if sx's persist or worsen.    Alberto Alberts understands evisit evaluation is not a substitute for face-to-face examination or emergency care. Patient advised to go to ER or call 911 for worsening symptoms or acute distress.

## 2025-06-10 ENCOUNTER — TELEPHONE (OUTPATIENT)
Dept: FAMILY MEDICINE CLINIC | Facility: CLINIC | Age: 35
End: 2025-06-10

## 2025-06-10 NOTE — TELEPHONE ENCOUNTER
Dr. Dobbins please advise.  Chart notes are still open, please sign and route back to triage support in order to complete Prior Authorization.

## 2025-06-16 ENCOUNTER — TELEPHONE (OUTPATIENT)
Age: 35
End: 2025-06-16

## 2025-06-16 NOTE — TELEPHONE ENCOUNTER
Sharing HOPE meetings/support group  Both in-person and online meeting options are now being offered. This policy may be revisited as the situation evolves.  Online Meeting (via Webex)  Meets the fourth Thursday of each month from 8 - 9:30 pm    Therapists with  Loss specialty:    Gabi Paula, Psychologist at [facility]   [address] [place], [address]  Phone: [phone] or [phone]    [facility]   [address] [place] [address]  [phone]    Lauren Smith LCPC, [facility]   [address] [place] [address]  Fax: [phone] - [phone]    Norma Cardoza LCPC, [facility]  [address] [place] [address], [address]  [place] [address]  [phone]    Shelley Roche LCSW ([facility])  [address] [place] [address] [phone]

## 2025-07-02 ENCOUNTER — LAB ENCOUNTER (OUTPATIENT)
Dept: LAB | Facility: HOSPITAL | Age: 35
End: 2025-07-02
Attending: FAMILY MEDICINE
Payer: COMMERCIAL

## 2025-07-02 DIAGNOSIS — E03.9 HYPOTHYROIDISM, UNSPECIFIED TYPE: ICD-10-CM

## 2025-07-02 DIAGNOSIS — D64.9 ANEMIA, UNSPECIFIED TYPE: ICD-10-CM

## 2025-07-02 LAB
BASOPHILS # BLD AUTO: 0.04 X10(3) UL (ref 0–0.2)
BASOPHILS NFR BLD AUTO: 0.8 %
DEPRECATED HBV CORE AB SER IA-ACNC: 17 NG/ML (ref 50–306)
EOSINOPHIL # BLD AUTO: 0.18 X10(3) UL (ref 0–0.7)
EOSINOPHIL NFR BLD AUTO: 3.5 %
ERYTHROCYTE [DISTWIDTH] IN BLOOD BY AUTOMATED COUNT: 17.5 %
HCT VFR BLD AUTO: 40 % (ref 35–48)
HGB BLD-MCNC: 12.8 G/DL (ref 12–16)
IMM GRANULOCYTES # BLD AUTO: 0.01 X10(3) UL (ref 0–1)
IMM GRANULOCYTES NFR BLD: 0.2 %
IRON SATN MFR SERPL: 18 % (ref 15–50)
IRON SERPL-MCNC: 62 UG/DL (ref 50–170)
LYMPHOCYTES # BLD AUTO: 1.43 X10(3) UL (ref 1–4)
LYMPHOCYTES NFR BLD AUTO: 27.5 %
MCH RBC QN AUTO: 25.7 PG (ref 26–34)
MCHC RBC AUTO-ENTMCNC: 32 G/DL (ref 31–37)
MCV RBC AUTO: 80.3 FL (ref 80–100)
MONOCYTES # BLD AUTO: 0.73 X10(3) UL (ref 0.1–1)
MONOCYTES NFR BLD AUTO: 14 %
NEUTROPHILS # BLD AUTO: 2.81 X10 (3) UL (ref 1.5–7.7)
NEUTROPHILS # BLD AUTO: 2.81 X10(3) UL (ref 1.5–7.7)
NEUTROPHILS NFR BLD AUTO: 54 %
PLATELET # BLD AUTO: 265 10(3)UL (ref 150–450)
RBC # BLD AUTO: 4.98 X10(6)UL (ref 3.8–5.3)
T4 FREE SERPL-MCNC: 1.2 NG/DL (ref 0.8–1.7)
TOTAL IRON BINDING CAPACITY: 347 UG/DL (ref 250–425)
TRANSFERRIN SERPL-MCNC: 273 MG/DL (ref 250–380)
TSI SER-ACNC: 4.76 UIU/ML (ref 0.55–4.78)
WBC # BLD AUTO: 5.2 X10(3) UL (ref 4–11)

## 2025-07-02 PROCEDURE — 83550 IRON BINDING TEST: CPT

## 2025-07-02 PROCEDURE — 36415 COLL VENOUS BLD VENIPUNCTURE: CPT

## 2025-07-02 PROCEDURE — 85025 COMPLETE CBC W/AUTO DIFF WBC: CPT

## 2025-07-02 PROCEDURE — 83540 ASSAY OF IRON: CPT

## 2025-07-02 PROCEDURE — 84439 ASSAY OF FREE THYROXINE: CPT

## 2025-07-02 PROCEDURE — 84443 ASSAY THYROID STIM HORMONE: CPT

## 2025-07-02 PROCEDURE — 82728 ASSAY OF FERRITIN: CPT

## 2025-08-18 ENCOUNTER — PATIENT MESSAGE (OUTPATIENT)
Age: 35
End: 2025-08-18

## 2025-08-26 ENCOUNTER — OFFICE VISIT (OUTPATIENT)
Dept: INTERNAL MEDICINE CLINIC | Facility: CLINIC | Age: 35
End: 2025-08-26

## 2025-08-26 VITALS
WEIGHT: 261 LBS | SYSTOLIC BLOOD PRESSURE: 120 MMHG | DIASTOLIC BLOOD PRESSURE: 80 MMHG | BODY MASS INDEX: 41.95 KG/M2 | HEIGHT: 66 IN | RESPIRATION RATE: 20 BRPM | HEART RATE: 89 BPM

## 2025-08-26 DIAGNOSIS — E66.813 CLASS 3 SEVERE OBESITY DUE TO EXCESS CALORIES WITHOUT SERIOUS COMORBIDITY WITH BODY MASS INDEX (BMI) OF 45.0 TO 49.9 IN ADULT: ICD-10-CM

## 2025-08-26 DIAGNOSIS — E28.2 PCOS (POLYCYSTIC OVARIAN SYNDROME): Primary | ICD-10-CM

## 2025-08-26 DIAGNOSIS — Z51.81 THERAPEUTIC DRUG MONITORING: ICD-10-CM

## 2025-08-26 DIAGNOSIS — F43.21 GRIEF: ICD-10-CM

## 2025-08-26 PROCEDURE — 99214 OFFICE O/P EST MOD 30 MIN: CPT | Performed by: INTERNAL MEDICINE

## 2025-08-26 RX ORDER — TIRZEPATIDE 10 MG/.5ML
INJECTION, SOLUTION SUBCUTANEOUS
COMMUNITY
Start: 2025-06-12 | End: 2025-08-26 | Stop reason: ALTCHOICE

## 2025-08-26 RX ORDER — TIRZEPATIDE 15 MG/.5ML
15 INJECTION, SOLUTION SUBCUTANEOUS WEEKLY
Qty: 2 ML | Refills: 2 | Status: SHIPPED | OUTPATIENT
Start: 2025-08-26 | End: 2025-09-17

## 2025-08-26 RX ORDER — TIRZEPATIDE 12.5 MG/.5ML
INJECTION, SOLUTION SUBCUTANEOUS
COMMUNITY
Start: 2025-07-28 | End: 2025-08-26

## (undated) NOTE — LETTER
Date: 11/10/2022    Patient Name: Cata Garcia          To Whom it may concern: This letter has been written at the patient's request. The above patient was seen at the [facility] for treatment of a medical condition. This patient should be excused from attending work from 11/10/22-11/11/22.       Sincerely,    Opal Lewis PA-C

## (undated) NOTE — LETTER
Date: 2019    Patient Name: Barb Trinidad  : [birth year]        To Whom it may concern: The above patient was seen at the [facility] for treatment of a medical condition.     This patient should be exempt from working greater than 4

## (undated) NOTE — LETTER
18    To Whom This May Concern:    Patient, Jimy Montemayor : 10/27/1990, is medically optimized to undergo Bariatric Surgery. She is been under our care for less than 6 months. Patient is 67 inches tall and weights 265 lbs with BMI 41.  An EKG was p expectations and understands the importance of being compliant with all post-surgical requirements.        Sincerely,     YEHUDA Gregorio

## (undated) NOTE — LETTER
Date: 4/18/2018    Patient Name: Bailey Lyons          To Whom it may concern: This letter has been written at the patient's request. The above patient was seen at the [facility] for treatment of a medical condition.     Patient can incr

## (undated) NOTE — MR AVS SNAPSHOT
[facility] [address]  [address] [facility] Yesenia Prado  [phone]               Thank you for choosing us for your health care visit with Aleksandra Keane MD.  We are glad to serve you and happy to provide you with t Assoc Dx:  Acquired hypothyroidism [E03.9]           VITAMIN D, 25-HYDROXY    Complete by:  Jan 21, 2017 (Approximate)    Assoc Dx:  Acquired hypothyroidism [E03.9]           TSH    Complete by:  Jan 21, 2017 (Approximate)    Assoc Dx:  Acquired hypothyro view more details from this visit by going to [email or web address]. If you've recently had a stay at the Hospital you can access your discharge instructions in [employer] by going to Visits < Admission Summaries.  If you've been to the Emergency Depar

## (undated) NOTE — ED AVS SNAPSHOT
[facility] Emergency Department    [place]  [address] [address]    Phone:  [phone]    Fax:  Primo Sam   MRN: [id number]    Department:  [facility] Emergency Department   Date of Visit:  5/18 have any questions regarding your home medications, including potential side effects. Medication List      Notice     You have not been prescribed any medications. Discharge Instructions       Be sure and stay well-hydrated.   Cont tell this physician (or your personal doctor if your instructions are to return to your personal doctor) about any new or lasting problems. The primary care or specialist physician will see patients referred from the [facility] Emergency Department.  Johnny Goss - If you are a smoker or have smoked in the last 12 months, we encourage you to explore options for quitting.     - If you have concerns related to behavioral health issues or thoughts of harming yourself, contact [facility] and Referral Center a [email or web address]. If you've recently had a stay at the Hospital you can access your discharge instructions in [employer] by going to Visits < Admission Summaries.  If you've been to the Emergency Department or your doctor's office, you can view yo

## (undated) NOTE — ED AVS SNAPSHOT
[facility] Emergency Department    [place]  [address] [address]    Phone:  [phone]    Fax:  Primo Sam   MRN: [id number]    Department:  [facility] Emergency Department   Date of Visit:  6/15 IF THERE IS ANY CHANGE OR WORSENING OF YOUR CONDITION, CALL YOUR PRIMARY CARE PHYSICIAN AT ONCE OR RETURN IMMEDIATELY TO THE EMERGENCY DEPARTMENT.     If you have been prescribed any medication(s), please fill your prescription right away and begin taking t

## (undated) NOTE — MR AVS SNAPSHOT
[facility] [address]  [address] [facility] Stefanie Negro  [phone]               Thank you for choosing us for your health care visit with Olga Ribera MD.  We are glad to serve you and happy to provide you with t Assoc Dx:  Hypothyroidism, unspecified type [E03.9], Pituitary [facility]) [D35.2]                 Referral Details     Referred By    Referred To    Naomi Dodd MD   [address]   Phone:  [phone] office, you can view your past visit information in [employer] by going to Visits < Visit Summaries. [employer] questions? Call [phone] for help. [employer] is NOT to be used for urgent needs. For medical emergencies, dial 911.            Visit [facility]

## (undated) NOTE — ED AVS SNAPSHOT
Tayajose Goetz   MRN: [id number]    Department:  [facility] Emergency Department   Date of Visit:  11/5/2019           Disclosure     Insurance plans vary and the physician(s) referred by the ER may not be covered by your plan.  Please contact you tell this physician (or your personal doctor if your instructions are to return to your personal doctor) about any new or lasting problems. The primary care or specialist physician will see patients referred from the [facility] Emergency Department.  Vipul Avila

## (undated) NOTE — LETTER
Date: 1/6/2020    Patient Name: Humberto Spear          To Whom it may concern: This letter has been written at the patient's request. The above patient was seen at the [facility] for treatment of a medical condition.     This patient arie

## (undated) NOTE — LETTER
Date: 5/10/2023    Patient Name: Keily Jacob          To Whom it may concern: This letter has been written at the patient's request. The above patient was seen at the [facility] for treatment of a medical condition. This patient should be excused from attending work from 5/10/23 -5/12/23    The patient may return to work on 5/15/23.         Sincerely,    Vivian Bush., YEHUDA

## (undated) NOTE — LETTER
Date: 7/19/2024    Patient Name: Alberto Alberts          To Whom it may concern:    This letter has been written at the patient's request. The above patient was seen at [facility] for treatment of a medical condition.    This patient should be excused from attending work 7/19/24-7/20/24        Sincerely,    Karoline Stallings PA-C

## (undated) NOTE — ED AVS SNAPSHOT
Copevladislav Peralta   MRN: [id number]    Department:  [facility] Emergency Department   Date of Visit:  1/23/2018           Disclosure     Insurance plans vary and the physician(s) referred by the ER may not be covered by your plan.  Please contact you tell this physician (or your personal doctor if your instructions are to return to your personal doctor) about any new or lasting problems. The primary care or specialist physician will see patients referred from the [facility] Emergency Department.  Casey Muñoz

## (undated) NOTE — LETTER
Date: 11/5/2022    Patient Name: Tommy Crespo          To Whom it may concern: This letter has been written at the patient's request. The above patient was seen at the [facility] the [facility] for treatment of a medical condition. This patient should be excused from attending work until fever free for 24 hours and feeling better.         Sincerely,    CURTIS Vega

## (undated) NOTE — LETTER
09/21/22  Patient Name:  Iliana Thakkar        To Whom it may concern:    Iliana Thakkar was evaluated in the office today and should be excused from attending work on 9/21/22. She may return to work on 9/22/22.       Sincerely,     Aditya Mari PA-C

## (undated) NOTE — LETTER
Date: 11/10/2022    Patient Name: Romelia Otoole          To Whom it may concern: This letter has been written at the patient's request. The above patient was seen at the [facility] for treatment of a medical condition.     This patient should be excused from attending work from 11/10/22-11/12/22        Sincerely,    Ignacia Arreaga PA-C

## (undated) NOTE — LETTER
04/06/18        [facility] [address]      Dear Sebastian Romero,    [facility] records indicate that you have outstanding lab work and or testing that was ordered for you and has not yet been completed:          Miscellaneous Testing [E]

## (undated) NOTE — LETTER
Date: 10/16/2019    Patient Name: Cherylene Queen  : [birth year]      To Whom it may concern: This letter has been written at the patient's request. The above patient was seen at the [facility] for treatment of a medical condition.     This

## (undated) NOTE — LETTER
Date & Time: 11/8/2022, 10:58 AM  Patient: Sweta Edge  Encounter Provider(s):    CURTIS Dillon       To Whom It May Concern:    Hess Nephew was seen and treated in our department on 11/8/2022. She should not return to work until 11/12/2022. If you have any questions or concerns, please do not hesitate to call.         Chava ACEVEDO   Nurse Practitioner

## (undated) NOTE — ED AVS SNAPSHOT
Abhilash Ann   MRN: [id number]    Department:  [facility] Emergency Department in [place]   Date of Visit:  2/20/2018           Disclosure     Insurance plans vary and the physician(s) referred by the ER may not be covered by your plan.  Please contac tell this physician (or your personal doctor if your instructions are to return to your personal doctor) about any new or lasting problems. The primary care or specialist physician will see patients referred from the [facility] Emergency Department.  Shelton Lombard

## (undated) NOTE — LETTER
Date: 2018    Patient Name: Alfred Davis  : [birth year]        To Whom it may concern: The above patient was seen at the [facility] for treatment of a medical condition.     This patient should be excused from attending work on

## (undated) NOTE — LETTER
Date: 1/29/2018    Patient Name: Tere Dill          To Whom it may concern:    Please excuse Meera Niece 1/29 and 1/30/18 due to illness. She is clear to return 1/31/18 without restrictions.         Sincerely,    Cici Ray PA-C

## (undated) NOTE — LETTER
Date: 2/23/2018    Patient Name: Valery Thibodeaux          To Whom it may concern: This letter has been written at the patient's request. The above patient was seen at the [facility] for treatment of a medical condition.       The patient ma

## (undated) NOTE — LETTER
Patient Name: Alberto Alberts  YOB: 1990          MRN :  [id number]  Date:  12/10/2024  Referring Physician:  Christiane Estes    SPINE EVALUATION:     Diagnosis:   Right sided sciatica (M54.31)         Referring Provider: Christiane Estes  Date of Evaluation:    12/10/2024    Precautions:  None    Precautions: Fall Risk , High-risk pregnancy, multigravida of advanced maternal age, antepartum [O09.529 ]; POTS  Next MD visit:   none scheduled  Date of Surgery: n/a     PATIENT SUMMARY   Alberto Alberts is a 34 year old female who presents to therapy today with complaints of R leg pain that started 4 weeks ago that has worsened. NKI. Pt c/o pain lateral R buttock to knee. Denies N/T. 16 weeks pregnant. 1st pregnancy. No history of back or sciatic pain. 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 Using heat and ice.  Pt describes pain level current 7/10, at best 7/10, at worst 9/10.   Current functional limitations include sleep, walking, rolling, transferring, driving, climb stairs, standing.      present with pt throughout treatment    Alberto describes prior level of function no limitations. Pt goals include less pain and get back to work.  Past medical history was reviewed with Alberto. Significant findings include  has a past medical history of Anemia, Anesthesia complication, Bilateral chronic vestibular neuritis, Concussion (10/11/2020), H/O complications due to general anesthesia, Left foot pain, Migraines, Obesity, unspecified, Panic, PCOS (polycystic ovarian syndrome), Postsurgical hypothyroidism, POTS (postural orthostatic tachycardia syndrome), Tachycardia, Thyroid nodule (12/15/2014), and Vestibular neuronitis (6/3/2016).   Pt denies diplopia, dysarthria, dysphasia, dizziness, drop attacks, bowel/bladder changes, saddle anesthesia, and KENDRA LE N/T.    ASSESSMENT  Alberto presents to physical therapy evaluation with primary c/o R leg pain. The results of  the objective tests and measures show decreased range of motion and strength, extreme antalgic gait, + neural signs.  Functional deficits include but are not limited to sleep, walking, rolling, transferring.  Signs and symptoms are consistent with diagnosis of Right sided sciatica (M54.31). Pt and PT discussed evaluation findings, pathology, POC and HEP.  Pt voiced understanding and performs HEP correctly without reported pain. Skilled Physical Therapy is medically necessary to address the above impairments and reach functional goals.     OBJECTIVE:   Observation/Posture: trunk FW bent with walking, FHP, slouched posture, posterior pelvic tilt in sitting  Neuro Screen: SILT, DTR's +2 B LE's    Lumbar AROM: (* denotes performed with pain)  Flexion: 70-holding onto RW  Extension: 25  Sidebending: R 30; L 25-c/o R leg pain  Rotation: R 30; L 30    Hip ROM:   AROM (in degrees) (R) (L)   Hip Flexion 90* 90   Hip IR 10* 10   Hip ER 30* 30     Accessory motion: decreased symptoms CPA L3-5  Palpation: Moderate tenderness lower back lumbar paraspinal mm, B QL, moderate tenderness Right Quads, HS, Hip add/abd     Strength: (* denotes performed with pain)  LE   Hip flexion (L2): R 2/5*; L 5/5  Hip abduction: R 2/5*; L 5/5  Hip Extension: R 2/5*; L 5/5   Hip ER: R 2+/5*; L 5/5  Hip IR: R 2+/5*; L 5/5  Knee Flexion: R 3+/5; L 5/5   Knee extension (L3): R 2+/5*; L 5/5   DF (L4): R 4/5; L 5/5  Great Toe Ext (L5): R 4/5, L 5/5  PF (S1): R 4/5; L 5/5    Tested in NWB  Pain R LE with all movements     Flexibility:   LE   Not assessed secondary to pt's extreme pain     Special tests:   Slump: + R  SLR: R +, L -  Active SLR: R +, L -  Long Axis Distraction: no reduction of pain      SI Cluster:  SI Distraction Provocation: neg  SI Compression Provocation: neg  Direct palpation of posterior SI ligament: R neg, L yasmine     Gait: pt ambulates on level ground with assistive device of rolling walker, antalgia, decreased step length R,  decreased stance phase R, and decreased hip/knee flex or ext R .  Balance: SLS R 0 sec, L 0 sec- pt in excessive pain in standing    Today’s Treatment and Response:   Pt education was provided on exam findings, treatment diagnosis, treatment plan, expectations, and prognosis. Pt was also provided recommendations for activity modifications, modalities as needed [ice/heat], postural corrections, pain science education , and importance of remaining active    MT-CPA L3-5 gr II multiple bouts , foam roller R lateral hip, STM R piriformis, R lumbar paraspinals    Education on pertinent anatomy, pathophysiology of condition, biomechanics relevant to current condition , prognosis, purpose of heat/ice for pain relieving modalities     Patient was educated on good postural habits, biomechanics of functional performance, gait training w RW  Sitting well back in chair with back supported at all times, without slouching. Need to interrupt sitting every 30 min.  To change positions with onset of any distal symptoms.To self limit static postures.   Recommendations of pregnancy abdominal support, massage (family training provided)  Neuromuscular Re-education: improve balance,coordination,posture, proprioception     Patient was instructed in and issued a HEP for: R sciatic nn glides x10 hourly    Pt decreased from 7/10 to 5/10 end of session.    Charges: PT Eval Low Complexity, MT 10, TE 15      Total Timed Treatment: 25 min     Total Treatment Time: 50 min     PLAN OF CARE:    Goals: (to be met in 10 visits)   Pt will improve transversus abdominis recruitment to perform proper isometric contraction without requiring verbal or tactile cuing to promote advancement of therex   Pt will demonstrate good understanding of proper posture and body mechanics to decrease pain and improve spinal safety   Pt will improve lumbar spine AROM flexion to >/=100 deg to allow increase ease with bending forward to don shoes   Pt will report improved  symptom centralization and absence of radicular symptoms for 3 consecutive days to improve function with ADL   Pt will have decreased paraspinal mm tension to tolerate standing >/=30 minutes for work and home activities   Pt will demonstrate improved core strength to be able to perform transfer with </=2/10 pain   Pt will be independent and compliant with comprehensive HEP to maintain progress achieved in PT       Frequency / Duration: Patient will be seen for 2 x/week or a total of 10 visits over a 90 day period. Treatment will include: Gait training, Manual Therapy, Neuromuscular Re-education, Self-Care Home Management, Therapeutic Exercise, Home Exercise Program instruction, and Modalities to include: heat/ice    Education or treatment limitation: None  Rehab Potential:good    Oswestry Disability Index Score  No data recorded    Patient/Family/Caregiver was advised of these findings, precautions, and treatment options and has agreed to actively participate in planning and for this course of care.    Thank you for your referral. Please co-sign or sign and return this letter via fax as soon as possible to [phone]. If you have any questions, please contact me at Dept: [phone]    Sincerely,  Electronically signed by therapist: Monica Carbone, PT    Physician's certification required: Yes  I certify the need for these services furnished under this plan of treatment and while under my care.    X___________________________________________________ Date____________________    Certification From: 12/10/2024  To:3/10/2025             21st Century Cures Act Notice to Patient: Medical documents like this are made available to patients in the interest of transparency. However, be advised this is a medical document and it is intended as umgh-jy-bpcx communication between your medical providers. This medical document may contain abbreviations, assessments, medical data, and results or other terms that are  unfamiliar. Medical documents are intended to carry relevant information, facts as evident, and the clinical opinion of the practitioner. As such, this medical document may be written in language that appears blunt or direct. You are encouraged to contact your medical provider and/or [facility] if you have any questions about this medical document.

## (undated) NOTE — LETTER
Date: 2018    Patient Name: Mary Kay Solis : 10/27/1990      RE: Surgical clearance    Dear Dr Ventura Diez,      This letter has been written at the patient's request. Jarrett Gates has been under my care for Chronic migraine headache and Nonspecific wh

## (undated) NOTE — Clinical Note
Date: 1/13/2017    Patient Name: Goldy Finn          To Whom it may concern: This letter has been written at the patient's request. The above patient was seen at the [facility] for treatment of a medical condition.     This patient has

## (undated) NOTE — ED AVS SNAPSHOT
Manuela Rodriguez   MRN: [id number]    Department:  [facility] Emergency Department   Date of Visit:  6/28/2018           Disclosure     Insurance plans vary and the physician(s) referred by the ER may not be covered by your plan.  Please contact you tell this physician (or your personal doctor if your instructions are to return to your personal doctor) about any new or lasting problems. The primary care or specialist physician will see patients referred from the [facility] Emergency Department.  Randal Roper

## (undated) NOTE — MR AVS SNAPSHOT
EM[address] [facility] [address] [facility]  [place] [address] [facility] [address] [phone]               Thank you for choosing us for your health care visit with YEHUDA De Dios. We are glad to serve you and happy to provide you with this summary of your visit.   Please h already taking blood thinners.   · For sore throats caused by allergies, try antihistamines to block the allergic reaction. · Remember: unless a sore throat is caused by a bacterial infection, antibiotics won’t help you.   Prevent future sore throats  Prev · Heat may help soothe painful areas of the face. Use a towel soaked in hot water. Or,  the shower and direct the hot spray onto your face.  Using a vaporizer along with a menthol rub at night may also help.   · An expectorant containing guaifenesin · Fever of 100.4ºF (38ºC) or higher, or as directed by your healthcare provider  · Seizure  · Breathing problems  · Symptoms not resolving within 10 days  Date Last Reviewed: 4/13/2015  © 2145-6975 [employer] [address] Jame Thorne [email or web address]. If you've recently had a stay at the Hospital you can access your discharge instructions in [employer] by going to Visits < Admission Summaries.  If you've been to the Emergency Department or your doctor's office, you can view yo

## (undated) NOTE — LETTER
Date: 2019    Patient Name: Alis Nelsno  : [birth year]      To Whom it may concern: The above patient was seen at the [facility] for treatment of a medical condition.     The patient may return to work on  with previous garcia

## (undated) NOTE — LETTER
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 She can not work longer hours in this specific position.  Therefore, it would be beneficial for her ,ranulfo

## (undated) NOTE — MR AVS SNAPSHOT
EMG [address] [facility]  [address] [facility]  [place] [id number] [phone]               Thank you for choosing us for your health care visit with YEHUDA Eckert. We are glad to serve you and happy to provide you with this summary of your visit.   Please h Commonly known as:  [facility]                   MyChart     Visit MyChart  You can access your MyChart to more actively manage your health care and view more details from this visit by going to [email or web address].   If you've recently had a stay at

## (undated) NOTE — LETTER
05/30/19      Patient: Milly Valentine. Antoine Maira   (10/27/2000)    To Whom It May Concern: The above mentioned patient has been under our medical care since 2018 for multiple health conditions. I support the decision for her fiance to live with my patient.

## (undated) NOTE — Clinical Note
Thank you for referring Sebatsian Romero to the [facility]. I met with her in consultation today. I have ordered labs, set up a nutrition consultation with our dietician, and completed an EKG.   She was started on Metformin ER for medication therapy

## (undated) NOTE — LETTER
Date: 5/3/2019    Patient Name: Bertha Flores  : [birth year]      To Whom it may concern: The above patient was seen at the [facility] for treatment of a medical condition.     This patient should be able to return to work on Thursday May

## (undated) NOTE — Clinical Note
02/16/2017      Alis Nelson  10/27/1990  [facility] [address]      [facility]  ATTN: [address] [facility]  Tracking Number [id number]    Please review this appeals request in regards to the MRI Pituitary that was ordered on 1/23/17 for patient William Melgoza

## (undated) NOTE — LETTER
Date: 10/12/2018    Patient Name: Sharita Eckert  : [birth year]        To Whom it may concern: The above patient was seen at the [facility] for treatment of a medical condition.     This patient should be excused from attending work from [employer]

## (undated) NOTE — LETTER
Date: 5/14/2018    Patient Name: Columba Peralta          To Whom it may concern: This letter has been written at the patient's request. The above patient was seen at the [facility] for treatment of a medical condition.     Patient can incr

## (undated) NOTE — LETTER
Date: 10/29/2019    Patient Name: Stephania Tyler  : 10/27/1990      To Whom it may concern: This letter has been written at the patient's request. The above patient was seen at the [facility] for treatment of a medical condition.     Tobin Honeycutt

## (undated) NOTE — LETTER
Date: 10/15/2018    Patient Name: Sophy Temple  : [birth year]        To Whom it may concern: The above patient was seen at the [facility] for treatment of a medical condition.     This patient should be excused from attending work until

## (undated) NOTE — LETTER
Date: 1/26/2023    Patient Name: Christiano Ardon          To Whom it may concern: This letter has been written at the patient's request. The above patient was seen at the [facility]  for treatment of a medical condition. This patient should be excused from attending work today 1/26/23.         Sincerely,    CURTIS Ashby

## (undated) NOTE — LETTER
Date: 2018    Patient Name: Vimal Licea  : [birth year]      To Whom it may concern: The above patient was seen at the [facility] for treatment of a medical condition.     This patient should be excused from attending work from [employer]

## (undated) NOTE — LETTER
19          Shay Larsen  :  10/27/1990      To Whom It May Concern: This patient was seen in our office on 19 . Work status:   May return to work full-time, no restrictions    May return to work status per above effective 8/10/

## (undated) NOTE — IP AVS SNAPSHOT
[facility] [place] [address] [place], [address] ~ [phone]                Discharge Summary   1/6/2017    Jaja Solis           Admission Information        Provider Department    1/6/2017 Silvia Novoa MD  [id number]         Thank Last time this was given:  175 mcg on 1/7/2017  6:04 AM   Commonly known as:  SYNTHROID, LEVOTHROID        Take 175 mcg by mouth before breakfast.                                 Where to Get Your Medications      These medications were sent to [facility] D Follow up with Steve Montemayor MD. Schedule an appointment as soon as possible for a visit in 1 week.     Specialties:  Internal Medicine, IP Consult to Primary Care, Weight Loss Managment    Contact information:    [address] [address]  [facility] Metabolic Lab Results  (Last result in the past 90 days)    HgbA1C Glucose BUN Creatinine Calcium Alkaline Phosph AST    -- (01/09/17)  72 (01/09/17)  7 (L) (01/09/17)  0.70 (01/09/17)  7.6 (L) (01/06/17)  104 (H) (01/06/17)  46 (H)      Metabolic Lab Resu office, you can view your past visit information in [employer] by going to Visits < Visit Summaries. [employer] questions? Call [phone] for help. [employer] is NOT to be used for urgent needs.   For medical emergencies, dial 911.             _____________ What to report to your healthcare team: Dizziness, Somnolence, Weakness, Headache, Nausea/vomiting           All Other Medications     ergocalciferol 27414 UNITS Oral Cap    B Complex Vitamins (B-COMPLEX/B-12 OR)

## (undated) NOTE — MR AVS SNAPSHOT
[address] [id number]               Thank you for choosing us for your health care visit with JORDYN Luong.   We are glad to serve you and happy to provide you with this Medical Issues Discussed Today     Pituitary microadenoma      Instructions and Information about Your Health      Refill policies:    ? Allow 2 business days for refills; controlled substances may take longer. ?  Contact your pharmacy at least 5 days lucio without insurance authorization, patient may be responsible for the entire amount billed. Precertification and Prior Authorizations  If your physician has recommended that you have a procedure or additional testing performed.   [place] 93 MetFORMIN HCl  MG Tb24   Take 750 mg by mouth daily with breakfast.   Commonly known as:  GLUCOPHAGE-XR           Potassium 99 MG Tabs   Take 2 tablets by mouth.            topiramate 50 MG Tabs   Take 1 tablet (50 mg total) by mouth 2 (two) times da

## (undated) NOTE — LETTER
Date: 5/24/2024    Patient Name: Alberto Alberts          To Whom it may concern:    This letter has been written at the patient's request. The above patient was evaluated through telehealth encounter at [facility].    This patient should be excused from attending work on Friday 5/24/24.    The patient may return to work on next scheduled shift with no limitations.        Sincerely,      ARLENE Jones, PA-C  Walk In Clinic/Telemedicine Physician Assistant  [facility]

## (undated) NOTE — LETTER
Date: 10/23/2019    Patient Name: Betsy Jacobo  : [birth year]        To Whom it may concern: This letter has been written at the patient's request. The above patient was seen at the [facility] for treatment of a medical condition.  Marco Antonio

## (undated) NOTE — MR AVS SNAPSHOT
[address] [address]               Thank you for choosing us for your health care visit with Miya Coreas MD.  We are glad to serve you and happy to provide you with this ? Please allow the office 48-72 hours to fill the prescription. ? Patient must present photo ID at time of .   If a designated family member will be picking up prescription, office must be given name of individual in advance and they must present a [facility] [address]   [phone]              Allergies as of Feb 14, 2017     No Known Allergies                Today's Vital Signs     BP Pulse Height Weight BMI    130/80 mmHg 90 66\" 210 lb 33.91 kg/m2         Current Medications          This l Component Value Standard Range & Units    Thyroxine (T4) 4.5 4.5-10.9 ug/dL                FSH      Component Value Standard Range & Units    FSH 5.9 mIU/mL      Prepubertal children 0.0- 2.8 mIU/mL   Follicular           2.5- 10.2 mIU/mL   Midcycle

## (undated) NOTE — LETTER
Date: 11/3/2022    Patient Name: Brittni Joy          To Whom it may concern: This letter has been written at the patient's request. The above patient was seen at the [facility] for treatment of a medical condition. This patient should be excused from attending work/school from 11/2/22 through 11/5/22. The patient may return to work/school on 11/6/22.          Sincerely,    Bronson Jones PA-C

## (undated) NOTE — LETTER
Date: 2018    Patient Name: Columba Peralta  : [birth year]        To Whom it may concern: The above patient was seen at the [facility] for treatment of a medical condition.     This patient should be excused from attending work from [employer]

## (undated) NOTE — LETTER
Date: 2019    Patient Name: Vimal Licea  : [birth year]        To Whom it may concern: The above patient was seen at the [facility] for treatment of a medical condition.     This patient should be excused from attending work from [facility]

## (undated) NOTE — LETTER
Date: 10/17/2024    Patient Name: Alberto Alberts          To Whom it may concern:    This letter has been written at the patient's request. The above patient was seen at [facility] for treatment of a medical condition.    This patient should be excused from attending work on 10/18/24.    The patient may return to work when symptoms have improved.         Sincerely,    CURTIS Urena

## (undated) NOTE — Clinical Note
I saw Jeanna Mcculloughs in the [employer] in [facility] today for viral URI symptoms,  she was treated with comfort care. Jeanna Laguna will follow up with you if no better or as needed.  Thank you for the opportunity to care for YEHUDA Vizcarra

## (undated) NOTE — LETTER
Date: 2019    Patient Name: Dali Joel  : [birth year]      To Whom it may concern: The above patient was seen at the [facility] for treatment of a medical condition.     This patient should be excused from attending work on August

## (undated) NOTE — Clinical Note
Date: 1/18/2017    Patient Name: Luz Maria Westfall          To Whom it may concern: The above patient was seen at the [facility] for treatment of a medical condition. This patient should be excused from attending work through 1/19/17.

## (undated) NOTE — LETTER
Date: 2019    Patient Name: Thea Venegas  : [birth year]        To Whom it may concern: The above patient was seen at the [facility] for treatment of a medical condition.     This patient should be excused from attending work today u

## (undated) NOTE — ED AVS SNAPSHOT
[facility] Emergency Department    [place]  [address] [address]    Phone:  [phone]    Fax:  Primo Sam   MRN: [id number]    Department:  [facility] Emergency Department   Date of Visit:  6/15 [address] [phone]  Pediatric [phone] Emergency Department   [phone]       To Check ER Wait Times:  TEXT 'ERwait' to 93916      Click [email or web address]      Or call [phone]    If you have any will be contacted. Please make sure we have your correct phone number before you leave. After you leave, you should follow the attached instructions. I have read and understand the instructions given to me by my caregivers.         [facility] You can access your MyChart to more actively manage your health care and view more details from this visit by going to [email or web address].   If you've recently had a stay at the Hospital you can access your discharge instructions in [address] by chapis

## (undated) NOTE — LETTER
Date: 1/19/2018    Patient Name: Vimal Licea          To Whom it may concern: This letter has been written at the patient's request. The above patient was seen at the [facility] for treatment of a medical condition.     This patient aiyana

## (undated) NOTE — LETTER
To Whom It May Concern:    Treva Doss has been under our care regarding ongoing medical issues. Because of this, she has been required to restrict her physical activities.     She may resume her usual activities, including work, on 01/07/2019 with th

## (undated) NOTE — LETTER
Date: 1/15/2019    Patient Name: Kaela Bailey  : [birth year]        To Whom it may concern: The above patient was seen at the [facility] for treatment of a medical condition.  She should be excused from attending work from

## (undated) NOTE — LETTER
January 23, 2018    Patient: Cierra Pearson   Date of Visit: 1/23/2018       To Whom It May Concern:    Yessenia Briscoe was seen and treated in our emergency department on 1/23/2018. She can return to work on 1/25/18.     If you have any questions or con

## (undated) NOTE — LETTER
Date: 3/20/2019    Patient Name: Valery Thibodeaux  : [birth year]      To Whom it may concern: This letter has been written at the patient's request. The above patient was seen at the [facility] for treatment of a medical condition.     This

## (undated) NOTE — LETTER
2/6/2025    Alberto Alberts  [address] [place] [address]    Dear Alberto,    We would like to inform you that your account has been charged $40 for not showing up to the office for your scheduled appointment on 2/6/25.    Our no-show policy is as follows: A 24-hour notice is required, or you may be charged a $40 No Show fee.      If you are unable to keep your scheduled appointment, please notify us at least 24 hours in advance so we can accommodate our other patients. You may also reschedule your appointment at that time.    On the third no-show, within a 12-month period, it will be the physician’s discretion as to whether a discharge letter will be sent out disengaging you from the practice and giving you 30 days to enroll with a new non [facility] physician.    If you would like to contest this charge, please call [phone].    Sincerely,  [facility]

## (undated) NOTE — LETTER
Date: 2019    Patient Name: Thea Venegas  : [birth year]      To Whom it may concern: The above patient was seen at the [facility] for treatment of a medical condition.     This patient should be excused from attending work on Sept 3

## (undated) NOTE — LETTER
September 8, 2020    Patient: Get Higginbotham   Date of Visit: 9/8/2020       To Whom It May Concern:    Marvin Wilson was seen and treated in our emergency department on 9/8/2020. She should not return to work until 9/10/20.     If you have any questio

## (undated) NOTE — LETTER
Date: 8/3/2018    Patient Name: Cierra Pearson  : [birth year]        To Whom it may concern: The above patient was seen at the [facility] for treatment of a medical condition.     This patient should be excused from attending work until [employer]

## (undated) NOTE — LETTER
Date: 2019    Patient Name: Bailey Lyons  : [birth year]      To Whom it may concern: This letter has been written at the patient's request. The above patient was seen at the [facility] for treatment of a medical condition.  The jose a

## (undated) NOTE — MR AVS SNAPSHOT
After Visit Summary   2023    Danika Aleman   MRN: [id number]           Visit Information     Date & Time  2023  5:30 PM Provider  CURTIS Payne Department  Hamida Orlando, Virtual Visit Dept. Phone  [phone]      Your Vitals Were     LMP   2022 (Exact Date)             Allergies as of 2023  Review status set to Review Complete on 2023   No Known Allergies     Your Current Medications        Dosage    ondansetron 8 MG Oral Tablet Dispersible Dissolve on tongue every 8 hours prn nausea. ASPIRIN LOW DOSE 81 MG Oral Tab EC Take 81 mg by mouth daily. For 14 days    topiramate 50 MG Oral Tab Take 1 tablet (50 mg total) by mouth daily. gabapentin 100 MG Oral Cap Take 1 capsule (100 mg total) by mouth 3 (three) times daily. ibuprofen 600 MG Oral Tab Take 1 tablet (600 mg total) by mouth every 6 (six) hours as needed for Pain. predniSONE 20 MG Oral Tab () Take 2 tablets (40 mg total) by mouth in the morning for 5 days. benzonatate 200 MG Oral Cap () Take 1 capsule (200 mg total) by mouth 3 (three) times daily as needed for cough. Multiple Vitamin Oral Tab Take 1 tablet by mouth daily. Levothyroxine Sodium 175 MCG Oral Tab One tablet Monday through Friday, two tablets  and Sundays      Diagnoses for This Visit    Nausea   [883203]  -  Primary           We Ordered the Following     Normal Orders This Visit    OL DIG E/M SVC 5-10 MIN [20252 CPT(R)]       Future Appointments        Provider Department    3/21/2023 11:20 AM Moira [address] [place]    3/21/2023 1:20 PM [facility] [address]                Did you know that [facility] primary care physicians now offer Video Visits through [address] for adult patients for a variety of conditions such as allergies, back pain and cold symptoms?  Skip the drive and waiting room and online chat with a doctor face-to-face using your web-cam enabled computer or mobile device wherever you are. Video Visits cost $50 and can be paid hassle-free using a credit, debit, or health savings card. Not active on [employer]? Ask us how to get signed up today! If you receive a survey from [employer], please take a few minutes to complete it and provide feedback. We strive to deliver the best patient experience and are looking for ways to make improvements. Your feedback will help us do so. For more information on [employer], please visit [email or web address]           No text in SmartText           No text in SmartText

## (undated) NOTE — ED AVS SNAPSHOT
[facility] Emergency Department    [place]  [address] [address]    Phone:  [phone]    Fax:  Primo Sam   MRN: [id number]    Department:  [facility] Emergency Department   Date of Visit:  5/18 IF THERE IS ANY CHANGE OR WORSENING OF YOUR CONDITION, CALL YOUR PRIMARY CARE PHYSICIAN AT ONCE OR RETURN IMMEDIATELY TO THE EMERGENCY DEPARTMENT.     If you have been prescribed any medication(s), please fill your prescription right away and begin taking t

## (undated) NOTE — MR AVS SNAPSHOT
[facility] [address]  [address] [facility]  [phone]               Thank you for choosing us for your health care visit with Otis Mac MD.  We are glad to serve you and happy to provide you with All therapies have potential risk of harm or side effects or medication interactions.  It is your duty and for your safety to discuss with the pharmacist and our office with questions, and to notify us and stop treatment if problems arise, but know that ou This list is accurate as of: 1/13/17 11:43 AM.  Always use your most recent med list.                B-COMPLEX/B-12 OR   Take 1 tablet by mouth daily. ergocalciferol 32490 UNITS Caps   Take 1 capsule (50,000 Units total) by mouth once a week.    C

## (undated) NOTE — LETTER
Date: 2020    Patient Name: Shay Larsen  : 10/27/1990      To Whom it may concern: The above patient was seen at the [facility] for treatment of a medical condition.       The patient may return to work on  with no li

## (undated) NOTE — LETTER
Date: 11/14/2024    Patient Name: Alberto Alberts          To Whom it may concern:    This letter has been written at the patient's request. The above patient was seen at [facility] for treatment of a medical condition.    This patient should be excused from attending work 11/14-11/15/2024. Patient may return to work 11/16/2024. If you have any questions, please call the office.        Sincerely,        CURTIS Mendoza

## (undated) NOTE — Clinical Note
Saurabh Luna Left,  I saw Ray Avalos in the clinic and  she has been under my care for few years. The  white matter lesions are chronic and nonspecific.  She had extensive neurological work up and Multiple sclerosis/demyelinating process and vasculitis have been ruled o

## (undated) NOTE — LETTER
Date: 3/20/2019     Patient Name: Mary Kay Solis  : [birth year]        To Whom it may concern:     This letter has been written at the patient's request. The above patient was seen at the [facility] for treatment of a medical condition.

## (undated) NOTE — LETTER
19  Patient Name: Luz Maria Westfall  : [birth year]        To Whom it may concern:     This letter has been written at the patient's request. The above patient was seen at the [facility] for treatment of a medical condition.  The patient is

## (undated) NOTE — LETTER
Date: 12/22/2023    Patient Name: Jarocho Harris          To Whom it may concern: This letter has been written at the patient's request. The above patient was seen through telehealth encounter for treatment of a medical condition. This patient should be excused from attending work/school on Saturday 12/23/2023. The patient may return to work/school on next scheduled shift with no limitations.         Sincerely,    Mamta Aguirre, ARLENE, PA-C  [facility]/Telemedicine Physician [address] [place] [address]

## (undated) NOTE — LETTER
Date: 2018    Patient Name: Betsy Jacobo  : [birth year]        To Whom it may concern: The above patient was seen at the [facility] for treatment of a medical condition.     This patient should be excused from attending work from [employer]

## (undated) NOTE — LETTER
Date: 9/22/2023    Patient Name: Tommy Crespo          To Whom it may concern: This letter has been written with the patient's permission. Clinton Kemp was evaluated by me for treatment of a medical condition via a Telehealth Visit today. Ms. Ayse Adams is ill and contagious. She has been advised not to return to work until [employer] for over 24 hours without taking a fever reducer and feeling better. Please excuse Ms. Ayse Adams from work on 9/22/23-9/24/23. Ms. Ayse Adams will seek further evaluation if she is unable to return to work after this timeframe.         Sincerely,    CURTIS Medina  Nurse Practitioner   [address] [facility] and [facility] Medicine

## (undated) NOTE — LETTER
Date: 11/1/2022    Patient Name: Rafiq Araya          To Whom it may concern: This letter has been written at the patient's request. The above patient was seen at the [facility] for treatment of a medical condition. This patient should be excused from attending work through 11/3/22. The patient may return to work on 11/4/22 if respiratory symptoms are mostly improved per patient report. Maynor Keys may report when this occurs as she will not need to be evaluated again unless symptoms significantly worsen.         Sincerely,        CURTIS Russell

## (undated) NOTE — LETTER
Date & Time: 11/1/2019, 5:43 PM  Patient: Cherylene Queen  Encounter Provider(s):    Avinash Ruiz MD       To Whom It May Concern:    Lul Bailey was seen and treated in our department on 11/1/2019. She may return to work 11/06/2019.     If you have

## (undated) NOTE — LETTER
Date: 3/25/2019    Patient Name: Abhilash Ann  : [birth year]      To Whom it may concern: This letter has been written at the patient's request. The above patient was seen at the [place] [facility] for treatment of a medical condition.     This

## (undated) NOTE — LETTER
Date: 11/16/2022    Patient Name: Wes Lawler          To Whom it may concern: This letter has been written with the patient's permission. Ana Rosa Richter was seen at the [facility] for treatment of a medical condition. Ms. Paulo Pereira was recently diagnosed with a medical condition which is causing her excruciating at this time. Please permit Ms. Paulo Pereira to leave work at this time in order to seek additional treatment. Sincerely,    CURTIS Irleand  Nurse Practitioner  [email or web address]

## (undated) NOTE — LETTER
20  Patient Name: Baljinder Conley  : [birth year]        To Whom it may concern:     This letter has been written at the patient's request. The above patient was seen at the [facility] for treatment of a medical condition.  The patient is

## (undated) NOTE — ED AVS SNAPSHOT
Valery Thibodeaux   MRN: [id number]    Department:  [facility] Emergency Department   Date of Visit:  7/5/2018           Disclosure     Insurance plans vary and the physician(s) referred by the ER may not be covered by your plan.  Please contact y CARE PHYSICIAN AT ONCE OR RETURN IMMEDIATELY TO THE EMERGENCY DEPARTMENT. If you have been prescribed any medication(s), please fill your prescription right away and begin taking the medication(s) as directed.   If you believe that any of the medications

## (undated) NOTE — LETTER
Date: 9/22/2023    Patient Name: Bernard Lyn        To Whom it may concern: This letter has been written with the patient's permission. Kurtis Bamberger was evaluated by me for treatment of a medical condition via a Telehealth Visit today. Ms. Kadeem Lu is ill. She has been advised not to return to work until [employer] for over 24 hours without taking a fever reducer and feeling better. Please excuse Ms. Kadeem Lu from work on 9/22/23-9/24/23. Ms. Kadeem Lu will seek further evaluation if she is unable to return to work after this timeframe.         Sincerely,    CURTIS Hendrix  Nurse Practitioner   [address] [facility] and [facility] Medicine

## (undated) NOTE — Clinical Note
Pt will be coming in for HFU appt tomorrow. I did give her some resources for rides to her appts as that is a concern for her. She is still refusing to go to the ER for further eval of her abdominal pain.   TE sent to triage regarding her sx and request f

## (undated) NOTE — LETTER
May 18, 2017    Patient: Get Higginbotham   Date of Visit: 5/18/2017       To Whom It May Concern:    Marvin Wilson was seen and treated in our emergency department on 5/18/2017. She should not return to work until 05/19/17.     If you have any questions

## (undated) NOTE — LETTER
Date: 2/26/2018    Patient Name: Mary Kay Solis          To Whom it may concern: This letter has been written at the patient's request. The above patient was seen at the [facility] for treatment of a medical condition.     This patient aiyana

## (undated) NOTE — LETTER
Date: 1/25/2018    Patient Name: Valery Thibodeaux          To Whom it may concern:    Please excuse Vasquez Dickinson from work 1/25 and 1/26/18. She may return without restrictions 1/29/18.         Sincerely,        Wilbert Guerrero PA-C

## (undated) NOTE — LETTER
05/13/22    Patient Name:  Charlie Licea        To Whom it may concern:    Charlie Licea was evaluated in the office today for her headache and should be excused from attending work on 5/13/22.       Sincerely,     Emmie Brady PA-C

## (undated) NOTE — LETTER
Date: 3/11/2020    Patient Name: Luz Maria Westfall  : [birth year]      To Whom it may concern: The above patient was seen at the [facility] for treatment of a medical condition.     This patient should be excused from attending work until Mar

## (undated) NOTE — MR AVS SNAPSHOT
[address], [place] [address] [address] [phone]               Thank you for choosing us for your health care visit with Loy Rodriguez MD.  We are glad to serve you and happy to provide you with this summar Imaging:  US PELVIS (TRANSABDOMINAL PELVIS)  (KQM=[id number])    Instructions: To schedule an appointment for your radiology test please call [address] Scheduling at [phone].        Tuesday March 21, 2017     Imaging:  US PELVIS (TRANSABDOM discharge instructions in [employer] by going to Visits < Admission Summaries. If you've been to the Emergency Department or your doctor's office, you can view your past visit information in [employer] by going to Visits < Visit Summaries. [place] questions?

## (undated) NOTE — LETTER
Date: 3/2/2023    Patient Name: Tommy Crespo          To Whom it may concern: This letter has been written at the patient's request. The above patient was seen at the [facility] for treatment of a medical condition.     This patient should be excused from attending work on 3/2/23-3/3/23      Sincerely,      CURTIS Mcclellan